# Patient Record
Sex: FEMALE | Race: AMERICAN INDIAN OR ALASKA NATIVE | Employment: UNEMPLOYED | URBAN - METROPOLITAN AREA
[De-identification: names, ages, dates, MRNs, and addresses within clinical notes are randomized per-mention and may not be internally consistent; named-entity substitution may affect disease eponyms.]

---

## 2017-01-02 ENCOUNTER — APPOINTMENT (OUTPATIENT)
Dept: PHYSICAL THERAPY | Facility: CLINIC | Age: 42
DRG: 441 | End: 2017-01-02
Payer: MEDICAID

## 2017-01-04 ENCOUNTER — APPOINTMENT (OUTPATIENT)
Dept: GENERAL RADIOLOGY | Facility: CLINIC | Age: 42
DRG: 441 | End: 2017-01-04
Attending: INTERNAL MEDICINE
Payer: MEDICAID

## 2017-01-04 PROCEDURE — 74000 XR ABDOMEN PORT F1 VW: CPT

## 2017-01-05 ENCOUNTER — APPOINTMENT (OUTPATIENT)
Dept: PHYSICAL THERAPY | Facility: CLINIC | Age: 42
DRG: 441 | End: 2017-01-05
Payer: MEDICAID

## 2017-01-06 ENCOUNTER — APPOINTMENT (OUTPATIENT)
Dept: OCCUPATIONAL THERAPY | Facility: CLINIC | Age: 42
DRG: 441 | End: 2017-01-06
Payer: MEDICAID

## 2017-01-07 ENCOUNTER — APPOINTMENT (OUTPATIENT)
Dept: OCCUPATIONAL THERAPY | Facility: CLINIC | Age: 42
DRG: 441 | End: 2017-01-07
Payer: MEDICAID

## 2017-01-08 ENCOUNTER — APPOINTMENT (OUTPATIENT)
Dept: OCCUPATIONAL THERAPY | Facility: CLINIC | Age: 42
DRG: 441 | End: 2017-01-08
Payer: MEDICAID

## 2017-01-09 ENCOUNTER — APPOINTMENT (OUTPATIENT)
Dept: OCCUPATIONAL THERAPY | Facility: CLINIC | Age: 42
DRG: 441 | End: 2017-01-09
Payer: MEDICAID

## 2017-01-09 ENCOUNTER — APPOINTMENT (OUTPATIENT)
Dept: PHYSICAL THERAPY | Facility: CLINIC | Age: 42
DRG: 441 | End: 2017-01-09
Payer: MEDICAID

## 2017-01-10 ENCOUNTER — APPOINTMENT (OUTPATIENT)
Dept: OCCUPATIONAL THERAPY | Facility: CLINIC | Age: 42
DRG: 441 | End: 2017-01-10
Payer: MEDICAID

## 2017-01-11 ENCOUNTER — APPOINTMENT (OUTPATIENT)
Dept: OCCUPATIONAL THERAPY | Facility: CLINIC | Age: 42
DRG: 441 | End: 2017-01-11
Payer: MEDICAID

## 2017-01-12 ENCOUNTER — APPOINTMENT (OUTPATIENT)
Dept: OCCUPATIONAL THERAPY | Facility: CLINIC | Age: 42
DRG: 441 | End: 2017-01-12
Payer: MEDICAID

## 2017-01-13 ENCOUNTER — APPOINTMENT (OUTPATIENT)
Dept: OCCUPATIONAL THERAPY | Facility: CLINIC | Age: 42
DRG: 441 | End: 2017-01-13
Payer: MEDICAID

## 2017-01-14 ENCOUNTER — APPOINTMENT (OUTPATIENT)
Dept: OCCUPATIONAL THERAPY | Facility: CLINIC | Age: 42
DRG: 441 | End: 2017-01-14
Payer: MEDICAID

## 2017-01-17 ENCOUNTER — APPOINTMENT (OUTPATIENT)
Dept: OCCUPATIONAL THERAPY | Facility: CLINIC | Age: 42
DRG: 441 | End: 2017-01-17
Payer: MEDICAID

## 2017-01-20 ENCOUNTER — APPOINTMENT (OUTPATIENT)
Dept: GENERAL RADIOLOGY | Facility: CLINIC | Age: 42
DRG: 441 | End: 2017-01-20
Attending: PEDIATRICS
Payer: MEDICAID

## 2017-01-20 PROCEDURE — 74020 XR ABDOMEN 2 VW: CPT

## 2017-01-20 PROCEDURE — 71020 XR CHEST 2 VW: CPT

## 2017-01-21 ENCOUNTER — APPOINTMENT (OUTPATIENT)
Dept: ULTRASOUND IMAGING | Facility: CLINIC | Age: 42
DRG: 441 | End: 2017-01-21
Attending: PEDIATRICS
Payer: MEDICAID

## 2017-01-21 PROCEDURE — 93970 EXTREMITY STUDY: CPT

## 2017-02-03 ENCOUNTER — TELEPHONE (OUTPATIENT)
Dept: FAMILY MEDICINE | Facility: CLINIC | Age: 42
End: 2017-02-03

## 2017-02-03 ENCOUNTER — CARE COORDINATION (OUTPATIENT)
Dept: CARDIOLOGY | Facility: CLINIC | Age: 42
End: 2017-02-03

## 2017-02-03 NOTE — PROGRESS NOTES
"[facility]  \"Hello, my name is Kassidy Pedroza , and I am calling from the [facility].  I want to check in and see how you are doing, after leaving the hospital.  You may also receive a call from your Care Coordinator (care team), but I want to make sure you don t have any urgent needs.  I have a couple questions to review with you:     Post-Discharge Outreach                                                    Mildred Rascon is a 41 year old female         Care Team:    Patient Care Team       Relationship Specialty Notifications Start End    Matthieu Kunz MD PCP - General Internal Medicine  9/25/12     Phone: [phone] Fax: [phone]         [facility] [address] [facility] [id number]    Heber Lou, RN Clinic Care Coordinator Nurse Admissions 11/15/16     Comment:  phone [phone]            Transition of Care Review                                                      Patient was called three times and no answer so post 24 hr DC follow up calls will be closed out               Plan                                                      Thanks for your time.  Your Care Coordinator may follow-up within the next couple days.  In the meantime if you have questions, concerns or problems call your care team.        Kassidy Pedroza      "

## 2017-02-03 NOTE — TELEPHONE ENCOUNTER
ED / Discharge Outreach Protocol    Patient Contact    Attempt # 1    Was call answered?  No.  Left message on voicemail with information to call me back.    Discharge Summary is incomplete.

## 2017-04-23 ENCOUNTER — APPOINTMENT (OUTPATIENT)
Dept: CT IMAGING | Facility: CLINIC | Age: 42
DRG: 442 | End: 2017-04-23
Attending: FAMILY MEDICINE
Payer: MEDICAID

## 2017-04-23 ENCOUNTER — HOSPITAL ENCOUNTER (INPATIENT)
Facility: CLINIC | Age: 42
LOS: 3 days | Discharge: HOME | DRG: 442 | End: 2017-04-26
Attending: FAMILY MEDICINE | Admitting: PEDIATRICS
Payer: MEDICAID

## 2017-04-23 DIAGNOSIS — R44.0 AUDITORY HALLUCINATIONS: ICD-10-CM

## 2017-04-23 DIAGNOSIS — E87.6 HYPOKALEMIA: ICD-10-CM

## 2017-04-23 DIAGNOSIS — R44.1 VISUAL HALLUCINATIONS: ICD-10-CM

## 2017-04-23 DIAGNOSIS — K70.30 ALCOHOLIC CIRRHOSIS OF LIVER WITHOUT ASCITES (H): ICD-10-CM

## 2017-04-23 DIAGNOSIS — K76.82 HEPATIC ENCEPHALOPATHY (H): ICD-10-CM

## 2017-04-23 LAB
ALBUMIN SERPL-MCNC: 3.1 G/DL (ref 3.4–5)
ALBUMIN UR-MCNC: NEGATIVE MG/DL
ALP SERPL-CCNC: 169 U/L (ref 40–150)
ALT SERPL W P-5'-P-CCNC: 22 U/L (ref 0–50)
AMMONIA PLAS-SCNC: 115 UMOL/L (ref 10–50)
AMPHETAMINES UR QL SCN: NORMAL
ANION GAP SERPL CALCULATED.3IONS-SCNC: 12 MMOL/L (ref 3–14)
APAP SERPL-MCNC: NORMAL MG/L (ref 10–20)
APPEARANCE UR: CLEAR
AST SERPL W P-5'-P-CCNC: 35 U/L (ref 0–45)
BARBITURATES UR QL: NORMAL
BASOPHILS # BLD AUTO: 0.1 10E9/L (ref 0–0.2)
BASOPHILS NFR BLD AUTO: 0.9 %
BENZODIAZ UR QL: NORMAL
BILIRUB SERPL-MCNC: 2 MG/DL (ref 0.2–1.3)
BILIRUB UR QL STRIP: NEGATIVE
BUN SERPL-MCNC: 5 MG/DL (ref 7–30)
CALCIUM SERPL-MCNC: 8.9 MG/DL (ref 8.5–10.1)
CANNABINOIDS UR QL SCN: NORMAL
CHLORIDE SERPL-SCNC: 113 MMOL/L (ref 94–109)
CO2 SERPL-SCNC: 20 MMOL/L (ref 20–32)
COCAINE UR QL: NORMAL
COLOR UR AUTO: YELLOW
CREAT SERPL-MCNC: 0.56 MG/DL (ref 0.52–1.04)
DIFFERENTIAL METHOD BLD: ABNORMAL
EOSINOPHIL # BLD AUTO: 0 10E9/L (ref 0–0.7)
EOSINOPHIL NFR BLD AUTO: 0.7 %
ERYTHROCYTE [DISTWIDTH] IN BLOOD BY AUTOMATED COUNT: 14.3 % (ref 10–15)
ETHANOL SERPL-MCNC: <0.01 G/DL
ETHANOL UR QL SCN: NORMAL
GFR SERPL CREATININE-BSD FRML MDRD: ABNORMAL ML/MIN/1.7M2
GLUCOSE SERPL-MCNC: 108 MG/DL (ref 70–99)
GLUCOSE UR STRIP-MCNC: NEGATIVE MG/DL
HCT VFR BLD AUTO: 38.5 % (ref 35–47)
HGB BLD-MCNC: 13.3 G/DL (ref 11.7–15.7)
HGB UR QL STRIP: NEGATIVE
IMM GRANULOCYTES # BLD: 0 10E9/L (ref 0–0.4)
IMM GRANULOCYTES NFR BLD: 0.2 %
INR PPP: 1.44 (ref 0.86–1.14)
KETONES UR STRIP-MCNC: NEGATIVE MG/DL
LEUKOCYTE ESTERASE UR QL STRIP: NEGATIVE
LYMPHOCYTES # BLD AUTO: 1.1 10E9/L (ref 0.8–5.3)
LYMPHOCYTES NFR BLD AUTO: 19.9 %
MAGNESIUM SERPL-MCNC: 1.7 MG/DL (ref 1.6–2.3)
MCH RBC QN AUTO: 31.7 PG (ref 26.5–33)
MCHC RBC AUTO-ENTMCNC: 34.5 G/DL (ref 31.5–36.5)
MCV RBC AUTO: 92 FL (ref 78–100)
MONOCYTES # BLD AUTO: 0.4 10E9/L (ref 0–1.3)
MONOCYTES NFR BLD AUTO: 7.2 %
NEUTROPHILS # BLD AUTO: 4.1 10E9/L (ref 1.6–8.3)
NEUTROPHILS NFR BLD AUTO: 71.1 %
NITRATE UR QL: NEGATIVE
NRBC # BLD AUTO: 0 10*3/UL
NRBC BLD AUTO-RTO: 0 /100
OPIATES UR QL SCN: NORMAL
PH UR STRIP: 7 PH (ref 5–7)
PLATELET # BLD AUTO: 68 10E9/L (ref 150–450)
POTASSIUM SERPL-SCNC: 3.2 MMOL/L (ref 3.4–5.3)
PROT SERPL-MCNC: 7.7 G/DL (ref 6.8–8.8)
RBC # BLD AUTO: 4.2 10E12/L (ref 3.8–5.2)
RBC #/AREA URNS AUTO: 1 /HPF (ref 0–2)
SALICYLATES SERPL-MCNC: NORMAL MG/DL
SODIUM SERPL-SCNC: 145 MMOL/L (ref 133–144)
SP GR UR STRIP: 1.01 (ref 1–1.03)
SQUAMOUS #/AREA URNS AUTO: 1 /HPF (ref 0–1)
URN SPEC COLLECT METH UR: ABNORMAL
UROBILINOGEN UR STRIP-MCNC: 8 MG/DL (ref 0–2)
WBC # BLD AUTO: 5.7 10E9/L (ref 4–11)
WBC #/AREA URNS AUTO: 4 /HPF (ref 0–2)

## 2017-04-23 PROCEDURE — 85025 COMPLETE CBC W/AUTO DIFF WBC: CPT | Performed by: FAMILY MEDICINE

## 2017-04-23 PROCEDURE — 25000132 ZZH RX MED GY IP 250 OP 250 PS 637: Performed by: PHYSICIAN ASSISTANT

## 2017-04-23 PROCEDURE — 80320 DRUG SCREEN QUANTALCOHOLS: CPT | Performed by: PHYSICIAN ASSISTANT

## 2017-04-23 PROCEDURE — 80329 ANALGESICS NON-OPIOID 1 OR 2: CPT | Performed by: FAMILY MEDICINE

## 2017-04-23 PROCEDURE — 70450 CT HEAD/BRAIN W/O DYE: CPT

## 2017-04-23 PROCEDURE — S5010 5% DEXTROSE AND 0.45% SALINE: HCPCS | Performed by: FAMILY MEDICINE

## 2017-04-23 PROCEDURE — 99285 EMERGENCY DEPT VISIT HI MDM: CPT | Mod: Z6 | Performed by: FAMILY MEDICINE

## 2017-04-23 PROCEDURE — 99223 1ST HOSP IP/OBS HIGH 75: CPT | Mod: AI | Performed by: PEDIATRICS

## 2017-04-23 PROCEDURE — 80053 COMPREHEN METABOLIC PANEL: CPT | Performed by: FAMILY MEDICINE

## 2017-04-23 PROCEDURE — 87040 BLOOD CULTURE FOR BACTERIA: CPT | Performed by: FAMILY MEDICINE

## 2017-04-23 PROCEDURE — 99207 ZZC APP CREDIT; MD BILLING SHARED VISIT: CPT | Performed by: PHYSICIAN ASSISTANT

## 2017-04-23 PROCEDURE — 80307 DRUG TEST PRSMV CHEM ANLYZR: CPT | Performed by: PHYSICIAN ASSISTANT

## 2017-04-23 PROCEDURE — 93005 ELECTROCARDIOGRAM TRACING: CPT

## 2017-04-23 PROCEDURE — 83735 ASSAY OF MAGNESIUM: CPT | Performed by: FAMILY MEDICINE

## 2017-04-23 PROCEDURE — 82140 ASSAY OF AMMONIA: CPT | Performed by: FAMILY MEDICINE

## 2017-04-23 PROCEDURE — 25800025 ZZH RX 258: Performed by: FAMILY MEDICINE

## 2017-04-23 PROCEDURE — 80320 DRUG SCREEN QUANTALCOHOLS: CPT | Performed by: FAMILY MEDICINE

## 2017-04-23 PROCEDURE — 81001 URINALYSIS AUTO W/SCOPE: CPT | Performed by: PHYSICIAN ASSISTANT

## 2017-04-23 PROCEDURE — 25000125 ZZHC RX 250: Performed by: FAMILY MEDICINE

## 2017-04-23 PROCEDURE — 96374 THER/PROPH/DIAG INJ IV PUSH: CPT

## 2017-04-23 PROCEDURE — 85610 PROTHROMBIN TIME: CPT | Performed by: FAMILY MEDICINE

## 2017-04-23 PROCEDURE — 12000001 ZZH R&B MED SURG/OB UMMC

## 2017-04-23 PROCEDURE — 99285 EMERGENCY DEPT VISIT HI MDM: CPT | Mod: 25

## 2017-04-23 RX ORDER — ZINC SULFATE 50(220)MG
220 CAPSULE ORAL 2 TIMES DAILY
COMMUNITY
End: 2017-08-09

## 2017-04-23 RX ORDER — FOLIC ACID 1 MG/1
1 TABLET ORAL DAILY
Status: DISCONTINUED | OUTPATIENT
Start: 2017-04-24 | End: 2017-04-26 | Stop reason: HOSPADM

## 2017-04-23 RX ORDER — POTASSIUM CHLORIDE 750 MG/1
20-40 TABLET, EXTENDED RELEASE ORAL
Status: DISCONTINUED | OUTPATIENT
Start: 2017-04-23 | End: 2017-04-26 | Stop reason: HOSPADM

## 2017-04-23 RX ORDER — POTASSIUM CHLORIDE 29.8 MG/ML
20 INJECTION INTRAVENOUS
Status: DISCONTINUED | OUTPATIENT
Start: 2017-04-23 | End: 2017-04-26 | Stop reason: HOSPADM

## 2017-04-23 RX ORDER — RISPERIDONE 2 MG/1
2 TABLET ORAL AT BEDTIME
Status: DISCONTINUED | OUTPATIENT
Start: 2017-04-23 | End: 2017-04-26 | Stop reason: HOSPADM

## 2017-04-23 RX ORDER — MULTIPLE VITAMINS W/ MINERALS TAB 9MG-400MCG
1 TAB ORAL DAILY
Status: DISCONTINUED | OUTPATIENT
Start: 2017-04-24 | End: 2017-04-26 | Stop reason: HOSPADM

## 2017-04-23 RX ORDER — POTASSIUM CHLORIDE 1.5 G/1.58G
20-40 POWDER, FOR SOLUTION ORAL
Status: DISCONTINUED | OUTPATIENT
Start: 2017-04-23 | End: 2017-04-26 | Stop reason: HOSPADM

## 2017-04-23 RX ORDER — SODIUM CHLORIDE 9 MG/ML
INJECTION, SOLUTION INTRAVENOUS
Status: DISCONTINUED
Start: 2017-04-23 | End: 2017-04-23 | Stop reason: HOSPADM

## 2017-04-23 RX ORDER — RISPERIDONE 1 MG/1
1 TABLET ORAL 2 TIMES DAILY PRN
Status: DISCONTINUED | OUTPATIENT
Start: 2017-04-23 | End: 2017-04-26 | Stop reason: HOSPADM

## 2017-04-23 RX ORDER — ONDANSETRON 2 MG/ML
4 INJECTION INTRAMUSCULAR; INTRAVENOUS EVERY 6 HOURS PRN
Status: DISCONTINUED | OUTPATIENT
Start: 2017-04-23 | End: 2017-04-26 | Stop reason: HOSPADM

## 2017-04-23 RX ORDER — ALBUTEROL SULFATE 90 UG/1
1 AEROSOL, METERED RESPIRATORY (INHALATION) EVERY 4 HOURS PRN
Status: DISCONTINUED | OUTPATIENT
Start: 2017-04-23 | End: 2017-04-26 | Stop reason: HOSPADM

## 2017-04-23 RX ORDER — MULTIPLE VITAMINS W/ MINERALS TAB 9MG-400MCG
1 TAB ORAL DAILY
Status: ON HOLD | COMMUNITY
End: 2018-01-02

## 2017-04-23 RX ORDER — LACTULOSE 10 G/15ML
100 SOLUTION ORAL
Status: DISCONTINUED | OUTPATIENT
Start: 2017-04-23 | End: 2017-04-26 | Stop reason: HOSPADM

## 2017-04-23 RX ORDER — FOLIC ACID 1 MG/1
1 TABLET ORAL DAILY
Status: DISCONTINUED | OUTPATIENT
Start: 2017-04-23 | End: 2017-04-23

## 2017-04-23 RX ORDER — LANOLIN ALCOHOL/MO/W.PET/CERES
100 CREAM (GRAM) TOPICAL DAILY
Status: DISCONTINUED | OUTPATIENT
Start: 2017-04-24 | End: 2017-04-26 | Stop reason: HOSPADM

## 2017-04-23 RX ORDER — ZINC SULFATE 50(220)MG
220 CAPSULE ORAL 2 TIMES DAILY
Status: DISCONTINUED | OUTPATIENT
Start: 2017-04-23 | End: 2017-04-26 | Stop reason: HOSPADM

## 2017-04-23 RX ORDER — POTASSIUM CHLORIDE 7.45 MG/ML
10 INJECTION INTRAVENOUS
Status: DISCONTINUED | OUTPATIENT
Start: 2017-04-23 | End: 2017-04-26 | Stop reason: HOSPADM

## 2017-04-23 RX ORDER — LACTULOSE 10 G/15ML
20 SOLUTION ORAL
Status: DISCONTINUED | OUTPATIENT
Start: 2017-04-23 | End: 2017-04-26 | Stop reason: HOSPADM

## 2017-04-23 RX ORDER — NALOXONE HYDROCHLORIDE 0.4 MG/ML
.1-.4 INJECTION, SOLUTION INTRAMUSCULAR; INTRAVENOUS; SUBCUTANEOUS
Status: DISCONTINUED | OUTPATIENT
Start: 2017-04-23 | End: 2017-04-26 | Stop reason: HOSPADM

## 2017-04-23 RX ORDER — MULTIPLE VITAMINS W/ MINERALS TAB 9MG-400MCG
1 TAB ORAL DAILY
Status: DISCONTINUED | OUTPATIENT
Start: 2017-04-23 | End: 2017-04-23

## 2017-04-23 RX ORDER — POTASSIUM CL/LIDO/0.9 % NACL 10MEQ/0.1L
10 INTRAVENOUS SOLUTION, PIGGYBACK (ML) INTRAVENOUS
Status: DISCONTINUED | OUTPATIENT
Start: 2017-04-23 | End: 2017-04-26 | Stop reason: HOSPADM

## 2017-04-23 RX ORDER — ONDANSETRON 4 MG/1
4 TABLET, ORALLY DISINTEGRATING ORAL EVERY 6 HOURS PRN
Status: DISCONTINUED | OUTPATIENT
Start: 2017-04-23 | End: 2017-04-26 | Stop reason: HOSPADM

## 2017-04-23 RX ORDER — MAGNESIUM SULFATE HEPTAHYDRATE 40 MG/ML
4 INJECTION, SOLUTION INTRAVENOUS EVERY 4 HOURS PRN
Status: DISCONTINUED | OUTPATIENT
Start: 2017-04-23 | End: 2017-04-26 | Stop reason: HOSPADM

## 2017-04-23 RX ADMIN — PANTOPRAZOLE SODIUM 40 MG: 40 TABLET, DELAYED RELEASE ORAL at 20:07

## 2017-04-23 RX ADMIN — ZINC SULFATE CAP 220 MG (50 MG ELEMENTAL ZN) 220 MG: 220 (50 ZN) CAP at 20:08

## 2017-04-23 RX ADMIN — POTASSIUM CHLORIDE 40 MEQ: 1.5 POWDER, FOR SOLUTION ORAL at 20:07

## 2017-04-23 RX ADMIN — RIFAXIMIN 550 MG: 550 TABLET ORAL at 20:08

## 2017-04-23 RX ADMIN — RISPERIDONE 2 MG: 2 TABLET ORAL at 22:09

## 2017-04-23 RX ADMIN — LACTULOSE 20 G: 20 SOLUTION ORAL at 20:07

## 2017-04-23 RX ADMIN — LACTULOSE 20 G: 20 SOLUTION ORAL at 22:09

## 2017-04-23 RX ADMIN — FOLIC ACID: 5 INJECTION, SOLUTION INTRAMUSCULAR; INTRAVENOUS; SUBCUTANEOUS at 13:38

## 2017-04-23 RX ADMIN — POTASSIUM CHLORIDE 20 MEQ: 750 TABLET, EXTENDED RELEASE ORAL at 22:09

## 2017-04-23 ASSESSMENT — ACTIVITIES OF DAILY LIVING (ADL)
TRANSFERRING: 0-->INDEPENDENT
COGNITION: 2 - DIFFICULTY WITH ORGANIZING THOUGHTS
TOILETING: 0-->INDEPENDENT
DRESS: 0-->INDEPENDENT
WHICH_OF_THE_ABOVE_FUNCTIONAL_RISKS_HAD_A_RECENT_ONSET_OR_CHANGE?: AMBULATION;TRANSFERRING;TOILETING;BATHING;DRESSING;COGNITION
FALL_HISTORY_WITHIN_LAST_SIX_MONTHS: NO
RETIRED_COMMUNICATION: 0-->UNDERSTANDS/COMMUNICATES WITHOUT DIFFICULTY
BATHING: 0-->INDEPENDENT
SWALLOWING: 0-->SWALLOWS FOODS/LIQUIDS WITHOUT DIFFICULTY
AMBULATION: 0-->INDEPENDENT
RETIRED_EATING: 0-->INDEPENDENT

## 2017-04-23 ASSESSMENT — ENCOUNTER SYMPTOMS
DIFFICULTY URINATING: 0
FEVER: 0
SHORTNESS OF BREATH: 0
CONFUSION: 1
HALLUCINATIONS: 1
ABDOMINAL PAIN: 0
DYSURIA: 0

## 2017-04-23 ASSESSMENT — PAIN DESCRIPTION - DESCRIPTORS: DESCRIPTORS: ACHING

## 2017-04-23 NOTE — PROGRESS NOTES
Pt arrived to floor from [place] ED. Pt able to stand ambulate to bed, standing wt taken. Pt continuing to have visual and audible hallucinations- pt hallucinations involve seeing Franklin her  standing behind me but he is wearing a mask and invisible suit, this explains why I cannot see Franklin. Pt talking to Franklin frequently and visually getting angry because he refuses to take of mask and suit so I can see him.  Attendant will be bedside. Admission started but unable to obtain accurate information at times due to pt's hallucinations. CAll light within reach, attendant present- Admitting Md arrived to assess pt.

## 2017-04-23 NOTE — ED PROVIDER NOTES
History     Chief Complaint   Patient presents with     Hallucinations     police brought pt in cooeratively after she was seen on street corner waving arms and talking to self. pt believes she is getting  today      HPI  Mildred Rascon is a 42 year old female with a history of alcoholic cirrhosis c/b previous hepatic encephalopathy, cholelithiasis, asthma, MDD, narcotic abuse and seizures who presents via EMS for evaluation of hallucinations. Of note, the patient was seen at [facility] ER 2 days ago for evaluation of confusion. She was found hyperammonemic (to 148  mol/L) in the setting of missed lactulose doses. She was discharged with a prescription for this. The patient is brought in today by EMS when she was found waving her arms and talking to herself, with hallucinations. The patient reports that she she saw someone she knew so was calling out and waving to them, but no one was around according to the police. She also reports that she got  today but is in the process of finalizing it.  Then the patient stated she was  yesterday and there was going to be another ceremony today.  She could not tell me the day of the week, but knew the date.  She thought she was in the psychiatric unit in the hospital.  She is reporting that her boyfriend/ is sitting next to me in the room and he was walking around in the hallway outside, yet there was no one in the room with me or in the hallway.  She was seeing glittering shirts on people and thinking invisible people were also in the room.  The patient reports that she did not get a chance to take her lactulose today and reports she had a dose last evening.  She denies she is taking any illicit substances and had no alcohol the last 2 days. She states she's urinating okay and having loose stools from the lactulose.    I have reviewed the Medications, Allergies, Past Medical and Surgical History, and Social History in the Epic  system.    Current Facility-Administered Medications   Medication     naloxone (NARCAN) injection 0.1-0.4 mg     ondansetron (ZOFRAN-ODT) ODT tab 4 mg    Or     ondansetron (ZOFRAN) injection 4 mg     albuterol (PROAIR HFA/PROVENTIL HFA/VENTOLIN HFA) Inhaler 1 puff     pantoprazole (PROTONIX) suspension 40 mg     rifaximin (XIFAXAN) tablet 550 mg     risperiDONE (risperDAL) tablet 1 mg     risperiDONE (risperDAL) tablet 2 mg     zinc sulfate (ZINCATE) capsule 220 mg     Daily 2 GRAM acetaminophen limit, unless fulminent liver failure or transaminases greater than or equal to 300 - 400, then none     lactulose (CHRONULAC) solution 20 g    Or     lactulose (CHRONULAC) solution for enema prep 100 g     potassium chloride SA (K-DUR/KLOR-CON M) CR tablet 20-40 mEq     potassium chloride (KLOR-CON) Packet 20-40 mEq     potassium chloride 10 mEq in 100 mL intermittent infusion     potassium chloride 10 mEq in 100 mL intermittent infusion with 10 mg lidocaine     potassium chloride 20 mEq in 50 mL intermittent infusion     magnesium sulfate 4 g in 100 mL sterile water (premade)     folic acid (FOLVITE) tablet 1 mg     multivitamin, therapeutic with minerals (THERA-VIT-M) tablet 1 tablet     thiamine tablet 100 mg     Past Medical History:   Diagnosis Date     Anxiety      Arthritis      Depression      Liver disease      Substance abuse        Past Surgical History:   Procedure Laterality Date     APPENDECTOMY       C  DELIVERY ONLY  ,      CHOLECYSTECTOMY       TUBAL LIGATION         Family History   Problem Relation Age of Onset     Liver Cancer Mother      Alcoholism Mother      Lung Cancer Father      Myocardial Infarction Father      Myocardial Infarction Brother      Myocardial Infarction Brother        Social History   Substance Use Topics     Smoking status: Current Every Day Smoker     Years: 7.00     Types: Cigarettes     Smokeless tobacco: Never Used      Comment: cutting back     Alcohol use  Yes      Comment: last drink was a couple months a go        Allergies   Allergen Reactions     Acetaminophen      Ambien [Zolpidem Tartrate] Other (See Comments)     Sleep walks and eats     Ciprofloxacin Other (See Comments)     Seizure.     Citalopram Nausea and Vomiting       Review of Systems   Constitutional: Negative for fever.   HENT: Negative for congestion.    Eyes: Negative for redness.   Respiratory: Negative for shortness of breath.    Cardiovascular: Negative for chest pain.   Gastrointestinal: Negative for abdominal pain.   Genitourinary: Negative for difficulty urinating and dysuria.   Musculoskeletal: Negative for arthralgias and neck stiffness.   Skin: Negative for color change.   Neurological: Negative for headaches.   Psychiatric/Behavioral: Positive for confusion and hallucinations.   All other systems reviewed and are negative.      Physical Exam      Physical Exam   Constitutional: No distress.   HENT:   Head: Normocephalic and atraumatic.   Right Ear: External ear normal.   Left Ear: External ear normal.   Mouth/Throat: Oropharynx is clear and moist. No oropharyngeal exudate.   Eyes: Conjunctivae and EOM are normal. Pupils are equal, round, and reactive to light. No scleral icterus.   Neck: Normal range of motion. Neck supple. No thyromegaly present.   Cardiovascular: Normal rate, regular rhythm, normal heart sounds and intact distal pulses.    Pulmonary/Chest: Effort normal and breath sounds normal. No respiratory distress.   Abdominal: Soft. Bowel sounds are normal. There is hepatomegaly. There is no tenderness.   No ascites   Musculoskeletal: She exhibits no edema or tenderness.   Lymphadenopathy:     She has no cervical adenopathy.   Neurological: She is alert. She has normal strength and normal reflexes. She is disoriented (place and time). No cranial nerve deficit or sensory deficit. She displays a negative Romberg sign.   Skin: Skin is warm. No rash noted. She is not diaphoretic.    Psychiatric: Her speech is normal. Her affect is inappropriate. She is slowed and actively hallucinating. Thought content is delusional. Thought content is not paranoid. Cognition and memory are impaired. She expresses inappropriate judgment. She does not express impulsivity. She expresses no homicidal and no suicidal ideation. She expresses no suicidal plans and no homicidal plans. She exhibits abnormal recent memory.   Patient was actively hallucinating.  She was seeing her  in the room when no one else was there and talking to people not even present in the room or hallway.  She was seeing glittering lights on people's shirts and clothes.  She was delusional thinking she was going to be  today, then said she was  yesterday, then she changed her story saying she was going to get  tomorrow.       ED Course     ED Course     Procedures             EKG Interpretation:      Interpreted by Ant Andino  Time reviewed: admission  Symptoms at time of EKG: AMS   Rhythm: normal sinus   Rate: normal  Axis: normal  Ectopy: none  Conduction: normal  ST Segments/ T Waves: No ST-T wave changes  Q Waves: none  Comparison to prior: No old EKG available    Clinical Impression: normal EKG          Critical Care time:  none               Labs Ordered and Resulted from Time of ED Arrival Up to the Time of Departure from the ED   CBC WITH PLATELETS DIFFERENTIAL - Abnormal; Notable for the following:        Result Value    Platelet Count 68 (*)     All other components within normal limits   COMPREHENSIVE METABOLIC PANEL - Abnormal; Notable for the following:     Sodium 145 (*)     Potassium 3.2 (*)     Chloride 113 (*)     Glucose 108 (*)     Urea Nitrogen 5 (*)     Bilirubin Total 2.0 (*)     Albumin 3.1 (*)     Alkaline Phosphatase 169 (*)     All other components within normal limits   AMMONIA - Abnormal; Notable for the following:     Ammonia 115 (*)     All other components within normal limits    INR - Abnormal; Notable for the following:     INR 1.44 (*)     All other components within normal limits   ALCOHOL ETHYL   ACETAMINOPHEN LEVEL   SALICYLATE LEVEL   MAGNESIUM   ACETAMINOPHEN       Assessments & Plan (with Medical Decision Making)  1.  Hepatic Encephalopathy   2.  Visual and Auditory Hallucinations   3.  Neurocognitive Disorder Secondary to Chronic Alcohol Use   4.  Alcoholic Cirrhosis   5.  Chronic Pain    This patient is a 41-year-old female with a history of alcoholic cirrhosis, hepatic encephalopathy, cholelithiasis, asthma, MDD, narcotic abuse and seizures who presents with increased auditory visual hallucinations.  The patient was picked up by police as she was waving her arms on a street corner and talking to people (not present in the area) that she was going to get .  While here in the ER the patient continually thought that her boyfriend was in the room with me or walking in the hallways when no one else was present.  She was also continuing to have auditory hallucinations as well frequently talking with people not present in the room.  The patient s labs show that her ammonia level is 115.  Looking through chart review, we did find that she was recently at [facility] with an ammonia level of 145 2 days ago and was having hallucinations then, but discharged from their facility.  The patient was very noncompliant with her medications in the past and has continued to drink alcohol in the past as well.  Her alcohol level was less than 0.1.  Chemistry showed that she was slightly hypokalemic at 3.2, sodium was 145.  Patient was given a banana bag with thiamine.  I am worried about whether or not this patient could have some component of Wernicke s encephalopathy also on top of all of this.  The patient s CBC showed a normal white count, a stable hemoglobin at 13.3. The patient s platelets were noted to be suppressed as before.  Her acetaminophen and salicylate levels were  negative.  A U-tox is still pending.  INR was slightly elevated at 1.44 but not significantly off of her baseline.  Head scan was performed because of her altered mental status and this does not show any acute intracranial abnormality.  I suspect that her hepatic encephalopathy in addition to drinking and also her neurocognitive delay has triggered some of these hallucinations.  The patient will most likely need to get her ammonia level down to normal to see if these symptoms improve.  Uncertain if a psychiatric component is part of the problem as well.  I spoke to the attending Medicine physician regarding the patient s clinical exam findings and lab and x-ray results and the need for admission to the hospital.  The patient is not completely oriented to time but she is to person and somewhat to place.  The patient was agreeable to coming in, although initially was declining admission.  She was placed on a 72 hour hold as I felt she was unsafe to be discharged on her own given her delusional AMS.       I have reviewed the nursing notes.    I have reviewed the findings, diagnosis, plan and need for follow up with the patient.  This part of the document was transcribed by Andrei Mesa for Ant Andino MD.    Current Discharge Medication List          Final diagnoses:   Hepatic encephalopathy (H)   Hypokalemia   Alcoholic cirrhosis of liver without ascites (H)   Visual hallucinations   Auditory hallucinations     IAndrei, am serving as a trained medical scribe to document services personally performed by Ant Andino MD, based on the provider's statements to me.      Ant KAISER MD, was physically present and have reviewed and verified the accuracy of this note documented by Andrei Mesa.    4/23/2017   [facility], EMERGENCY DEPARTMENT     Ant Andino MD  04/24/17 0958

## 2017-04-23 NOTE — PHARMACY-ADMISSION MEDICATION HISTORY
Admission Medication History status for the 4/23/2017 admission is complete.  See EPIC admission navigator for Prior to Admission medications.    Medication history sources:  [facility] [place] [place] [facility]    Medication history source reliability: Poor; patient doesn't know her medications and was very confused. Per CareEverywhere, she was last hospitalized at [facility] [place] [place] 2/6- 2/11 and has home care through [facility] [place] [place]. I updated her medication list based on her active medications at [facility] [place] [place], although it is unclear when she last took her medications.     Medication adherence:  Unknown; patient is unable to tell me anything about her medications or when she last took them. She states her fiance, Franklin, manages her medications and knows when she last took them, but her nurse reports [facility] does not exist.    Changes made to PTA medication list (reason)  Added:   - Risperidone 2 mg po qHS per [facility] [place] [place] records. Note the patient was discharged from our hospital on 2/2 on olanzapine and haloperidol, but the psychiatrist at [facility] [place] [place] changed her to risperidone to reduce anticholinergic adverse effects. He had prescribed haloperidol PRN, but patient was not discharged with it.  - Risperidone 1 mg po BID PRN agitation per [facility] [place] [place] records  - Zinc sulfate 220 mg po BID per [facility] [place] [place] records  - Folic acid 1 mg po daily per [facility] [place] [place] records  Deleted:   - Acetaminophen, docusate, ondansetron; were not listed as active medications in [facility] [place] [place] records  - Haloperidol, olanzapine; these medications were discontinued/not given while patient was admitted at [facility] [place] [place] and were not ordered at discharge  - Nystatin; not listed on [facility] [place] [place] records  - Phytonadione; not listed on [facility] [place] [place] records  Changed:   - Multivitamin 1 tablet po daily per [facility] [place] [place] records (updated formulation)    Additional medication history information (including reliability of information, actions taken by pharmacist):   - Patient was not reliable historian and there was no one with patient to get information  regarding her medications. It appears she was getting most of her care at [facility], so I updated her medication list to reflect her active medications from [facility]. It is unclear when the patient last took her medications.   - Per Care Everywhere, the patient was seen at [facility] 2 days ago and prescribed lactulose 20 g po QID. The patient was unable to tell me when she last took lactulose, but did state she thought she needed a dose now.  - Per Care Everywhere, her last hospital admission was 2/6 - 2/11 at [facility]. She has been followed by an [facility] home nurse, but it appears several of her recent visits have not occurred.     Time spent in this activity: 45 minutes    Medication history completed by: Fern Little PharmD    Prior to Admission medications    Medication Sig Last Dose Taking? Auth Provider   multivitamin, therapeutic with minerals (MULTI-VITAMIN) TABS tablet Take 1 tablet by mouth daily Unknown  Unknown, Entered By History   zinc sulfate (ZINCATE) 220 (50 ZN) MG capsule Take 220 mg by mouth 2 times daily Unknown  Unknown, Entered By History   RisperiDONE (RISPERDAL PO) Take 1 mg by mouth 2 times daily as needed (agitation) Unknown  Unknown, Entered By History   RISPERIDONE PO Take 2 mg by mouth At Bedtime Unknown  Unknown, Entered By History   FOLIC ACID PO Take 1 mg by mouth daily Unknown  Unknown, Entered By History   melatonin 3 MG tablet Take 1 tablet (3 mg) by mouth nightly as needed Unknown  Navneet Verma MD   lactulose (CHRONULAC) 10 GM/15ML solution Take 30 mLs (20 g) by mouth 4 times daily Unknown  Navneet Verma MD   pantoprazole (PROTONIX) 2 mg/mL Take 20 mLs (40 mg) by mouth daily Unknown  Navneet Verma MD   thiamine 100 MG tablet Take 1 tablet (100 mg) by mouth daily Unknown  Navneet Verma MD   MEDICATION INSTRUCTION Daily 2 gram acetaminophen limit from all sources.   Navneet Verma MD   albuterol (PROAIR HFA, PROVENTIL HFA, VENTOLIN  HFA) 108 (90 BASE) MCG/ACT inhaler Use 1-2 puffs every 4 to 6 hours as needed Unknown  Navneet Verma MD   rifaximin (XIFAXAN) 550 MG TABS Take 1 tablet (550 mg) by mouth 2 times daily Unknown  Navneet Verma MD   FERROUS GLUCONATE PO Take 324 mg by mouth 2 times daily (with meals) Unknown  Reported, Patient

## 2017-04-23 NOTE — PLAN OF CARE
Problem: Goal Outcome Summary  Goal: Goal Outcome Summary  Outcome: No Change  Pt admitted 1730 to floor from [place] ED. VSS, 72 Hr hold in place. pt experiencing hallucinations both Visual and Audible. Pt able to answer questions for admission but not all- Pt getting frustrated with Franklin- whom is the focal point of the hallucinations. Pt able to deescalate with less questions being asked and providing time with little stimulation.  Skin assessment was not able to be completed due to pt refusing. PIV in Right forearm- NS running at TKO.  STanding wt completed. Attendant at bedside. Urine needed for labs- staff aware. Calls light within reach, will continue to follow POC/monitor.

## 2017-04-23 NOTE — ED NOTES
ED to Floor Handoff      S:  Mildred Rascon is a 42 year old female who speaks English and lives unknown,  home status is unknown  They arrived in the ED by police car with a complaint of Hallucinations (police brought pt in cooeratively after she was seen on street corner waving arms and talking to self. pt believes she is getting  today )    Initial vitals were:   BP: 142/82  Pulse: 112  Temp: 97.9  F (36.6  C)  Resp: 16  Weight: 92.5 kg (204 lb)  SpO2: 97 %  Allergies:   Allergies   Allergen Reactions     Acetaminophen      Ambien [Zolpidem Tartrate] Other (See Comments)     Sleep walks and eats     Ciprofloxacin Other (See Comments)     Seizure.     Citalopram Nausea and Vomiting   .  The meds given in the ED and their home medications are:   Current Facility-Administered Medications   Medication     NaCl 0.9 % infusion     Current Outpatient Prescriptions   Medication     multivitamin, therapeutic with minerals (MULTI-VITAMIN) TABS tablet     zinc sulfate (ZINCATE) 220 (50 ZN) MG capsule     RisperiDONE (RISPERDAL PO)     RISPERIDONE PO     FOLIC ACID PO     melatonin 3 MG tablet     lactulose (CHRONULAC) 10 GM/15ML solution     pantoprazole (PROTONIX) 2 mg/mL     thiamine 100 MG tablet     MEDICATION INSTRUCTION     albuterol (PROAIR HFA, PROVENTIL HFA, VENTOLIN HFA) 108 (90 BASE) MCG/ACT inhaler     rifaximin (XIFAXAN) 550 MG TABS     FERROUS GLUCONATE PO     Social demographics are   Social History     Social History     Marital status:      Spouse name: N/A     Number of children: N/A     Years of education: N/A     Social History Main Topics     Smoking status: Current Every Day Smoker     Years: 7.00     Types: Cigarettes     Smokeless tobacco: Never Used      Comment: cutting back     Alcohol use Yes      Comment: last drink was a couple months a go     Drug use: Yes     Special: Marijuana     Sexual activity: Yes     Partners: Male     Birth control/ protection: Surgical     Other  Topics Concern     Parent/Sibling W/ Cabg, Mi Or Angioplasty Before 65f 55m? No     Social History Narrative       B:   The patient has been ill for 2 day(s).  In the ED was diagnosed with   Final diagnoses:   Hepatic encephalopathy (H)    Infection/sepsis suspected:No Isolation type; No active isolations   A:   In the ED these meds were given:   Medications   NaCl 0.9 % infusion (not administered)   dextrose 5% and 0.45% NaCl 1,000 mL with multivitamin-ADULT (INFUVITE) 10 mL, thiamine 100 mg, folic acid 1 mg infusion ( Intravenous New Bag 4/23/17 1338)     Drips running?  Yes  Labs results   Labs Ordered and Resulted from Time of ED Arrival Up to the Time of Departure from the ED   CBC WITH PLATELETS DIFFERENTIAL - Abnormal; Notable for the following:        Result Value    Platelet Count 68 (*)     All other components within normal limits   COMPREHENSIVE METABOLIC PANEL - Abnormal; Notable for the following:     Sodium 145 (*)     Potassium 3.2 (*)     Chloride 113 (*)     Glucose 108 (*)     Urea Nitrogen 5 (*)     Bilirubin Total 2.0 (*)     Albumin 3.1 (*)     Alkaline Phosphatase 169 (*)     All other components within normal limits   AMMONIA - Abnormal; Notable for the following:     Ammonia 115 (*)     All other components within normal limits   INR - Abnormal; Notable for the following:     INR 1.44 (*)     All other components within normal limits   ALCOHOL ETHYL   ACETAMINOPHEN LEVEL   SALICYLATE LEVEL   MAGNESIUM   ACETAMINOPHEN   DRUG ABUSE SCREEN 6 CHEM DEP URINE ([facility])   ROUTINE UA WITH MICROSCOPIC REFLEX TO CULTURE   BLOOD CULTURE     Imaging Studies:   Recent Results (from the past 24 hour(s))   Head CT w/o contrast    Narrative    CT HEAD WITHOUT CONTRAST  4/23/2017 2:01 PM    HISTORY: Altered mental status without injury.    TECHNIQUE: Thin section axial images without contrast. Radiation dose  for this scan was reduced using automated exposure control, adjustment  of the mA and/or kV according  to patient size, or iterative  reconstruction technique.    COMPARISON: 12/11/2016.    FINDINGS: The ventricles are normal in size, shape and configuration.  The brain parenchyma and subarachnoid spaces are normal with exception  of a minimal focus of subcortical encephalomalacia malacia in the  anterior right frontal lobe which is stable. There is no evidence for  intracranial hemorrhage or mass effects. There is no noncontrast CT  evidence of an acute infarct. No calvarial abnormalities. Visualized  paranasal sinuses are clear.      Impression    IMPRESSION: No acute intracranial abnormality.     DOUGLAS CAREY MD     Recent vital signs /82  Pulse 112  Temp 97.9  F (36.6  C) (Oral)  Resp 16  Wt 92.5 kg (204 lb)  SpO2 97%  BMI 32.93 kg/m2  Cardiac Rhythm: ,      Abnormal labs/tests/findings requiring intervention:---  Pain control: pt had none  Nausea control: pt had none  R:   Transfer assistance needed: Independent  Family present during ED course? No   Family currently present? No  Pt needs tele? No  Code Status: Full Code  Tasks needing to be completed:---    Mansi Brannon  [facility]--   [phone] [place] ED  [phone] [facility] ED

## 2017-04-23 NOTE — IP AVS SNAPSHOT
MRN:[id number]                      After Visit Summary   4/23/2017    Mildred Rascon    MRN: [id number]           Thank you!     Thank you for choosing [facility] for your care. Our goal is always to provide you with excellent care. Hearing back from our patients is one way we can continue to improve our services. Please take a few minutes to complete the written survey that you may receive in the mail after you visit with us. Thank you!        Patient Information     Date Of Birth          1975        Designated Caregiver       Most Recent Value    Caregiver    Will someone help with your care after discharge? no [JOSE]      About your hospital stay     You were admitted on:  April 23, 2017 You last received care in the:  [address] [facility]    You were discharged on:  April 26, 2017        Reason for your hospital stay       You were admitted because you were confused likely because you were not taking your medication , ie lactulose                  Who to Call     For medical emergencies, please call 911.  For non-urgent questions about your medical care, please call your primary care provider or clinic, [phone]          Attending Provider     Provider Specialty    Ant Andino MD Emergency Medicine    Jeremy Dowd MD Internal Medicine       Primary Care Provider Office Phone # Fax #    Ant Cavazos [phone]       [facility] [address] [facility] [address]        After Care Instructions     Activity       As tolerated            Diet       As tolerated            Monitor and record       - If family/friends note that you appear slightly confused they should make sure that you take your lactulose (or rifaximin) right away. This will help clear the confusion.    - Friends/family can have you put your arms up like you are stopping traffic. If your hands are shaking or bending at the wrist this is a sign the toxins are starting to  build up and also a reason to make sure you take a dose of lactulose (or rifamaxin) right away.    - Record every day in a chart when you have a bowel movement and take your lactulose (or rifamaxin). Your family/friends should look at your chart and make sure you haven't missed any doses and that you are having enough bowel movements.  1. Each day there should be 3 boxes for bowel movements.  2. Each day there should be 2 boxes for rifaximin.   3. Each day there should be 4 boxes for lactulose.  If you are behind on any doses or bowel movements family/friends should have you take a dose of medication and call you every 2 hours until you are no longer confused.                  Follow-up Appointments     Follow Up and recommended labs and tests       PCP in one week for post hospital dc follow up                  Additional Services     Medication Therapy Management Referral       Reason for referral:  adherence to medication regimen and takes rifaxamin or lactulose     This service is designed to help you get the most from your medications.  A specially trained pharmacist will work closely with you and your doctors  to solve any problems related to your medications and to help you get the   best results from taking them.      The Medication Therapy Management staff will call you to schedule an appointment.                  Further instructions from your care team       We have scheduled an appt for you on Tuesday 5/2 at 12:00 pm with your Primary Care Provider, Dr. Ant Cavazos. Please bring your insurance card, ID, and these discharge papers with you to this appointment.  [facility]  [address] [place] [address]  Phone #: [phone]      Pending Results     Date and Time Order Name Status Description    4/26/2017 0315 EKG 12-lead, tracing only Preliminary     4/23/2017 1230 Blood culture ONE site Preliminary             Statement of Approval     Ordered          04/25/17 3742   "I have reviewed and agree with all the recommendations and orders detailed in this document.  EFFECTIVE NOW     Approved and electronically signed by:  Noelle Felix MD             Admission Information     Date & Time Provider Department Dept. Phone    2017 Jeremy Dowd MD [address] [facility] [phone]      Your Vitals Were     Blood Pressure Pulse Temperature Respirations Height Weight    107/58 (BP Location: Left arm) 98 97.4  F (36.3  C) (Oral) 16 1.676 m (5' 6\") 96.3 kg (212 lb 3.2 oz)    Pulse Oximetry BMI (Body Mass Index)                98% 34.25 kg/m2          MyChart Information     [employer] lets you send messages to your doctor, view your test results, renew your prescriptions, schedule appointments and more. To sign up, go to [email or web address] . Click on \"Log in\" on the left side of the screen, which will take you to the Welcome page. Then click on \"Sign up Now\" on the right side of the page.     You will be asked to enter the access code listed below, as well as some personal information. Please follow the directions to create your username and password.     Your access code is: [email or web address]  Expires: 2017  3:23 PM     Your access code will  in 90 days. If you need help or a new code, please call your [facility] clinic or [phone].        Care EveryWhere ID     This is your Care EveryWhere ID. This could be used by other organizations to access your [facility] medical records  [id number]           Review of your medicines      CONTINUE these medicines which may have CHANGED, or have new prescriptions. If we are uncertain of the size of tablets/capsules you have at home, strength may be listed as something that might have changed.        Dose / Directions    RISPERDAL PO   This may have changed:  Another medication with the same name was removed. Continue taking this medication, and follow the directions you see here.        Dose:  1 mg   Take 1 mg by mouth 2 times " daily as needed (agitation)   Refills:  0         CONTINUE these medicines which have NOT CHANGED        Dose / Directions    albuterol 108 (90 BASE) MCG/ACT Inhaler   Commonly known as:  PROAIR HFA/PROVENTIL HFA/VENTOLIN HFA   Used for:  Mild intermittent asthma without complication        Use 1-2 puffs every 4 to 6 hours as needed   Quantity:  1 Inhaler   Refills:  0       FERROUS GLUCONATE PO   Indication:  Anemia From Inadequate Iron in the Body        Dose:  324 mg   Take 324 mg by mouth 2 times daily (with meals)   Refills:  0       FOLIC ACID PO        Dose:  1 mg   Take 1 mg by mouth daily   Refills:  0       lactulose 10 GM/15ML solution   Commonly known as:  CHRONULAC   Used for:  Hepatic encephalopathy (H)        Dose:  20 g   Take 30 mLs (20 g) by mouth 4 times daily   Quantity:  3600 mL   Refills:  3       MEDICATION INSTRUCTION   Used for:  Alcoholic liver disease (H)        Daily 2 gram acetaminophen limit from all sources.   Refills:  0       Multi-vitamin Tabs tablet        Dose:  1 tablet   Take 1 tablet by mouth daily   Refills:  0       pantoprazole Susp suspension   Commonly known as:  PROTONIX   Used for:  Alcoholic liver disease (H)        Dose:  40 mg   Take 20 mLs (40 mg) by mouth daily   Refills:  0       rifaximin 550 MG Tabs tablet   Commonly known as:  XIFAXAN   Indication:  Intestinal bacteria reduction in the treament of Hepatic Encephalopathy.   Used for:  Alcoholic cirrhosis of liver without ascites (H)        Dose:  550 mg   Take 1 tablet (550 mg) by mouth 2 times daily   Quantity:  60 tablet   Refills:  3       thiamine 100 MG tablet   Used for:  Alcoholic liver disease (H)        Dose:  100 mg   Take 1 tablet (100 mg) by mouth daily   Refills:  0       zinc sulfate 220 (50 ZN) MG capsule   Commonly known as:  ZINCATE        Dose:  220 mg   Take 220 mg by mouth 2 times daily   Refills:  0         STOP taking     melatonin 3 MG tablet                    Protect others around you:  Learn how to safely use, store and throw away your medicines at www.disposemymeds.org.             Medication List: This is a list of all your medications and when to take them. Check marks below indicate your daily home schedule. Keep this list as a reference.      Medications           Morning Afternoon Evening Bedtime As Needed    albuterol 108 (90 BASE) MCG/ACT Inhaler   Commonly known as:  PROAIR HFA/PROVENTIL HFA/VENTOLIN HFA   Use 1-2 puffs every 4 to 6 hours as needed                                FERROUS GLUCONATE PO   Take 324 mg by mouth 2 times daily (with meals)                                FOLIC ACID PO   Take 1 mg by mouth daily   Last time this was given:  1 mg on 4/26/2017  8:35 AM                                lactulose 10 GM/15ML solution   Commonly known as:  CHRONULAC   Take 30 mLs (20 g) by mouth 4 times daily   Last time this was given:  20 g on 4/26/2017  8:34 AM                                MEDICATION INSTRUCTION   Daily 2 gram acetaminophen limit from all sources.                                Multi-vitamin Tabs tablet   Take 1 tablet by mouth daily   Last time this was given:  1 tablet on 4/26/2017  8:34 AM                                pantoprazole Susp suspension   Commonly known as:  PROTONIX   Take 20 mLs (40 mg) by mouth daily   Last time this was given:  40 mg on 4/26/2017  8:34 AM                                rifaximin 550 MG Tabs tablet   Commonly known as:  XIFAXAN   Take 1 tablet (550 mg) by mouth 2 times daily   Last time this was given:  550 mg on 4/26/2017  8:35 AM                                RISPERDAL PO   Take 1 mg by mouth 2 times daily as needed (agitation)   Last time this was given:  2 mg on 4/24/2017  9:27 PM                                thiamine 100 MG tablet   Take 1 tablet (100 mg) by mouth daily   Last time this was given:  100 mg on 4/26/2017  8:35 AM                                zinc sulfate 220 (50 ZN) MG capsule   Commonly known as:  ZINCATE    Take 220 mg by mouth 2 times daily   Last time this was given:  220 mg on 4/26/2017  8:35 AM

## 2017-04-23 NOTE — LETTER
Transition Communication Hand-off for Care Transitions to Next Level of Care Provider    Name: Mildred Rascon  MRN #: [id number]  Primary Care Provider: Ant Cavazos     Primary Clinic: [facility] [address] [facility] [address]     Reason for Hospitalization:  Hepatic encephalopathy (H) [K72.90]  Admit Date/Time: 4/23/2017 12:12 PM  Discharge Date: 4/26/17  Payor Source: Payor[place] / [place] / Product Type: Medicaid /       Reason for Communication Hand-off Referral: Fragility  Non-adherence to plan of care related to:  Multiple chemical dependency admissions    Discharge Plan: home       Concern for non-adherence with plan of care:   Y/N yes  Discharge Needs Assessment: patient continuously readmitted to multiple OSH for encephalopathy.  This admission, patient was brought into ED by police.      Already enrolled in Tele-monitoring program and name of program:  no  Follow-up specialty is recommended: No    Follow-up plan:  No future appointments.    Any outstanding tests or procedures:        Referrals     Future Labs/Procedures    Medication Therapy Management Referral     Comments:    Reason for referral:  adherence to medication regimen and takes rifaxamin or lactulose     This service is designed to help you get the most from your medications.  A specially trained pharmacist will work closely with you and your doctors  to solve any problems related to your medications and to help you get the   best results from taking them.      The Medication Therapy Management staff will call you to schedule an appointment.            Key Recommendations:  See AVS    Tracie Davalos RN, BSN  Care Coordinator [facility]  Pager: [phone]  Phone: [phone]  AVS/Discharge Summary is the source of truth; this is a helpful guide for improved communication of patient story

## 2017-04-23 NOTE — IP AVS SNAPSHOT
[address] [address]    Phone:  [phone]                                       After Visit Summary   4/23/2017    Mildred Rascon    MRN: [id number]           After Visit Summary Signature Page     I have received my discharge instructions, and my questions have been answered. I have discussed any challenges I see with this plan with the nurse or doctor.    ..........................................................................................................................................  Patient/Patient Representative Signature      ..........................................................................................................................................  Patient Representative Print Name and Relationship to Patient    ..................................................               ................................................  Date                                            Time    ..........................................................................................................................................  Reviewed by Signature/Title    ...................................................              ..............................................  Date                                                            Time

## 2017-04-23 NOTE — ED NOTES
Pt using BR. PT c/o dropping specimen cup and unable to give UA. Pt notified to give one when able.

## 2017-04-23 NOTE — H&P
Internal Medicine History and Physical    Patient Name: Mildred Rascon MRN# [id number]   Age: 42 year old YOB: 1975     Date of Admission:4/23/2017    Primary care provider: Ant Cavazos  Date of Service: 4/23/2017  Admitting Team: Gold Night    Physician Attestation   I, Jeremy TERRY Dowd, saw and evaluated Mildred Rascon as part of a shared visit.  I have reviewed and discussed with the advanced practice provider their history, physical and plan.    I personally reviewed the vital signs, medications, labs and imaging.    My key history or physical exam findings: Pt oriented to date, month, year, president but tangential in thinking about her  and being newly , seeing glitter etc.  No asterixis or evidence of ascites on exam.  Lungs- CTA bilaterally  CV- RRR no M  No swelling in ext    Key management decisions made by me: Agree with lactulose protocol. Consider Psychiatry evaluation of thinking does not clear.  Pt on hold given hx of past elopement.     Jeremy Dowd  Date of Service (when I saw the patient): 04/23/17         Assessment and Plan:   Mildred Rascon is a 42 year old female with a history of alcoholic hepatic cirrhosis c/b HE, asthma, polysubstance abuse, depression, and anxiety who presented to the ED via police due to AMS likely 2/2 hyperammonemia.    Acute on Chronic Encephalopathy likely 2/2 Hyperammonemia, Hx of Alcoholic Cirrhosis.  Brought to ED today via police due to active hallucinations. Labs revealed elevated Ammonia (115), normal ALT/AST, T bili 2.0, Alk phos 169,  ethanol and APAP negative. CT scan with no acute intracranial abnormality. Presented to [facility] ED 4/21 with elevated ammonia (148) in setting of lactulose non-compliance. She was found to be A&O and was sent home with a new lactulose prescription. Suspect current encephalopathy 2/2 medication non-compliance, although consider possible component from underlying neurocognitive  disorder. Low suspicion for infectious source at this time as WBC WNL, patient afebrile, no localized symptoms. VS stable, currently A&O x 3. No asterixis on exam.  - Lactulose protocol  - q4h Neuro checks  - Follow BCx  - UA ordered, pending  - Drug screen pending  - Continue rifaximin 550 mg BID  - Thiamine, folate, MVI QD  - Protonix 40 mg QD    Visual Hallucinations. Presented to ED with active visual hallucinations. CT negative per above. Noted persistent visual hallucinations and some impulsiveness/aggression during recent hospitalization 11/2016. Psych evaluated several times, felt patient did not have decisional capacity to care for self. Patient eloped 2/2/17 prior to obtaining appropriate placement. Uncertain which psychiatric medications are most current. Appears patient was discharged 2/2017 on zyprexa 5 mg QAM, 15 mg QHS; haldol 1 mg QHS, although current prescription lists only risperidone nightly which patient reports taking.   - 1:1 sitter  - 72 hour hold in place  - Continue risperidone 2 mg QHS  - Risperidone 1 mg BID PRN  - Consider psych consult once cognition improved    Suspect Underlying Neurocognitive Disorder. Noted previous hospitalizations, thought 2/2 chronic alcohol use vs previous head trauma. Psychiatry has documented patient as not decisional in previous notes, most recently 2/1/17. During last hospital stay, attempted to place patient in a group home; however, she eloped before placement was obtained. SW subsequently completed a vulnerable adult report as patient felt to be at significant risk for harm in the community.  - 72 hour hold in place  - Psych consult per above    Thrombocytopenia. Platelets 68 on admission, BL ~80's. Thrombocytopenia noted as far back as 2010, more consistently since 2016. Unclear etiology, although likely 2/2 underlying liver disease.   - Trend CBC    Hyperkalemia. K 3.2 in ED, Mg WNL. Suspect 2/2 poor oral intake as patient with encephalopathy, unclear  "ability to care for self.  - Trend CMP  - Potassium replacement protocol    Hypernatremia. Na 145 in ED. Unclear etiology, although possibly 2/2 free water deficit as patient likely with poor oral intake given encephalopathy.  - Trend CMP  - Encourage PO fluids    Asthma. No acute concerns. Continue albuterol PRN.     CODE: Full Code  Diet/IVF: Regular diet  DVT ppx: Mechanical, SCD's  Disposition/Admission Status: Inpatient pending improvement in cognition    Maggy Varela PA-C  Hospitalist Service  Pager: [phone]           Chief Complaint:   Hyperammonemia         HPI:   Mildred Rascon is a 42 year old female with a history of alcohol abuse, alcoholic hepatic cirrhosis c/b HE, depression, and anxiety who presented to the ED with AMS likely 2/2 hyperammonemia    Patient has had multiple hospitalizations for recurrent hepatic encephalopathy, most recently hospitalized at [facility] 11/15/2016-2/2/2017. Her course was complicated by persistent hallucinations and impulsive/aggressive behavior, felt to be inconsistent with her HE. She also presented to [facility] ED 4/21 and found to have an elevated ammonia of 148. Patient was reported as alert and oriented, and felt lab findings likely due to medication non-compliance (self-reported). She was sent home with a new lactulose prescription.    Currently patient states she is here because her \"ammonia is high\". Reports having a discussion with her neighbor earlier today, when police came by and told her she was delusional. Patient is adamant that she was talking with a real person, and the police simply did not look hard enough. They then escorted her to the ED for further evaluation. She reports not having received her afternoon dosing of lactulose, but otherwise believes she has been compliant. Notes she did run out of all of her prescriptions several days ago. Patient believes her  Franklin is currently in the room with her, although no one else is present. " Otherwise denies fevers, chills, HA, SOB, chest pain, cough, abdominal pain, nausea, vomiting, dysuria, urinary frequency, constipation, peripheral edema, or SI/HI.         Past Medical History:     Past Medical History:   Diagnosis Date     Anxiety      Arthritis      Depression      Liver disease      Substance abuse      Reviewed & updated in [employer].         Past Surgical History:     Past Surgical History:   Procedure Laterality Date     APPENDECTOMY       C  DELIVERY ONLY  ,      CHOLECYSTECTOMY       TUBAL LIGATION       Reviewed & updated in Epic.         Social History:   Tobacco use: Former Smoker  Alcohol use: Denies  Illicit substances: Denies         Family History:     Family History   Problem Relation Age of Onset     Liver Cancer Mother      Alcoholism Mother      Lung Cancer Father      Myocardial Infarction Father      Myocardial Infarction Brother      Myocardial Infarction Brother      Reviewed & updated in Epic.         Allergies:      Allergies   Allergen Reactions     Acetaminophen      Ambien [Zolpidem Tartrate] Other (See Comments)     Sleep walks and eats     Ciprofloxacin Other (See Comments)     Seizure.     Citalopram Nausea and Vomiting            Medications:     Prior to Admission Medications   Prescriptions Last Dose Informant Patient Reported? Taking?   FERROUS GLUCONATE PO Unknown Other Yes No   Sig: Take 324 mg by mouth 2 times daily (with meals)   FOLIC ACID PO Unknown Other Yes No   Sig: Take 1 mg by mouth daily   MEDICATION INSTRUCTION  Other No No   Sig: Daily 2 gram acetaminophen limit from all sources.   RISPERIDONE PO Unknown Other Yes No   Sig: Take 2 mg by mouth At Bedtime   RisperiDONE (RISPERDAL PO) Unknown Other Yes No   Sig: Take 1 mg by mouth 2 times daily as needed (agitation)   albuterol (PROAIR HFA, PROVENTIL HFA, VENTOLIN HFA) 108 (90 BASE) MCG/ACT inhaler Unknown Other No No   Sig: Use 1-2 puffs every 4 to 6 hours as needed   lactulose  "(CHRONULAC) 10 GM/15ML solution Unknown Other No No   Sig: Take 30 mLs (20 g) by mouth 4 times daily   melatonin 3 MG tablet Unknown Other No No   Sig: Take 1 tablet (3 mg) by mouth nightly as needed   multivitamin, therapeutic with minerals (MULTI-VITAMIN) TABS tablet Unknown Other Yes No   Sig: Take 1 tablet by mouth daily   pantoprazole (PROTONIX) 2 mg/mL Unknown Other No No   Sig: Take 20 mLs (40 mg) by mouth daily   rifaximin (XIFAXAN) 550 MG TABS Unknown Other No No   Sig: Take 1 tablet (550 mg) by mouth 2 times daily   thiamine 100 MG tablet Unknown Other No No   Sig: Take 1 tablet (100 mg) by mouth daily   zinc sulfate (ZINCATE) 220 (50 ZN) MG capsule Unknown Other Yes No   Sig: Take 220 mg by mouth 2 times daily      Facility-Administered Medications: None             Review of Systems:   A complete ROS was performed and is negative other than what is stated in the HPI.          Physical Exam:   Blood pressure 107/57, pulse 104, temperature 97.5  F (36.4  C), temperature source Oral, resp. rate 16, height 1.676 m (5' 6\"), weight 95.3 kg (210 lb), SpO2 100 %, not currently breastfeeding.  General: Well-appearing female sitting upright in bed, NAD.  HEENT: NC/AT. Anicteric sclera. EOMI. Mucous membranes moist.  Neck: Supple  CV: RRR, S1/S2. No appreciable murmurs.  PULMONARY: Normal effort on room air. Lungs CTAB without wheezes, rales or rhonchi.  GI: Soft, obese. Non-tender, no apparent ascites. Bowel sounds normoactive.  Extremities/MSK: No peripheral edema. Warm & well perfused. No joint tenderness or swelling.  Skin: No jaundice, rashes or lesions evident on exposed skin.  Neuro: A&O X 3. Moves all extremities symmetrically. No focal deficits. No asterixis.  Psych: Active visual hallucinations, believes her  is in the room with her and intermittently speaks to him. Denies SI/HI.         Data:   I reviewed all pertinent data including CBC, CMP, Ammonia, Ethanol, BCx              "

## 2017-04-24 LAB
ALBUMIN SERPL-MCNC: 2.5 G/DL (ref 3.4–5)
ALP SERPL-CCNC: 145 U/L (ref 40–150)
ALT SERPL W P-5'-P-CCNC: 16 U/L (ref 0–50)
ANION GAP SERPL CALCULATED.3IONS-SCNC: 8 MMOL/L (ref 3–14)
AST SERPL W P-5'-P-CCNC: 24 U/L (ref 0–45)
BILIRUB SERPL-MCNC: 1.7 MG/DL (ref 0.2–1.3)
BUN SERPL-MCNC: 6 MG/DL (ref 7–30)
CALCIUM SERPL-MCNC: 8.3 MG/DL (ref 8.5–10.1)
CHLORIDE SERPL-SCNC: 114 MMOL/L (ref 94–109)
CO2 SERPL-SCNC: 20 MMOL/L (ref 20–32)
CREAT SERPL-MCNC: 0.51 MG/DL (ref 0.52–1.04)
ERYTHROCYTE [DISTWIDTH] IN BLOOD BY AUTOMATED COUNT: 15.1 % (ref 10–15)
GFR SERPL CREATININE-BSD FRML MDRD: ABNORMAL ML/MIN/1.7M2
GLUCOSE SERPL-MCNC: 95 MG/DL (ref 70–99)
HCT VFR BLD AUTO: 31.2 % (ref 35–47)
HGB BLD-MCNC: 10.9 G/DL (ref 11.7–15.7)
MCH RBC QN AUTO: 32.4 PG (ref 26.5–33)
MCHC RBC AUTO-ENTMCNC: 34.9 G/DL (ref 31.5–36.5)
MCV RBC AUTO: 93 FL (ref 78–100)
PLATELET # BLD AUTO: 52 10E9/L (ref 150–450)
POTASSIUM SERPL-SCNC: 3.6 MMOL/L (ref 3.4–5.3)
POTASSIUM SERPL-SCNC: 3.7 MMOL/L (ref 3.4–5.3)
PROT SERPL-MCNC: 5.9 G/DL (ref 6.8–8.8)
RBC # BLD AUTO: 3.36 10E12/L (ref 3.8–5.2)
SODIUM SERPL-SCNC: 143 MMOL/L (ref 133–144)
WBC # BLD AUTO: 3.6 10E9/L (ref 4–11)

## 2017-04-24 PROCEDURE — 99233 SBSQ HOSP IP/OBS HIGH 50: CPT | Performed by: INTERNAL MEDICINE

## 2017-04-24 PROCEDURE — 25000132 ZZH RX MED GY IP 250 OP 250 PS 637: Performed by: PHYSICIAN ASSISTANT

## 2017-04-24 PROCEDURE — 36415 COLL VENOUS BLD VENIPUNCTURE: CPT | Performed by: PHYSICIAN ASSISTANT

## 2017-04-24 PROCEDURE — 85027 COMPLETE CBC AUTOMATED: CPT | Performed by: PHYSICIAN ASSISTANT

## 2017-04-24 PROCEDURE — 80053 COMPREHEN METABOLIC PANEL: CPT | Performed by: PHYSICIAN ASSISTANT

## 2017-04-24 PROCEDURE — 25000132 ZZH RX MED GY IP 250 OP 250 PS 637: Performed by: INTERNAL MEDICINE

## 2017-04-24 PROCEDURE — 84132 ASSAY OF SERUM POTASSIUM: CPT | Performed by: PEDIATRICS

## 2017-04-24 PROCEDURE — 99207 ZZC CDG-MDM COMPONENT: MEETS MODERATE - UP CODED: CPT | Performed by: INTERNAL MEDICINE

## 2017-04-24 PROCEDURE — 12000001 ZZH R&B MED SURG/OB UMMC

## 2017-04-24 RX ADMIN — LACTULOSE 20 G: 20 SOLUTION ORAL at 10:09

## 2017-04-24 RX ADMIN — FOLIC ACID 1 MG: 1 TABLET ORAL at 10:09

## 2017-04-24 RX ADMIN — ZINC SULFATE CAP 220 MG (50 MG ELEMENTAL ZN) 220 MG: 220 (50 ZN) CAP at 10:09

## 2017-04-24 RX ADMIN — TRAMADOL HYDROCHLORIDE 25 MG: 50 TABLET, FILM COATED ORAL at 12:44

## 2017-04-24 RX ADMIN — RIFAXIMIN 550 MG: 550 TABLET ORAL at 21:27

## 2017-04-24 RX ADMIN — MULTIPLE VITAMINS W/ MINERALS TAB 1 TABLET: TAB at 10:09

## 2017-04-24 RX ADMIN — RISPERIDONE 2 MG: 2 TABLET ORAL at 21:27

## 2017-04-24 RX ADMIN — ZINC SULFATE CAP 220 MG (50 MG ELEMENTAL ZN) 220 MG: 220 (50 ZN) CAP at 21:27

## 2017-04-24 RX ADMIN — Medication 100 MG: at 10:09

## 2017-04-24 RX ADMIN — PANTOPRAZOLE SODIUM 40 MG: 40 TABLET, DELAYED RELEASE ORAL at 10:09

## 2017-04-24 RX ADMIN — RIFAXIMIN 550 MG: 550 TABLET ORAL at 10:10

## 2017-04-24 RX ADMIN — LACTULOSE 20 G: 20 SOLUTION ORAL at 21:39

## 2017-04-24 ASSESSMENT — ENCOUNTER SYMPTOMS
ARTHRALGIAS: 0
COLOR CHANGE: 0
HEADACHES: 0
EYE REDNESS: 0
NECK STIFFNESS: 0

## 2017-04-24 NOTE — PLAN OF CARE
"Problem: Goal Outcome Summary  Goal: Goal Outcome Summary  Outcome: No Change  Blood pressure 112/62, pulse 92, temperature 97.2  F (36.2  C), temperature source Oral, resp. rate 16, height 1.676 m (5' 6\"), weight 95.3 kg (210 lb), SpO2 100 %, not currently breastfeeding.      Pt here for altered mental status, now improving. Up with SBA to BSC. VSS on room air. Pt has 72 hour hold in place. 1:1 attendant in place. Pt able to answer questions appropriately. Can state where she is and the date. Pt is having hallucinations. Talks to people and seeing people that are not present. Not agitated. Very pleasant and joking with staff. Stated that she is here because she was not taking Lactulose. Pt given Lactulose per PRN orders. Taking it willingly. Takes meds PO without difficulty. Taking food and fluids, good appetite. R PIV is saline locked. Voiding spontaneously and had a BM. Urine sent. Utox neg. Patient is resting at present. Continue to assess per POC.  "

## 2017-04-24 NOTE — PROGRESS NOTES
Care Coordinator- Assessment Note     Admission Date/Time:  4/23/2017  Attending MD:  Jeremy Dowd MD     Data  Chart reviewed, discussed with interdisciplinary team.   Patient was admitted for:   1. Hepatic encephalopathy (H)    2. Hypokalemia    3. Alcoholic cirrhosis of liver without ascites (H)    4. Visual hallucinations    5. Auditory hallucinations         History: alcoholic hepatic cirrhosis c/b HE, asthma, polysubstance abuse, depression, and anxiety     Patient currently admitted with: AMS likely related to hyperammonemia     RNCC Assessment  Concerns with insurance coverage for discharge needs: None.  Current Living Situation: Patient lives in a shelter.  Support System: Supportive  Services Involved: none  Transportation: Family or Friend will provide  Barriers to Discharge: none    Plan  Anticipated Discharge Date: 1-2  Anticipated Discharge Plan:  home    CTS Handoff completed: morgan Degroot RN, BSN, PHN, RNCCPH: [phone]  Pager: [phone]  Covering for : Tracie Davalos, Medicine RNCC

## 2017-04-24 NOTE — PROGRESS NOTES
[facility], [place]    Internal Medicine Progress Note - Gold Service      Assessment & Plan   Mildred Rascon is a 42 year old female with a history of alcoholic hepatic cirrhosis c/b HE, asthma, polysubstance abuse, depression, and anxiety who presented to the ED via police due to AMS likely 2/2 hepatic encephalopathy    Acute on Chronic Encephalopathy likely 2/2 Hepatic encephalopathy: Suspect current encephalopathy 2/2 medication non-compliance. Brought to ED today via police due to active hallucinations. Labs revealed elevated Ammonia (115). Ethanol, Urine toxicology screen and APAP negative. CT scan with no acute intracranial abnormality. Presented to [facility] ED 4/21 with elevated ammonia (148) in setting of lactulose non-compliance. She was found to be A&O and was sent home with a new lactulose prescription. Low suspicion for infectious source at this time as WBC WNL. Patient denies any respiratory, Genitourinary, Gastrointestinal symptoms.   She was started on Lactulose protocol. She was continued on Rifaximin, Thiamine, Folic acid, and MVI. Neuro checks were done.   Today morning, patient still slightly somnolent, but denies any hallucinations. Mildly shaky.   - Continue Lactulose protocol  - q4h Neuro checks  - Continue rifaximin 550 mg BID  - Thiamine, folate, MVI QD  - Protonix 40 mg QD     Visual Hallucinations: Most likely due to Hepatic encephalopathy. Psych evaluated several times, felt patient did not have decisional capacity to care for self. Patient eloped 2/2/17 prior to obtaining appropriate placement. Uncertain which psychiatric medications are most current. Appears patient was discharged 2/2017 on zyprexa 5 mg QAM, 15 mg QHS; haldol 1 mg QHS, although current prescription lists only risperidone nightly which patient reports taking.   Patient denies any hallucinations right now, and agreeable with the plan of care. She was not suicidal. So will discontinue bedside  attendant, and discontinue 72 hours hold  - Consider Psych consult if hallucination returns, mental status worsens despite treatment with Lactulose       Suspect Underlying Neurocognitive Disorder. Noted previous hospitalizations, thought 2/2 chronic alcohol use vs previous head trauma. Psychiatry has documented patient as not decisional in previous notes, most recently 2/1/17. During last hospital stay, attempted to place patient in a group home; however, she eloped before placement was obtained. SW subsequently completed a vulnerable adult report as patient felt to be at significant risk for harm in the community. Discussed with , she can be discharged when medically stable.      Thrombocytopenia. Platelets 68 on admission, BL ~80's. Thrombocytopenia noted as far back as 2010, more consistently since 2016. Unclear etiology, although likely 2/2 underlying liver disease.   No signs of bleeding.   - Trend CBC    Anemia: Baseline around 9-10. Hg 13.3 on admission most likely represents hemoconcentration  - Anemia workup can be done as outpatient     Hypokalemia. K 3.2 in ED, Mg WNL. Suspect 2/2 poor oral intake as patient with encephalopathy, unclear ability to care for self.  K 3.7 in AM  - Potassium replacement protocol     Hypernatremia. Na 145 in ED. Unclear etiology, although possibly 2/2 free water deficit as patient likely with poor oral intake given encephalopathy.  - Trend CMP  - Encourage PO fluids     Asthma. No acute concerns. Continue albuterol PRN.      CODE: Full Code  Diet/IVF: Regular diet  DVT ppx: Mechanical, SCD's  Disposition/Admission Status: Inpatient pending improvement in cognition   Disposition Plan   Expected discharge: 1 - 2 days; recommended to prior living arrangement once .     Entered: Freedom Draper 04/24/2017, 1:14 PM   Information in the above section will display in the discharge planner report.          Freedom Draper  Internal Medicine Staff Hospitalist  Service  [facility]  Pager: [phone]  Please see sticky note for cross cover information    Interval History     Hx reviewed with the patient. Feels better compared to yesterday. Denies any auditory or visual hallucinations. No abdominal fever, chills, cough, chest pain, shortness of breath no burning urination      Data reviewed today: I reviewed all medications, new labs and imaging results over the last 24 hours. I personally reviewed the head CT image(s) showing no acute abnormality.    Physical Exam   Vital Signs: Temp: 97.2  F (36.2  C) Temp src: Axillary BP: 103/54 Pulse: 82   Resp: 16 SpO2: 97 % O2 Device: None (Room air)    Weight: 212 lbs 3.2 oz  General Appearance: In no acute distress. Looked little shaky  Respiratory: B/L CTA  Cardiovascular: S1, S2 normal.   GI: Soft, NT, BS+   Neuro: Sleepy initially, awake later. Oriented to time place person        Data   Medications     - MEDICATION INSTRUCTIONS -         pantoprazole  40 mg Oral Daily     rifaximin  550 mg Oral BID     risperiDONE (risperDAL) tablet 2 mg  2 mg Oral At Bedtime     zinc sulfate  220 mg Oral BID     folic acid (FOLVITE) tablet 1 mg  1 mg Oral Daily     multivitamin, therapeutic with minerals  1 tablet Oral Daily     vitamin  B-1  100 mg Oral Daily     Data     Recent Labs  Lab 04/24/17  0804 04/24/17  0158 04/23/17  1310   WBC 3.6*  --  5.7   HGB 10.9*  --  13.3   MCV 93  --  92   PLT 52*  --  68*   INR  --   --  1.44*     --  145*   POTASSIUM 3.7 3.6 3.2*   CHLORIDE 114*  --  113*   CO2 20  --  20   BUN 6*  --  5*   CR 0.51*  --  0.56   ANIONGAP 8  --  12   FABIAN 8.3*  --  8.9   GLC 95  --  108*   ALBUMIN 2.5*  --  3.1*   PROTTOTAL 5.9*  --  7.7   BILITOTAL 1.7*  --  2.0*   ALKPHOS 145  --  169*   ALT 16  --  22   AST 24  --  35     Recent Results (from the past 24 hour(s))   Head CT w/o contrast    Narrative    CT HEAD WITHOUT CONTRAST  4/23/2017 2:01 PM    HISTORY: Altered mental status without  injury.    TECHNIQUE: Thin section axial images without contrast. Radiation dose  for this scan was reduced using automated exposure control, adjustment  of the mA and/or kV according to patient size, or iterative  reconstruction technique.    COMPARISON: 12/11/2016.    FINDINGS: The ventricles are normal in size, shape and configuration.  The brain parenchyma and subarachnoid spaces are normal with exception  of a minimal focus of subcortical encephalomalacia malacia in the  anterior right frontal lobe which is stable. There is no evidence for  intracranial hemorrhage or mass effects. There is no noncontrast CT  evidence of an acute infarct. No calvarial abnormalities. Visualized  paranasal sinuses are clear.      Impression    IMPRESSION: No acute intracranial abnormality.     DOUGLAS CAREY MD

## 2017-04-24 NOTE — PLAN OF CARE
Problem: Goal Outcome Summary  Goal: Goal Outcome Summary  Outcome: No Change  Pt alert, confused/lethargic in the morning. Lactulose given and confusion improved. AOx4 the rest of the day, no hallucinations reported. PRN lactulose ordered and will continue to monitor for increased confusion and HE. Bedside attendant and 72 hr hold DC'd. R PIV saline locked. Regular diet, good appetite. Voiding WDL, one BM today. Will continue to monitor and follow POC.

## 2017-04-25 VITALS
RESPIRATION RATE: 16 BRPM | DIASTOLIC BLOOD PRESSURE: 58 MMHG | HEART RATE: 98 BPM | BODY MASS INDEX: 34.1 KG/M2 | HEIGHT: 66 IN | OXYGEN SATURATION: 98 % | WEIGHT: 212.2 LBS | TEMPERATURE: 97.4 F | SYSTOLIC BLOOD PRESSURE: 107 MMHG

## 2017-04-25 LAB
ALBUMIN SERPL-MCNC: 2.4 G/DL (ref 3.4–5)
ALP SERPL-CCNC: 164 U/L (ref 40–150)
ALT SERPL W P-5'-P-CCNC: 17 U/L (ref 0–50)
ANION GAP SERPL CALCULATED.3IONS-SCNC: 11 MMOL/L (ref 3–14)
APAP SERPL-MCNC: ABNORMAL UG/ML
AST SERPL W P-5'-P-CCNC: 23 U/L (ref 0–45)
BASOPHILS # BLD AUTO: 0 10E9/L (ref 0–0.2)
BASOPHILS NFR BLD AUTO: 0.8 %
BILIRUB SERPL-MCNC: 1.2 MG/DL (ref 0.2–1.3)
BUN SERPL-MCNC: 6 MG/DL (ref 7–30)
CALCIUM SERPL-MCNC: 8.6 MG/DL (ref 8.5–10.1)
CHLORIDE SERPL-SCNC: 111 MMOL/L (ref 94–109)
CO2 SERPL-SCNC: 22 MMOL/L (ref 20–32)
CREAT SERPL-MCNC: 0.55 MG/DL (ref 0.52–1.04)
DIFFERENTIAL METHOD BLD: ABNORMAL
EOSINOPHIL # BLD AUTO: 0.1 10E9/L (ref 0–0.7)
EOSINOPHIL NFR BLD AUTO: 3.2 %
ERYTHROCYTE [DISTWIDTH] IN BLOOD BY AUTOMATED COUNT: 15.1 % (ref 10–15)
GFR SERPL CREATININE-BSD FRML MDRD: ABNORMAL ML/MIN/1.7M2
GLUCOSE SERPL-MCNC: 135 MG/DL (ref 70–99)
HCT VFR BLD AUTO: 31.8 % (ref 35–47)
HGB BLD-MCNC: 10.7 G/DL (ref 11.7–15.7)
IMM GRANULOCYTES # BLD: 0 10E9/L (ref 0–0.4)
IMM GRANULOCYTES NFR BLD: 0 %
INR PPP: 1.45 (ref 0.86–1.14)
LYMPHOCYTES # BLD AUTO: 1.4 10E9/L (ref 0.8–5.3)
LYMPHOCYTES NFR BLD AUTO: 37.8 %
MCH RBC QN AUTO: 31.7 PG (ref 26.5–33)
MCHC RBC AUTO-ENTMCNC: 33.6 G/DL (ref 31.5–36.5)
MCV RBC AUTO: 94 FL (ref 78–100)
MONOCYTES # BLD AUTO: 0.3 10E9/L (ref 0–1.3)
MONOCYTES NFR BLD AUTO: 7.4 %
NEUTROPHILS # BLD AUTO: 1.9 10E9/L (ref 1.6–8.3)
NEUTROPHILS NFR BLD AUTO: 50.8 %
NRBC # BLD AUTO: 0 10*3/UL
NRBC BLD AUTO-RTO: 0 /100
PLATELET # BLD AUTO: 57 10E9/L (ref 150–450)
POTASSIUM SERPL-SCNC: 3.7 MMOL/L (ref 3.4–5.3)
PROT SERPL-MCNC: 6.2 G/DL (ref 6.8–8.8)
RBC # BLD AUTO: 3.38 10E12/L (ref 3.8–5.2)
SODIUM SERPL-SCNC: 144 MMOL/L (ref 133–144)
WBC # BLD AUTO: 3.8 10E9/L (ref 4–11)

## 2017-04-25 PROCEDURE — 12000001 ZZH R&B MED SURG/OB UMMC

## 2017-04-25 PROCEDURE — 36415 COLL VENOUS BLD VENIPUNCTURE: CPT | Performed by: INTERNAL MEDICINE

## 2017-04-25 PROCEDURE — 85025 COMPLETE CBC W/AUTO DIFF WBC: CPT | Performed by: INTERNAL MEDICINE

## 2017-04-25 PROCEDURE — 99207 ZZC CDG-MDM COMPONENT: MEETS MODERATE - UP CODED: CPT | Performed by: INTERNAL MEDICINE

## 2017-04-25 PROCEDURE — 80053 COMPREHEN METABOLIC PANEL: CPT | Performed by: INTERNAL MEDICINE

## 2017-04-25 PROCEDURE — 85610 PROTHROMBIN TIME: CPT | Performed by: INTERNAL MEDICINE

## 2017-04-25 PROCEDURE — 25000132 ZZH RX MED GY IP 250 OP 250 PS 637: Performed by: PHYSICIAN ASSISTANT

## 2017-04-25 PROCEDURE — 99232 SBSQ HOSP IP/OBS MODERATE 35: CPT | Performed by: INTERNAL MEDICINE

## 2017-04-25 RX ADMIN — Medication 100 MG: at 08:21

## 2017-04-25 RX ADMIN — MULTIPLE VITAMINS W/ MINERALS TAB 1 TABLET: TAB at 08:20

## 2017-04-25 RX ADMIN — ZINC SULFATE CAP 220 MG (50 MG ELEMENTAL ZN) 220 MG: 220 (50 ZN) CAP at 08:20

## 2017-04-25 RX ADMIN — RIFAXIMIN 550 MG: 550 TABLET ORAL at 08:20

## 2017-04-25 RX ADMIN — ZINC SULFATE CAP 220 MG (50 MG ELEMENTAL ZN) 220 MG: 220 (50 ZN) CAP at 20:20

## 2017-04-25 RX ADMIN — LACTULOSE 20 G: 20 SOLUTION ORAL at 00:21

## 2017-04-25 RX ADMIN — LACTULOSE 20 G: 20 SOLUTION ORAL at 21:56

## 2017-04-25 RX ADMIN — FOLIC ACID 1 MG: 1 TABLET ORAL at 08:20

## 2017-04-25 RX ADMIN — RIFAXIMIN 550 MG: 550 TABLET ORAL at 20:20

## 2017-04-25 RX ADMIN — PANTOPRAZOLE SODIUM 40 MG: 40 TABLET, DELAYED RELEASE ORAL at 08:20

## 2017-04-25 NOTE — DISCHARGE SUMMARY
Gold Service - Internal Medicine Discharge Summary       Mildred Rascon MRN# [id number]   YOB: 1975 Age: 42 year old     Date of Admission:  4/23/2017  Date of Discharge:  4/26  Admitting Physician:  Jeremy Dowd MD  Discharge Physician:  Elvira  Discharging Service:  Internal Medicine [facility]     Primary Provider: Ant Cavazos         Reason for Admission:   Confusion           Discharge Diagnosis:   Hepatic encephalopathy  due to medication non compliance  Pancytopenia , etiology under, likely due to liver cirrhosis and alcohol abuse  Hypokalemia  Hypernatremia         Procedures & Significant Findings:     Ct head ; no acute pathology 4/23           Consultations:   none         Hospital Course by Problem:      Acute on Chronic Encephalopathy likely 2/2 Hepatic encephalopathy: Suspect current encephalopathy 2/2 medication non-compliance. Brought to ED t via police due to active hallucinations. Labs revealed elevated Ammonia (115). Ethanol, Urine toxicology screen and APAP negative. CT scan with no acute intracranial abnormality. Presented to [facility] ED 4/21 with elevated ammonia (148) in setting of lactulose non-compliance. She was found to be A&O and was sent home with a new lactulose prescription.   Patient denies any respiratory, Genitourinary, Gastrointestinal symptoms.   She was started on Lactulose protocol. She was continued on Rifaximin, Thiamine, Folic acid, and MVI. Neuro checks were done.   Now alert and oriented and wants to dc to e         Suspect Underlying Neurocognitive Disorder. Noted previous hospitalizations, thought 2/2 chronic alcohol use vs previous head trauma. Psychiatry has documented patient as not decisional in previous notes, most recently 2/1/17. During last hospital stay, attempted to place patient in a group home; however, she eloped before placement was obtained. SW subsequently completed a vulnerable adult report as patient felt to be at significant risk  "for harm in the community. Discussed with  4/25 and 4/26, she can be discharged when medically stable.   She has a house and lives with , tried to contact him but could not reach on number listed . SW gave a cab voucher to patient       Thrombocytopenia. . Unclear etiology, although likely 2/2 underlying liver disease.   No signs of bleeding.        Anemia: Baseline around 9-10      Hypokalemia. Corrected       Hypernatremia.   - Encourage PO fluids  Corrected       Vital signs:  Temp: 96.9  F (36.1  C) Temp src: Oral BP: 93/61 Pulse: 88   Resp: 18 SpO2: 100 % O2 Device: None (Room air)   Height: 167.6 cm (5' 6\") Weight: 96.3 kg (212 lb 3.2 oz) (bed scale)  Estimated body mass index is 34.25 kg/(m^2) as calculated from the following:    Height as of this encounter: 1.676 m (5' 6\").    Weight as of this encounter: 96.3 kg (212 lb 3.2 oz).  Neck ; supple , no JVD  Chest ; equal BS bilaterally , no rales or rhonchi   CVS; RRR, no murmur /rubs or gallops  GI ; soft abdomen, non tender, BS positive  Ext  , no cynosis  Neuro ; CN 2 to 12 grossly intact , No Facial asymmetry noticed  .. No asterixis. Able to tell time/date.cathleen/reason for admission who brought her in . Plan to take lactulose and follow up   Psych ; appropriate mood and effect  Skin ; no rash or purpura on exposed skin            Pending Results:   bcx         Discharge Medications:     Current Discharge Medication List      CONTINUE these medications which have NOT CHANGED    Details   multivitamin, therapeutic with minerals (MULTI-VITAMIN) TABS tablet Take 1 tablet by mouth daily      zinc sulfate (ZINCATE) 220 (50 ZN) MG capsule Take 220 mg by mouth 2 times daily      RisperiDONE (RISPERDAL PO) Take 1 mg by mouth 2 times daily as needed (agitation)      FOLIC ACID PO Take 1 mg by mouth daily      lactulose (CHRONULAC) 10 GM/15ML solution Take 30 mLs (20 g) by mouth 4 times daily  Qty: 3600 mL, Refills: 3    Comments: Goal 4-5 BM daily. "  Hold next dose if goal met.  Associated Diagnoses: Hepatic encephalopathy (H)      pantoprazole (PROTONIX) 2 mg/mL Take 20 mLs (40 mg) by mouth daily    Associated Diagnoses: Alcoholic liver disease (H)      thiamine 100 MG tablet Take 1 tablet (100 mg) by mouth daily    Associated Diagnoses: Alcoholic liver disease (H)      MEDICATION INSTRUCTION Daily 2 gram acetaminophen limit from all sources.  Refills: 0    Associated Diagnoses: Alcoholic liver disease (H)      albuterol (PROAIR HFA, PROVENTIL HFA, VENTOLIN HFA) 108 (90 BASE) MCG/ACT inhaler Use 1-2 puffs every 4 to 6 hours as needed  Qty: 1 Inhaler, Refills: 0    Associated Diagnoses: Mild intermittent asthma without complication      rifaximin (XIFAXAN) 550 MG TABS Take 1 tablet (550 mg) by mouth 2 times daily  Qty: 60 tablet, Refills: 3    Associated Diagnoses: Alcoholic cirrhosis of liver without ascites (H)      FERROUS GLUCONATE PO Take 324 mg by mouth 2 times daily (with meals)         STOP taking these medications       melatonin 3 MG tablet Comments:   Reason for Stopping:                    Discharge Instructions and Follow-Up:     Discharge Procedure Orders  Medication Therapy Management Referral   Referral Type: Med Therapy Management     Reason for your hospital stay   Order Comments: You were admitted because you were confused likely because you were not taking your medication , ie lactulose     Follow Up and recommended labs and tests   Order Comments: PCP in one week for post hospital dc follow up     Activity   Order Comments: As tolerated   Order Specific Question Answer Comments   Is discharge order? Yes      Monitor and record   Order Comments: - If family/friends note that you appear slightly confused they should make sure that you take your lactulose (or rifaximin) right away. This will help clear the confusion.    - Friends/family can have you put your arms up like you are stopping traffic. If your hands are shaking or bending at the  wrist this is a sign the toxins are starting to build up and also a reason to make sure you take a dose of lactulose (or rifamaxin) right away.    - Record every day in a chart when you have a bowel movement and take your lactulose (or rifamaxin). Your family/friends should look at your chart and make sure you haven't missed any doses and that you are having enough bowel movements.  1. Each day there should be 3 boxes for bowel movements.  2. Each day there should be 2 boxes for rifaximin.   3. Each day there should be 4 boxes for lactulose.  If you are behind on any doses or bowel movements family/friends should have you take a dose of medication and call you every 2 hours until you are no longer confused.     Diet   Order Comments: As tolerated   Order Specific Question Answer Comments   Is discharge order? Yes                  Discharge Disposition:   home         Condition on Discharge:   Stable  Time spent 33 minutes   Date of service 4/26  Spoke with RN and SW about dc plans     Noelle Felix  Internal Medicine Hospitalist & Staff Physician  [facility]

## 2017-04-25 NOTE — PLAN OF CARE
Problem: Goal Outcome Summary  Goal: Goal Outcome Summary  Outcome: No Change  1900-0730: Pt A&Ox4 with some forgetfulness and intermittent confusion. VSS on room air. PRN lactulose administered x2. SBA to bathroom, calls appropriately. 2BM overnight. Continue with POC.

## 2017-04-26 ENCOUNTER — CARE COORDINATION (OUTPATIENT)
Dept: CARE COORDINATION | Facility: CLINIC | Age: 42
End: 2017-04-26

## 2017-04-26 LAB
ALBUMIN SERPL-MCNC: 2.9 G/DL (ref 3.4–5)
ALP SERPL-CCNC: 172 U/L (ref 40–150)
ALT SERPL W P-5'-P-CCNC: 19 U/L (ref 0–50)
AMMONIA PLAS-SCNC: 43 UMOL/L (ref 10–50)
ANION GAP SERPL CALCULATED.3IONS-SCNC: 9 MMOL/L (ref 3–14)
AST SERPL W P-5'-P-CCNC: 27 U/L (ref 0–45)
BILIRUB SERPL-MCNC: 1.8 MG/DL (ref 0.2–1.3)
BUN SERPL-MCNC: 8 MG/DL (ref 7–30)
CALCIUM SERPL-MCNC: 8.9 MG/DL (ref 8.5–10.1)
CHLORIDE SERPL-SCNC: 111 MMOL/L (ref 94–109)
CO2 SERPL-SCNC: 21 MMOL/L (ref 20–32)
CREAT SERPL-MCNC: 0.49 MG/DL (ref 0.52–1.04)
ERYTHROCYTE [DISTWIDTH] IN BLOOD BY AUTOMATED COUNT: 14.8 % (ref 10–15)
GFR SERPL CREATININE-BSD FRML MDRD: ABNORMAL ML/MIN/1.7M2
GLUCOSE SERPL-MCNC: 104 MG/DL (ref 70–99)
HCT VFR BLD AUTO: 34 % (ref 35–47)
HGB BLD-MCNC: 11.8 G/DL (ref 11.7–15.7)
INR PPP: 1.42 (ref 0.86–1.14)
MCH RBC QN AUTO: 32.5 PG (ref 26.5–33)
MCHC RBC AUTO-ENTMCNC: 34.7 G/DL (ref 31.5–36.5)
MCV RBC AUTO: 94 FL (ref 78–100)
PLATELET # BLD AUTO: 59 10E9/L (ref 150–450)
POTASSIUM SERPL-SCNC: 3.6 MMOL/L (ref 3.4–5.3)
PROT SERPL-MCNC: 6.7 G/DL (ref 6.8–8.8)
RBC # BLD AUTO: 3.63 10E12/L (ref 3.8–5.2)
SODIUM SERPL-SCNC: 141 MMOL/L (ref 133–144)
WBC # BLD AUTO: 4.6 10E9/L (ref 4–11)

## 2017-04-26 PROCEDURE — 93010 ELECTROCARDIOGRAM REPORT: CPT | Performed by: INTERNAL MEDICINE

## 2017-04-26 PROCEDURE — 99239 HOSP IP/OBS DSCHRG MGMT >30: CPT | Performed by: INTERNAL MEDICINE

## 2017-04-26 PROCEDURE — 82140 ASSAY OF AMMONIA: CPT | Performed by: INTERNAL MEDICINE

## 2017-04-26 PROCEDURE — 36415 COLL VENOUS BLD VENIPUNCTURE: CPT | Performed by: INTERNAL MEDICINE

## 2017-04-26 PROCEDURE — 25000128 H RX IP 250 OP 636: Performed by: INTERNAL MEDICINE

## 2017-04-26 PROCEDURE — 85027 COMPLETE CBC AUTOMATED: CPT | Performed by: INTERNAL MEDICINE

## 2017-04-26 PROCEDURE — 85610 PROTHROMBIN TIME: CPT | Performed by: INTERNAL MEDICINE

## 2017-04-26 PROCEDURE — 25000132 ZZH RX MED GY IP 250 OP 250 PS 637: Performed by: PHYSICIAN ASSISTANT

## 2017-04-26 PROCEDURE — 93005 ELECTROCARDIOGRAM TRACING: CPT

## 2017-04-26 PROCEDURE — 80053 COMPREHEN METABOLIC PANEL: CPT | Performed by: INTERNAL MEDICINE

## 2017-04-26 RX ORDER — HALOPERIDOL 5 MG/ML
INJECTION INTRAMUSCULAR
Status: DISCONTINUED
Start: 2017-04-26 | End: 2017-04-26 | Stop reason: HOSPADM

## 2017-04-26 RX ORDER — HALOPERIDOL 5 MG/ML
2 INJECTION INTRAMUSCULAR ONCE
Status: COMPLETED | OUTPATIENT
Start: 2017-04-26 | End: 2017-04-26

## 2017-04-26 RX ORDER — HALOPERIDOL 5 MG/ML
2 INJECTION INTRAMUSCULAR ONCE
Status: DISCONTINUED | OUTPATIENT
Start: 2017-04-26 | End: 2017-04-26

## 2017-04-26 RX ADMIN — PANTOPRAZOLE SODIUM 40 MG: 40 TABLET, DELAYED RELEASE ORAL at 08:34

## 2017-04-26 RX ADMIN — HALOPERIDOL LACTATE 2 MG: 5 INJECTION, SOLUTION INTRAMUSCULAR at 03:05

## 2017-04-26 RX ADMIN — LACTULOSE 20 G: 20 SOLUTION ORAL at 08:34

## 2017-04-26 RX ADMIN — MULTIPLE VITAMINS W/ MINERALS TAB 1 TABLET: TAB at 08:34

## 2017-04-26 RX ADMIN — FOLIC ACID 1 MG: 1 TABLET ORAL at 08:35

## 2017-04-26 RX ADMIN — RIFAXIMIN 550 MG: 550 TABLET ORAL at 08:35

## 2017-04-26 RX ADMIN — LACTULOSE 20 G: 20 SOLUTION ORAL at 00:24

## 2017-04-26 RX ADMIN — LACTULOSE 20 G: 20 SOLUTION ORAL at 02:55

## 2017-04-26 RX ADMIN — ZINC SULFATE CAP 220 MG (50 MG ELEMENTAL ZN) 220 MG: 220 (50 ZN) CAP at 08:35

## 2017-04-26 RX ADMIN — Medication 100 MG: at 08:35

## 2017-04-26 NOTE — PLAN OF CARE
Problem: Goal Outcome Summary  Goal: Goal Outcome Summary  Outcome: No Change  Temp: 97.4  F (36.3  C) Temp src: Oral BP: 107/58 Pulse: 98 Resp: 16 SpO2: 98 % O2 Device: None (Room air)      Contact isolation. Alert, but disoriented to situation; per [place] score, 3 doses of PRN lactulose given. Patient hallucinating (seeing babies) and aggressive; provider aware and a one time dose of IM haldol ordered; patient cooperative and agreeable to receiving haldol. Restraints ordered by provider, but restraints not used. Patient moved near nursing station for closer supervision. Up with SBA. Regular diet. No complaints of nausea, no emesis. No PIV in place; provider aware. Voiding wnl's. No stools from 2300 to 0330. No complaints of pain.

## 2017-04-26 NOTE — PLAN OF CARE
Problem: Goal Outcome Summary  Goal: Goal Outcome Summary  Outcome: No Change  1256-0643: VSS, A/O, making illogical statements/having hallucinations at times. Lactulose protocol initiated at 2200, increased confusion noted. Pt had planned to d/c today, needs safer plan before discharge. PIV removed in anticipation for d/c, MDs okay with pt not having PIV at this time. Refused risperidone at bedtime d/t stomach pain with previous dose, attempted to provide education but pt becoming increasingly agitated, MDs aware. Will order zyprexa if necessary, pt redirectable at this time. Wandering into other pt's rooms this evening, door alarm placed, attempted to place bed alarm but pt refusing. Continue to monitor and follow POC.

## 2017-04-26 NOTE — PROGRESS NOTES
[facility], [place]    Internal Medicine Progress Note - Gold Service      Assessment & Plan   Mildred Rascon is a 42 year old female admitted on 4/23/2017. For confusion and high ammonia    Acute encephalopathy ;   Due to medication non complainace  Infection work up negative   Ct lactulose per [facility] protocol   Rifaximin   Protonix    Underlying neurocognitive disorder : etoh abuse vs head trauma   Improved   Ct pm resperidone      Diet: Fluids:none  DVT Prophylaxis: ambulatory  Code Status: Full Code    Disposition Plan   Expected discharge ; home today    Noelle Felix  Internal Medicine Staff Hospitalist Service  [facility]    Please see sticky note for cross cover information    Interval History   Slept well   Not agitated   No cp or sob   No hallucinations reported       Data reviewed today: I reviewed all medications, new labs and imaging results over the last 24 hours  Physical Exam   Vital Signs: Temp: 97.4  F (36.3  C) Temp src: Oral BP: 107/58 Pulse: 98   Resp: 16 SpO2: 98 % O2 Device: None (Room air)    Weight: 212 lbs 3.2 oz  General Appearance: Alert and inetarctive  Respiratory: clear b/l no rales or rhocnhi  Cardiovascular: RRR  GI: soft , nontender , bs positive  Skin: no erythema  Other: knows where she is , wants to dc , at times agitated           Data   D/w RN , SW and called s/o

## 2017-04-26 NOTE — PROVIDER NOTIFICATION
"Pt noted to having increasing hallucinations-\"Someone is in my closet!\" and wandering into other pt rooms. When asking pt about this, she states \"they told me to do it, I didn't want to\", crying and very agitated. Bed and door alarms placed. MD notified. PRN lactulose protocol initiated.   "

## 2017-04-26 NOTE — PROGRESS NOTES
[facility], [place]    Internal Medicine Progress Note - Gold Service      Assessment & Plan   Mildred Rascon is a 42 year old female admitted on 4/23/2017.     Acute encephalopathy ; due to medication noncompliance  Continue lactulose  Ct Rifaximin   Thiamin, folate, mvi    Hallucinations and agitation ; improved now .  required IM haldol last night   Seems due to underlying psych related issues  SW had filed vulnerable adult report .    Improved now . Per SW patient has been to numerous hospitals and non compliant with medications .     Diet: regular  DVT Prophylaxis:   Code Status: Full Code    Disposition Plan   Expected discharge: TBD     Entered: Noelle Felix 04/26/2017, 9:20 AM   Information in the above section will display in the discharge planner report.      The patient's care was discussed with the Bedside Nurse.and CC and DELFINO. Asked SW to look into living situation. denia says she lives at  home with yamilet Reid , I was not able to reach him     Noelle Felix  Internal Medicine Staff Hospitalist Service  [facility]    Please see sticky note for cross cover information    Interval History   Agitation last night   Hallucinations last night   4 BM  No chest pain or sob         Data reviewed today: I reviewed all medications, new labs and imaging results over the last 24 hours. I personally reviewed the EKG tracing showing QTC of 468.    Physical Exam   Vital Signs: Temp: 97.4  F (36.3  C) Temp src: Oral BP: 107/58 Pulse: 98   Resp: 16 SpO2: 98 % O2 Device: None (Room air)    Weight: 212 lbs 3.2 oz  General Appearance:   Respiratory: clear bilaterally   Cardiovascular: RRR . No r/g/  GI: soft , bs positive  Skin: no rash   Neuro ; able to tell me date.day year , hospital . Why she brought to ED by police  No hallucinations currently            Data     Recent Labs  Lab 04/26/17  0830 04/25/17  0743 04/24/17  0804  04/23/17  1310   WBC 4.6 3.8*  3.6*  --  5.7   HGB 11.8 10.7* 10.9*  --  13.3   MCV 94 94 93  --  92   PLT 59* 57* 52*  --  68*   INR 1.42* 1.45*  --   --  1.44*    144 143  --  145*   POTASSIUM 3.6 3.7 3.7  < > 3.2*   CHLORIDE 111* 111* 114*  --  113*   CO2 21 22 20  --  20   BUN 8 6* 6*  --  5*   CR 0.49* 0.55 0.51*  --  0.56   ANIONGAP 9 11 8  --  12   FABIAN 8.9 8.6 8.3*  --  8.9   * 135* 95  --  108*   ALBUMIN 2.9* 2.4* 2.5*  --  3.1*   PROTTOTAL 6.7* 6.2* 5.9*  --  7.7   BILITOTAL 1.8* 1.2 1.7*  --  2.0*   ALKPHOS 172* 164* 145  --  169*   ALT 19 17 16  --  22   AST 27 23 24  --  35   < > = values in this interval not displayed.  Ammonia 43 9 115 )

## 2017-04-26 NOTE — PLAN OF CARE
Problem: Goal Outcome Summary  Goal: Goal Outcome Summary  Pt AOx4, VSS on RA. Transfers SBA/independently in room. No c/o pain or nausea. Regular diet, good appetite. Voiding WDL, 2 BM's today. Plan was to DC home. AVS reviewed and signed. PIV removed. Pt claimed  or cab would pick her up. However, when RN later asked the patient about her plan, she became more confused about whether or not she had a ride available and who was going to pick her up. MD paged and decided that pt should stay an add'l night, receiving lactulose PRN until there is a safer plan for her to hopefully DC tomorrow. Will continue to monitor and follow POC.

## 2017-04-26 NOTE — PROGRESS NOTES
Social Work Services Progress Note    Hospital Day: 4  Date of Initial Social Work Evaluation:  RNCC evaluated pt on 4/25/17  Collaborated with:  Patient, RN Carri, primary team Gold 2    Data:  Pt is a 42-year-old female admitted with acute encephalopathy.     Intervention:  Pt to be discharged today and states that she does not have a ride home. Multiple members of the team tried calling pt's significant other, Dontae, but he has been unreachable. SW met with pt. Pt confirms that she lives at [address] [place], [place] [address] [place] in [place]. Pt states that she has been unable to get ahold of any family members to provide a ride for her, and she left her cell phone at home. There is concern for pt's ability to safely take a bus home. SW obtained a cab voucher for the pt. Notified pt's RN of cab voucher and gave cab voucher to charge RN.     Assessment:  Pt to dc home today with cab ride    Plan:    Anticipated Disposition:  Home, no needs identified    Barriers to d/c plan:  None    Follow Up:  SW to follow for any needs    JULIENNE Edwards, LGSW  5[address] Unit   Pager [phone]  Phone [phone]

## 2017-04-26 NOTE — PROGRESS NOTES
"[facility]  \"Hello, my name is Kassidy Pedroza , and I am calling from the [facility].  I want to check in and see how you are doing, after leaving the hospital.  You may also receive a call from your Care Coordinator (care team), but I want to make sure you don t have any urgent needs.  I have a couple questions to review with you:     Post-Discharge Outreach                                                    Mildred Rascon is a 42 year old female     Follow-up Appointments           Follow Up and recommended labs and tests       PCP in one week for post hospital dc follow up                 Care Team:    Patient Care Team       Relationship Specialty Notifications Start End    nAt Cavazos PCP - General Internal Medicine  4/23/17     Phone: [phone] Fax: [phone]         [facility] [address] [facility] [address]            Transition of Care Review                                                      Patient was called three times and no answer so post 24 hr DC follow up calls will be closed out                       Plan                                                      Thanks for your time.  Your Care Coordinator may follow-up within the next couple days.  In the meantime if you have questions, concerns or problems call your care team.        Kassidy Pedroza    "

## 2017-04-26 NOTE — PLAN OF CARE
Problem: Goal Outcome Summary  Goal: Goal Outcome Summary  Outcome: No Change  2590-4337  Pt alert. Pt is disoriented to situation, able to answer orientation questions properly. Pt very agitated and anxious at beginning of shift. Pt continues to have visual hallucinations, yelling in her room and crying. Pt uncooperative with staff intermittently, at times will be compliant. No PIV. Restraints were discontinued by MD as they were not used. No pain. No BMs this shift, voiding adequately. Good appetite. Pt finally fell asleep at 0530.    Vital signs were not taken this morning at pt is sleeping.

## 2017-04-27 ENCOUNTER — TELEPHONE (OUTPATIENT)
Dept: PHARMACY | Facility: OTHER | Age: 42
End: 2017-04-27

## 2017-04-27 LAB — INTERPRETATION ECG - MUSE: NORMAL

## 2017-04-27 NOTE — TELEPHONE ENCOUNTER
MTM referral from: Transitions of Care (recent hospital discharge or ED visit)    MTM referral outreach attempt #1 on April 27, 2017 at 3:57 PM      Outcome: Patient is not interested at this time because they are a [facility] patient. Emailed MTM referral info for follow up, will route to MTM Pharmacist/Provider as an FYI. Thank you for the referral.     Kendra Frias MTM Coordinator

## 2017-04-29 LAB
BACTERIA SPEC CULT: NO GROWTH
Lab: NORMAL
MICRO REPORT STATUS: NORMAL
SPECIMEN SOURCE: NORMAL

## 2017-08-09 ENCOUNTER — HOSPITAL ENCOUNTER (INPATIENT)
Facility: CLINIC | Age: 42
LOS: 145 days | Discharge: NURSING, REHAB OR LONG-TERM CARE | End: 2018-01-02
Attending: FAMILY MEDICINE | Admitting: PSYCHIATRY & NEUROLOGY
Payer: MEDICAID

## 2017-08-09 DIAGNOSIS — F15.10 METHAMPHETAMINE ABUSE, EPISODIC (H): ICD-10-CM

## 2017-08-09 DIAGNOSIS — R41.82 ALTERED MENTAL STATUS, UNSPECIFIED ALTERED MENTAL STATUS TYPE: ICD-10-CM

## 2017-08-09 DIAGNOSIS — F41.8 MIXED ANXIETY DEPRESSIVE DISORDER: ICD-10-CM

## 2017-08-09 DIAGNOSIS — K70.9 LIVER DISEASE, CHRONIC, DUE TO ALCOHOL (H): ICD-10-CM

## 2017-08-09 DIAGNOSIS — R41.82 ALTERED MENTAL STATUS: ICD-10-CM

## 2017-08-09 DIAGNOSIS — K21.9 GASTROESOPHAGEAL REFLUX DISEASE WITHOUT ESOPHAGITIS: ICD-10-CM

## 2017-08-09 DIAGNOSIS — N28.9 RENAL INSUFFICIENCY: ICD-10-CM

## 2017-08-09 DIAGNOSIS — K76.9 HEPATIC LESION: Primary | ICD-10-CM

## 2017-08-09 DIAGNOSIS — K70.9 ALCOHOLIC LIVER DISEASE (H): ICD-10-CM

## 2017-08-09 LAB
ALBUMIN SERPL-MCNC: 3.2 G/DL (ref 3.4–5)
ALCOHOL BREATH TEST: 0 (ref 0–0.01)
ALP SERPL-CCNC: 196 U/L (ref 40–150)
ALT SERPL W P-5'-P-CCNC: 129 U/L (ref 0–50)
AMMONIA PLAS-SCNC: 47 UMOL/L (ref 10–50)
AMMONIA PLAS-SCNC: 52 UMOL/L (ref 10–50)
AMPHETAMINES UR QL SCN: ABNORMAL
ANION GAP SERPL CALCULATED.3IONS-SCNC: 11 MMOL/L (ref 3–14)
ANION GAP SERPL CALCULATED.3IONS-SCNC: 13 MMOL/L (ref 3–14)
AST SERPL W P-5'-P-CCNC: 145 U/L (ref 0–45)
BARBITURATES UR QL: ABNORMAL
BASOPHILS # BLD AUTO: 0 10E9/L (ref 0–0.2)
BASOPHILS NFR BLD AUTO: 0.7 %
BENZODIAZ UR QL: ABNORMAL
BILIRUB SERPL-MCNC: 1.1 MG/DL (ref 0.2–1.3)
BUN SERPL-MCNC: 6 MG/DL (ref 7–30)
BUN SERPL-MCNC: 6 MG/DL (ref 7–30)
CALCIUM SERPL-MCNC: 8.6 MG/DL (ref 8.5–10.1)
CALCIUM SERPL-MCNC: 8.7 MG/DL (ref 8.5–10.1)
CANNABINOIDS UR QL SCN: ABNORMAL
CHLORIDE SERPL-SCNC: 114 MMOL/L (ref 94–109)
CHLORIDE SERPL-SCNC: 115 MMOL/L (ref 94–109)
CO2 SERPL-SCNC: 16 MMOL/L (ref 20–32)
CO2 SERPL-SCNC: 17 MMOL/L (ref 20–32)
COCAINE UR QL: ABNORMAL
CREAT SERPL-MCNC: 0.48 MG/DL (ref 0.52–1.04)
CREAT SERPL-MCNC: 0.48 MG/DL (ref 0.52–1.04)
DIFFERENTIAL METHOD BLD: ABNORMAL
EOSINOPHIL # BLD AUTO: 0.1 10E9/L (ref 0–0.7)
EOSINOPHIL NFR BLD AUTO: 1.4 %
ERYTHROCYTE [DISTWIDTH] IN BLOOD BY AUTOMATED COUNT: 14.4 % (ref 10–15)
ETHANOL UR QL SCN: ABNORMAL
GFR SERPL CREATININE-BSD FRML MDRD: ABNORMAL ML/MIN/1.7M2
GFR SERPL CREATININE-BSD FRML MDRD: ABNORMAL ML/MIN/1.7M2
GLUCOSE SERPL-MCNC: 122 MG/DL (ref 70–99)
GLUCOSE SERPL-MCNC: 134 MG/DL (ref 70–99)
HCT VFR BLD AUTO: 39.8 % (ref 35–47)
HGB BLD-MCNC: 14.1 G/DL (ref 11.7–15.7)
IMM GRANULOCYTES # BLD: 0 10E9/L (ref 0–0.4)
IMM GRANULOCYTES NFR BLD: 0 %
LYMPHOCYTES # BLD AUTO: 0.9 10E9/L (ref 0.8–5.3)
LYMPHOCYTES NFR BLD AUTO: 20.1 %
MAGNESIUM SERPL-MCNC: 1.9 MG/DL (ref 1.6–2.3)
MCH RBC QN AUTO: 31.8 PG (ref 26.5–33)
MCHC RBC AUTO-ENTMCNC: 35.4 G/DL (ref 31.5–36.5)
MCV RBC AUTO: 90 FL (ref 78–100)
MONOCYTES # BLD AUTO: 0.3 10E9/L (ref 0–1.3)
MONOCYTES NFR BLD AUTO: 6.8 %
NEUTROPHILS # BLD AUTO: 3 10E9/L (ref 1.6–8.3)
NEUTROPHILS NFR BLD AUTO: 71 %
NRBC # BLD AUTO: 0 10*3/UL
NRBC BLD AUTO-RTO: 0 /100
OPIATES UR QL SCN: ABNORMAL
PLATELET # BLD AUTO: 61 10E9/L (ref 150–450)
POTASSIUM SERPL-SCNC: 3.4 MMOL/L (ref 3.4–5.3)
POTASSIUM SERPL-SCNC: 3.4 MMOL/L (ref 3.4–5.3)
PROT SERPL-MCNC: 7.8 G/DL (ref 6.8–8.8)
RBC # BLD AUTO: 4.44 10E12/L (ref 3.8–5.2)
SODIUM SERPL-SCNC: 143 MMOL/L (ref 133–144)
SODIUM SERPL-SCNC: 143 MMOL/L (ref 133–144)
TSH SERPL DL<=0.005 MIU/L-ACNC: 2.33 MU/L (ref 0.4–4)
WBC # BLD AUTO: 4.3 10E9/L (ref 4–11)

## 2017-08-09 PROCEDURE — 99285 EMERGENCY DEPT VISIT HI MDM: CPT | Mod: 25 | Performed by: FAMILY MEDICINE

## 2017-08-09 PROCEDURE — 25000128 H RX IP 250 OP 636: Performed by: FAMILY MEDICINE

## 2017-08-09 PROCEDURE — 25800025 ZZH RX 258: Performed by: FAMILY MEDICINE

## 2017-08-09 PROCEDURE — 85025 COMPLETE CBC W/AUTO DIFF WBC: CPT | Performed by: FAMILY MEDICINE

## 2017-08-09 PROCEDURE — 84443 ASSAY THYROID STIM HORMONE: CPT | Performed by: FAMILY MEDICINE

## 2017-08-09 PROCEDURE — 99283 EMERGENCY DEPT VISIT LOW MDM: CPT | Mod: Z6 | Performed by: FAMILY MEDICINE

## 2017-08-09 PROCEDURE — 80320 DRUG SCREEN QUANTALCOHOLS: CPT | Performed by: FAMILY MEDICINE

## 2017-08-09 PROCEDURE — 80307 DRUG TEST PRSMV CHEM ANLYZR: CPT | Performed by: FAMILY MEDICINE

## 2017-08-09 PROCEDURE — 83735 ASSAY OF MAGNESIUM: CPT | Performed by: FAMILY MEDICINE

## 2017-08-09 PROCEDURE — 80053 COMPREHEN METABOLIC PANEL: CPT | Performed by: FAMILY MEDICINE

## 2017-08-09 PROCEDURE — 96361 HYDRATE IV INFUSION ADD-ON: CPT | Performed by: FAMILY MEDICINE

## 2017-08-09 PROCEDURE — 25000132 ZZH RX MED GY IP 250 OP 250 PS 637: Performed by: FAMILY MEDICINE

## 2017-08-09 PROCEDURE — 82075 ASSAY OF BREATH ETHANOL: CPT | Performed by: FAMILY MEDICINE

## 2017-08-09 PROCEDURE — 25000132 ZZH RX MED GY IP 250 OP 250 PS 637: Performed by: EMERGENCY MEDICINE

## 2017-08-09 PROCEDURE — 99207 ZZC CONSULT E&M CHANGED TO SUBSEQUENT LEVEL: CPT | Performed by: INTERNAL MEDICINE

## 2017-08-09 PROCEDURE — 25000125 ZZHC RX 250: Performed by: FAMILY MEDICINE

## 2017-08-09 PROCEDURE — 96365 THER/PROPH/DIAG IV INF INIT: CPT | Performed by: FAMILY MEDICINE

## 2017-08-09 PROCEDURE — 82140 ASSAY OF AMMONIA: CPT | Performed by: FAMILY MEDICINE

## 2017-08-09 PROCEDURE — S5010 5% DEXTROSE AND 0.45% SALINE: HCPCS | Performed by: FAMILY MEDICINE

## 2017-08-09 PROCEDURE — 90791 PSYCH DIAGNOSTIC EVALUATION: CPT

## 2017-08-09 PROCEDURE — 80048 BASIC METABOLIC PNL TOTAL CA: CPT | Performed by: FAMILY MEDICINE

## 2017-08-09 PROCEDURE — 99233 SBSQ HOSP IP/OBS HIGH 50: CPT | Performed by: INTERNAL MEDICINE

## 2017-08-09 RX ORDER — PANTOPRAZOLE SODIUM 40 MG/1
40 TABLET, DELAYED RELEASE ORAL DAILY
Status: ON HOLD | COMMUNITY
End: 2018-01-02

## 2017-08-09 RX ORDER — RISPERIDONE 1 MG/1
1 TABLET, ORALLY DISINTEGRATING ORAL ONCE
Status: COMPLETED | OUTPATIENT
Start: 2017-08-09 | End: 2017-08-09

## 2017-08-09 RX ORDER — FUROSEMIDE 20 MG
TABLET ORAL
COMMUNITY
Start: 2017-07-12 | End: 2017-08-09

## 2017-08-09 RX ORDER — FOLIC ACID 1 MG/1
1 TABLET ORAL DAILY
Status: ON HOLD | COMMUNITY
End: 2018-01-02

## 2017-08-09 RX ORDER — HALOPERIDOL 5 MG/ML
2 INJECTION INTRAMUSCULAR ONCE
Status: DISCONTINUED | OUTPATIENT
Start: 2017-08-09 | End: 2017-08-09

## 2017-08-09 RX ORDER — SPIRONOLACTONE 100 MG/1
100 TABLET, FILM COATED ORAL
COMMUNITY
Start: 2017-06-25 | End: 2017-08-09

## 2017-08-09 RX ORDER — RISPERIDONE 1 MG/1
1 TABLET ORAL 2 TIMES DAILY PRN
Status: ON HOLD | COMMUNITY
End: 2018-01-02

## 2017-08-09 RX ORDER — GABAPENTIN 300 MG/1
300 CAPSULE ORAL 3 TIMES DAILY
Status: ON HOLD | COMMUNITY
End: 2018-01-02

## 2017-08-09 RX ORDER — FERROUS GLUCONATE 324(38)MG
324 TABLET ORAL
Status: ON HOLD | COMMUNITY
End: 2018-01-02

## 2017-08-09 RX ORDER — SODIUM CHLORIDE 9 MG/ML
1000 INJECTION, SOLUTION INTRAVENOUS CONTINUOUS
Status: DISCONTINUED | OUTPATIENT
Start: 2017-08-09 | End: 2017-08-30

## 2017-08-09 RX ORDER — RISPERIDONE 2 MG/1
2 TABLET ORAL AT BEDTIME
Status: ON HOLD | COMMUNITY
End: 2018-01-02

## 2017-08-09 RX ADMIN — SODIUM CHLORIDE 1000 ML: 9 INJECTION, SOLUTION INTRAVENOUS at 16:27

## 2017-08-09 RX ADMIN — FOLIC ACID: 5 INJECTION, SOLUTION INTRAMUSCULAR; INTRAVENOUS; SUBCUTANEOUS at 15:04

## 2017-08-09 RX ADMIN — SODIUM CHLORIDE 1000 ML: 9 INJECTION, SOLUTION INTRAVENOUS at 09:47

## 2017-08-09 RX ADMIN — LACTULOSE 20 G: 20 POWDER, FOR SOLUTION ORAL at 10:56

## 2017-08-09 RX ADMIN — RISPERIDONE 1 MG: 1 TABLET, ORALLY DISINTEGRATING ORAL at 12:19

## 2017-08-09 RX ADMIN — SODIUM CHLORIDE 1000 ML: 9 INJECTION, SOLUTION INTRAVENOUS at 11:13

## 2017-08-09 RX ADMIN — RISPERIDONE 1 MG: 1 TABLET, ORALLY DISINTEGRATING ORAL at 10:34

## 2017-08-09 RX ADMIN — LACTULOSE 20 G: 20 POWDER, FOR SOLUTION ORAL at 20:33

## 2017-08-09 NOTE — ED NOTES
"Awake,  Staring, reaching for objects not present  States \"i need to finish my puzzle\".  Tearful at times  "

## 2017-08-09 NOTE — ED NOTES
Pt appears to be responding to internal stimuli; reaching for things that are not physically present and conversating at times as though another person is in the room with her.

## 2017-08-09 NOTE — ED NOTES
Bed: ED11  Expected date: 8/9/17  Expected time: 7:50 AM  Means of arrival:   Comments:  [facility] [address] 43 yo F anxiety

## 2017-08-09 NOTE — ED NOTES
"In room screaming and crying,  Pulled own IV out, staring at the blood yelling \"someone is trying to kill me, You were suppose to keep me safe\"   Assisted to clean up, scrubs changed.   Remains paranoid with auditory and visual hallucinations.  MD at bedside  "

## 2017-08-09 NOTE — ED NOTES
"Pt in her room on her hands and knees starring at the ground with her pants down to her knees. Pt told to pull up her pants and asked if she needed assistance or a smaller size pants, but pt said \"no I am fine\" as she crawled across the floor with her pants down.   "

## 2017-08-09 NOTE — IP AVS SNAPSHOT
[address]    [address]    [place] [address]    Phone:  [phone]                                       After Visit Summary   8/9/2017    Mildred Rascon    MRN: [id number]           After Visit Summary Signature Page     I have received my discharge instructions, and my questions have been answered. I have discussed any challenges I see with this plan with the nurse or doctor.    ..........................................................................................................................................  Patient/Patient Representative Signature      ..........................................................................................................................................  Patient Representative Print Name and Relationship to Patient    ..................................................               ................................................  Date                                            Time    ..........................................................................................................................................  Reviewed by Signature/Title    ...................................................              ..............................................  Date                                                            Time

## 2017-08-09 NOTE — PHARMACY-ADMISSION MEDICATION HISTORY
Admission Medication History     Admission medication history interview status for the August 9, 2017 admission is complete. See Epic admission navigator for allergy information, pharmacy, prior to admission medications and immunization status.     Medication history interview sources:  [facility] ([phone]), [facility] ([phone]), [facility] ([facility], [phone]), [facility] ([phone]), [facility] ([phone])     Medication compliance: Poor    Changes made to PTA medication list (reason)  Added:   - risperidone 2 mg at bedtime  Deleted:   - lactulose 10 g packet, take 3 packets TID x10 days --> last fill in Feb 2017, per [facility] this completed  - furosemide 20 mg tab (no dose) --> no record of this medication with any pharmacy  - rifaximin 550 mg BID --> no record of this medication with any pharmacy  - spironolactone 100 mg daily --> no record of this medication with any pharmacy  - thiamine 100 mg daily --> no record of this medication with any pharmacy  - zinc sulfate 220 mg (50 Zn) capsule --> no record of this medication with any pharmacy  Changed:   - pantoprazole liquid --> pantoprazole 40 mg tab daily (formulation/dose correction)  - ferrous gluconate 324 mg BID --> 324 mg daily (dose correction)    Additional medication history information (including reliability of information, actions taken by pharmacist):  - Unable to interview patient given current presentation. Attempted to contact spouse (Dontae) at [phone] but unable to get through. Called the patient's listed home phone number ([phone]), however this number belongs to someone unrelated to Mildred.   - Contacted home health care ([facility]; [phone]) to obtain medication history, however they noted the patient was discharged from home health care on 6/13/17 and has not received care from them since.   - VERY difficult to determine the patient's medications and whether  she has been taking them. Contacted 5 pharmacies ([facility], [facility], [facility], [facility], and [facility]) and found the following refill history:   - [facility]: most recent fill was April 2017 (gabapentin), preceeded by Feb 2017 (ferrous gluconate, folic acid, albuterol)    - [facility]: most recent fill was March 2017 (gabapentin)   - [facility]: most recent fill was Feb 2017 (risperidone, lactulose, pantoprazole, multivitamin)   - [facility]: most recent fill April 2017 (lactulose)    Prior to Admission medications    Medication Sig Last Dose Taking? Auth Provider   ferrous gluconate (FERGON) 324 (38 FE) MG tablet Take 324 mg by mouth daily (with breakfast) Unknown  Unknown, Entered By History   folic acid (FOLVITE) 1 MG tablet Take 1 mg by mouth daily Unknown  Unknown, Entered By History   pantoprazole (PROTONIX) 40 MG EC tablet Take 40 mg by mouth daily Unknown  Unknown, Entered By History   risperiDONE (RISPERDAL) 1 MG tablet Take 1 mg by mouth 2 times daily as needed (agitation) Unknown  Unknown, Entered By History   risperiDONE (RISPERDAL) 2 MG tablet Take 2 mg by mouth At Bedtime Unknown  Unknown, Entered By History   gabapentin (NEURONTIN) 300 MG capsule Take 300 mg by mouth 3 times daily Unknown  Unknown, Entered By History   multivitamin, therapeutic with minerals (MULTI-VITAMIN) TABS tablet Take 1 tablet by mouth daily Unknown     albuterol (PROAIR HFA, PROVENTIL HFA, VENTOLIN HFA) 108 (90 BASE) MCG/ACT inhaler Use 1-2 puffs every 4 to 6 hours as needed Unknown  Navneet Verma MD     Time spent: 2 hours    Medication history completed by:   Ludy Anaya, AshleyD

## 2017-08-09 NOTE — ED NOTES
Patient talked with  on the phone.    updated on condition by writer, with patients consent to give info

## 2017-08-09 NOTE — ED NOTES
"Pt tearful, asking staff to unlock the bed so she can \"move it into the hallway\" and \"unhook my IV so I can leave.\"   "
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
## 2017-08-09 NOTE — ED NOTES
"Pt sitting on the bathroom floor, states she is \"finishing a puzzle for her friend,\" pt taking large steps looking at the floor and attempting to  something that was not there.  Pt crying stating \"please don't shoot me, they're gonna kill me, please don't let them kill me.\"  "
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
## 2017-08-09 NOTE — ED NOTES
"Pt standing behind bed, asking PA \"unlock this bed so I can move it for my friends, I'm taking this home.\"  Told PA \"move right now you little bitch, I am moving this bed.\"  PA re-directed pt to bed, pt continues to reach for things not physically present and handed PA a \"hat\" (which was not physically present) and said \"put this fucking ugly hat on and don't let the door hit you on the way out bitch.\"  "

## 2017-08-09 NOTE — ED NOTES
Patient seen by and talked with MD.  Reports feels comfortable being discharge.  Given discharge instructions along with information for treatment centers and homeless shelters.  Discharged home

## 2017-08-09 NOTE — IP AVS SNAPSHOT
MRN:[id number]                      After Visit Summary   8/9/2017    Mildred Rascon    MRN: [id number]           Thank you!     Thank you for choosing [facility] for your care. Our goal is always to provide you with excellent care.        Patient Information     Date Of Birth          1975        About your hospital stay     You were admitted on:  August 10, 2017 You last received care in the:  UR 30NR    You were discharged on:  January 2, 2018       Who to Call     For medical emergencies, please call 911.  For non-urgent questions about your medical care, please call your primary care provider or clinic, [phone]          Attending Provider     Provider Specialty    Manolo Renee MD [facility], Quoc Avalos MD Psychiatry    July Foss MD Psychiatry    Kerry, Asif DE LA CRUZ MD Psychiatry    Brandi, Jeffrey Graham MD Psychiatry    Ashish, Andrés Tee MD Psychiatry       Primary Care Provider Office Phone # Fax #    Ant Cavazos [phone]      After Care Instructions     Activity       Activity Level: up as tolerated. Patient uses a walker, so her ambulation is slow.            Diet       Diet order: Regular  Fluid restriction? NA  Texture: regular  Thicken liquids? No            Fall precautions           General info for SNF       Admitting Provider(s): Andrés Hinojosa MD    Admit to Facility: [facility]  Length of Stay Estimate: Undetermined   Condition: Improving  Level of care:skilled   Rehabilitation Potential: Good  Admission H&P completed: Yes   Recent Chemotherapy: No  Use Nursing Home Standing Orders: Yes    For questions about these admission orders, please contact:  [address] [address]  Phone: [phone]                  Follow-up Appointments     Follow Up ([facility])       Follow up MRI to assess liver lesion. See you PCP 1 week after MRI to  discuss results    Appointments on [place] and/or [facility] (with [facility] or [facility] provider or service). Call [phone] if you haven't heard regarding these appointments within 7 days of discharge.                  Future tests that were ordered for you     MR Abdomen w Contrast       Administration of IV contrast (contrast agent, dose, and amount) will be tailored to this examination per the appropriate written protocol listed in the Protocol E-Book, or by the supervising imaging provider.                  Further instructions from your care team        Behavioral Discharge Planning and Instructions      Summary:  You were admitted on 8/9/2017 after eloping from the [facility] you were placed in from [facility].  You were then found at a party where you were smoking methamphetamine and crying uncontrollably and your family called 911. You were treated by Dr. Asif Payne MD and then Andrés Hinojosa MD. You were responding to internal stimuli. You spent most of the time in your room. You were not able to participate in the program. Your symptoms did not resolve but did improve.  You were discharged on 1/2/2018 from [facility] to a Skilled Nursing  Home at [facility] on a provisional discharge. You were [facility] by [facility]. You will be on a secure behavioral health unit.      Principal Diagnosis:   Cognitive Disorder NOS.   Encephalopathy probably multifactorial such as chronic hepatic encephalopathy, organic brain damage due to encephalopathy, alcohol drinking, possibly underlying psychotic illness; possibly head injury contributes to patient's symptoms.   Alcohol use disorder.   Amphetamine use disorder.  Major depressive disorder, moderate severity.       Health Care Follow-up Appointments:     You were recommitted through [facility] [place] on November 29, 2017.   Continue to coordinate the terms of your commitment with the adult mental health case  manager.  [facility]  is  Shira Pate Ph: [phone]  [facility] is Janki Benítez Ph: [phone]  The Health unit Coordinator has faxed these discharge instructions to fax: [phone]      You are being discharged on a provisional discharge. This is filed with [facility].      Tracie is the liaison to your admission to this [facility].  Ph: [phone]  Fax:[phone]    [facility]  [place] at [address] [address]  Ph: [phone]  Fax: [phone]      The OBRA Level 2 screening was completed 12/29/17 by [facility].  Macarena Mays  Ph: [phone]  Fax: [phone]      Your [facility] CADI Worker is:  Yoon Braun  Cell: [phone]  The [facility] Unit Coordinator has faxed these discharge instructions to Fax: [phone]      You sister would like to be your guardian.  Yareli  Ph: [phone]    Attend all scheduled appointments with your outpatient providers. Call at least 24 hours in advance if you need to reschedule an appointment to ensure continued access to your outpatient providers.   Major Treatments, Procedures and Findings:  You were provided with: a psychiatric assessment and medication evaluation and/or management    You were evaluated by internal medicine multiple times for your physical health conditions.    You were seen by a pain management specialist on 11/14/17.    Symptoms to Report: feeling more aggressive, increased confusion, losing more sleep, mood getting worse or thoughts of suicide    Early warning signs can include: increased depression or anxiety sleep disturbances increased thoughts or behaviors of suicide or self-harm  increased unusual thinking, such as paranoia or hearing voices    Safety and Wellness:  Take all medicines as directed.  Make no changes unless your doctor suggests them.      Follow treatment recommendations.  Refrain from alcohol and non-prescribed drugs.  If  "there is a concern for safety, call 911.    Resources:   [facility] Crisis (COPE) Response - Adult [phone]        The treatment team has appreciated the opportunity to work with you.     If you have any questions or concerns our unit number is [phone]          Pending Results     No orders found from 2017 to 8/10/2017.            Statement of Approval     Ordered          18 0942  I have reviewed and agree with all the recommendations and orders detailed in this document.     Approved and electronically signed by:  Andrés Hinojosa MD             Admission Information     Date & Time Provider Department Dept. Phone    2017 Andrés Hinojosa MD UR 30NR [phone]      Your Vitals Were     Blood Pressure Pulse Temperature Respirations Height Weight    109/69 75 96  F (35.6  C) 16 1.676 m (5' 5.98\") 102.5 kg (226 lb)    Pulse Oximetry BMI (Body Mass Index)                94% 36.5 kg/m2          [employer] Information     "[employer]" lets you send messages to your doctor, view your test results, renew your prescriptions, schedule appointments and more. To sign up, go to [email or web address]" . Click on \"Log in\" on the left side of the screen, which will take you to the Welcome page. Then click on \"Sign up Now\" on the right side of the page.     You will be asked to enter the access code listed below, as well as some personal information. Please follow the directions to create your username and password.     Your access code is: [email or web address]  Expires: 2018 12:12 PM     Your access code will  in 90 days. If you need help or a new code, please call your [place] clinic or [phone].        Care EveryWhere ID     This is your Care EveryWhere ID. This could be used by other organizations to access your [place] medical records  [id number]        Equal Access to Services     ALFREDO HARRIS AH: Cachorro Knedrick, annie buenrostro, sushila garduno " sushma lukedanish sarikamonico la'aan ah. So [facility] [phone].    ATENCIÓN: Si habla español, tiene a alvarado disposición servicios gratuitos de asistencia lingüística. Dilip al [phone].    We comply with applicable federal civil rights laws and [place] laws. We do not discriminate on the basis of race, color, national origin, age, disability, sex, sexual orientation, or gender identity.               Review of your medicines      START taking        Dose / Directions    furosemide 20 MG tablet   Commonly known as:  LASIX   Used for:  Renal insufficiency        Dose:  20 mg   Take 1 tablet (20 mg) by mouth daily   Quantity:  30 tablet   Refills:  1       * haloperidol 2 MG tablet   Commonly known as:  HALDOL   Used for:  Altered mental status, unspecified altered mental status type, Mixed anxiety depressive disorder        Dose:  2 mg   Take 1 tablet (2 mg) by mouth 2 times daily   Quantity:  60 tablet   Refills:  1       * haloperidol 0.5 MG tablet   Commonly known as:  HALDOL   Used for:  Altered mental status, unspecified altered mental status type, Mixed anxiety depressive disorder        Dose:  0.5 mg   Take 1 tablet (0.5 mg) by mouth 2 times daily   Quantity:  60 tablet   Refills:  1       lactulose 10 GM Packet   Commonly known as:  CEPHULAC   Used for:  Altered mental status, unspecified altered mental status type, Liver disease, chronic, due to alcohol (H)        Dose:  30 g   Take 3 packets (30 g) by mouth 3 times daily   Quantity:  90 each   Refills:  1       levOCARNitine 330 MG tablet   Commonly known as:  CARNITOR   Used for:  Liver disease, chronic, due to alcohol (H), Alcoholic liver disease (H)        Dose:  990 mg   Take 3 tablets (990 mg) by mouth 2 times daily   Quantity:  180 tablet   Refills:  1       rifaximin 550 MG Tabs tablet   Commonly known as:  XIFAXAN   Indication:  hepatic encephalopathy   Used for:  Liver disease, chronic, due to alcohol (H), Altered mental status, unspecified altered mental  status type        Dose:  550 mg   Take 1 tablet (550 mg) by mouth 2 times daily   Quantity:  60 tablet   Refills:  1       spironolactone 50 MG tablet   Commonly known as:  ALDACTONE   Used for:  Liver disease, chronic, due to alcohol (H), Renal insufficiency        Dose:  50 mg   Take 1 tablet (50 mg) by mouth daily   Quantity:  30 tablet   Refills:  1       * Notice:  This list has 2 medication(s) that are the same as other medications prescribed for you. Read the directions carefully, and ask your doctor or other care provider to review them with you.      CONTINUE these medicines which may have CHANGED, or have new prescriptions. If we are uncertain of the size of tablets/capsules you have at home, strength may be listed as something that might have changed.        Dose / Directions    gabapentin 100 MG capsule   Commonly known as:  NEURONTIN   This may have changed:    - medication strength  - how much to take   Used for:  Mixed anxiety depressive disorder        Dose:  200 mg   Take 2 capsules (200 mg) by mouth 3 times daily   Quantity:  180 capsule   Refills:  1       * risperiDONE 2 MG tablet   Commonly known as:  risperDAL   This may have changed:  Another medication with the same name was changed. Make sure you understand how and when to take each.   Used for:  Altered mental status, unspecified altered mental status type, Mixed anxiety depressive disorder        Dose:  2 mg   Take 1 tablet (2 mg) by mouth At Bedtime   Quantity:  30 tablet   Refills:  1       * risperiDONE 1 MG tablet   Commonly known as:  risperDAL   This may have changed:    - when to take this  - reasons to take this   Used for:  Altered mental status, unspecified altered mental status type, Mixed anxiety depressive disorder        Dose:  1 mg   Take 1 tablet (1 mg) by mouth every morning   Quantity:  30 tablet   Refills:  1       * Notice:  This list has 2 medication(s) that are the same as other medications prescribed for you. Read  the directions carefully, and ask your doctor or other care provider to review them with you.      CONTINUE these medicines which have NOT CHANGED        Dose / Directions    albuterol 108 (90 BASE) MCG/ACT Inhaler   Commonly known as:  PROAIR HFA/PROVENTIL HFA/VENTOLIN HFA   Used for:  Mild intermittent asthma without complication        Use 1-2 puffs every 4 to 6 hours as needed   Quantity:  1 Inhaler   Refills:  0       ferrous gluconate 324 (38 FE) MG tablet   Commonly known as:  FERGON   Used for:  Liver disease, chronic, due to alcohol (H), Alcoholic liver disease (H)        Dose:  324 mg   Take 1 tablet (324 mg) by mouth daily (with breakfast)   Quantity:  30 tablet   Refills:  1       folic acid 1 MG tablet   Commonly known as:  FOLVITE   Used for:  Liver disease, chronic, due to alcohol (H), Alcoholic liver disease (H)        Dose:  1 mg   Take 1 tablet (1 mg) by mouth daily   Quantity:  30 tablet   Refills:  1       multivitamin, therapeutic with minerals Tabs tablet   Used for:  Liver disease, chronic, due to alcohol (H)        Dose:  1 tablet   Take 1 tablet by mouth daily   Quantity:  30 each   Refills:  1       pantoprazole 40 MG EC tablet   Commonly known as:  PROTONIX   Used for:  Gastroesophageal reflux disease without esophagitis        Dose:  40 mg   Take 1 tablet (40 mg) by mouth daily   Quantity:  30 tablet   Refills:  1            Where to get your medicines      Some of these will need a paper prescription and others can be bought over the counter. Ask your nurse if you have questions.     Bring a paper prescription for each of these medications     ferrous gluconate 324 (38 FE) MG tablet    folic acid 1 MG tablet    furosemide 20 MG tablet    gabapentin 100 MG capsule    haloperidol 0.5 MG tablet    haloperidol 2 MG tablet    lactulose 10 GM Packet    levOCARNitine 330 MG tablet    multivitamin, therapeutic with minerals Tabs tablet    pantoprazole 40 MG EC tablet    rifaximin 550 MG Tabs  tablet    risperiDONE 1 MG tablet    risperiDONE 2 MG tablet    spironolactone 50 MG tablet               ANTIBIOTIC INSTRUCTION     You've Been Prescribed an Antibiotic - Now What?  Your healthcare team thinks that you or your loved one might have an infection. Some infections can be treated with antibiotics, which are powerful, life-saving drugs. Like all medications, antibiotics have side effects and should only be used when necessary. There are some important things you should know about your antibiotic treatment.      Your healthcare team may run tests before you start taking an antibiotic.    Your team may take samples (e.g., from your blood, urine or other areas) to run tests to look for bacteria. These test can be important to determine if you need an antibiotic at all and, if you do, which antibiotic will work best.      Within a few days, your healthcare team might change or even stop your antibiotic.    Your team may start you on an antibiotic while they are working to find out what is making you sick.    Your team might change your antibiotic because test results show that a different antibiotic would be better to treat your infection.    In some cases, once your team has more information, they learn that you do not need an antibiotic at all. They may find out that you don't have an infection, or that the antibiotic you're taking won't work against your infection. For example, an infection caused by a virus can't be treated with antibiotics. Staying on an antibiotic when you don't need it is more likely to be harmful than helpful.      You may experience side effects from your antibiotic.    Like all medications, antibiotics have side effects. Some of these can be serious.    Let you healthcare team know if you have any known allergies when you are admitted to the hospital.    One significant side effect of nearly all antibiotics is the risk of severe and sometimes deadly diarrhea caused by Clostridium  difficile (C. Difficile). This occurs when a person takes antibiotics because some good germs are destroyed. Antibiotic use allows C. diificile to take over, putting patients at high risk for this serious infection.    As a patient or caregiver, it is important to understand your or your loved one's antibiotic treatment. It is especially important for caregivers to speak up when patients can't speak for themselves. Here are some important questions to ask your healthcare team.    What infection is this antibiotic treating and how do you know I have that infection?    What side effects might occur from this antibiotic?    How long will I need to take this antibiotic?    Is it safe to take this antibiotic with other medications or supplements (e.g., vitamins) that I am taking?     Are there any special directions I need to know about taking this antibiotic? For example, should I take it with food?    How will I be monitored to know whether my infection is responding to the antibiotic?    What tests may help to make sure the right antibiotic is prescribed for me?      Information provided by:  www.cdc.gov/getsmart  U.S. Department of Health and Human Services  Centers for disease Control and Prevention  National Center for Emerging and Zoonotic Infectious Diseases  Division of Healthcare Quality Promotion         Protect others around you: Learn how to safely use, store and throw away your medicines at www.disposemymeds.org.             Medication List: This is a list of all your medications and when to take them. Check marks below indicate your daily home schedule. Keep this list as a reference.      Medications           Morning Afternoon Evening Bedtime As Needed    albuterol 108 (90 BASE) MCG/ACT Inhaler   Commonly known as:  PROAIR HFA/PROVENTIL HFA/VENTOLIN HFA   Use 1-2 puffs every 4 to 6 hours as needed                                ferrous gluconate 324 (38 FE) MG tablet   Commonly known as:  FERGON   Take 1  tablet (324 mg) by mouth daily (with breakfast)   Last time this was given:  324 mg on 1/2/2018 10:22 AM                                folic acid 1 MG tablet   Commonly known as:  FOLVITE   Take 1 tablet (1 mg) by mouth daily   Last time this was given:  1 mg on 1/2/2018 10:22 AM                                furosemide 20 MG tablet   Commonly known as:  LASIX   Take 1 tablet (20 mg) by mouth daily   Last time this was given:  20 mg on 1/2/2018 10:20 AM                                gabapentin 100 MG capsule   Commonly known as:  NEURONTIN   Take 2 capsules (200 mg) by mouth 3 times daily   Last time this was given:  200 mg on 1/2/2018 10:20 AM                                * haloperidol 2 MG tablet   Commonly known as:  HALDOL   Take 1 tablet (2 mg) by mouth 2 times daily   Last time this was given:  0.5 mg on 1/2/2018 10:23 AM                                * haloperidol 0.5 MG tablet   Commonly known as:  HALDOL   Take 1 tablet (0.5 mg) by mouth 2 times daily   Last time this was given:  0.5 mg on 1/2/2018 10:23 AM                                lactulose 10 GM Packet   Commonly known as:  CEPHULAC   Take 3 packets (30 g) by mouth 3 times daily   Last time this was given:  30 g on 1/2/2018 10:20 AM                                levOCARNitine 330 MG tablet   Commonly known as:  CARNITOR   Take 3 tablets (990 mg) by mouth 2 times daily   Last time this was given:  990 mg on 1/2/2018 10:22 AM                                multivitamin, therapeutic with minerals Tabs tablet   Take 1 tablet by mouth daily   Last time this was given:  1 tablet on 1/2/2018 10:22 AM                                pantoprazole 40 MG EC tablet   Commonly known as:  PROTONIX   Take 1 tablet (40 mg) by mouth daily   Last time this was given:  40 mg on 1/2/2018 10:22 AM                                rifaximin 550 MG Tabs tablet   Commonly known as:  XIFAXAN   Take 1 tablet (550 mg) by mouth 2 times daily   Last time this was given:   550 mg on 1/2/2018 10:21 AM                                * risperiDONE 2 MG tablet   Commonly known as:  risperDAL   Take 1 tablet (2 mg) by mouth At Bedtime   Last time this was given:  1 mg on 1/2/2018 10:22 AM                                * risperiDONE 1 MG tablet   Commonly known as:  risperDAL   Take 1 tablet (1 mg) by mouth every morning   Last time this was given:  1 mg on 1/2/2018 10:22 AM                                spironolactone 50 MG tablet   Commonly known as:  ALDACTONE   Take 1 tablet (50 mg) by mouth daily   Last time this was given:  50 mg on 1/2/2018 10:24 AM                                * Notice:  This list has 4 medication(s) that are the same as other medications prescribed for you. Read the directions carefully, and ask your doctor or other care provider to review them with you.

## 2017-08-09 NOTE — ED NOTES
Continues to talk to people who are not present.  Reaches in to the air trying to grab things that are not present.

## 2017-08-09 NOTE — ED NOTES
Sleeping, slept through IV start and blood draw. Placed on pulse oximetry.  Haldol held at this time,  IV fluids started.
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
## 2017-08-10 PROBLEM — F29 PSYCHOSIS (H): Status: ACTIVE | Noted: 2017-08-10

## 2017-08-10 PROCEDURE — 25000132 ZZH RX MED GY IP 250 OP 250 PS 637: Performed by: EMERGENCY MEDICINE

## 2017-08-10 PROCEDURE — 12400001 ZZH R&B MH UMMC

## 2017-08-10 PROCEDURE — 25000132 ZZH RX MED GY IP 250 OP 250 PS 637: Performed by: PSYCHIATRY & NEUROLOGY

## 2017-08-10 PROCEDURE — HZ2ZZZZ DETOXIFICATION SERVICES FOR SUBSTANCE ABUSE TREATMENT: ICD-10-PCS | Performed by: PSYCHIATRY & NEUROLOGY

## 2017-08-10 PROCEDURE — 25000132 ZZH RX MED GY IP 250 OP 250 PS 637: Performed by: NURSE PRACTITIONER

## 2017-08-10 RX ORDER — FOLIC ACID 1 MG/1
1 TABLET ORAL DAILY
Status: DISCONTINUED | OUTPATIENT
Start: 2017-08-10 | End: 2018-01-02 | Stop reason: HOSPADM

## 2017-08-10 RX ORDER — FUROSEMIDE 20 MG/1
20 TABLET ORAL DAILY
Status: DISCONTINUED | OUTPATIENT
Start: 2017-08-10 | End: 2018-01-02 | Stop reason: HOSPADM

## 2017-08-10 RX ORDER — RISPERIDONE 2 MG/1
2 TABLET ORAL AT BEDTIME
Status: DISCONTINUED | OUTPATIENT
Start: 2017-08-10 | End: 2018-01-02 | Stop reason: HOSPADM

## 2017-08-10 RX ORDER — LORAZEPAM 1 MG/1
1 TABLET ORAL EVERY 4 HOURS PRN
Status: DISCONTINUED | OUTPATIENT
Start: 2017-08-10 | End: 2018-01-02 | Stop reason: HOSPADM

## 2017-08-10 RX ORDER — RISPERIDONE 1 MG/1
1 TABLET ORAL 2 TIMES DAILY PRN
Status: DISCONTINUED | OUTPATIENT
Start: 2017-08-10 | End: 2018-01-02 | Stop reason: HOSPADM

## 2017-08-10 RX ORDER — HYDROXYZINE HYDROCHLORIDE 25 MG/1
25-50 TABLET, FILM COATED ORAL EVERY 4 HOURS PRN
Status: DISCONTINUED | OUTPATIENT
Start: 2017-08-10 | End: 2018-01-02 | Stop reason: HOSPADM

## 2017-08-10 RX ORDER — MULTIPLE VITAMINS W/ MINERALS TAB 9MG-400MCG
1 TAB ORAL DAILY
Status: DISCONTINUED | OUTPATIENT
Start: 2017-08-10 | End: 2018-01-02 | Stop reason: HOSPADM

## 2017-08-10 RX ORDER — ALBUTEROL SULFATE 90 UG/1
2 AEROSOL, METERED RESPIRATORY (INHALATION) EVERY 4 HOURS PRN
Status: DISCONTINUED | OUTPATIENT
Start: 2017-08-10 | End: 2018-01-02 | Stop reason: HOSPADM

## 2017-08-10 RX ORDER — PANTOPRAZOLE SODIUM 40 MG/1
40 TABLET, DELAYED RELEASE ORAL DAILY
Status: DISCONTINUED | OUTPATIENT
Start: 2017-08-10 | End: 2018-01-02 | Stop reason: HOSPADM

## 2017-08-10 RX ORDER — SPIRONOLACTONE 50 MG/1
50 TABLET, FILM COATED ORAL DAILY
Status: DISCONTINUED | OUTPATIENT
Start: 2017-08-10 | End: 2018-01-02 | Stop reason: HOSPADM

## 2017-08-10 RX ADMIN — PANTOPRAZOLE SODIUM 40 MG: 40 TABLET, DELAYED RELEASE ORAL at 09:03

## 2017-08-10 RX ADMIN — SPIRONOLACTONE 50 MG: 50 TABLET ORAL at 11:02

## 2017-08-10 RX ADMIN — LACTULOSE 20 G: 20 POWDER, FOR SOLUTION ORAL at 09:03

## 2017-08-10 RX ADMIN — RIFAXIMIN 550 MG: 550 TABLET ORAL at 09:03

## 2017-08-10 RX ADMIN — RISPERIDONE 2 MG: 2 TABLET ORAL at 09:10

## 2017-08-10 RX ADMIN — LACTULOSE 20 G: 20 POWDER, FOR SOLUTION ORAL at 14:48

## 2017-08-10 RX ADMIN — RIFAXIMIN 550 MG: 550 TABLET ORAL at 20:48

## 2017-08-10 RX ADMIN — FOLIC ACID 1 MG: 1 TABLET ORAL at 09:03

## 2017-08-10 RX ADMIN — Medication 20 MG: at 11:02

## 2017-08-10 RX ADMIN — MULTIPLE VITAMINS W/ MINERALS TAB 1 TABLET: TAB at 09:03

## 2017-08-10 RX ADMIN — LACTULOSE 20 G: 20 POWDER, FOR SOLUTION ORAL at 20:48

## 2017-08-10 ASSESSMENT — ACTIVITIES OF DAILY LIVING (ADL)
ORAL_HYGIENE: INDEPENDENT
GROOMING: INDEPENDENT;PROMPTS
LAUNDRY: WITH SUPERVISION
GROOMING: INDEPENDENT
DRESS: INDEPENDENT
ORAL_HYGIENE: INDEPENDENT
DRESS: SCRUBS (BEHAVIORAL HEALTH)

## 2017-08-10 NOTE — PROGRESS NOTES
08/10/17 0135   Patient Belongings   Did you bring any home meds/supplements to the hospital?  No   Patient Belongings body jewelry;clothing;plastic bag;purse;shoes;wallet   Disposition of Belongings locker, security    Belongings Search Yes   Clothing Search Yes   Second Staff Lisha FRANK   General Info Comment Black shoes, blue tights, blue shirt, black and red purse, identication card, direct express card :[id number], green socks, make up in purse, neck lace with a feather and cross, brown wallet     Brought in by her  on 10/2/2017 at 4:30pm (YJ)  In locker   10 pairs of socks: pink, blue, cream, tan, pink/black, grey, white x2, brown, pink/purple  Black nike bag, black small note pad, black sweat pants with strings, tan bra, white shirt, navy blue pants, blue jegging, black navy shirt, black shirt with colored designs, navy blue shirt, grey sweater pants x3 underwears navy blue, black and white  Yellow sweat shirt, grey shirt, yellow shirt, tan shorts (fariba), white shirt with black colored designs, white pair of socks, white shirt with American flag designs, black shirt, black long sleeve shirt, black jeans, pink shirt, grey shirt, long sleeve blue sweater shirt, navy blue shorts with strings, white starkey top, grey starkey top, black and white sport bra, orange shirt, grey shirt, black starkey top.    ADMISSION:  I am responsible for any personal items that are not sent to the safe or pharmacy. [facility] is not responsible for loss, theft or damage of any property in my possession.    Patient Signature _____________________ Date/Time _____________________    Staff Signature _______________________ Date/Time _____________________    2nd Staff person, if patient is unable/unwilling to sign  ___________________________________ Date/Time _____________________  DISCHARGE:  All personal items have been returned to me.    Patient Signature _____________________ Date/Time _____________________    Staff Signature  _______________________ Date/Time _____________________

## 2017-08-10 NOTE — ED NOTES
Station [facility] called, unable to take patient at this time, they will call when they are able to admit patient.

## 2017-08-10 NOTE — PROGRESS NOTES
Pt signed JENNIFER for  Franklin and mother-in-law Divya. Both numbers pt gave were either in active or wrong number.

## 2017-08-10 NOTE — ED NOTES
Continues to have hallucinations.  grabbing into air at things not present,  Currently in bed with hand placed over ears. Remains paranoid. Crying at intermittent intervals

## 2017-08-10 NOTE — PLAN OF CARE
Problem: Psychotic Symptoms  Goal: Psychotic Symptoms  Signs and symptoms of listed problems will be absent or manageable.  42 year old female arrived on station 20 at 0112 this night shift from the ER at [facility].  Arrived via wheelchair and appeared to be somewhat unsteady on her feet.  She was immediately brought to [facility] where she will sleep in a medical bed with seizure pads on the floor next to her bed.  MSSA = 6 (due to hallucinations).  She is on a 1:1 for safety and appears to be sleeping comfortably at this time.  VS and search done upon admission to unit.  Will have labs in the morning.  Internal med consult also.     Admitted for hallucinations.  She apparently has been at [facility] in the past on medical units.  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  History of alcohol use, and more recently, meth use. Smokes cigarettes.     Unable to complete admission with patient due to psychosis (auditory and visual hallucinations) and inability to remain awake at this point for the profile interview.  Will complete admission process when patient more willing and able.  Please see ER notes for further history.

## 2017-08-10 NOTE — PROGRESS NOTES
Initial Psychosocial Assessment    I have reviewed the chart, met with the patient, and developed Care Plan. Information for assessment was obtained from: Pt, medical record                                                              Voluntary    Presenting Problem: Patient was too fatigued to complete assessment.  According to the DEC assessment, pt presented to the hospital after smoking and snorting meth. She appears to be confused when she became angry with her family after they jokingly told her she has cancer and was going to die. She be upset, crying uncontrollably and could not calm down. Police were called and pt was brought to the hospital.    Pt has a history of over disease and alcoholism.        History of Mental Health and Chemical Dependency:      History of liver disease from alcoholism.  Pt denies alcohol use this admission but did report Meth use.      Significant Life Events   (Illness, Abuse, Trauma, Death): declined to respond according to records.    Family:    Living Situation:      Educational Background:        Financial Status:     Legal Issues:      Ethnic/Cultural Issues:     Spiritual Orientation:       Service History:    Social Functioning (organization, interests):    Current Treatment Providers are:    Dr. Klein    PC Dr. Ant Cavazos    Social Service Assessment/Plan:   Provide safe environment  Observe for detox symptoms  Manage meds per psychiatry  CTC to encourage CD assessment

## 2017-08-11 PROCEDURE — 25000132 ZZH RX MED GY IP 250 OP 250 PS 637: Performed by: EMERGENCY MEDICINE

## 2017-08-11 PROCEDURE — 25000132 ZZH RX MED GY IP 250 OP 250 PS 637: Performed by: NURSE PRACTITIONER

## 2017-08-11 PROCEDURE — 12400007 ZZH R&B MH INTERMEDIATE UMMC

## 2017-08-11 PROCEDURE — 99233 SBSQ HOSP IP/OBS HIGH 50: CPT | Performed by: PSYCHIATRY & NEUROLOGY

## 2017-08-11 RX ADMIN — LACTULOSE 20 G: 20 POWDER, FOR SOLUTION ORAL at 09:17

## 2017-08-11 RX ADMIN — SPIRONOLACTONE 50 MG: 50 TABLET ORAL at 09:16

## 2017-08-11 RX ADMIN — PANTOPRAZOLE SODIUM 40 MG: 40 TABLET, DELAYED RELEASE ORAL at 09:16

## 2017-08-11 RX ADMIN — LACTULOSE 20 G: 20 POWDER, FOR SOLUTION ORAL at 13:25

## 2017-08-11 RX ADMIN — MULTIPLE VITAMINS W/ MINERALS TAB 1 TABLET: TAB at 09:16

## 2017-08-11 RX ADMIN — LACTULOSE 20 G: 20 POWDER, FOR SOLUTION ORAL at 20:21

## 2017-08-11 RX ADMIN — RIFAXIMIN 550 MG: 550 TABLET ORAL at 09:16

## 2017-08-11 RX ADMIN — Medication 20 MG: at 09:16

## 2017-08-11 RX ADMIN — RIFAXIMIN 550 MG: 550 TABLET ORAL at 20:21

## 2017-08-11 RX ADMIN — FOLIC ACID 1 MG: 1 TABLET ORAL at 09:16

## 2017-08-11 RX ADMIN — RISPERIDONE 2 MG: 2 TABLET ORAL at 21:51

## 2017-08-11 ASSESSMENT — ACTIVITIES OF DAILY LIVING (ADL)
DRESS: SCRUBS (BEHAVIORAL HEALTH)
LAUNDRY: WITH SUPERVISION
DRESS: SCRUBS (BEHAVIORAL HEALTH)
HYGIENE/GROOMING: INDEPENDENT
ORAL_HYGIENE: INDEPENDENT
ORAL_HYGIENE: INDEPENDENT
GROOMING: INDEPENDENT;PROMPTS

## 2017-08-11 NOTE — PROGRESS NOTES
08/11/17 1341   Behavioral Health   Hallucinations denies / not responding to hallucinations   Thinking distractable   Orientation time: oriented;date: oriented;place: oriented;person: oriented   Memory baseline memory   Insight poor   Judgement impaired   Eye Contact (Covers with a blanket over her head)   Affect irritable;blunted, flat;angry   Mood irritable;depressed   Physical Appearance/Attire disheveled   Hygiene neglected grooming - unclean body, hair, teeth   Suicidality other (see comments)  (Unable to assess due her not speaking.)   Self Injury other (see comment)  (Unable to assess)   Speech clear;coherent   Medication Sensitivity no stated side effects   Psychomotor / Gait balanced;steady   Psycho Education   Type of Intervention 1:1 intervention   Response participates, initiates socially appropriate   Hours 0.5   Activities of Daily Living   Hygiene/Grooming independent;prompts   Oral Hygiene independent   Dress scrubs (behavioral health)   Laundry with supervision   Room Organization independent   Behavioral Health Interventions   Psychotic Symptoms maintain safety precautions;maintain safe secure environment;assist patient in developing safety plan;assist patient in following safety plan;encourage nutrition and hydration   Social and Therapeutic Interventions (Psychotic Symptoms) encourage socialization with peers;encourage participation in therapeutic groups and milieu activities   Pt spent the entire time in her room sleeping or lying bed. She did not eat breakfast but the writer took her lunch to room to eat. She cover her head with a blanket and she appears to responding to internal stimuli. She did not come out for any requests. She does not talk back to writer when talking to her

## 2017-08-11 NOTE — PROGRESS NOTES
NON ATTENDANCE:      08/11/17 1300   General Information   Has Not Attended OT as of: 08/11/17   Special Considerations Encourage attendance and Participation.

## 2017-08-11 NOTE — PROGRESS NOTES
"[facility] [place]   Psychiatric Progress Note        Interim History:   The patient's care was discussed with the treatment team during the daily team meeting and/or staff's chart notes were reviewed.  Staff report patient spent most of the time in her bed. When out of bed was rude and demanding with treatment team members. She was seen in her bed, she opened her eyes and immediately started screaming. When asked why she was doing that, said: \"because I am angry that I am here\". She added that she was homeless, would not answer to where she was living before. When asked if she knew where she was at, sarcastically responded:\"at the same place where I was yesterday\". Blamed her  for putting her here. She would not respond where she was experiencing Auditory hallucinations or Visual hallucinations. She has been eating little and with encouragement taking her meds.   Per [facility]'s note:   Writer spoke with patient's  Franklin Fatemeh ([phone]), JENNIFER signed. Franklin reports that patient left the nursing home she was living at [facility]. She lives there for medical reasons. Writer did notify Franklin that someone from [facility] will be contacting him for additional information.     Write spoke with [facility] pre-petition screening. Patient is already committed as MI. She was released from the [facility] in [place] to the nursing [facility] two weeks ago. Pt eloped one week ago and the CM Shira Villagran revoked her PD and pt is now on a full commitment. Shira will fax over the paperwork for our records. Shira will call [facility] or [facility] to put pt on a list for [facility].       Medications:       risperiDONE  2 mg Oral At Bedtime     pantoprazole  40 mg Oral Daily     multivitamin, therapeutic with minerals  1 tablet Oral Daily     folic acid  1 mg Oral Daily     lactulose  20 g Oral TID     rifaximin  550 mg Oral BID     spironolactone  50 mg " Oral Daily     furosemide  20 mg Oral Daily          Allergies:     Allergies   Allergen Reactions     Acetaminophen      Ambien [Zolpidem Tartrate] Other (See Comments)     Sleep walks and eats     Ciprofloxacin Other (See Comments)     Seizure.     Citalopram Nausea and Vomiting          Labs:   No results found for this or any previous visit (from the past 24 hour(s)).       Psychiatric Examination:     /79 (BP Location: Left arm)  Pulse 71  Temp 99  F (37.2  C) (Oral)  Resp 16  Wt 92.1 kg (203 lb)  SpO2 99%  BMI 32.77 kg/m2  Weight is 203 lbs 0 oz  Body mass index is 32.77 kg/(m^2).  Orthostatic Vitals       Most Recent      Lying Orthostatic /53 08/11 1057    Lying Orthostatic Pulse (bpm) 65 08/11 1057    Sitting Orthostatic /62 08/11 0924    Sitting Orthostatic Pulse (bpm) 75 08/11 0924    Standing Orthostatic BP --  Comment: Ref. 08/11 0924    Standing Orthostatic Pulse (bpm) --  Comment: Ref. 08/11 0924            Appearance: dressed in hospital scrubs  Attitude:  guarded and uncooperative  Eye Contact:  poor   Mood:  angry  Affect:  intensity is exaggerated  Speech:  rambling  Psychomotor Behavior:  no evidence of tardive dyskinesia, dystonia, or tics and physical agitation  Throught Process:  disorganized  Associations:  loosening of associations present  Thought Content:  no evidence of suicidal ideation or homicidal ideation and can't rule out presence of psychotic symptoms.  Insight:  limited  Judgement:  poor  Oriented to:  self and place  Attention Span and Concentration:  limited  Recent and Remote Memory:  poor         Precautions:     Behavioral Orders   Procedures     Code 1 - Restrict to Unit     Elopement precautions     Routine Programming     As clinically indicated     Seizure precautions     Status 15     Every 15 minutes.     Withdrawal precautions          DIagnoses:     Encephalopathy probably multifactorial, drug induced (amphetamines); possibly underlying  psychotic illness; possibly head injury contributes to patient's symptoms.             Plan:   Patient is fully MI committed. Will need CD component added. She has CM and will need placement, likely, to one of highly structured locked places.
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
## 2017-08-11 NOTE — PLAN OF CARE
Problem: General Plan of Care (Inpatient Behavioral)  Goal: Team Discussion  Team Plan:   BEHAVIORAL TEAM DISCUSSION     Participants: Dr. Jasso, MARYBEL Elmore, RN Umu Santoyo  Progress: none  Continued Stay Criteria/Rationale: pt's PD was revoked  Medical/Physical:possible withdrawal from meth  Precautions:   Behavioral Orders   Procedures     Code 1 - Restrict to Unit     Elopement precautions     Routine Programming       As clinically indicated     Seizure precautions     Status 15       Every 15 minutes.     Withdrawal precautions     Plan: CM is looking into placement for pt.  Rationale for change in precautions or plan: no change

## 2017-08-11 NOTE — PROGRESS NOTES
"BP 95/61  Pulse 110  Temp 99.2  F (37.3  C) (Oral)  Resp 16  Wt 92.1 kg (203 lb)  SpO2 99%  BMI 32.77 kg/m2      Pt isolated to her room for the majority of the shift. She avoided eye contact, was staring into space and responded with mostly one word answers. Pt appeared responding to internal stimuli, pulling invisible objects off of her toothbrush, as well as looking out the window and crying. She required multiple prompts by writer for patient to verbally respond. When asked if she was seeing or hearing things that were not there, pt denied. In addition, pt was brushing her teeth hard enough to cause there to be blood on her toothbrush. Writer redirected this behavior.    Pt made multiple attempts to elope from the unit, but was redirectable by staff. Pt stated to writer, \"I want to leave! Why won't they let me leave? You guys win. You're the winners. I want to leave. I just live right over there!\" Writer told pt that provider wants to ensure that she is safe to go home.     Pt became increasingly agitated, and attempted to exit the unit. Provider paged, and order for prn Lorazepam placed, and to be taken in place of her scheduled Risperdal. Writer offered pt Lorazepam, but she yelled at writer and said, \"I'm not going to take that! I can see the doctor right now and he says I don't need to take that.\" Writer explained that this medication would help her calm down. Pt began screaming, and said \"Get the fuck out of my room with that!\" Writer did not continue to press pt into taking this medication.    Pt remained calm and isolative to her room for the remainder of the evening.    Pt denies SI/SIB.    No additional concerns at this time. Will continue to monitor and assess.  "

## 2017-08-11 NOTE — CONSULTS
Brief Medicine Note,     Please refer to recent medicine H&P by Dr. Ari Shah in charting dated 8/9/17, which was reviewed by this writer and is up to date. Labs and vitals reviewed.  Please call the on-call [facility] for any f/u medical concerns if they arise.     In short, Mildred Rascon is a 42 year old female with a past medical history of hepatic alcoholic cirrhosis, substance abuse, depression, and anxiety who is admitted to station 20 for psychosis, delusions, and paranoia, possibly 2/2 recent methamphetamine use.    Pt seen briefly today.  She appears to be responding to internal stimuli and very slow to respond to my questions.  She averts eye contact.  She denies previous history of SBP and paracentesis.  She reports that she still takes Aldactone and Lasix, which I have restarted.  Otherwise, she denies chest pain, dyspnea, abdominal pain, dizziness, and urinary symptoms.          Nely Oswald, CNP  Internal Medicine   [phone]

## 2017-08-11 NOTE — PROGRESS NOTES
Writer spoke with patient's  Franklin Lefthand ([phone]), JENNIFER signed. Franklin reports that patient left the nursing home she was living at [facility] in [place]. She lives there for medical reasons. Writer did notify Franklin that someone from [facility] will be contacting him for additional information.    Write spoke with [facility] pre-petition screening. Patient is already committed as MI. She was released from the [facility] in [place] to the nursing [facility] two weeks ago. Pt eloped one week ago and the CM Shira Villagarn revoked her PD and pt is now on a full commitment. Shira will fax over the paperwork for our records. Shira will call [facility] or [facility] to put pt on a list for [facility].

## 2017-08-12 LAB
ALBUMIN SERPL-MCNC: 3 G/DL (ref 3.4–5)
ALP SERPL-CCNC: 180 U/L (ref 40–150)
ALT SERPL W P-5'-P-CCNC: 88 U/L (ref 0–50)
ANION GAP SERPL CALCULATED.3IONS-SCNC: 12 MMOL/L (ref 3–14)
AST SERPL W P-5'-P-CCNC: 83 U/L (ref 0–45)
BASOPHILS # BLD AUTO: 0 10E9/L (ref 0–0.2)
BASOPHILS NFR BLD AUTO: 0.4 %
BILIRUB SERPL-MCNC: 1.4 MG/DL (ref 0.2–1.3)
BUN SERPL-MCNC: 8 MG/DL (ref 7–30)
CALCIUM SERPL-MCNC: 8.9 MG/DL (ref 8.5–10.1)
CHLORIDE SERPL-SCNC: 111 MMOL/L (ref 94–109)
CO2 SERPL-SCNC: 20 MMOL/L (ref 20–32)
CREAT SERPL-MCNC: 0.5 MG/DL (ref 0.52–1.04)
DIFFERENTIAL METHOD BLD: ABNORMAL
EOSINOPHIL # BLD AUTO: 0.2 10E9/L (ref 0–0.7)
EOSINOPHIL NFR BLD AUTO: 3.7 %
ERYTHROCYTE [DISTWIDTH] IN BLOOD BY AUTOMATED COUNT: 14.4 % (ref 10–15)
GFR SERPL CREATININE-BSD FRML MDRD: ABNORMAL ML/MIN/1.7M2
GLUCOSE SERPL-MCNC: 110 MG/DL (ref 70–99)
HCT VFR BLD AUTO: 36.5 % (ref 35–47)
HGB BLD-MCNC: 13 G/DL (ref 11.7–15.7)
IMM GRANULOCYTES # BLD: 0 10E9/L (ref 0–0.4)
IMM GRANULOCYTES NFR BLD: 0.2 %
LYMPHOCYTES # BLD AUTO: 1.3 10E9/L (ref 0.8–5.3)
LYMPHOCYTES NFR BLD AUTO: 26.4 %
MCH RBC QN AUTO: 31.8 PG (ref 26.5–33)
MCHC RBC AUTO-ENTMCNC: 35.6 G/DL (ref 31.5–36.5)
MCV RBC AUTO: 89 FL (ref 78–100)
MONOCYTES # BLD AUTO: 0.3 10E9/L (ref 0–1.3)
MONOCYTES NFR BLD AUTO: 5.4 %
NEUTROPHILS # BLD AUTO: 3.1 10E9/L (ref 1.6–8.3)
NEUTROPHILS NFR BLD AUTO: 63.9 %
NRBC # BLD AUTO: 0 10*3/UL
NRBC BLD AUTO-RTO: 0 /100
PLATELET # BLD AUTO: 62 10E9/L (ref 150–450)
POTASSIUM SERPL-SCNC: 3.1 MMOL/L (ref 3.4–5.3)
PROT SERPL-MCNC: 7 G/DL (ref 6.8–8.8)
RBC # BLD AUTO: 4.09 10E12/L (ref 3.8–5.2)
SODIUM SERPL-SCNC: 143 MMOL/L (ref 133–144)
WBC # BLD AUTO: 4.8 10E9/L (ref 4–11)

## 2017-08-12 PROCEDURE — 12400007 ZZH R&B MH INTERMEDIATE UMMC

## 2017-08-12 PROCEDURE — 25000132 ZZH RX MED GY IP 250 OP 250 PS 637: Performed by: NURSE PRACTITIONER

## 2017-08-12 PROCEDURE — 36415 COLL VENOUS BLD VENIPUNCTURE: CPT | Performed by: EMERGENCY MEDICINE

## 2017-08-12 PROCEDURE — 80053 COMPREHEN METABOLIC PANEL: CPT | Performed by: EMERGENCY MEDICINE

## 2017-08-12 PROCEDURE — 85025 COMPLETE CBC W/AUTO DIFF WBC: CPT | Performed by: EMERGENCY MEDICINE

## 2017-08-12 PROCEDURE — 25000132 ZZH RX MED GY IP 250 OP 250 PS 637: Performed by: EMERGENCY MEDICINE

## 2017-08-12 RX ORDER — POTASSIUM CHLORIDE 1500 MG/1
20 TABLET, EXTENDED RELEASE ORAL ONCE
Status: COMPLETED | OUTPATIENT
Start: 2017-08-12 | End: 2017-08-12

## 2017-08-12 RX ORDER — OLANZAPINE 5 MG/1
5-10 TABLET ORAL EVERY 6 HOURS PRN
Status: DISCONTINUED | OUTPATIENT
Start: 2017-08-12 | End: 2018-01-02 | Stop reason: HOSPADM

## 2017-08-12 RX ADMIN — LACTULOSE 20 G: 20 POWDER, FOR SOLUTION ORAL at 14:55

## 2017-08-12 RX ADMIN — RIFAXIMIN 550 MG: 550 TABLET ORAL at 09:20

## 2017-08-12 RX ADMIN — POTASSIUM CHLORIDE 20 MEQ: 1500 TABLET, EXTENDED RELEASE ORAL at 18:39

## 2017-08-12 RX ADMIN — FOLIC ACID 1 MG: 1 TABLET ORAL at 09:20

## 2017-08-12 RX ADMIN — PANTOPRAZOLE SODIUM 40 MG: 40 TABLET, DELAYED RELEASE ORAL at 09:20

## 2017-08-12 RX ADMIN — LACTULOSE 20 G: 20 POWDER, FOR SOLUTION ORAL at 21:20

## 2017-08-12 RX ADMIN — MULTIPLE VITAMINS W/ MINERALS TAB 1 TABLET: TAB at 09:20

## 2017-08-12 RX ADMIN — Medication 20 MG: at 09:20

## 2017-08-12 RX ADMIN — RIFAXIMIN 550 MG: 550 TABLET ORAL at 21:20

## 2017-08-12 RX ADMIN — SPIRONOLACTONE 50 MG: 50 TABLET ORAL at 09:20

## 2017-08-12 RX ADMIN — RISPERIDONE 2 MG: 2 TABLET ORAL at 21:20

## 2017-08-12 RX ADMIN — LACTULOSE 20 G: 20 POWDER, FOR SOLUTION ORAL at 09:46

## 2017-08-12 ASSESSMENT — ACTIVITIES OF DAILY LIVING (ADL)
DRESS: SCRUBS (BEHAVIORAL HEALTH);INDEPENDENT
COGNITION: 2 - DIFFICULTY WITH ORGANIZING THOUGHTS
GROOMING: INDEPENDENT
DRESS: 0-->INDEPENDENT
AMBULATION: 0-->INDEPENDENT
SWALLOWING: 0-->SWALLOWS FOODS/LIQUIDS WITHOUT DIFFICULTY
TOILETING: 0-->INDEPENDENT
TRANSFERRING: 0-->INDEPENDENT
ORAL_HYGIENE: INDEPENDENT
RETIRED_EATING: 0-->INDEPENDENT
BATHING: 0-->INDEPENDENT
DRESS: STREET CLOTHES;INDEPENDENT
ORAL_HYGIENE: PROMPTS
RETIRED_COMMUNICATION: 0-->UNDERSTANDS/COMMUNICATES WITHOUT DIFFICULTY
HYGIENE/GROOMING: PROMPTS

## 2017-08-12 NOTE — PROGRESS NOTES
Brief Medicine Note, 8/12/17:    Internal Medicine following for history of alcoholic cirrhosis with hx of hepatic encephalopathy.  Mildred Rascon is a 42 year old female with a past medical history of hepatic alcoholic cirrhosis, substance abuse, depression, and anxiety who is admitted to station 20 for psychosis, delusions, and paranoia, possibly 2/2 recent methamphetamine use.     ALT, AST, AP all continuing to trend down.  Tbili slightly elevated to 1.4 in setting of known cirrhosis.  Potassium low at 3.1 despite daily Aldactone.  Will replace K w/ 20mEq x 1 dose and recheck in am.               Nely Oswald CNP  Hospitalist Service   Pager: [phone]

## 2017-08-12 NOTE — SIGNIFICANT EVENT
"While taking the pt's vitals, the pt appeared to reach out to hit this writer, but instead reached for object(s) that this writer could not see.  Several times in this encounter, the pt would stare at things in space and visibly respond to things that were not there.  This became more evident when the pt grabbed her garbage bag and started talking to and hugging it.  Thereafter, this writer witnessed multiple events when the pt was obviously responding to unseen stimuli.  When asked by this writer if I could help, the pt angrily lashed out and told me to \"get the hell out, I'm busy in here.\"  "

## 2017-08-12 NOTE — PLAN OF CARE
Problem: Psychotic Symptoms  Goal: Psychotic Symptoms  Signs and symptoms of listed problems will be absent or manageable.   Outcome: Improving  48 Hour Nursing Assessment:  Day 3 of hospitalization.  Mildred has spent her day in bed.  She denies SI and hallucinations.   She did not wish to talk today but was pleasant in her response.  She said she just wanted to sleep.  She repeated that she didn't want to be here, and why don't we just let her go, a number of times.  She is on a full commitment.

## 2017-08-12 NOTE — PROGRESS NOTES
Withdrawn, isolated this evening. Didn't say much and appeared irritable. Didn't leave her room the whole shift. Was up most of the shift moving from bed to bed in her room.

## 2017-08-12 NOTE — PROGRESS NOTES
"Pt was isolative to her room for the shift, coming out to use the phone on one occurrence. She was verbally agitated with several staff members, but remained calm without aggression for the remainder of the shift. Pt was visibly responding to internal stimuli, pulling objects out of her water glass. In addition, she had her head covered with a blanket, and was audibly crying. However, she stated she was \"OK\".     Pt was sleeping on the floor on her seizure mat, but stated she was comfortable and not in distress. She took all medications without issue.    No additional issues at this time. Will continue to monitor.  "

## 2017-08-13 LAB
ALBUMIN SERPL-MCNC: 2.8 G/DL (ref 3.4–5)
ALP SERPL-CCNC: 190 U/L (ref 40–150)
ALT SERPL W P-5'-P-CCNC: 89 U/L (ref 0–50)
ANION GAP SERPL CALCULATED.3IONS-SCNC: 10 MMOL/L (ref 3–14)
AST SERPL W P-5'-P-CCNC: 86 U/L (ref 0–45)
BILIRUB SERPL-MCNC: 1 MG/DL (ref 0.2–1.3)
BUN SERPL-MCNC: 8 MG/DL (ref 7–30)
CALCIUM SERPL-MCNC: 8.4 MG/DL (ref 8.5–10.1)
CHLORIDE SERPL-SCNC: 112 MMOL/L (ref 94–109)
CO2 SERPL-SCNC: 20 MMOL/L (ref 20–32)
CREAT SERPL-MCNC: 0.58 MG/DL (ref 0.52–1.04)
GFR SERPL CREATININE-BSD FRML MDRD: ABNORMAL ML/MIN/1.7M2
GLUCOSE SERPL-MCNC: 118 MG/DL (ref 70–99)
POTASSIUM SERPL-SCNC: 3.4 MMOL/L (ref 3.4–5.3)
PROT SERPL-MCNC: 7.1 G/DL (ref 6.8–8.8)
SODIUM SERPL-SCNC: 142 MMOL/L (ref 133–144)

## 2017-08-13 PROCEDURE — 25000132 ZZH RX MED GY IP 250 OP 250 PS 637: Performed by: NURSE PRACTITIONER

## 2017-08-13 PROCEDURE — 25000132 ZZH RX MED GY IP 250 OP 250 PS 637: Performed by: EMERGENCY MEDICINE

## 2017-08-13 PROCEDURE — 80053 COMPREHEN METABOLIC PANEL: CPT | Performed by: NURSE PRACTITIONER

## 2017-08-13 PROCEDURE — 12400007 ZZH R&B MH INTERMEDIATE UMMC

## 2017-08-13 PROCEDURE — 36415 COLL VENOUS BLD VENIPUNCTURE: CPT | Performed by: NURSE PRACTITIONER

## 2017-08-13 RX ADMIN — LACTULOSE 20 G: 20 POWDER, FOR SOLUTION ORAL at 08:57

## 2017-08-13 RX ADMIN — RISPERIDONE 2 MG: 2 TABLET ORAL at 21:02

## 2017-08-13 RX ADMIN — LACTULOSE 20 G: 20 POWDER, FOR SOLUTION ORAL at 14:24

## 2017-08-13 RX ADMIN — FOLIC ACID 1 MG: 1 TABLET ORAL at 08:57

## 2017-08-13 RX ADMIN — LACTULOSE 20 G: 20 POWDER, FOR SOLUTION ORAL at 21:02

## 2017-08-13 RX ADMIN — SPIRONOLACTONE 50 MG: 50 TABLET ORAL at 08:57

## 2017-08-13 RX ADMIN — Medication 20 MG: at 08:57

## 2017-08-13 RX ADMIN — RIFAXIMIN 550 MG: 550 TABLET ORAL at 21:02

## 2017-08-13 RX ADMIN — PANTOPRAZOLE SODIUM 40 MG: 40 TABLET, DELAYED RELEASE ORAL at 08:57

## 2017-08-13 RX ADMIN — RIFAXIMIN 550 MG: 550 TABLET ORAL at 08:58

## 2017-08-13 RX ADMIN — MULTIPLE VITAMINS W/ MINERALS TAB 1 TABLET: TAB at 08:57

## 2017-08-13 ASSESSMENT — ACTIVITIES OF DAILY LIVING (ADL)
GROOMING: PROMPTS
ORAL_HYGIENE: PROMPTS
DRESS: SCRUBS (BEHAVIORAL HEALTH)
GROOMING: PROMPTS
DRESS: SCRUBS (BEHAVIORAL HEALTH)
ORAL_HYGIENE: PROMPTS

## 2017-08-13 NOTE — PROGRESS NOTES
Mildred wanted her clothes back and to take a shower.  Explained to her that as we were concerned about her running she needed to stay in scrubs.  She then wanted her purse and was told purses aren't allowed.  She pursued getting her clothes back briefly and then took her shower.  She is in clean scrubs.

## 2017-08-13 NOTE — PROGRESS NOTES
08/13/17 1448   Behavioral Health   Hallucinations other (see comment)  (Minimal communicative)   Thinking poor concentration;distractable  (Delayed response)   Orientation situation, disoriented;place, disoriented   Memory (not able to deterimne)   Insight poor   Judgement impaired   Eye Contact into space   Affect blunted, flat   Mood mood is calm  (slow response and sedated)   Physical Appearance/Attire untidy   Hygiene neglected grooming - unclean body, hair, teeth   Suicidality (Denies after delayed response)   Self Injury other (see comment)  (Denies after delayed response)   Elopement (sleeping whole shift - nothing to report)   Activity isolative;withdrawn   Speech pressured   Medication Sensitivity sedation   Psychomotor / Gait slow;balanced   Psycho Education   Type of Intervention 1:1 intervention   Response participates with cues/redirection   Hours 0.5   Treatment Detail (Minimal response)   Activities of Daily Living   Hygiene/Grooming prompts   Oral Hygiene prompts   Dress scrubs (behavioral health)   Room Organization prompts   Patient was in her room most the shift sleeping.  The patient did come out for lunch, but returned right to her room.  Patient was very slow with a response when asked a question.  Patient was only minimally communicative.  Patient was able to directly deny having SI or SiB after a delayed response.

## 2017-08-13 NOTE — PLAN OF CARE
"Problem: Psychotic Symptoms  Goal: Psychotic Symptoms  Signs and symptoms of listed problems will be absent or manageable.   Outcome: No Change     48 Hour Nurse Note:     Met with pt for 1:1.  Pt states she feels \"great\".  Pt presents as irritable and with poor concentration.  She is isolative to her room.  Pt was guarded of questions that were asked of her.  Pt denied depression, anxiety, SI, SIB, AH, VH, and paranoia.  Writer asked pt where she lived she stated \"[place] the block, [place] the corner, [place]\" writer asked pt to elaborate and pt stated she lived [address] \"[address]".  Pt stated her doctor said she could leave soon.  Writer asked what day she could leave and pt became irritated.  Pt became more irritable as the 1:1 continued. Pt dismissed writer after 5 minutes of the interview stating \"I am done with this\".    During the 5 minute interview, the pt appeared to be responding to IS.  She was moving her head back and forth as if responding to a voice and was grabbing for invisible objects in the air.  Staff report seeing pt (during checks) talking to her self (\"Thanks for helping me\" - no one in the room), grabbing at invisible items in the room, stating that staff are people in her life (\"Oh, you are my son's girlfriend\").  Pt denies AH and VH to staff when they ask her if she has any hallucinations.    Pt continues on elopement, seizure, withdrawal, and contact precautions.    Will continue to monitor and assess.  "

## 2017-08-13 NOTE — PROGRESS NOTES
Pt spent most of time in room, isolative and withdrawn but came out for snacks both times. Admitted to better appetite today and seen eating more of dinner tray and lots of snacks. Prompted to shower but pt wanted her personal clothes first to which she did not end up showering. Denies SI/SIB/AH/VH. However, seen pt still grabbing at air and laughing to self in room. Pleasant when approached but pt did get irritable and angry when redirected from kitchen door which she was trying to follow staffs out. Pt threw cup of liquid on ground and room floor as a result of redirection. However, pt able to calmly remain in room rest of shift.       08/12/17 4092   Behavioral Health   Hallucinations appears responding   Thinking confused;distractable;poor concentration   Orientation place: oriented   Memory baseline memory   Insight poor   Judgement impaired   Eye Contact at examiner   Affect blunted, flat;irritable   Mood mood is calm;depressed;irritable;labile   Physical Appearance/Attire appears stated age;attire appropriate to age and situation;neat   Hygiene well groomed   Suicidality other (see comments)  (denies)   Self Injury other (see comment)  (denies)   Elopement Loitering near exit doors;Trying handles of doors;Statements about wanting to leave   Activity withdrawn;isolative   Speech coherent   Medication Sensitivity no stated side effects;no observed side effects   Psychomotor / Gait steady;balanced   Activities of Daily Living   Hygiene/Grooming prompts   Oral Hygiene prompts   Dress scrubs (behavioral health);independent   Room Organization prompts

## 2017-08-14 PROCEDURE — 99232 SBSQ HOSP IP/OBS MODERATE 35: CPT | Performed by: PSYCHIATRY & NEUROLOGY

## 2017-08-14 PROCEDURE — 25000132 ZZH RX MED GY IP 250 OP 250 PS 637: Performed by: EMERGENCY MEDICINE

## 2017-08-14 PROCEDURE — 25000132 ZZH RX MED GY IP 250 OP 250 PS 637: Performed by: NURSE PRACTITIONER

## 2017-08-14 PROCEDURE — 12400007 ZZH R&B MH INTERMEDIATE UMMC

## 2017-08-14 RX ADMIN — RIFAXIMIN 550 MG: 550 TABLET ORAL at 20:40

## 2017-08-14 RX ADMIN — RIFAXIMIN 550 MG: 550 TABLET ORAL at 08:55

## 2017-08-14 RX ADMIN — SPIRONOLACTONE 50 MG: 50 TABLET ORAL at 08:55

## 2017-08-14 RX ADMIN — RISPERIDONE 2 MG: 2 TABLET ORAL at 21:49

## 2017-08-14 RX ADMIN — LACTULOSE 20 G: 20 POWDER, FOR SOLUTION ORAL at 20:40

## 2017-08-14 RX ADMIN — Medication 20 MG: at 08:55

## 2017-08-14 RX ADMIN — MULTIPLE VITAMINS W/ MINERALS TAB 1 TABLET: TAB at 08:55

## 2017-08-14 RX ADMIN — LACTULOSE 20 G: 20 POWDER, FOR SOLUTION ORAL at 08:55

## 2017-08-14 RX ADMIN — PANTOPRAZOLE SODIUM 40 MG: 40 TABLET, DELAYED RELEASE ORAL at 08:55

## 2017-08-14 RX ADMIN — FOLIC ACID 1 MG: 1 TABLET ORAL at 08:55

## 2017-08-14 RX ADMIN — LACTULOSE 20 G: 20 POWDER, FOR SOLUTION ORAL at 14:09

## 2017-08-14 ASSESSMENT — ACTIVITIES OF DAILY LIVING (ADL)
LAUNDRY: WITH SUPERVISION
DRESS: PROMPTS
GROOMING: PROMPTS
DRESS: INDEPENDENT
GROOMING: INDEPENDENT
ORAL_HYGIENE: PROMPTS
ORAL_HYGIENE: INDEPENDENT

## 2017-08-14 NOTE — PROGRESS NOTES
"[facility], [place]   Psychiatric Progress Note        Interim History:   The patient's care was discussed with the treatment team during the daily team meeting and/or staff's chart notes were reviewed.  Staff report patient spent most of the time in her bed. She went out to get her tray, but preferred to eat in her bed. She continued to be irritable and tended to answer with short answers. \"I am tired, why do you talk to me\" while answering to my questions. She refused to answer about where she was at and today's date. She was seen by staff members talking to self and other people while being alone, stated that staff members were her relatives.   Per medicine note: Following up on chronic transaminitis and hypokalemia. ALT, AST, and ALP are stable. Tbili normalized. K normalized. See prior notes for details on current and prior medical history.      Medicine will sign off. Please contact with future questions or concerns.\"       Medications:       risperiDONE  2 mg Oral At Bedtime     pantoprazole  40 mg Oral Daily     multivitamin, therapeutic with minerals  1 tablet Oral Daily     folic acid  1 mg Oral Daily     lactulose  20 g Oral TID     rifaximin  550 mg Oral BID     spironolactone  50 mg Oral Daily     furosemide  20 mg Oral Daily          Allergies:     Allergies   Allergen Reactions     Acetaminophen      Ambien [Zolpidem Tartrate] Other (See Comments)     Sleep walks and eats     Ciprofloxacin Other (See Comments)     Seizure.     Citalopram Nausea and Vomiting          Labs:   No results found for this or any previous visit (from the past 24 hour(s)).       Psychiatric Examination:     /55 (BP Location: Right arm)  Pulse 64  Temp 98.1  F (36.7  C) (Oral)  Resp 16  Wt 92.1 kg (203 lb)  SpO2 99%  BMI 32.77 kg/m2  Weight is 203 lbs 0 oz  Body mass index is 32.77 kg/(m^2).  Orthostatic Vitals       Most Recent      Lying Orthostatic /53 08/11 1057    Lying " Orthostatic Pulse (bpm) 65 08/11 1057    Sitting Orthostatic /62 08/11 0924    Sitting Orthostatic Pulse (bpm) 75 08/11 0924    Standing Orthostatic BP --  Comment: Ref. 08/11 0924    Standing Orthostatic Pulse (bpm) --  Comment: Ref. 08/11 0924            Appearance: dressed in hospital scrubs  Attitude:  guarded and uncooperative  Eye Contact:  poor   Mood:  angry  Affect:  intensity is exaggerated  Speech:  rambling, limited speech production  Psychomotor Behavior:  no evidence of tardive dyskinesia, dystonia, or tics and physical agitation  Throught Process:  disorganized  Associations:  loosening of associations present  Thought Content:  no evidence of suicidal ideation or homicidal ideation and can't rule out presence of psychotic symptoms.  Insight:  limited  Judgement:  poor  Oriented to:  self and place  Attention Span and Concentration:  limited  Recent and Remote Memory:  poor         Precautions:     Behavioral Orders   Procedures     Code 1 - Restrict to Unit     Elopement precautions     Routine Programming     As clinically indicated     Seizure precautions     Status 15     Every 15 minutes.     Withdrawal precautions          DIagnoses:     Encephalopathy probably multifactorial, possibly underlying psychotic illness; possibly head injury contributes to patient's symptoms.             Plan:   Patient is fully MI committed. Will need CD component added. She has CM and will need placement, likely, to one of highly structured locked places. She refused to sign neuroleptic consent. For more details, please, see Internal Medicine notes. Appreciate medical team's input.

## 2017-08-14 NOTE — PROGRESS NOTES
Brief Medicine Note:    Following up on chronic transaminitis and hypokalemia. ALT, AST, and ALP are stable. Tbili normalized. K normalized. See prior notes for details on current and prior medical history.     Medicine will sign off. Please contact with future questions or concerns.    Bridgette Champion PA-C  Internal Medicine [facility]  [phone]

## 2017-08-14 NOTE — PROGRESS NOTES
08/14/17 1411   Behavioral Health   Hallucinations denies / not responding to hallucinations  (was angry with the question)   Thinking poor concentration;distractable   Orientation person: oriented;situation, disoriented   Memory (not able to determine)   Insight poor   Judgement impaired   Eye Contact out of corner of eyes  (or eyes closed)   Affect irritable;blunted, flat   Mood depressed;labile   Physical Appearance/Attire disheveled   Hygiene neglected grooming - unclean body, hair, teeth   Suicidality other (see comments)  (Denies)   Self Injury other (see comment)  (Denies)   Activity isolative;withdrawn   Speech (mummble)   Medication Sensitivity sedation   Psychomotor / Gait (In bed all shift, only sat up twice to eat)   Psycho Education   Type of Intervention 1:1 intervention   Response participates, initiates socially appropriate   Hours 0.5   Treatment Detail (Check In & Observation)   Activities of Daily Living   Hygiene/Grooming prompts   Oral Hygiene prompts   Dress prompts   Room Organization prompts   Patient was in her room sleeping all shift.  The patient was not willing to get up for her two meals this shift, but was brought her trays and she eat in bed.  Patient reports being very tired and did not have the energy to come out of her room for meals.  Patient was irritable when checking in and gave short blunt answers.  For some questions, such as about hallucinations, she was angry with being asked, but denied.  Patient shortly after starting check in rolled over and sad she was going to go to sleep.  Patient did deny SI and SiB.

## 2017-08-15 PROCEDURE — 12400007 ZZH R&B MH INTERMEDIATE UMMC

## 2017-08-15 PROCEDURE — 25000132 ZZH RX MED GY IP 250 OP 250 PS 637: Performed by: NURSE PRACTITIONER

## 2017-08-15 PROCEDURE — 25000132 ZZH RX MED GY IP 250 OP 250 PS 637: Performed by: EMERGENCY MEDICINE

## 2017-08-15 RX ADMIN — MULTIPLE VITAMINS W/ MINERALS TAB 1 TABLET: TAB at 08:49

## 2017-08-15 RX ADMIN — LACTULOSE 20 G: 20 POWDER, FOR SOLUTION ORAL at 08:49

## 2017-08-15 RX ADMIN — FOLIC ACID 1 MG: 1 TABLET ORAL at 08:49

## 2017-08-15 RX ADMIN — SPIRONOLACTONE 50 MG: 50 TABLET ORAL at 08:49

## 2017-08-15 RX ADMIN — RIFAXIMIN 550 MG: 550 TABLET ORAL at 08:49

## 2017-08-15 RX ADMIN — LACTULOSE 20 G: 20 POWDER, FOR SOLUTION ORAL at 15:52

## 2017-08-15 RX ADMIN — RIFAXIMIN 550 MG: 550 TABLET ORAL at 20:21

## 2017-08-15 RX ADMIN — PANTOPRAZOLE SODIUM 40 MG: 40 TABLET, DELAYED RELEASE ORAL at 08:49

## 2017-08-15 RX ADMIN — LACTULOSE 20 G: 20 POWDER, FOR SOLUTION ORAL at 20:21

## 2017-08-15 RX ADMIN — RISPERIDONE 2 MG: 2 TABLET ORAL at 21:24

## 2017-08-15 RX ADMIN — Medication 20 MG: at 08:49

## 2017-08-15 ASSESSMENT — ACTIVITIES OF DAILY LIVING (ADL)
GROOMING: PROMPTS
GROOMING: PROMPTS
ORAL_HYGIENE: PROMPTS
DRESS: SCRUBS (BEHAVIORAL HEALTH);PROMPTS
ORAL_HYGIENE: PROMPTS
LAUNDRY: WITH SUPERVISION
LAUNDRY: WITH SUPERVISION
DRESS: PROMPTS

## 2017-08-15 NOTE — PROGRESS NOTES
/55 (BP Location: Right arm)  Pulse 64  Temp 98.1  F (36.7  C) (Oral)  Resp 16  Wt 92.1 kg (203 lb)  SpO2 99%  BMI 32.77 kg/m2    Pt isolated to her room for the shift. She took her dinner tray in her room, and ate about 2/3 of the food on her tray. She still appears responding to internal stimuli, and picks objects out of her drinking glass, as well as off of her tongue.     Pt often responds to questions in an agitated fashion, but did not become aggressive or escalate in any way. Pt took her medications without issue.    States that she has been able to pass BM and urine with no problems. Denies pain.    Will continue to monitor.

## 2017-08-15 NOTE — PROGRESS NOTES
"   08/14/17 2200   Behavioral Health   Hallucinations denies / not responding to hallucinations   Thinking confused;distractable;poor concentration   Orientation person: oriented;place: oriented   Memory baseline memory   Insight poor   Judgement impaired   Eye Contact at examiner   Affect blunted, flat;irritable   Mood mood is calm   Physical Appearance/Attire disheveled   Hygiene neglected grooming - unclean body, hair, teeth   Suicidality other (see comments)  (denies )   Self Injury other (see comment)  (denies )   Elopement Statements about wanting to leave   Activity isolative   Activities of Daily Living   Hygiene/Grooming independent   Oral Hygiene independent   Dress independent   Laundry with supervision   Room Organization independent     Patient denied SI \"no I don't.\"  Patient denied SIB.  Patient seems very disorganized, irritated, isolative, but calm.  Patient did not attend group and spend most of the shift in her room.  Patient daily goal \" get out of here.\"  Patient goal after discharge \" not talk.\"  Patient reported \" do you have the keys for the door.\"  Patient reported she is eating well and sleeping well.   Patient reported no visitors.    "

## 2017-08-15 NOTE — PROGRESS NOTES
Up much of night. Going from bed to bed. Picking in air. Talking to self. Offers no complaints or requests. Sleeping after 0400.

## 2017-08-16 PROCEDURE — 25000132 ZZH RX MED GY IP 250 OP 250 PS 637: Performed by: NURSE PRACTITIONER

## 2017-08-16 PROCEDURE — 12400007 ZZH R&B MH INTERMEDIATE UMMC

## 2017-08-16 PROCEDURE — 99233 SBSQ HOSP IP/OBS HIGH 50: CPT | Performed by: PSYCHIATRY & NEUROLOGY

## 2017-08-16 PROCEDURE — 25000132 ZZH RX MED GY IP 250 OP 250 PS 637: Performed by: EMERGENCY MEDICINE

## 2017-08-16 RX ORDER — RISPERIDONE 1 MG/1
1 TABLET ORAL EVERY MORNING
Status: DISCONTINUED | OUTPATIENT
Start: 2017-08-17 | End: 2018-01-02 | Stop reason: HOSPADM

## 2017-08-16 RX ADMIN — MULTIPLE VITAMINS W/ MINERALS TAB 1 TABLET: TAB at 09:01

## 2017-08-16 RX ADMIN — FOLIC ACID 1 MG: 1 TABLET ORAL at 09:01

## 2017-08-16 RX ADMIN — LACTULOSE 20 G: 20 POWDER, FOR SOLUTION ORAL at 09:01

## 2017-08-16 RX ADMIN — LACTULOSE 20 G: 20 POWDER, FOR SOLUTION ORAL at 13:58

## 2017-08-16 RX ADMIN — SPIRONOLACTONE 50 MG: 50 TABLET ORAL at 09:01

## 2017-08-16 RX ADMIN — RISPERIDONE 2 MG: 2 TABLET ORAL at 21:14

## 2017-08-16 RX ADMIN — Medication 20 MG: at 09:01

## 2017-08-16 RX ADMIN — PANTOPRAZOLE SODIUM 40 MG: 40 TABLET, DELAYED RELEASE ORAL at 09:01

## 2017-08-16 RX ADMIN — RIFAXIMIN 550 MG: 550 TABLET ORAL at 09:01

## 2017-08-16 RX ADMIN — LACTULOSE 20 G: 20 POWDER, FOR SOLUTION ORAL at 21:14

## 2017-08-16 RX ADMIN — RIFAXIMIN 550 MG: 550 TABLET ORAL at 21:14

## 2017-08-16 NOTE — PLAN OF CARE
Problem: Psychotic Symptoms  Goal: Psychotic Symptoms  Signs and symptoms of listed problems will be absent or manageable.   Outcome: No Change  48 Hour Assessment     /60  Pulse 70  Temp 97.9  F (36.6  C) (Oral)  Resp 16  Wt 92.1 kg (203 lb)  SpO2 99%  BMI 32.77 kg/m2    Pt isolated to her room for the shift, coming out to the lounge to request soda. She ate her meal in her room, and completed her food with no issue. She was prompted to shower, but declined.  Pt denies being in pain or discomfort. She states she has had a BM and no issues with urinating. Pt took all medications with no issue.     Pt was calm for the evening and made no attempts to elope from the unit.         SI/SIB: Pt denies     Auditory/Visual Hallucinations: Pt denies. However, she appears to be responding to internal stimuli by picking at objects in the air that are not there. When asked, pt becomes agitated and verbally irritated with writer.     No additional concerns at this time. Will continue to assess.

## 2017-08-16 NOTE — SIGNIFICANT EVENT
The pt was visibly responding to stimuli again this shift.  This writer went in to take her evening blood pressure and she was batting at things above her head.  When asked if she was seeing anything, the pt became agitated and started raising her voice in denial of hallucinations.  Right after however, the pt returned to batting at things above her head.

## 2017-08-16 NOTE — PROGRESS NOTES
"[facility], [place]   Psychiatric Progress Note        Interim History:   The patient's care was discussed with the treatment team during the daily team meeting and/or staff's chart notes were reviewed.  Staff report patient spent most of the time in her bed. She refused breakfast, needed encouragement to get out of bed. Appeared to be withdrawn. She was seen by one of Psych associates batting things in the air. Got angry when I asked about Auditory hallucinations, Visual hallucinations, Suicidal ideation, Homicidal thoughts. \"I am fine, I don't want to talk\".   Per CTC: \"Received a call from Shira Villagran.  [facility] is on [facility]  [facility] pt needs to wait for a bed there. She is 20th on the wait list.  Shira will be out of the office on Friday.  She will plan on visiting pt on Tuesday, Aug 22nd at 2pm\".         Medications:       risperiDONE  2 mg Oral At Bedtime     pantoprazole  40 mg Oral Daily     multivitamin, therapeutic with minerals  1 tablet Oral Daily     folic acid  1 mg Oral Daily     lactulose  20 g Oral TID     rifaximin  550 mg Oral BID     spironolactone  50 mg Oral Daily     furosemide  20 mg Oral Daily          Allergies:     Allergies   Allergen Reactions     Acetaminophen      Ambien [Zolpidem Tartrate] Other (See Comments)     Sleep walks and eats     Ciprofloxacin Other (See Comments)     Seizure.     Citalopram Nausea and Vomiting          Labs:   No results found for this or any previous visit (from the past 24 hour(s)).       Psychiatric Examination:     /60  Pulse 70  Temp 97.5  F (36.4  C) (Oral)  Resp 16  Wt 92.1 kg (203 lb)  SpO2 99%  BMI 32.77 kg/m2  Weight is 203 lbs 0 oz  Body mass index is 32.77 kg/(m^2).  Orthostatic Vitals       Most Recent      Lying Orthostatic /53 08/11 1057    Lying Orthostatic Pulse (bpm) 65 08/11 1057    Sitting Orthostatic /62 08/11 0924    Sitting Orthostatic Pulse (bpm) 75 " 08/11 0924    Standing Orthostatic BP --  Comment: Ref. 08/11 0924    Standing Orthostatic Pulse (bpm) --  Comment: Ref. 08/11 0924            Appearance: dressed in hospital scrubs  Attitude:  guarded and uncooperative  Eye Contact:  poor   Mood:  angry  Affect:  intensity is exaggerated  Speech:  rambling, limited speech production  Psychomotor Behavior:  no evidence of tardive dyskinesia, dystonia, or tics and physical agitation  Throught Process:  disorganized  Associations:  loosening of associations present  Thought Content:  no evidence of suicidal ideation or homicidal ideation, Visual hallucinations are likely.   Insight:  limited  Judgement:  poor  Oriented to:  self and place  Attention Span and Concentration:  limited  Recent and Remote Memory:  poor         Precautions:     Behavioral Orders   Procedures     Code 1 - Restrict to Unit     Elopement precautions     Routine Programming     As clinically indicated     Seizure precautions     Status 15     Every 15 minutes.     Withdrawal precautions          DIagnoses:     Encephalopathy probably multifactorial, possibly underlying psychotic illness; possibly head injury contributes to patient's symptoms.             Plan:   Patient is fully MI committed. Per CTC, she is waiting for placement to [facility], see above. The etiology of Visual hallucinations is unclear. Can't be explained by intoxication or withdrawal. Liver enzymes are not very high. Will ask for neuro consult.  Will increase Risperdal dose to address Visual hallucinations. Will continue to provide support and structure.

## 2017-08-16 NOTE — PROGRESS NOTES
Received a call from Shira Villagran.  Pt is on the wait list for [facility].  Per [facility], pt needs to wait for a bed there. She is 20th on the wait list.  Shira will be out of the office on Friday.  She will plan on visiting pt on Tuesday, Aug 22nd at 2pm.

## 2017-08-16 NOTE — PROGRESS NOTES
08/16/17 1349   Behavioral Health   Hallucinations denies / not responding to hallucinations   Thinking poor concentration   Orientation person: oriented;place: oriented;date: oriented;time: oriented   Memory baseline memory   Insight poor   Judgement impaired   Eye Contact into space   Affect blunted, flat;tense;irritable   Mood labile   Physical Appearance/Attire untidy   Hygiene neglected grooming - unclean body, hair, teeth   Suicidality (none stated)   Self Injury (none stated)   Elopement (none staetd)   Activity isolative;withdrawn;refusal   Speech clear;coherent   Medication Sensitivity no stated side effects   Psychomotor / Gait slow   Psycho Education   Type of Intervention 1:1 intervention   Response refuses   Hours 0.5   pt isolative in room. Pt refused check in with staff. Pt refused to come out for breakfast and needed motivation to come out for lunch as she stated her knees were hurting  but pt did end up walking out. Pt showered. Pt irritable at times.

## 2017-08-17 LAB
ALBUMIN SERPL-MCNC: 2.7 G/DL (ref 3.4–5)
ALP SERPL-CCNC: 168 U/L (ref 40–150)
ALT SERPL W P-5'-P-CCNC: 57 U/L (ref 0–50)
AMMONIA PLAS-SCNC: 73 UMOL/L (ref 10–50)
AST SERPL W P-5'-P-CCNC: 48 U/L (ref 0–45)
BILIRUB DIRECT SERPL-MCNC: 0.3 MG/DL (ref 0–0.2)
BILIRUB SERPL-MCNC: 1 MG/DL (ref 0.2–1.3)
PROT SERPL-MCNC: 6.7 G/DL (ref 6.8–8.8)

## 2017-08-17 PROCEDURE — 12400007 ZZH R&B MH INTERMEDIATE UMMC

## 2017-08-17 PROCEDURE — 36415 COLL VENOUS BLD VENIPUNCTURE: CPT | Performed by: PSYCHIATRY & NEUROLOGY

## 2017-08-17 PROCEDURE — 82140 ASSAY OF AMMONIA: CPT | Performed by: PSYCHIATRY & NEUROLOGY

## 2017-08-17 PROCEDURE — 99233 SBSQ HOSP IP/OBS HIGH 50: CPT | Performed by: PSYCHIATRY & NEUROLOGY

## 2017-08-17 PROCEDURE — 25000132 ZZH RX MED GY IP 250 OP 250 PS 637: Performed by: NURSE PRACTITIONER

## 2017-08-17 PROCEDURE — 25000132 ZZH RX MED GY IP 250 OP 250 PS 637: Performed by: EMERGENCY MEDICINE

## 2017-08-17 PROCEDURE — 80076 HEPATIC FUNCTION PANEL: CPT | Performed by: PSYCHIATRY & NEUROLOGY

## 2017-08-17 PROCEDURE — 25000132 ZZH RX MED GY IP 250 OP 250 PS 637: Performed by: PSYCHIATRY & NEUROLOGY

## 2017-08-17 RX ADMIN — PANTOPRAZOLE SODIUM 40 MG: 40 TABLET, DELAYED RELEASE ORAL at 09:07

## 2017-08-17 RX ADMIN — RISPERIDONE 2 MG: 2 TABLET ORAL at 21:00

## 2017-08-17 RX ADMIN — LACTULOSE 20 G: 20 POWDER, FOR SOLUTION ORAL at 09:06

## 2017-08-17 RX ADMIN — MULTIPLE VITAMINS W/ MINERALS TAB 1 TABLET: TAB at 09:07

## 2017-08-17 RX ADMIN — LACTULOSE 20 G: 20 POWDER, FOR SOLUTION ORAL at 20:59

## 2017-08-17 RX ADMIN — Medication 20 MG: at 09:07

## 2017-08-17 RX ADMIN — FOLIC ACID 1 MG: 1 TABLET ORAL at 09:07

## 2017-08-17 RX ADMIN — RIFAXIMIN 550 MG: 550 TABLET ORAL at 20:59

## 2017-08-17 RX ADMIN — LACTULOSE 20 G: 20 POWDER, FOR SOLUTION ORAL at 13:57

## 2017-08-17 RX ADMIN — RISPERIDONE 1 MG: 1 TABLET ORAL at 09:07

## 2017-08-17 RX ADMIN — RIFAXIMIN 550 MG: 550 TABLET ORAL at 09:07

## 2017-08-17 RX ADMIN — SPIRONOLACTONE 50 MG: 50 TABLET ORAL at 09:07

## 2017-08-17 ASSESSMENT — ACTIVITIES OF DAILY LIVING (ADL)
GROOMING: PROMPTS
DRESS: SCRUBS (BEHAVIORAL HEALTH);INDEPENDENT
ORAL_HYGIENE: PROMPTS

## 2017-08-17 NOTE — PROGRESS NOTES
"[facility], [place]   Psychiatric Progress Note        Interim History:   The patient's care was discussed with the treatment team during the daily team meeting and/or staff's chart notes were reviewed.  Staff report patient spent most of the time in her bed. She refuses to get out but eats if tray with food is brought to her. She seems to be eat better. Remains irritable, gets angry when asked about Auditory hallucinations and Visual hallucinations and denies having them. She was seen by number of staff members trying to reach things in the air. When I asked her about this today, she responded: \"I was just stretching my arms\". She appeared to be less confused, told me that she was at [facility], said that now was the end of August. Quickly grew irritated and refused to answer further questions. I discussed patient's case with neurology who reviewed her chart and ordered MRI and ammonia level. Per neurology patient's symptoms could be due to brain injury due to encephalopathy. Neurology will see the patient after she had her MRI done.          Medications:       risperiDONE  1 mg Oral QAM     risperiDONE  2 mg Oral At Bedtime     pantoprazole  40 mg Oral Daily     multivitamin, therapeutic with minerals  1 tablet Oral Daily     folic acid  1 mg Oral Daily     lactulose  20 g Oral TID     rifaximin  550 mg Oral BID     spironolactone  50 mg Oral Daily     furosemide  20 mg Oral Daily          Allergies:     Allergies   Allergen Reactions     Acetaminophen      Ambien [Zolpidem Tartrate] Other (See Comments)     Sleep walks and eats     Ciprofloxacin Other (See Comments)     Seizure.     Citalopram Nausea and Vomiting          Labs:     Recent Results (from the past 24 hour(s))   Hepatic panel    Collection Time: 08/17/17  7:53 AM   Result Value Ref Range    Bilirubin Direct 0.3 (H) 0.0 - 0.2 mg/dL    Bilirubin Total 1.0 0.2 - 1.3 mg/dL    Albumin 2.7 (L) 3.4 - 5.0 g/dL    Protein Total 6.7 (L) " 6.8 - 8.8 g/dL    Alkaline Phosphatase 168 (H) 40 - 150 U/L    ALT 57 (H) 0 - 50 U/L    AST 48 (H) 0 - 45 U/L   Ammonia    Collection Time: 08/17/17 11:34 AM   Result Value Ref Range    Ammonia 73 (H) 10 - 50 umol/L          Psychiatric Examination:     /60  Pulse 70  Temp 97.5  F (36.4  C) (Oral)  Resp 16  Wt 92.1 kg (203 lb)  SpO2 99%  BMI 32.77 kg/m2  Weight is 203 lbs 0 oz  Body mass index is 32.77 kg/(m^2).  Orthostatic Vitals       Most Recent      Lying Orthostatic /53 08/11 1057    Lying Orthostatic Pulse (bpm) 65 08/11 1057    Sitting Orthostatic /62 08/11 0924    Sitting Orthostatic Pulse (bpm) 75 08/11 0924    Standing Orthostatic BP --  Comment: Ref. 08/11 0924    Standing Orthostatic Pulse (bpm) --  Comment: Ref. 08/11 0924            Appearance: dressed in hospital scrubs  Attitude:  guarded and uncooperative  Eye Contact:  poor   Mood:  angry  Affect:  intensity is exaggerated  Speech:  rambling, limited speech production  Psychomotor Behavior:  no evidence of tardive dyskinesia, dystonia, or tics and physical agitation  Throught Process:  Slightly more organized.  Associations:  loosening of associations present  Thought Content:  no evidence of suicidal ideation or homicidal ideation, Visual hallucinations are likely.   Insight:  limited  Judgement:  poor  Oriented to:  self and place  Attention Span and Concentration:  limited  Recent and Remote Memory:  poor         Precautions:     Behavioral Orders   Procedures     Code 2     Elopement precautions     Routine Programming     As clinically indicated     Seizure precautions     Status 15     Every 15 minutes.     Withdrawal precautions          DIagnoses:     Encephalopathy probably multifactorial, possibly underlying psychotic illness; possibly head injury contributes to patient's symptoms.             Plan:   Patient is fully MI committed. Per CTC, she is waiting for placement to [facility], see above. The etiology of Visual  hallucinations is unclear. Can't be explained by intoxication or withdrawal. Liver enzymes are not very high and lower than before, but ammonia level is higher, still, not very high. Will have brain MRI. Will continue to provide support and structure.

## 2017-08-17 NOTE — PROGRESS NOTES
"Pt has an MRI scheduled for 8/17.  Writer was told that pt had hearing aids.  Writer asked pt if she would remove her hearing aids and the pt stated \"I can't, they are implanted\".  Pt is psychotic and a poor historian.  Pt can not state when the implants were placed.  Pt has no hx in her chart of cochlear implants.   Writer attempted to call family, but the number provided was disconnected.  Writer called MRI, who stated that some cochlear implants can not have MRIs completed.  Writer paged Attending provider, who asked for neurology to be paged.  Neurology physician stated that the pt had an MRI completed 6 months ago and pt is a poor historian and that MRI should check for an implant.  Writer called MRI, who checked and did not see an implant; however, the tech was going to check with the radiologist to determine the next step of care.  Writer awaiting phone call from MRI.    ADDENDUM:  Writer spoke to MRI.  They stated they need definitive proof from pt family that she does not have a cochlear implant.    Number provided for family contact does not work.  Will ask pt if she has a number that works.    ADDENDUM #2:  Writer was able to contact pt's , Franklin Weldon.   states pt does not have any hearing aids.   states she has difficulty hearing, but no implants.     ADDENDUM #3:  Writer called MRI to notify them that pt does not have an implant.  MRI wanted to ensure pt would agree to be safe during the procedure. Writer asked pt if she could be safe to do an MRI.  Pt stated \"I have done lots of them, but I am not doing one tonight\".  Writer asked why, pt stated \"Because I am not!\".  \"I will do it tomorrow\".  Writer informed MRI, and they have scheduled pt for tomorrow during evening shift.    Will continue to monitor and assess.  "
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
## 2017-08-17 NOTE — PLAN OF CARE
Problem: Psychotic Symptoms  Goal: Psychotic Symptoms  Signs and symptoms of listed problems will be absent or manageable.   Outcome: No Change  48 Hour Nursing Assessment:  Day 8 of hospitalization.  Mildred  Is refusing to come out for meals, refusing to talk with staff, and refusing to go to groups.  She denies being suicidal.  She denies hallucinating however staff wonder if this is accurate.  She will eat if her tray is taken to her and her appetite has seemed to improve.  When staff offer to spend time with her she doesn't want this.
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
## 2017-08-18 PROCEDURE — 25000132 ZZH RX MED GY IP 250 OP 250 PS 637: Performed by: EMERGENCY MEDICINE

## 2017-08-18 PROCEDURE — 25000132 ZZH RX MED GY IP 250 OP 250 PS 637: Performed by: PSYCHIATRY & NEUROLOGY

## 2017-08-18 PROCEDURE — 12400007 ZZH R&B MH INTERMEDIATE UMMC

## 2017-08-18 PROCEDURE — 99232 SBSQ HOSP IP/OBS MODERATE 35: CPT | Performed by: PSYCHIATRY & NEUROLOGY

## 2017-08-18 PROCEDURE — 25000132 ZZH RX MED GY IP 250 OP 250 PS 637: Performed by: NURSE PRACTITIONER

## 2017-08-18 RX ADMIN — RIFAXIMIN 550 MG: 550 TABLET ORAL at 20:18

## 2017-08-18 RX ADMIN — LACTULOSE 20 G: 20 POWDER, FOR SOLUTION ORAL at 20:18

## 2017-08-18 RX ADMIN — RISPERIDONE 2 MG: 2 TABLET ORAL at 21:36

## 2017-08-18 RX ADMIN — SPIRONOLACTONE 50 MG: 50 TABLET ORAL at 09:15

## 2017-08-18 RX ADMIN — LACTULOSE 20 G: 20 POWDER, FOR SOLUTION ORAL at 14:14

## 2017-08-18 RX ADMIN — LACTULOSE 20 G: 20 POWDER, FOR SOLUTION ORAL at 09:14

## 2017-08-18 RX ADMIN — RISPERIDONE 1 MG: 1 TABLET ORAL at 09:14

## 2017-08-18 RX ADMIN — PANTOPRAZOLE SODIUM 40 MG: 40 TABLET, DELAYED RELEASE ORAL at 09:14

## 2017-08-18 RX ADMIN — MULTIPLE VITAMINS W/ MINERALS TAB 1 TABLET: TAB at 09:14

## 2017-08-18 RX ADMIN — Medication 20 MG: at 09:15

## 2017-08-18 RX ADMIN — FOLIC ACID 1 MG: 1 TABLET ORAL at 09:14

## 2017-08-18 RX ADMIN — RIFAXIMIN 550 MG: 550 TABLET ORAL at 09:15

## 2017-08-18 ASSESSMENT — ACTIVITIES OF DAILY LIVING (ADL)
DRESS: INDEPENDENT
DRESS: INDEPENDENT
ORAL_HYGIENE: PROMPTS
GROOMING: PROMPTS
HYGIENE/GROOMING: PROMPTS
LAUNDRY: UNABLE TO COMPLETE
ORAL_HYGIENE: PROMPTS

## 2017-08-18 NOTE — PROGRESS NOTES
"   08/18/17 6913   Behavioral Health   Hallucinations denies / not responding to hallucinations   Thinking distractable;poor concentration   Orientation person: oriented;place: oriented   Memory baseline memory   Insight poor   Judgement impaired   Eye Contact (covered up with a blanket)   Affect blunted, flat;irritable   Mood irritable   Physical Appearance/Attire untidy   Hygiene neglected grooming - unclean body, hair, teeth   Suicidality other (see comments)  (denied)   Self Injury other (see comment)  (denied)   Elopement (none observed, in room all shift)   Activity isolative;withdrawn   Speech pressured   Medication Sensitivity no stated side effects;no observed side effects   Psychomotor / Gait steady   Coping/Psychosocial   Verbalized Emotional State frustration  (said she feels \"okay\")   Plan Of Care Reviewed With patient   Patient Agreement with Plan of Care agrees   Psycho Education   Type of Intervention other (see comment)   Response refuses   Activities of Daily Living   Hygiene/Grooming prompts   Oral Hygiene prompts   Dress independent   Laundry unable to complete   Room Organization unable   Groups   Details did not attend any   Behavioral Health Interventions   Psychotic Symptoms maintain safety precautions;maintain safe secure environment   Social and Therapeutic Interventions (Psychotic Symptoms) encourage participation in therapeutic groups and milieu activities     Pt was withdrawn and isolative for the whole shift, resting and sleeping in her room. She ate lunch in her room - when asked if she wanted to come out to eat she got irritable. When doing 1:1 check in, pt stated she does not have any thoughts of SI or self-harm, and denied any hallucinations. Pt is reserved and gave short answers, and didn't elaborate much on anything.  "

## 2017-08-18 NOTE — PROGRESS NOTES
Neurology follow up    Discussed case with Dr Payne. Patient does not allow physical examination and refuses brain MRI.  My impression remains that her visual hallucinations may not be related to delirium, drug effects or psychosis. 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 Previous brain MRI done in 11/2016 provides supportive evidence for the above  statement.  My recommendation is for patient to have an Opthalmologic examination to rule out other causes of visual hallucinations. I doubt she will allow it, but nevertheless this step is medically appropriate. I am afraid I don't have any treatment to propose that is likely to reverse those hallucinations. I will sign off at this point. Please call with any questions.    Zack Grimes MD   of Neurology

## 2017-08-18 NOTE — PLAN OF CARE
Problem: General Plan of Care (Inpatient Behavioral)  Goal: Team Discussion  Team Plan:   BEHAVIORAL TEAM DISCUSSION     Participants: Dr. Payne, MARYBEL Elmore, Rn Elvie Cornejo  Progress: minimal  Continued Stay Criteria/Rationale: PD revoked  Medical/Physical: liver disease  Precautions:   Behavioral Orders   Procedures     Code 2     Elopement precautions     Routine Programming       As clinically indicated     Seizure precautions     Status 15       Every 15 minutes.     Withdrawal precautions     Plan: pt is on the list for [facility],   Rationale for change in precautions or plan: no change.

## 2017-08-18 NOTE — PROGRESS NOTES
"[facility], [place], [place]   Psychiatric Progress Note        Interim History:   The patient's care was discussed with the treatment team during the daily team meeting and/or staff's chart notes were reviewed.  Staff report patient spent most of the time in her bed. She refuses to get out but eats if tray with food is brought to her. She seems to be eat better. Remains irritable, gets angry when asked about Auditory hallucinations and Visual hallucinations and denies having them. She was seen by number of staff members trying to reach things in the air. Neurology has ordered brain MRI, but the patient refused to do it: \"I had a million of MRIs I would not do one again\". She is irritable, focused on being discharged. Yesterday was trying to leave this unit, got angry when staff didn't open for her door.          Medications:       risperiDONE  1 mg Oral QAM     risperiDONE  2 mg Oral At Bedtime     pantoprazole  40 mg Oral Daily     multivitamin, therapeutic with minerals  1 tablet Oral Daily     folic acid  1 mg Oral Daily     lactulose  20 g Oral TID     rifaximin  550 mg Oral BID     spironolactone  50 mg Oral Daily     furosemide  20 mg Oral Daily          Allergies:     Allergies   Allergen Reactions     Acetaminophen      Ambien [Zolpidem Tartrate] Other (See Comments)     Sleep walks and eats     Ciprofloxacin Other (See Comments)     Seizure.     Citalopram Nausea and Vomiting          Labs:     No results found for this or any previous visit (from the past 24 hour(s)).       Psychiatric Examination:     BP (!) 84/44  Pulse 71  Temp 98  F (36.7  C) (Oral)  Resp 16  Wt 92.1 kg (203 lb)  SpO2 99%  BMI 32.77 kg/m2  Weight is 203 lbs 0 oz  Body mass index is 32.77 kg/(m^2).           Appearance: dressed in hospital scrubs  Attitude:  guarded and uncooperative  Eye Contact:  poor   Mood:  angry  Affect:  intensity is exaggerated  Speech: limited speech production  Psychomotor Behavior:  " no evidence of tardive dyskinesia, dystonia, or tics and physical agitation  Throught Process:  Slightly more organized.  Associations:  loosening of associations present  Thought Content:  no evidence of suicidal ideation or homicidal ideation, Visual hallucinations are likely.   Insight:  limited  Judgement:  poor  Oriented to:  self and place  Attention Span and Concentration:  limited  Recent and Remote Memory:  poor         Precautions:     Behavioral Orders   Procedures     Code 2     Elopement precautions     Routine Programming     As clinically indicated     Seizure precautions     Status 15     Every 15 minutes.     Withdrawal precautions          DIagnoses:     Encephalopathy probably multifactorial, possibly underlying psychotic illness; possibly head injury contributes to patient's symptoms. Will recheck BP because of today's hypotension.            Plan:   Patient is fully MI committed. Per CTC, she is waiting for placement to [facility], see above. The etiology of Visual hallucinations is unclear. Can't be explained by intoxication or withdrawal. Liver enzymes are not very high and lower than before, but ammonia level is higher, still, not very high. She refused MRI. Will wait for recommendations from neurology consult. Will continue to provide support and structure.

## 2017-08-19 PROCEDURE — 25000132 ZZH RX MED GY IP 250 OP 250 PS 637: Performed by: NURSE PRACTITIONER

## 2017-08-19 PROCEDURE — 12400007 ZZH R&B MH INTERMEDIATE UMMC

## 2017-08-19 PROCEDURE — 25000132 ZZH RX MED GY IP 250 OP 250 PS 637: Performed by: PSYCHIATRY & NEUROLOGY

## 2017-08-19 PROCEDURE — 25000132 ZZH RX MED GY IP 250 OP 250 PS 637: Performed by: EMERGENCY MEDICINE

## 2017-08-19 RX ADMIN — LACTULOSE 20 G: 20 POWDER, FOR SOLUTION ORAL at 20:11

## 2017-08-19 RX ADMIN — PANTOPRAZOLE SODIUM 40 MG: 40 TABLET, DELAYED RELEASE ORAL at 08:52

## 2017-08-19 RX ADMIN — MULTIPLE VITAMINS W/ MINERALS TAB 1 TABLET: TAB at 08:52

## 2017-08-19 RX ADMIN — SPIRONOLACTONE 50 MG: 50 TABLET ORAL at 08:52

## 2017-08-19 RX ADMIN — RISPERIDONE 1 MG: 1 TABLET ORAL at 08:52

## 2017-08-19 RX ADMIN — RISPERIDONE 2 MG: 2 TABLET ORAL at 21:59

## 2017-08-19 RX ADMIN — LACTULOSE 20 G: 20 POWDER, FOR SOLUTION ORAL at 14:36

## 2017-08-19 RX ADMIN — Medication 20 MG: at 08:52

## 2017-08-19 RX ADMIN — LACTULOSE 20 G: 20 POWDER, FOR SOLUTION ORAL at 08:52

## 2017-08-19 RX ADMIN — RIFAXIMIN 550 MG: 550 TABLET ORAL at 08:52

## 2017-08-19 RX ADMIN — RIFAXIMIN 550 MG: 550 TABLET ORAL at 20:11

## 2017-08-19 RX ADMIN — FOLIC ACID 1 MG: 1 TABLET ORAL at 08:52

## 2017-08-19 ASSESSMENT — ACTIVITIES OF DAILY LIVING (ADL)
DRESS: INDEPENDENT
DRESS: INDEPENDENT;SCRUBS (BEHAVIORAL HEALTH)
ORAL_HYGIENE: PROMPTS
GROOMING: PROMPTS
GROOMING: PROMPTS
ORAL_HYGIENE: PROMPTS
LAUNDRY: WITH SUPERVISION

## 2017-08-19 NOTE — PROGRESS NOTES
08/19/17 1522   Behavioral Health   Hallucinations denies / not responding to hallucinations   Thinking distractable   Orientation person: oriented;place: oriented   Memory baseline memory   Insight poor   Judgement impaired   Eye Contact at examiner   Affect blunted, flat;irritable   Mood anxious   Physical Appearance/Attire untidy;disheveled   Hygiene neglected grooming - unclean body, hair, teeth;body odor   Suicidality other (see comments)  (denied )   Self Injury other (see comment)  (denied )   Elopement (not shown)   Activity isolative   Speech pressured;clear   Medication Sensitivity no stated side effects   Psychomotor / Gait balanced   Psycho Education   Type of Intervention 1:1 intervention   Response refuses   Hours 0.5   Treatment Detail (check-in)   Activities of Daily Living   Hygiene/Grooming prompts   Oral Hygiene prompts   Dress independent   Room Organization independent   Behavioral Health Interventions   Psychotic Symptoms maintain safety precautions;encourage nutrition and hydration;encourage participation / independence with adls;provide emotional support;establish therapeutic relationship;assist with developing & utilizing healthy coping strategies;build upon strengths   Social and Therapeutic Interventions (Psychotic Symptoms) encourage socialization with peers;encourage participation in therapeutic groups and milieu activities     Pt spent the entire shift in her room napping. Requested her meals be brought to her room. Pt was hyperactive/hyperagressive, demanding staff to leave her room. Pt reported general body aches, denied all psychotic symptoms.

## 2017-08-19 NOTE — PROGRESS NOTES
08/18/17 2135   Behavioral Health   Hallucinations denies / not responding to hallucinations   Thinking distractable   Orientation person: oriented;place: oriented;date: oriented;time: oriented   Memory baseline memory   Insight poor   Judgement impaired   Eye Contact at examiner   Affect blunted, flat   Mood anxious   Physical Appearance/Attire disheveled   Hygiene neglected grooming - unclean body, hair, teeth   Suicidality other (see comments)  (pt denied)   Self Injury other (see comment)  (pt denied)   Elopement (none observed)   Activity isolative   Speech pressured   Medication Sensitivity no stated side effects   Psychomotor / Gait steady   Activities of Daily Living   Hygiene/Grooming prompts   Oral Hygiene prompts   Dress independent   Room Organization unable   Behavioral Health Interventions   Psychotic Symptoms decrease environmental stimulation;simple, clear language;maintain safety precautions;monitor need to revise level of observation;reality orientation;maintain safe secure environment;assist patient in developing safety plan;encourage nutrition and hydration;encourage participation / independence with adls;provide emotional support;build upon strengths;assist with developing & utilizing healthy coping strategies;establish therapeutic relationship;assist patient in following safety plan   Social and Therapeutic Interventions (Psychotic Symptoms) encourage socialization with peers;encourage effective boundaries with peers;encourage participation in therapeutic groups and milieu activities     Pt denied thoughts of SI/SIB. Her favorite things she did today was eat chicken tenders. Pt had multiple snacks on this shift. Staff changed the pt bed linins. Mildred needs prompting on ADL's. No visitors on this shift. Pt took several naps. No elopement activity observed. Pt waited patiently while grabbing snacks and made no gestures about running through the door. She was polite to staff.

## 2017-08-20 PROCEDURE — 25000132 ZZH RX MED GY IP 250 OP 250 PS 637: Performed by: NURSE PRACTITIONER

## 2017-08-20 PROCEDURE — 25000132 ZZH RX MED GY IP 250 OP 250 PS 637: Performed by: EMERGENCY MEDICINE

## 2017-08-20 PROCEDURE — 25000132 ZZH RX MED GY IP 250 OP 250 PS 637: Performed by: PSYCHIATRY & NEUROLOGY

## 2017-08-20 PROCEDURE — 12400007 ZZH R&B MH INTERMEDIATE UMMC

## 2017-08-20 RX ADMIN — LACTULOSE 20 G: 20 POWDER, FOR SOLUTION ORAL at 20:28

## 2017-08-20 RX ADMIN — SPIRONOLACTONE 50 MG: 50 TABLET ORAL at 08:12

## 2017-08-20 RX ADMIN — RISPERIDONE 2 MG: 2 TABLET ORAL at 21:07

## 2017-08-20 RX ADMIN — MULTIPLE VITAMINS W/ MINERALS TAB 1 TABLET: TAB at 08:13

## 2017-08-20 RX ADMIN — Medication 20 MG: at 08:12

## 2017-08-20 RX ADMIN — RIFAXIMIN 550 MG: 550 TABLET ORAL at 08:12

## 2017-08-20 RX ADMIN — LACTULOSE 20 G: 20 POWDER, FOR SOLUTION ORAL at 14:07

## 2017-08-20 RX ADMIN — PANTOPRAZOLE SODIUM 40 MG: 40 TABLET, DELAYED RELEASE ORAL at 08:12

## 2017-08-20 RX ADMIN — LACTULOSE 20 G: 20 POWDER, FOR SOLUTION ORAL at 08:12

## 2017-08-20 RX ADMIN — RISPERIDONE 1 MG: 1 TABLET ORAL at 08:12

## 2017-08-20 RX ADMIN — FOLIC ACID 1 MG: 1 TABLET ORAL at 08:13

## 2017-08-20 RX ADMIN — RIFAXIMIN 550 MG: 550 TABLET ORAL at 20:28

## 2017-08-20 ASSESSMENT — ACTIVITIES OF DAILY LIVING (ADL)
LAUNDRY: WITH SUPERVISION
ORAL_HYGIENE: INDEPENDENT;PROMPTS
GROOMING: INDEPENDENT;PROMPTS
DRESS: SCRUBS (BEHAVIORAL HEALTH);INDEPENDENT

## 2017-08-20 NOTE — PLAN OF CARE
Problem: Psychotic Symptoms  Intervention: Social and Therapeutic Interv (Psychotic Symptoms)     Occupational Therapy:  Pt attended and actively participated in an OT facilitated Topic group which focused on the topic of Self-Confidence. Pt appeared comfortable answering question cards related to problem solving, identifying self-positives, and feelings expression.

## 2017-08-20 NOTE — PROGRESS NOTES
"Pt isolated to her room for the shift, coming out to grab snacks on occasion. She denies being in any pain, and states feeling \"OK\". She is pleasant when asked questions by writer, but will generally answer in one word answers. Pt does not appear to be responding to internal stimuli. However, pt avoids eye contact with writer and is evasive when asked if she is having auditory or visual hallucinations.     She states that she is able to pass a BM, as well as urine. Pt took all medications without issue, and denies any medication side effects.Offered pt toiletries to take a shower, and she said she would take a shower. However, pt did not follow through with this.    Writer encouraged pt to join other patients in the milieu, but she declined.    No additional concerns at this time. Will continue to monitor and assess.    "

## 2017-08-20 NOTE — PLAN OF CARE
"Problem: Psychotic Symptoms  Goal: Psychotic Symptoms  Signs and symptoms of listed problems will be absent or manageable.   Outcome: Improving  Pt in kaba yelling at staff was redirected back to her room, I offered her a prn ativan and she refused, stated \"I'm tired\". Pt appetite good did get up this am and get her own breakfast tray and ate her food in her room. Pt is labile with periods of confusion and anger and then is polite and lucid. Pt does present tired , isolates in her room, she has refused her vital signs x 2, will continue to encourage her to let us get her vital signs.    "

## 2017-08-21 PROCEDURE — 99233 SBSQ HOSP IP/OBS HIGH 50: CPT | Performed by: PSYCHIATRY & NEUROLOGY

## 2017-08-21 PROCEDURE — 25000132 ZZH RX MED GY IP 250 OP 250 PS 637: Performed by: PSYCHIATRY & NEUROLOGY

## 2017-08-21 PROCEDURE — 99207 ZZC CDG-MDM COMPONENT: MEETS MODERATE - UP CODED: CPT | Performed by: PSYCHIATRY & NEUROLOGY

## 2017-08-21 PROCEDURE — 25000132 ZZH RX MED GY IP 250 OP 250 PS 637: Performed by: EMERGENCY MEDICINE

## 2017-08-21 PROCEDURE — 12400007 ZZH R&B MH INTERMEDIATE UMMC

## 2017-08-21 PROCEDURE — 25000132 ZZH RX MED GY IP 250 OP 250 PS 637: Performed by: NURSE PRACTITIONER

## 2017-08-21 RX ADMIN — MULTIPLE VITAMINS W/ MINERALS TAB 1 TABLET: TAB at 09:20

## 2017-08-21 RX ADMIN — RIFAXIMIN 550 MG: 550 TABLET ORAL at 09:19

## 2017-08-21 RX ADMIN — RISPERIDONE 2 MG: 2 TABLET ORAL at 21:00

## 2017-08-21 RX ADMIN — SPIRONOLACTONE 50 MG: 50 TABLET ORAL at 09:20

## 2017-08-21 RX ADMIN — RIFAXIMIN 550 MG: 550 TABLET ORAL at 21:00

## 2017-08-21 RX ADMIN — PANTOPRAZOLE SODIUM 40 MG: 40 TABLET, DELAYED RELEASE ORAL at 09:20

## 2017-08-21 RX ADMIN — LACTULOSE 20 G: 20 POWDER, FOR SOLUTION ORAL at 21:00

## 2017-08-21 RX ADMIN — FOLIC ACID 1 MG: 1 TABLET ORAL at 09:20

## 2017-08-21 RX ADMIN — LACTULOSE 20 G: 20 POWDER, FOR SOLUTION ORAL at 09:19

## 2017-08-21 RX ADMIN — Medication 20 MG: at 09:19

## 2017-08-21 RX ADMIN — LACTULOSE 20 G: 20 POWDER, FOR SOLUTION ORAL at 14:12

## 2017-08-21 RX ADMIN — RISPERIDONE 1 MG: 1 TABLET ORAL at 09:19

## 2017-08-21 ASSESSMENT — ACTIVITIES OF DAILY LIVING (ADL)
DRESS: INDEPENDENT
ORAL_HYGIENE: INDEPENDENT
HYGIENE/GROOMING: PROMPTS
DRESS: INDEPENDENT
GROOMING: INDEPENDENT
LAUNDRY: WITH SUPERVISION
ORAL_HYGIENE: INDEPENDENT

## 2017-08-21 NOTE — PROGRESS NOTES
Writer called [facility] to discuss sending pt to a [facility]. The person I spoke with stated that since the patient is [facility], she will be placed on the [facility] and not the [place] [facility]. It is possible to request the Chief Medical Officer for the [facility] to review the case for [facility] placement.

## 2017-08-21 NOTE — PLAN OF CARE
"Problem: Psychotic Symptoms  Goal: Psychotic Symptoms  Signs and symptoms of listed problems will be absent or manageable.   Outcome: No Change  Pt isolated to her room for the shift. She states that she is \"feeling ok\". Pt denies SI/SIB, as well as AH and VH. Pt was calm when meeting with writer, but displayed a blunted affect and responded with one word answers.      Pt came out of her room and attempted to exit the unit through the emergency door. She yelled at staff saying, \"I wasn't talking to you! I see my  out there and I want to leave!\" Pt was redirected back to her room, and she was able to calm down. Writer went to check on pt, and she stated she was \"OK\". When asked if she was in pain, pt stated that she was in pain, but \"Would be fine\". Writer inquired further, and pt stated that she would need narcotics for her pain. Writer stated that this would not be possible at the moment, and pt responded \"Yeah, that's why I said I would be fine.\"    Pt showered on this shift, and writer cleaned pt room. Pt  called to speak with writer, and to check on pt status (JENNIFER was signed for ). Patient's  stated that pt recently lost her glasses, and has very poor vision. States that he will look into getting glasses for pt.     No additional concerns at this time. Will continue to monitor and assess.    "

## 2017-08-21 NOTE — PROGRESS NOTES
08/21/17 1413   Behavioral Health   Hallucinations denies / not responding to hallucinations   Thinking confused;poor concentration   Orientation person: oriented   Memory baseline memory   Insight poor   Judgement impaired   Eye Contact at examiner   Affect blunted, flat   Mood labile   Physical Appearance/Attire untidy   Hygiene neglected grooming - unclean body, hair, teeth   Suicidality (none stated)   Self Injury (none stated)   Elopement (none shown, in room all shift)   Activity isolative;withdrawn;refusal   Speech coherent   Medication Sensitivity no stated side effects   Psychomotor / Gait balanced   Psycho Education   Type of Intervention 1:1 intervention   Response refuses   Hours 0.5   Activities of Daily Living   Hygiene/Grooming independent   Oral Hygiene independent   Dress independent   Laundry with supervision   Room Organization independent   pt isolative and withdrawn in room all shift. Pt had meals be brought to her and she ate well. Pt labile and sleeping with blanket over her head.

## 2017-08-22 PROCEDURE — 25000132 ZZH RX MED GY IP 250 OP 250 PS 637: Performed by: EMERGENCY MEDICINE

## 2017-08-22 PROCEDURE — 25000132 ZZH RX MED GY IP 250 OP 250 PS 637: Performed by: NURSE PRACTITIONER

## 2017-08-22 PROCEDURE — 12400007 ZZH R&B MH INTERMEDIATE UMMC

## 2017-08-22 PROCEDURE — 25000132 ZZH RX MED GY IP 250 OP 250 PS 637: Performed by: PSYCHIATRY & NEUROLOGY

## 2017-08-22 RX ADMIN — FOLIC ACID 1 MG: 1 TABLET ORAL at 08:39

## 2017-08-22 RX ADMIN — PANTOPRAZOLE SODIUM 40 MG: 40 TABLET, DELAYED RELEASE ORAL at 08:39

## 2017-08-22 RX ADMIN — LACTULOSE 20 G: 20 POWDER, FOR SOLUTION ORAL at 20:42

## 2017-08-22 RX ADMIN — RIFAXIMIN 550 MG: 550 TABLET ORAL at 08:38

## 2017-08-22 RX ADMIN — SPIRONOLACTONE 50 MG: 50 TABLET ORAL at 08:39

## 2017-08-22 RX ADMIN — RISPERIDONE 2 MG: 2 TABLET ORAL at 20:42

## 2017-08-22 RX ADMIN — MULTIPLE VITAMINS W/ MINERALS TAB 1 TABLET: TAB at 08:38

## 2017-08-22 RX ADMIN — LACTULOSE 20 G: 20 POWDER, FOR SOLUTION ORAL at 13:54

## 2017-08-22 RX ADMIN — Medication 20 MG: at 08:38

## 2017-08-22 RX ADMIN — RISPERIDONE 1 MG: 1 TABLET ORAL at 08:38

## 2017-08-22 RX ADMIN — RIFAXIMIN 550 MG: 550 TABLET ORAL at 20:42

## 2017-08-22 RX ADMIN — LACTULOSE 20 G: 20 POWDER, FOR SOLUTION ORAL at 08:38

## 2017-08-22 ASSESSMENT — ACTIVITIES OF DAILY LIVING (ADL)
GROOMING: PROMPTS
GROOMING: PROMPTS
DRESS: SCRUBS (BEHAVIORAL HEALTH)
ORAL_HYGIENE: PROMPTS
LAUNDRY: WITH SUPERVISION
DRESS: INDEPENDENT
ORAL_HYGIENE: PROMPTS

## 2017-08-22 NOTE — PLAN OF CARE
"Problem: Psychotic Symptoms  Goal: Psychotic Symptoms  Signs and symptoms of listed problems will be absent or manageable.   Outcome: No Change  Pt isolated to her room for the majority of this shift. She came out to the lounge to request, and eat, a snack. She appears responding to internal stimuli by pulling objects out of her mouth and the air. However, pt denies all AH, VH and SI/SIB.      Pt was tearful, and reports she is having knee pain and a headache. However, she is unable to take ibuprofen or aspirin. Pt states \"I can't have any pain medication, so I will be fine.\" Encouraged pt to join other patients in milieu, but she declined.    Pt did not make any attempts to elope from unit.      "

## 2017-08-22 NOTE — PROGRESS NOTES
"   08/22/17 1306   Behavioral Health   Hallucinations denies / not responding to hallucinations   Thinking confused;poor concentration   Orientation person: oriented;place: oriented   Memory baseline memory   Insight poor   Judgement impaired   Eye Contact at examiner   Affect tense;irritable   Mood labile;irritable   Physical Appearance/Attire disheveled   Hygiene neglected grooming - unclean body, hair, teeth   Suicidality (none stated)   Self Injury (none stated)   Elopement Distress about legal situation (holds for mental health or criminal);Statements about wanting to leave   Activity isolative;withdrawn;refusal   Speech clear;coherent   Medication Sensitivity no stated side effects   Psychomotor / Gait balanced   Psycho Education   Type of Intervention 1:1 intervention   Response refuses   Hours 0.5   Activities of Daily Living   Hygiene/Grooming prompts   Oral Hygiene prompts   Dress independent   Laundry with supervision   Room Organization independent   pt isolative and withdrawn in room. Pt did walk out to get meal trays but brought them back to her room to eat them. Pt irritable upon approach. Pt wanting to leave as she is making statements about leaving. Pt refused full check in with staff and told staff \" come back when you actually have news for me\". Pt did not shower and has body odor also has very messy room.   "

## 2017-08-22 NOTE — PROGRESS NOTES
Patient came out briefly to eat a snack but otherwise isolated in her room and refused to leave or interact with staff beyond minimal responses. Patient appeared tense and irritable and complained of headaches at times.     08/21/17 2100   Behavioral Health   Hallucinations denies / not responding to hallucinations   Thinking confused;distractable;poor concentration   Orientation person: oriented   Memory baseline memory   Insight poor   Judgement impaired   Eye Contact at examiner   Affect blunted, flat;tense   Mood labile;irritable   Physical Appearance/Attire disheveled   Hygiene neglected grooming - unclean body, hair, teeth   Suicidality other (see comments)  (None stated or observed.)   Self Injury other (see comment)  (None stated or observed.)   Activity isolative;withdrawn;refusal   Speech clear;coherent   Medication Sensitivity no stated side effects;no observed side effects   Psychomotor / Gait balanced   Activities of Daily Living   Hygiene/Grooming prompts   Oral Hygiene independent   Dress independent   Room Organization independent

## 2017-08-22 NOTE — PROGRESS NOTES
Pt's CM Shira Villagran came to visit to see if pt would sign a JENNIFER for [facility] in [place]. Pt became irritated and demanded that we leave her room. Writer will call [facility] to see if they need a JENNIFER seeing as pt is committed.

## 2017-08-22 NOTE — PROGRESS NOTES
[facility], [place]   Psychiatric Progress Note        Interim History:   The patient's care was discussed with the treatment team during the daily team meeting and/or staff's chart notes were reviewed.  Staff report patient spent most of the time in her bed. She refuses to get out but eats if tray with food is brought to her. She seems to be eat better. Remains irritable, tends to answer in short fraises. She refused MRI and refused neuro exam. As per neuro:     Neurology follow up     Discussed case with Dr Payne. Patient does not allow physical examination and refuses brain MRI.  My impression remains that her visual hallucinations may not be related to delirium, drug effects or psychosis. 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 Previous brain MRI done in 11/2016 provides supportive evidence for the above  statement.  My recommendation is for patient to have an Opthalmologic examination to rule out other causes of visual hallucinations. I doubt she will allow it, but nevertheless this step is medically appropriate. I am afraid I don't have any treatment to propose that is likely to reverse those hallucinations. I will sign off at this point. Please call with any questions.     Zack Grimes MD   of Neurology       Medications:       risperiDONE  1 mg Oral QAM     risperiDONE  2 mg Oral At Bedtime     pantoprazole  40 mg Oral Daily     multivitamin, therapeutic with minerals  1 tablet Oral Daily     folic acid  1 mg Oral Daily     lactulose  20 g Oral TID     rifaximin  550 mg Oral BID     spironolactone  50 mg Oral Daily     furosemide  20 mg Oral Daily          Allergies:     Allergies   Allergen Reactions     Acetaminophen      Ambien [Zolpidem  Tartrate] Other (See Comments)     Sleep walks and eats     Ciprofloxacin Other (See Comments)     Seizure.     Citalopram Nausea and Vomiting          Labs:     No results found for this or any previous visit (from the past 24 hour(s)).       Psychiatric Examination:     /76  Pulse 82  Temp 97.5  F (36.4  C) (Oral)  Resp 16  Wt 93.4 kg (206 lb)  SpO2 98%  BMI 33.25 kg/m2  Weight is 206 lbs 0 oz  Body mass index is 33.25 kg/(m^2).           Appearance: dressed in hospital scrubs  Attitude:  guarded and uncooperative  Eye Contact:  poor   Mood:  angry  Affect:  intensity is exaggerated  Speech: limited speech production  Psychomotor Behavior:  no evidence of tardive dyskinesia, dystonia, or tics and physical agitation  Throught Process:  Slightly more organized.  Associations:  loosening of associations present at times.  Thought Content:  no evidence of suicidal ideation or homicidal ideation, Visual hallucinations are likely.   Insight:  limited  Judgement:  poor  Oriented to:  self and place  Attention Span and Concentration:  limited  Recent and Remote Memory:  poor         Precautions:     Behavioral Orders   Procedures     Code 2     Elopement precautions     Routine Programming     As clinically indicated     Seizure precautions     Status 15     Every 15 minutes.     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             Plan:   Patient is fully MI committed. Per CTC, she is waiting for placement to [facility], see above. The etiology of Visual hallucinations per neuro, see note above. No medication changes today. Will recheck Ammonia level in am. BP is normal.

## 2017-08-23 LAB — AMMONIA PLAS-SCNC: 82 UMOL/L (ref 10–50)

## 2017-08-23 PROCEDURE — 36415 COLL VENOUS BLD VENIPUNCTURE: CPT | Performed by: PSYCHIATRY & NEUROLOGY

## 2017-08-23 PROCEDURE — 25000132 ZZH RX MED GY IP 250 OP 250 PS 637: Performed by: EMERGENCY MEDICINE

## 2017-08-23 PROCEDURE — 25000132 ZZH RX MED GY IP 250 OP 250 PS 637: Performed by: NURSE PRACTITIONER

## 2017-08-23 PROCEDURE — 82140 ASSAY OF AMMONIA: CPT | Performed by: PSYCHIATRY & NEUROLOGY

## 2017-08-23 PROCEDURE — 12400007 ZZH R&B MH INTERMEDIATE UMMC

## 2017-08-23 PROCEDURE — 25000132 ZZH RX MED GY IP 250 OP 250 PS 637: Performed by: PSYCHIATRY & NEUROLOGY

## 2017-08-23 PROCEDURE — 99233 SBSQ HOSP IP/OBS HIGH 50: CPT | Performed by: PSYCHIATRY & NEUROLOGY

## 2017-08-23 RX ADMIN — FLUOXETINE 20 MG: 20 CAPSULE ORAL at 20:00

## 2017-08-23 RX ADMIN — RISPERIDONE 2 MG: 2 TABLET ORAL at 22:02

## 2017-08-23 RX ADMIN — FOLIC ACID 1 MG: 1 TABLET ORAL at 09:05

## 2017-08-23 RX ADMIN — PANTOPRAZOLE SODIUM 40 MG: 40 TABLET, DELAYED RELEASE ORAL at 09:05

## 2017-08-23 RX ADMIN — RIFAXIMIN 550 MG: 550 TABLET ORAL at 09:05

## 2017-08-23 RX ADMIN — Medication 20 MG: at 09:05

## 2017-08-23 RX ADMIN — LACTULOSE 20 G: 20 POWDER, FOR SOLUTION ORAL at 14:02

## 2017-08-23 RX ADMIN — LACTULOSE 20 G: 20 POWDER, FOR SOLUTION ORAL at 09:05

## 2017-08-23 RX ADMIN — MULTIPLE VITAMINS W/ MINERALS TAB 1 TABLET: TAB at 09:05

## 2017-08-23 RX ADMIN — LACTULOSE 20 G: 20 POWDER, FOR SOLUTION ORAL at 20:00

## 2017-08-23 RX ADMIN — RIFAXIMIN 550 MG: 550 TABLET ORAL at 20:00

## 2017-08-23 RX ADMIN — SPIRONOLACTONE 50 MG: 50 TABLET ORAL at 09:05

## 2017-08-23 RX ADMIN — RISPERIDONE 1 MG: 1 TABLET ORAL at 09:05

## 2017-08-23 ASSESSMENT — ACTIVITIES OF DAILY LIVING (ADL)
HYGIENE/GROOMING: PROMPTS
DRESS: SCRUBS (BEHAVIORAL HEALTH)
ORAL_HYGIENE: PROMPTS

## 2017-08-23 NOTE — PROGRESS NOTES
The pt was only visible when she got her dinner tray and snacks.  She sat in her room, in and out of sleep, responding to stimuli.  Any attempt to have any meaningful conversations ended in the pt's irritability and refusal to continue.     08/22/17 2112   Behavioral Health   Hallucinations auditory;visual;appears responding   Thinking confused;distractable;poor concentration   Orientation person: oriented;place: oriented   Insight poor   Judgement impaired   Eye Contact at examiner   Affect blunted, flat;irritable   Mood labile;irritable   Physical Appearance/Attire disheveled   Hygiene neglected grooming - unclean body, hair, teeth   Suicidality other (see comments)  (none stated)   Self Injury other (see comment)  (none observed)   Elopement (no statements about leaving were made this shift)   Activity isolative;withdrawn   Speech clear   Medication Sensitivity no stated side effects;no observed side effects   Psychomotor / Gait balanced   Substance Withdrawal Interventions   Social and Therapeutic Interventions (Substance Withdrawal) encourage socialization with peers;encourage effective boundaries with peers;encourage participation in therapeutic groups and milieu activities   Safety   Suicidality status 15   Coping/Psychosocial   Supportive Measures verbalization of feelings encouraged   Psycho Education   Type of Intervention 1:1 intervention   Response refuses   Group Therapy Session   Group Attendance refused to attend group session   Activities of Daily Living   Hygiene/Grooming prompts   Oral Hygiene prompts   Dress scrubs (behavioral health)   Room Organization prompts   Activity   Activity Level of Assistance independent   Behavioral Health Interventions   Psychotic Symptoms maintain safety precautions;monitor need to revise level of observation;maintain safe secure environment;reality orientation;simple, clear language;decrease environmental stimulation;redirection of intrusive behaviors;redirection of  aggressive behaviors;encourage nutrition and hydration;encourage participation / independence with adls;provide emotional support;establish therapeutic relationship;assist with developing & utilizing healthy coping strategies;build upon strengths   Social and Therapeutic Interventions (Psychotic Symptoms) encourage effective boundaries with peers;encourage socialization with peers;encourage participation in therapeutic groups and milieu activities

## 2017-08-23 NOTE — PROGRESS NOTES
"[facility], [place]   Psychiatric Progress Note        Interim History:   The patient's care was discussed with the treatment team during the daily team meeting and/or staff's chart notes were reviewed.  Staff report patient spent most of the time in her bed. She got out to get her tray, then, returned to her room and ate there. Doesn't sleep at night, sleeps more during the day. Irritable, becomes angry when asked about Auditory hallucinations and Visual hallucinations. Appears to be more depressed. Per staff report, she still at times attends to internal stimuli. Passively takes her meds. Was visited by CM, see CTC's note below:   \"Pt's CM Shira Tyshawn came to visit to see if pt would sign a JENNIFER for [employer] [facility] in [place]. Pt became irritated and demanded that we leave her room. Writer will call [employer] [facility] to see if they need a JENNIFER seeing as pt is committed.\"       Medications:       risperiDONE  1 mg Oral QAM     risperiDONE  2 mg Oral At Bedtime     pantoprazole  40 mg Oral Daily     multivitamin, therapeutic with minerals  1 tablet Oral Daily     folic acid  1 mg Oral Daily     lactulose  20 g Oral TID     rifaximin  550 mg Oral BID     spironolactone  50 mg Oral Daily     furosemide  20 mg Oral Daily          Allergies:     Allergies   Allergen Reactions     Acetaminophen      Ambien [Zolpidem Tartrate] Other (See Comments)     Sleep walks and eats     Ciprofloxacin Other (See Comments)     Seizure.     Citalopram Nausea and Vomiting          Labs:     Recent Results (from the past 24 hour(s))   Ammonia    Collection Time: 08/23/17  8:04 AM   Result Value Ref Range    Ammonia 82 (H) 10 - 50 umol/L          Psychiatric Examination:     /76  Pulse 82  Temp 97.8  F (36.6  C) (Oral)  Resp 16  Wt 93.4 kg (206 lb)  SpO2 98%  BMI 33.25 kg/m2  Weight is 206 lbs 0 oz  Body mass index is 33.25 kg/(m^2).           Appearance: dressed in hospital scrubs  Attitude:  " guarded and uncooperative  Eye Contact:  poor   Mood:  angry and sad  Affect:  intensity is exaggerated  Speech: limited speech production  Psychomotor Behavior:  no evidence of tardive dyskinesia, dystonia, or tics and physical agitation  Throught Process:  Slightly more organized.  Associations:  loosening of associations present at times.  Thought Content:  no evidence of suicidal ideation or homicidal ideation, Visual hallucinations are likely.   Insight:  limited  Judgement:  poor  Oriented to:  self and place  Attention Span and Concentration:  limited  Recent and Remote Memory:  poor         Precautions:     Behavioral Orders   Procedures     Code 2     Elopement precautions     Routine Programming     As clinically indicated     Seizure precautions     Status 15     Every 15 minutes.     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             Plan:   Patient is fully MI committed. Per CTC, she is waiting for placement to [facility], see above. The etiology of Visual hallucinations per neuro, see note above. Will ask for IM consult due to slowly rising ammonia level. Will start on Prozac.

## 2017-08-24 LAB — AMMONIA PLAS-SCNC: 76 UMOL/L (ref 10–50)

## 2017-08-24 PROCEDURE — 12400007 ZZH R&B MH INTERMEDIATE UMMC

## 2017-08-24 PROCEDURE — 82140 ASSAY OF AMMONIA: CPT | Performed by: PHYSICIAN ASSISTANT

## 2017-08-24 PROCEDURE — 36415 COLL VENOUS BLD VENIPUNCTURE: CPT | Performed by: PHYSICIAN ASSISTANT

## 2017-08-24 PROCEDURE — 99232 SBSQ HOSP IP/OBS MODERATE 35: CPT | Performed by: PHYSICIAN ASSISTANT

## 2017-08-24 PROCEDURE — 99207 ZZC CONSULT E&M CHANGED TO SUBSEQUENT LEVEL: CPT | Performed by: PHYSICIAN ASSISTANT

## 2017-08-24 PROCEDURE — 25000132 ZZH RX MED GY IP 250 OP 250 PS 637: Performed by: PSYCHIATRY & NEUROLOGY

## 2017-08-24 PROCEDURE — 25000132 ZZH RX MED GY IP 250 OP 250 PS 637: Performed by: EMERGENCY MEDICINE

## 2017-08-24 PROCEDURE — 99233 SBSQ HOSP IP/OBS HIGH 50: CPT | Performed by: PSYCHIATRY & NEUROLOGY

## 2017-08-24 PROCEDURE — 25000132 ZZH RX MED GY IP 250 OP 250 PS 637: Performed by: NURSE PRACTITIONER

## 2017-08-24 RX ORDER — LACTULOSE 10 G/15ML
20 SOLUTION ORAL
Status: DISCONTINUED | OUTPATIENT
Start: 2017-08-24 | End: 2017-10-26

## 2017-08-24 RX ADMIN — LACTULOSE 20 G: 20 POWDER, FOR SOLUTION ORAL at 20:07

## 2017-08-24 RX ADMIN — LACTULOSE 20 G: 20 POWDER, FOR SOLUTION ORAL at 14:31

## 2017-08-24 RX ADMIN — MULTIPLE VITAMINS W/ MINERALS TAB 1 TABLET: TAB at 08:44

## 2017-08-24 RX ADMIN — FOLIC ACID 1 MG: 1 TABLET ORAL at 08:44

## 2017-08-24 RX ADMIN — RISPERIDONE 2 MG: 2 TABLET ORAL at 21:57

## 2017-08-24 RX ADMIN — PANTOPRAZOLE SODIUM 40 MG: 40 TABLET, DELAYED RELEASE ORAL at 08:44

## 2017-08-24 RX ADMIN — RIFAXIMIN 550 MG: 550 TABLET ORAL at 08:45

## 2017-08-24 RX ADMIN — Medication 20 MG: at 08:44

## 2017-08-24 RX ADMIN — RIFAXIMIN 550 MG: 550 TABLET ORAL at 20:07

## 2017-08-24 RX ADMIN — SPIRONOLACTONE 50 MG: 50 TABLET ORAL at 08:44

## 2017-08-24 RX ADMIN — RISPERIDONE 1 MG: 1 TABLET ORAL at 08:44

## 2017-08-24 RX ADMIN — LACTULOSE 20 G: 20 POWDER, FOR SOLUTION ORAL at 08:44

## 2017-08-24 ASSESSMENT — ACTIVITIES OF DAILY LIVING (ADL)
ORAL_HYGIENE: INDEPENDENT
GROOMING: INDEPENDENT
DRESS: STREET CLOTHES
LAUNDRY: WITH SUPERVISION

## 2017-08-24 NOTE — PROGRESS NOTES
08/24/17 1435   Behavioral Health   Hallucinations denies / not responding to hallucinations   Thinking distractable   Orientation time: oriented;date: oriented;place: oriented;person: oriented   Memory baseline memory   Insight poor   Judgement impaired   Eye Contact (Covered her head with a blanket.)   Affect blunted, flat   Mood mood is calm;depressed   Physical Appearance/Attire disheveled   Hygiene body odor   Suicidality other (see comments)  (Denies)   Self Injury other (see comment)  (Denies)   Activity isolative;withdrawn   Speech coherent;clear   Medication Sensitivity no stated side effects   Psychomotor / Gait balanced;steady   Psycho Education   Type of Intervention 1:1 intervention   Response participates, initiates socially appropriate   Hours 0.5   Activities of Daily Living   Hygiene/Grooming independent   Oral Hygiene independent   Dress street clothes   Laundry with supervision   Behavioral Health Interventions   Psychotic Symptoms maintain safety precautions;assist patient in developing safety plan;assist patient in following safety plan;encourage participation / independence with adls;provide emotional support   Social and Therapeutic Interventions (Psychotic Symptoms) encourage socialization with peers;encourage participation in therapeutic groups and milieu activities   Pt spent most of her time in the room and she did not attend any groups. She had her meals in the room and denies suicidal ideation or having any side effects of medications.Pt has been sleeping most of the time.

## 2017-08-24 NOTE — CONSULTS
"           BRIEF INTERNAL MEDICINE CONSULT NOTE    Mildred Rascon  : 1975  MRN # [id number]  2017    ASSESSMENT & PLAN: Mildred Rascon is a 42 year old female with a history of hepatic cirrhosis 2/2 alcohol use c/b HE, asthma, polysubstance abuse, depression, and anxiety admitted to [facility] for acute psychosis. Please see initial consult note dated 2017 by Dr. Ari Shah. Medicine was re-consulted to evaluate elevated ammonia levels.    1. Elevated Ammonia. Ammonia gradually up-trending since admission, most recently 82 (73). Liver enzymes down-trending with AST/ALT 48/57 (previously 86/89). History of hepatic cirrhosis c/b multiple episodes of hepatic encephalopathy (most recently 2017). Maintained on lactulose and rifaximin PTA, although documented poor compliance. Abdominal US 2016 with cirrhotic liver, small volume ascites, retrograde flow in portal venous system. Neurology consulted on  due to persistent visual hallucinations; felt likely patient suffers from permanent sequelae of multiple attacks of HE and/or Korsakoff syndrome. Unclear how many BM's patient is currently having per day, may need increased lactulose dosing. Will also check UA for possible infection. No obvious ascites on exam.  - Lactulose protocol  - I&O's  - UA/UCx ordered  - Repeat ammonia today  - Trend ammonia, CMP    INTERVAL HISTORY: Patient appears irritable today. Provides very brief one-word responses to most questions. When asked how many BM's she has in one day, patient states \"enough\". Denies fevers or chills. No abdominal pain, nausea, vomiting. Does not feel as though her mentation is clouded. Discussed monitoring her ammonia levels, and informed patient of her most recent level (82) to which she replied \"that's not high at all\".    PHYSICAL EXAM:  Blood pressure 109/76, pulse 82, temperature 97.8  F (36.6  C), temperature source Oral, resp. rate 16, weight 93.4 kg (206 lb), SpO2 98 %, " not currently breastfeeding.  GENERAL: Well-appearing female sitting on bed, eating breakfast. NAD.  HEENT: NC/AT. Anicteric sclera. Mucous membranes moist.  NECK: Supple   CV: RRR, S1/S2 present without murmur, rub, or gallop.   RESPIRATORY: Respirations regular, even, and unlabored on RA. Lungs CTAB without wheezes, rales or rhonchi.   GI: Soft, non-tender and non distended with normoactive bowel sounds.   EXTREMITIES: No peripheral edema. Warm & well perfused.  NEUROLOGIC: Alert and orientated x 3. No asterixis. Irritable but cooperative. Provides brief, yes or no responses to questions. Moves all extremities. No focal deficits.   SKIN: No apparent jaundice, rashes, or lesions.    LABS:  Reviewed    Maggy Varela PA-C  [facility]  Pager: [phone]

## 2017-08-24 NOTE — PROGRESS NOTES
Has been withdrawn and isolative this evening. Has been loitering around exits. It seemed at one-point she was whispering to someone while she was looking at the keypad to one of the fire exit doors. Hallucinations appear auditory. Irritable with staff this evening with vitals and check-ins for medications.

## 2017-08-24 NOTE — PLAN OF CARE
"Pt refused her scheduled Prozac today stating \"why would he even prescribe prozac.  I told the doctor I would not take it\".    "

## 2017-08-24 NOTE — PROGRESS NOTES
"[facility], [place]   Psychiatric Progress Note        Interim History:   The patient's care was discussed with the treatment team during the daily team meeting and/or staff's chart notes were reviewed.  Staff report patient spent most of the time in her bed. She got out to get her tray, then, returned to her room and ate there. She refused to take Prozac today am, became angry when I told her that she was repeatedly complaining of feeling tired and Prozac could help with tiredness. She has minimal interactions with peers, staff members, typically talks in short fraises, still easily gets irritable. She, overall, appears to be less confused, impossible to verify if she experiences Auditory hallucinations or Visual hallucinations, although, staff reports that Mildred at times talks to self. There were no recent reports about patient trying to reach things in the air. She still has poor insight into reasons for her hospitalization, was seen trying to unlock unit doors.   Seen by IM: \"1. Elevated Ammonia. Ammonia gradually up-trending since admission, most recently 82 (73). Liver enzymes down-trending with AST/ALT 48/57 (previously 86/89). History of hepatic cirrhosis c/b multiple episodes of hepatic encephalopathy (most recently 4/2017). Maintained on lactulose and rifaximin PTA, although documented poor compliance. Abdominal US 9/2016 with cirrhotic liver, small volume ascites, retrograde flow in portal venous system. Neurology consulted on 8/17 due to persistent visual hallucinations; felt likely patient suffers from permanent sequelae of multiple attacks of HE and/or Korsakoff syndrome. Unclear how many BM's patient is currently having per day, may need increased lactulose dosing. Will also check UA for possible infection. No obvious ascites on exam.  - Lactulose protocol  - I&O's  - UA/UCx ordered  - Repeat ammonia today  - Trend ammonia, CMP       Medications:       FLUoxetine  20 mg " Oral Daily     risperiDONE  1 mg Oral QAM     risperiDONE  2 mg Oral At Bedtime     pantoprazole  40 mg Oral Daily     multivitamin, therapeutic with minerals  1 tablet Oral Daily     folic acid  1 mg Oral Daily     lactulose  20 g Oral TID     rifaximin  550 mg Oral BID     spironolactone  50 mg Oral Daily     furosemide  20 mg Oral Daily          Allergies:     Allergies   Allergen Reactions     Acetaminophen      Ambien [Zolpidem Tartrate] Other (See Comments)     Sleep walks and eats     Ciprofloxacin Other (See Comments)     Seizure.     Citalopram Nausea and Vomiting          Labs:     Recent Results (from the past 24 hour(s))   Ammonia    Collection Time: 08/24/17  1:18 PM   Result Value Ref Range    Ammonia 76 (H) 10 - 50 umol/L          Psychiatric Examination:     /76  Pulse 82  Temp 97.8  F (36.6  C) (Oral)  Resp 16  Wt 93.4 kg (206 lb)  SpO2 98%  BMI 33.25 kg/m2  Weight is 206 lbs 0 oz  Body mass index is 33.25 kg/(m^2).           Appearance: dressed in hospital scrubs, laying in bed covered with blanket.  Attitude:  guarded and uncooperative  Eye Contact:  poor   Mood:  angry and sad  Affect:  intensity is exaggerated  Speech: limited speech production  Psychomotor Behavior:  no evidence of tardive dyskinesia, dystonia, or tics and physical agitation  Throught Process:  Slightly more organized.  Associations:  loosening of associations present at times.  Thought Content:  no evidence of suicidal ideation or homicidal ideation, Visual hallucinations are likely.   Insight:  limited  Judgement:  poor  Oriented to:  self and place  Attention Span and Concentration:  limited  Recent and Remote Memory:  poor         Precautions:     Behavioral Orders   Procedures     Code 2     Elopement precautions     Routine Programming     As clinically indicated     Seizure precautions     Status 15     Every 15 minutes.     Withdrawal precautions          DIagnoses:     Encephalopathy probably multifactorial  such as chronic hepatic encephalopathy, organic brain damage due to encephalopathy, alcohol drinking, possibly underlying psychotic illness; possibly head injury contributes to patient's symptoms.             Plan:   Patient is fully MI committed. Per CTC, she is waiting for placement to [facility], see above. The etiology of Visual hallucinations per neuro, see note above. Seen by IM for uptrending in ammonia level. See IM recommendations above. For more details, please, see Internal Medicine notes. Appreciate medical team's input.

## 2017-08-25 LAB
ALBUMIN SERPL-MCNC: 2.7 G/DL (ref 3.4–5)
ALP SERPL-CCNC: 149 U/L (ref 40–150)
ALT SERPL W P-5'-P-CCNC: 39 U/L (ref 0–50)
AMMONIA PLAS-SCNC: 102 UMOL/L (ref 10–50)
AMMONIA PLAS-SCNC: 68 UMOL/L (ref 10–50)
ANION GAP SERPL CALCULATED.3IONS-SCNC: 11 MMOL/L (ref 3–14)
AST SERPL W P-5'-P-CCNC: 38 U/L (ref 0–45)
BILIRUB SERPL-MCNC: 0.9 MG/DL (ref 0.2–1.3)
BUN SERPL-MCNC: 11 MG/DL (ref 7–30)
CALCIUM SERPL-MCNC: 8.5 MG/DL (ref 8.5–10.1)
CHLORIDE SERPL-SCNC: 112 MMOL/L (ref 94–109)
CO2 SERPL-SCNC: 22 MMOL/L (ref 20–32)
CREAT SERPL-MCNC: 0.55 MG/DL (ref 0.52–1.04)
GFR SERPL CREATININE-BSD FRML MDRD: >90 ML/MIN/1.7M2
GLUCOSE SERPL-MCNC: 100 MG/DL (ref 70–99)
POTASSIUM SERPL-SCNC: 3.9 MMOL/L (ref 3.4–5.3)
PROT SERPL-MCNC: 6.9 G/DL (ref 6.8–8.8)
SODIUM SERPL-SCNC: 145 MMOL/L (ref 133–144)

## 2017-08-25 PROCEDURE — 25000132 ZZH RX MED GY IP 250 OP 250 PS 637: Performed by: NURSE PRACTITIONER

## 2017-08-25 PROCEDURE — 25000132 ZZH RX MED GY IP 250 OP 250 PS 637: Performed by: EMERGENCY MEDICINE

## 2017-08-25 PROCEDURE — 12400007 ZZH R&B MH INTERMEDIATE UMMC

## 2017-08-25 PROCEDURE — 99232 SBSQ HOSP IP/OBS MODERATE 35: CPT | Performed by: PSYCHIATRY & NEUROLOGY

## 2017-08-25 PROCEDURE — 82140 ASSAY OF AMMONIA: CPT | Performed by: PHYSICIAN ASSISTANT

## 2017-08-25 PROCEDURE — 25000132 ZZH RX MED GY IP 250 OP 250 PS 637: Performed by: PSYCHIATRY & NEUROLOGY

## 2017-08-25 PROCEDURE — 25000132 ZZH RX MED GY IP 250 OP 250 PS 637: Performed by: PHYSICIAN ASSISTANT

## 2017-08-25 PROCEDURE — 80053 COMPREHEN METABOLIC PANEL: CPT | Performed by: PHYSICIAN ASSISTANT

## 2017-08-25 PROCEDURE — 36415 COLL VENOUS BLD VENIPUNCTURE: CPT | Performed by: PHYSICIAN ASSISTANT

## 2017-08-25 RX ADMIN — MULTIPLE VITAMINS W/ MINERALS TAB 1 TABLET: TAB at 08:45

## 2017-08-25 RX ADMIN — RISPERIDONE 1 MG: 1 TABLET ORAL at 08:46

## 2017-08-25 RX ADMIN — RIFAXIMIN 550 MG: 550 TABLET ORAL at 20:17

## 2017-08-25 RX ADMIN — RIFAXIMIN 550 MG: 550 TABLET ORAL at 08:45

## 2017-08-25 RX ADMIN — LACTULOSE 20 G: 10 SOLUTION ORAL at 11:48

## 2017-08-25 RX ADMIN — LACTULOSE 20 G: 20 POWDER, FOR SOLUTION ORAL at 09:27

## 2017-08-25 RX ADMIN — PANTOPRAZOLE SODIUM 40 MG: 40 TABLET, DELAYED RELEASE ORAL at 08:45

## 2017-08-25 RX ADMIN — LACTULOSE 20 G: 20 POWDER, FOR SOLUTION ORAL at 08:46

## 2017-08-25 RX ADMIN — LACTULOSE 20 G: 20 POWDER, FOR SOLUTION ORAL at 20:17

## 2017-08-25 RX ADMIN — SPIRONOLACTONE 50 MG: 50 TABLET ORAL at 08:45

## 2017-08-25 RX ADMIN — LACTULOSE 20 G: 10 SOLUTION ORAL at 13:26

## 2017-08-25 RX ADMIN — Medication 20 MG: at 08:45

## 2017-08-25 RX ADMIN — RISPERIDONE 2 MG: 2 TABLET ORAL at 20:17

## 2017-08-25 RX ADMIN — FOLIC ACID 1 MG: 1 TABLET ORAL at 08:45

## 2017-08-25 ASSESSMENT — ACTIVITIES OF DAILY LIVING (ADL)
DRESS: INDEPENDENT
GROOMING: INDEPENDENT
ORAL_HYGIENE: INDEPENDENT
GROOMING: INDEPENDENT
ORAL_HYGIENE: INDEPENDENT
LAUNDRY: WITH SUPERVISION
DRESS: INDEPENDENT
LAUNDRY: WITH SUPERVISION

## 2017-08-25 NOTE — PROGRESS NOTES
[facility], [place]   Psychiatric Progress Note        Interim History:   The patient's care was discussed with the treatment team during the daily team meeting and/or staff's chart notes were reviewed.  Staff report patient spent most of the time in her bed. She got out to get her tray, then, returned to her room and ate there. She was started on Lactulose protocol. She appears to be much less confused, there were no recent reports about Auditory hallucinations or Visual hallucinations. She is still irritable when awakened, states that this what she does: sleeps during the day and awake at night. Refuses antidepressants and Prozac was discontinued.        Medications:       risperiDONE  1 mg Oral QAM     risperiDONE  2 mg Oral At Bedtime     pantoprazole  40 mg Oral Daily     multivitamin, therapeutic with minerals  1 tablet Oral Daily     folic acid  1 mg Oral Daily     lactulose  20 g Oral TID     rifaximin  550 mg Oral BID     spironolactone  50 mg Oral Daily     furosemide  20 mg Oral Daily          Allergies:     Allergies   Allergen Reactions     Acetaminophen      Ambien [Zolpidem Tartrate] Other (See Comments)     Sleep walks and eats     Ciprofloxacin Other (See Comments)     Seizure.     Citalopram Nausea and Vomiting          Labs:     Recent Results (from the past 24 hour(s))   Ammonia    Collection Time: 08/24/17  1:18 PM   Result Value Ref Range    Ammonia 76 (H) 10 - 50 umol/L   Comprehensive metabolic panel    Collection Time: 08/25/17  8:37 AM   Result Value Ref Range    Sodium 145 (H) 133 - 144 mmol/L    Potassium 3.9 3.4 - 5.3 mmol/L    Chloride 112 (H) 94 - 109 mmol/L    Carbon Dioxide 22 20 - 32 mmol/L    Anion Gap 11 3 - 14 mmol/L    Glucose 100 (H) 70 - 99 mg/dL    Urea Nitrogen 11 7 - 30 mg/dL    Creatinine 0.55 0.52 - 1.04 mg/dL    GFR Estimate >90 >60 mL/min/1.7m2    GFR Estimate If Black >90 >60 mL/min/1.7m2    Calcium 8.5 8.5 - 10.1 mg/dL    Bilirubin Total  0.9 0.2 - 1.3 mg/dL    Albumin 2.7 (L) 3.4 - 5.0 g/dL    Protein Total 6.9 6.8 - 8.8 g/dL    Alkaline Phosphatase 149 40 - 150 U/L    ALT 39 0 - 50 U/L    AST 38 0 - 45 U/L   Ammonia    Collection Time: 08/25/17  8:37 AM   Result Value Ref Range    Ammonia 102 (HH) 10 - 50 umol/L          Psychiatric Examination:     /76  Pulse 82  Temp 97.8  F (36.6  C) (Oral)  Resp 16  Wt 93.4 kg (206 lb)  SpO2 98%  BMI 33.25 kg/m2  Weight is 206 lbs 0 oz  Body mass index is 33.25 kg/(m^2).           Appearance: dressed in hospital scrubs, laying in bed covered with blanket.  Attitude:  guarded and uncooperative  Eye Contact:  poor   Mood:  angry and sad  Affect:  intensity is exaggerated at times, at times flat.  Speech: limited speech production  Psychomotor Behavior:  no evidence of tardive dyskinesia, dystonia, or tics and physical agitation  Throught Process:  Slightly more organized.  Associations:  loosening of associations present at times.  Thought Content:  no evidence of suicidal ideation or homicidal ideation, Visual hallucinations are likely.   Insight:  limited  Judgement:  poor  Oriented to:  self and place  Attention Span and Concentration:  limited  Recent and Remote Memory:  poor         Precautions:     Behavioral Orders   Procedures     Code 2     Elopement precautions     Routine Programming     As clinically indicated     Seizure precautions     Status 15     Every 15 minutes.     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             Plan:   Patient is fully MI committed. Per CTC, she is waiting for placement to [facility], see above. Despite mild increase in ammonia level patient appears to be less confused, more aware of her meds.

## 2017-08-25 NOTE — PROGRESS NOTES
"This writer was notified of elevated ammonia levels (102) by lab.  Pt assessed for confusion, pt is easily aroused, does not appear confused and denies symptoms of confusion. Pt expresses frustration about being awaken from sleep. Pt reports one bowel movement this morning.  Internal medicine provider consulted. Lactulose protocol initiated-First dose of lactulose 20mg given at 0927.    Update:  Lactulose protocol ended at 13:26. Pt's ammonia level has decreased to 68 (WNL). No confusion noted, pt remains irritable and asks that \"you all need to stop bothering me I want to sleep.\"      Regarding intake/output: Pt reported 3 unwitnessed bowel movements and Pt reports urinating 3 times. Pt informs this writer, \"I keep missing the hat when I pee.\"  "

## 2017-08-25 NOTE — PROGRESS NOTES
08/25/17 1346   Behavioral Health   Hallucinations denies / not responding to hallucinations   Thinking poor concentration   Orientation person: oriented;place: oriented   Memory baseline memory   Insight poor   Judgement impaired   Eye Contact at examiner   Affect blunted, flat;irritable   Mood labile   Physical Appearance/Attire attire appropriate to age and situation   Hygiene neglected grooming - unclean body, hair, teeth   Suicidality (none stated)   Self Injury (none stated)   Elopement (none shown)   Activity isolative;withdrawn;refusal   Speech clear;coherent   Medication Sensitivity no stated side effects   Psychomotor / Gait slow   Psycho Education   Type of Intervention 1:1 intervention   Response refuses   Hours 0.5   Activities of Daily Living   Hygiene/Grooming independent   Oral Hygiene independent   Dress independent   Laundry with supervision   Room Organization independent   pt in room most of shift only out to get meals and bring them back to her room to eat them. Pt irritable and labile with mood. Pt refusal during check in.

## 2017-08-25 NOTE — PROGRESS NOTES
Pt ate 100% of her meal, and drank 236 mL of milk, and 240 mL of water.    She had one solid BM and one urine output (into toilet). Provided with hat, so more accurate urine reading can be obtained. Pt also needs UA collected, but was unable to provide a sample for this tonight.    Took all medications without issue. Pt did not show signs of confusion, so Lactulose prn was not administered.     No additional concerns at this time. Will continue to assess.

## 2017-08-25 NOTE — PROGRESS NOTES
[facility], [place]   Psychiatric Progress Note        Interim History:   The patient's care was discussed with the treatment team during the daily team meeting and/or staff's chart notes were reviewed.  Staff report patient spent most of the time in her bed. She refuses to get out but eats if tray with food is brought to her. She seems to be eat better. Remains irritable, gets angry when asked about Auditory hallucinations and Visual hallucinations and denies having them. She was seen by number of staff members trying to reach things in the air. She is also focused on being discharged from this unit, doesn't understand/accept concept of commitment. Passively takes her meds.          Medications:       risperiDONE  1 mg Oral QAM     risperiDONE  2 mg Oral At Bedtime     pantoprazole  40 mg Oral Daily     multivitamin, therapeutic with minerals  1 tablet Oral Daily     folic acid  1 mg Oral Daily     lactulose  20 g Oral TID     rifaximin  550 mg Oral BID     spironolactone  50 mg Oral Daily     furosemide  20 mg Oral Daily          Allergies:     Allergies   Allergen Reactions     Acetaminophen      Ambien [Zolpidem Tartrate] Other (See Comments)     Sleep walks and eats     Ciprofloxacin Other (See Comments)     Seizure.     Citalopram Nausea and Vomiting          Labs:     Recent Results (from the past 24 hour(s))   Ammonia    Collection Time: 08/24/17  1:18 PM   Result Value Ref Range    Ammonia 76 (H) 10 - 50 umol/L   Comprehensive metabolic panel    Collection Time: 08/25/17  8:37 AM   Result Value Ref Range    Sodium 145 (H) 133 - 144 mmol/L    Potassium 3.9 3.4 - 5.3 mmol/L    Chloride 112 (H) 94 - 109 mmol/L    Carbon Dioxide 22 20 - 32 mmol/L    Anion Gap 11 3 - 14 mmol/L    Glucose 100 (H) 70 - 99 mg/dL    Urea Nitrogen 11 7 - 30 mg/dL    Creatinine 0.55 0.52 - 1.04 mg/dL    GFR Estimate >90 >60 mL/min/1.7m2    GFR Estimate If Black >90 >60 mL/min/1.7m2    Calcium 8.5 8.5 -  10.1 mg/dL    Bilirubin Total 0.9 0.2 - 1.3 mg/dL    Albumin 2.7 (L) 3.4 - 5.0 g/dL    Protein Total 6.9 6.8 - 8.8 g/dL    Alkaline Phosphatase 149 40 - 150 U/L    ALT 39 0 - 50 U/L    AST 38 0 - 45 U/L   Ammonia    Collection Time: 08/25/17  8:37 AM   Result Value Ref Range    Ammonia 102 (HH) 10 - 50 umol/L          Psychiatric Examination:     /76  Pulse 82  Temp 97.8  F (36.6  C) (Oral)  Resp 16  Wt 93.4 kg (206 lb)  SpO2 98%  BMI 33.25 kg/m2  Weight is 206 lbs 0 oz  Body mass index is 33.25 kg/(m^2).           Appearance: dressed in hospital scrubs  Attitude:  guarded and uncooperative  Eye Contact:  poor   Mood:  irritable  Affect:  intensity is exaggerated, often flat  Speech: limited speech production  Psychomotor Behavior:  no evidence of tardive dyskinesia, dystonia, or tics and physical agitation  Throught Process:  Slightly more organized.  Associations:  loosening of associations present  Thought Content:  no evidence of suicidal ideation or homicidal ideation, Visual hallucinations are likely.   Insight:  limited  Judgement:  poor  Oriented to:  self and place  Attention Span and Concentration:  limited  Recent and Remote Memory:  poor         Precautions:     Behavioral Orders   Procedures     Code 2     Elopement precautions     Routine Programming     As clinically indicated     Seizure precautions     Status 15     Every 15 minutes.     Withdrawal precautions          DIagnoses:     Encephalopathy probably multifactorial, possibly underlying psychotic illness; possibly head injury contributes to patient's symptoms. Will recheck BP because of today's hypotension.            Plan:   Patient is fully MI committed. Per CTC, she is waiting for placement to [facility], see above. Will continue to provide support and structure.

## 2017-08-25 NOTE — PROGRESS NOTES
Brief Medicine Note    Medicine following for elevated ammonia levels.    Started patient on lactulose protocol yesterday. Repeat ammonia check this AM elevated at 102. Per RN, patient alert and oriented this AM; noted to be less confused. Possible lab error, as patient's clinical presentation does not appear to be concerning for worsening encephalopathy (although very possible patient's baseline ammonia level is moderately elevated and she has compensated as such). Ordered 1x dose of lactulose 20 g to be given this AM as patient only had one documented BM yesterday. Repeat ammonia level of 68 at noon.     Recommend continuing with lactulose protocol; given ammonia is improving and patient without s/s of encephalopathy, medicine will sign off. Please alert medicine team if patient develops signs of encephalopathy including acute confusion, delirium, etc.    Maggy Varela PA-C  Hospitalist Service  [phone]

## 2017-08-26 PROCEDURE — 12400007 ZZH R&B MH INTERMEDIATE UMMC

## 2017-08-26 PROCEDURE — 25000132 ZZH RX MED GY IP 250 OP 250 PS 637: Performed by: NURSE PRACTITIONER

## 2017-08-26 PROCEDURE — 25000132 ZZH RX MED GY IP 250 OP 250 PS 637: Performed by: PSYCHIATRY & NEUROLOGY

## 2017-08-26 PROCEDURE — 25000132 ZZH RX MED GY IP 250 OP 250 PS 637: Performed by: EMERGENCY MEDICINE

## 2017-08-26 RX ADMIN — PANTOPRAZOLE SODIUM 40 MG: 40 TABLET, DELAYED RELEASE ORAL at 09:27

## 2017-08-26 RX ADMIN — LACTULOSE 20 G: 20 POWDER, FOR SOLUTION ORAL at 09:27

## 2017-08-26 RX ADMIN — RISPERIDONE 2 MG: 2 TABLET ORAL at 22:27

## 2017-08-26 RX ADMIN — LACTULOSE 20 G: 20 POWDER, FOR SOLUTION ORAL at 13:48

## 2017-08-26 RX ADMIN — RISPERIDONE 1 MG: 1 TABLET ORAL at 09:27

## 2017-08-26 RX ADMIN — LACTULOSE 20 G: 20 POWDER, FOR SOLUTION ORAL at 20:32

## 2017-08-26 RX ADMIN — RIFAXIMIN 550 MG: 550 TABLET ORAL at 09:27

## 2017-08-26 RX ADMIN — MULTIPLE VITAMINS W/ MINERALS TAB 1 TABLET: TAB at 09:27

## 2017-08-26 RX ADMIN — FOLIC ACID 1 MG: 1 TABLET ORAL at 09:27

## 2017-08-26 RX ADMIN — RIFAXIMIN 550 MG: 550 TABLET ORAL at 20:32

## 2017-08-26 RX ADMIN — SPIRONOLACTONE 50 MG: 50 TABLET ORAL at 09:27

## 2017-08-26 RX ADMIN — Medication 20 MG: at 09:27

## 2017-08-26 ASSESSMENT — ACTIVITIES OF DAILY LIVING (ADL)
GROOMING: PROMPTS
DRESS: SCRUBS (BEHAVIORAL HEALTH)
LAUNDRY: UNABLE TO COMPLETE
HYGIENE/GROOMING: INDEPENDENT;PROMPTS
DRESS: INDEPENDENT
LAUNDRY: UNABLE TO COMPLETE
ORAL_HYGIENE: PROMPTS;INDEPENDENT
ORAL_HYGIENE: INDEPENDENT;PROMPTS

## 2017-08-26 NOTE — PROGRESS NOTES
Mildred was able to do most of a MMSE accurately.  She is not confused.  She is taking all of her medication.  She reported one BM.

## 2017-08-26 NOTE — PROGRESS NOTES
08/26/17 1441   Behavioral Health   Hallucinations denies / not responding to hallucinations   Thinking other (see comment)  (JOSE)   Orientation other (see comment)  (JOSE)   Memory other (see comment)  (JOSE)   Insight poor   Judgement impaired   Eye Contact (No eye contact)   Affect irritable;tense;blunted, flat   Mood irritable   Physical Appearance/Attire disheveled   Hygiene neglected grooming - unclean body, hair, teeth   Suicidality other (see comments)  (JOSE-none stated, none observed)   Self Injury other (see comment)  (JOSE-none stated, none observed)   Elopement (Pt didn't exhibit any of these behaviors this shift.)   Activity isolative;withdrawn   Speech coherent;clear;pressured   Psychomotor / Gait (JOSE-Pt stayed in bed all shift.)   Activities of Daily Living   Hygiene/Grooming independent;prompts   Oral Hygiene independent;prompts   Dress independent   Laundry unable to complete   Room Organization prompts   Significant Event   Significant Event Other (see comments)  (Shift Summary)   Behavioral Health Interventions   Psychotic Symptoms maintain safety precautions;maintain safe secure environment;reality orientation;simple, clear language;decrease environmental stimulation;redirection of intrusive behaviors;encourage nutrition and hydration;encourage participation / independence with adls;provide emotional support;establish therapeutic relationship;assist with developing & utilizing healthy coping strategies;build upon strengths   Social and Therapeutic Interventions (Psychotic Symptoms) encourage socialization with peers;encourage effective boundaries with peers;encourage participation in therapeutic groups and milieu activities   Pt became upset and irritable upon check-in, so a complete shift check-in wasn't possible. Pt stayed in bed and appeared to be sleeping for most of the shift and stayed in her room the whole shift. Pt reported that her stomach hurts.

## 2017-08-26 NOTE — PLAN OF CARE
"Problem: Psychotic Symptoms  Goal: Psychotic Symptoms  Signs and symptoms of listed problems will be absent or manageable.   Outcome: Improving     48 Hour Nurse Note:     Met with pt for 1:1.  Pt states she is doing \"ok\".  Pt alert and Ox4.  Pt was able to answer all of writers questions.  Pt presents with a flat and blunted affect.  Pt less irritable during this shift, was able to talk to writer without agitation.  Pt reports depression and anxiety, but would not elaborate.  Pt denies AH and VH.  Pt denies paranoia. Pt was observed by staff grabbing at invisible things in her room - appears to be responding to IS.       Medical Data:  Neuro check - Pt PERRLA.  Pt able to follow all verbal commands of facial expressions (frown/smile/raise eyebrows/etc.)  Pt able to stick out tongue - no drifting observed.  Pt displayed no weakness in any extremity and had full range of motion.  Pt walks on the unit.  Pt declined to allow writer to test sensory function \"I can feel everything\".       /65 (BP Location: Right arm)  Pulse 84  Temp 97.9  F (36.6  C) (Oral)  Resp 16  Wt 93.4 kg (206 lb)  SpO2 98%  BMI 33.25 kg/m2      Pt on lactuolse nurse initated protocool for HE.  Pt did not displays symptoms of Stage 1 or greater.  Pt took scheduled lactulose.  Pt compliant with all other medications.     Pt continues on VRE precautions.     Will continue to monitor and assess.                 "

## 2017-08-26 NOTE — PROGRESS NOTES
Neuro assessment at 0500. Awakened easily. VSS. Thought date was September 2nd otherwise oriented. Said she wanted to sleep and asked nurse to leave the room.

## 2017-08-27 PROCEDURE — 25000132 ZZH RX MED GY IP 250 OP 250 PS 637: Performed by: PSYCHIATRY & NEUROLOGY

## 2017-08-27 PROCEDURE — 25000132 ZZH RX MED GY IP 250 OP 250 PS 637: Performed by: NURSE PRACTITIONER

## 2017-08-27 PROCEDURE — 12400007 ZZH R&B MH INTERMEDIATE UMMC

## 2017-08-27 PROCEDURE — 25000132 ZZH RX MED GY IP 250 OP 250 PS 637: Performed by: EMERGENCY MEDICINE

## 2017-08-27 RX ADMIN — RIFAXIMIN 550 MG: 550 TABLET ORAL at 20:55

## 2017-08-27 RX ADMIN — SPIRONOLACTONE 50 MG: 50 TABLET ORAL at 09:28

## 2017-08-27 RX ADMIN — PANTOPRAZOLE SODIUM 40 MG: 40 TABLET, DELAYED RELEASE ORAL at 09:28

## 2017-08-27 RX ADMIN — LACTULOSE 20 G: 20 POWDER, FOR SOLUTION ORAL at 20:54

## 2017-08-27 RX ADMIN — RISPERIDONE 1 MG: 1 TABLET ORAL at 09:28

## 2017-08-27 RX ADMIN — MULTIPLE VITAMINS W/ MINERALS TAB 1 TABLET: TAB at 09:28

## 2017-08-27 RX ADMIN — RISPERIDONE 2 MG: 2 TABLET ORAL at 20:56

## 2017-08-27 RX ADMIN — RIFAXIMIN 550 MG: 550 TABLET ORAL at 09:28

## 2017-08-27 RX ADMIN — Medication 20 MG: at 09:28

## 2017-08-27 RX ADMIN — LACTULOSE 20 G: 20 POWDER, FOR SOLUTION ORAL at 13:53

## 2017-08-27 RX ADMIN — FOLIC ACID 1 MG: 1 TABLET ORAL at 09:28

## 2017-08-27 RX ADMIN — LACTULOSE 20 G: 20 POWDER, FOR SOLUTION ORAL at 09:28

## 2017-08-27 ASSESSMENT — ACTIVITIES OF DAILY LIVING (ADL)
ORAL_HYGIENE: INDEPENDENT
GROOMING: SHOWER;INDEPENDENT
HYGIENE/GROOMING: INDEPENDENT
DRESS: SCRUBS (BEHAVIORAL HEALTH);INDEPENDENT
DRESS: SCRUBS (BEHAVIORAL HEALTH);INDEPENDENT
ORAL_HYGIENE: INDEPENDENT

## 2017-08-27 NOTE — PROGRESS NOTES
The pt was isolative and withdrawn.  The only conversation that this writer had or heard was when staff asked her questions.  She appeared to be responding to stimuli, but when asked if she had hallucinations, she irritably denied it.  This writer would encourage other shifts to not allow the pt to eat in her room, as the food mess that I and her nurse had to clean up was quite large.

## 2017-08-27 NOTE — PROGRESS NOTES
Pt refused vitals    Intake: 90% of her meal; 240 mL of milk  - Encouraged pt to drink water    Output: 1 urine occurrence in toilet bowl; no BM

## 2017-08-27 NOTE — PLAN OF CARE
Problem: Psychotic Symptoms  Goal: Psychotic Symptoms  Signs and symptoms of listed problems will be absent or manageable.   Outcome: No Change  48 Hour Nursing Assessment:  Day 18 of hospitalization.  Mildred has continued to refuse to use the hat which has been provided for urine and/or BM's  She will report how many times she has used the toilet to void or have a BM but we don't have any volumes for output.  She is eating well.  She is oriented except for the day of the week.  Last ammonia was drawn on Friday.  She denies SI and hallucinations but she seems to be having visual hallucinations.  She does not want 1:1's.

## 2017-08-28 PROCEDURE — 25000132 ZZH RX MED GY IP 250 OP 250 PS 637: Performed by: PSYCHIATRY & NEUROLOGY

## 2017-08-28 PROCEDURE — 25000132 ZZH RX MED GY IP 250 OP 250 PS 637: Performed by: EMERGENCY MEDICINE

## 2017-08-28 PROCEDURE — 99232 SBSQ HOSP IP/OBS MODERATE 35: CPT | Performed by: PSYCHIATRY & NEUROLOGY

## 2017-08-28 PROCEDURE — 25000132 ZZH RX MED GY IP 250 OP 250 PS 637: Performed by: NURSE PRACTITIONER

## 2017-08-28 PROCEDURE — 12400007 ZZH R&B MH INTERMEDIATE UMMC

## 2017-08-28 RX ADMIN — RISPERIDONE 1 MG: 1 TABLET ORAL at 08:37

## 2017-08-28 RX ADMIN — RIFAXIMIN 550 MG: 550 TABLET ORAL at 08:37

## 2017-08-28 RX ADMIN — PANTOPRAZOLE SODIUM 40 MG: 40 TABLET, DELAYED RELEASE ORAL at 08:37

## 2017-08-28 RX ADMIN — MULTIPLE VITAMINS W/ MINERALS TAB 1 TABLET: TAB at 08:37

## 2017-08-28 RX ADMIN — LACTULOSE 20 G: 20 POWDER, FOR SOLUTION ORAL at 14:08

## 2017-08-28 RX ADMIN — FOLIC ACID 1 MG: 1 TABLET ORAL at 08:37

## 2017-08-28 RX ADMIN — RIFAXIMIN 550 MG: 550 TABLET ORAL at 20:13

## 2017-08-28 RX ADMIN — Medication 20 MG: at 08:37

## 2017-08-28 RX ADMIN — SPIRONOLACTONE 50 MG: 50 TABLET ORAL at 08:37

## 2017-08-28 RX ADMIN — LACTULOSE 20 G: 20 POWDER, FOR SOLUTION ORAL at 20:13

## 2017-08-28 RX ADMIN — LACTULOSE 20 G: 20 POWDER, FOR SOLUTION ORAL at 08:37

## 2017-08-28 RX ADMIN — RISPERIDONE 2 MG: 2 TABLET ORAL at 22:38

## 2017-08-28 ASSESSMENT — ACTIVITIES OF DAILY LIVING (ADL)
GROOMING: INDEPENDENT
ORAL_HYGIENE: INDEPENDENT;PROMPTS
DRESS: SCRUBS (BEHAVIORAL HEALTH);INDEPENDENT;PROMPTS
LAUNDRY: WITH SUPERVISION

## 2017-08-28 NOTE — PROGRESS NOTES
Pt still much the same. Mostly kept to room, coming out only for meals but even then grabs her tray right away into room even with prompts to eat in dining room. Irritable when staffs check-in. Covering head with blanket or looking away in bed when conversing in room. Still appears to respond to internal stimuli even when saying she doesn't have AH/VH. Otherwise, no attempts to leave this shift and no behavioral issues.        08/27/17 3270   Behavioral Health   Hallucinations appears responding   Thinking distractable   Orientation place: oriented   Memory baseline memory   Insight poor   Judgement impaired   Eye Contact at examiner;into space   Affect blunted, flat;irritable   Mood irritable   Physical Appearance/Attire disheveled;appears stated age;attire appropriate to age and situation   Hygiene other (see comment)  (adequate)   Suicidality other (see comments)  (none stated/observed)   Self Injury other (see comment)  (none stated/observed)   Activity withdrawn;isolative   Speech clear;coherent   Medication Sensitivity no observed side effects   Psychomotor / Gait steady;balanced   Activities of Daily Living   Hygiene/Grooming independent   Oral Hygiene independent   Dress scrubs (behavioral health);independent   Room Organization prompts

## 2017-08-28 NOTE — PROGRESS NOTES
08/28/17 1523   Behavioral Health   Hallucinations denies / not responding to hallucinations   Thinking poor concentration   Orientation person: oriented;place: oriented   Memory baseline memory   Insight poor   Judgement impaired   Eye Contact at examiner   Affect full range affect;irritable   Mood labile   Physical Appearance/Attire disheveled   Hygiene (did not shower, adequate)   Suicidality (none stated)   Self Injury (none stated)   Elopement (none shown)   Activity isolative;withdrawn;refusal   Speech clear;coherent   Medication Sensitivity no stated side effects   Psychomotor / Gait slow   Psycho Education   Type of Intervention 1:1 intervention   Response refuses   Hours 0.5   pt labile mood. Pt only out to get meals and then ate in her room. Pt eating 100%.

## 2017-08-28 NOTE — PROGRESS NOTES
Continues to refuse to use the hat in her toilet.  She reported 2 voids and one BM.  Has spent the day in bed but eats her meals.  Does not know day of week but know the year.

## 2017-08-28 NOTE — PROGRESS NOTES
"[facility], [place]   Psychiatric Progress Note        Interim History:   The patient's care was discussed with the treatment team during the daily team meeting and/or staff's chart notes were reviewed.  Staff report patient spent most of the time in her bed. She got out to get her tray, then, returned to her room and ate there. She remains irritable and refuses 1:1. Gives short answers: \"I am fine, I am OK\". Refused to go to groups: \"I don't want to\". Denied she had complaints or discomfort. Was seen by staff trying to reach things in the air as if responding to the internal stimuli, but becomes angry and denies having them. Oriented to self and place, said that today was Sept 6, 17, not Aug 28.       Medications:       risperiDONE  1 mg Oral QAM     risperiDONE  2 mg Oral At Bedtime     pantoprazole  40 mg Oral Daily     multivitamin, therapeutic with minerals  1 tablet Oral Daily     folic acid  1 mg Oral Daily     lactulose  20 g Oral TID     rifaximin  550 mg Oral BID     spironolactone  50 mg Oral Daily     furosemide  20 mg Oral Daily          Allergies:     Allergies   Allergen Reactions     Acetaminophen      Ambien [Zolpidem Tartrate] Other (See Comments)     Sleep walks and eats     Ciprofloxacin Other (See Comments)     Seizure.     Citalopram Nausea and Vomiting          Labs:     No results found for this or any previous visit (from the past 24 hour(s)).       Psychiatric Examination:     /56  Pulse 72  Temp 98.1  F (36.7  C) (Oral)  Resp 16  Wt 93.4 kg (206 lb)  SpO2 95%  BMI 33.25 kg/m2  Weight is 206 lbs 0 oz  Body mass index is 33.25 kg/(m^2).           Appearance: dressed in hospital scrubs, laying in bed covered with blanket.  Attitude:  guarded and uncooperative  Eye Contact:  poor   Mood: sad  Affect:  intensity is exaggerated at times, at times flat.  Speech: limited speech production  Psychomotor Behavior:  no evidence of tardive dyskinesia, dystonia, " or tics and physical agitation  Throught Process:  Slightly more organized.  Associations:  loosening of associations present at times.  Thought Content:  no evidence of suicidal ideation or homicidal ideation, Visual hallucinations are likely.   Insight:  limited  Judgement:  poor  Oriented to:  self and place  Attention Span and Concentration:  limited  Recent and Remote Memory:  poor         Precautions:     Behavioral Orders   Procedures     Code 2     Elopement precautions     Routine Programming     As clinically indicated     Seizure precautions     Status 15     Every 15 minutes.     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             Plan:   Patient is fully MI committed. Per CTC, she is waiting for placement to [facility], see above. Despite mild increase in ammonia level patient appears to be less confused, more aware of her meds. Will recheck Ammonia level again on Wednesday.

## 2017-08-29 PROCEDURE — 12400007 ZZH R&B MH INTERMEDIATE UMMC

## 2017-08-29 PROCEDURE — 25000132 ZZH RX MED GY IP 250 OP 250 PS 637: Performed by: PSYCHIATRY & NEUROLOGY

## 2017-08-29 PROCEDURE — 25000132 ZZH RX MED GY IP 250 OP 250 PS 637: Performed by: NURSE PRACTITIONER

## 2017-08-29 PROCEDURE — 25000132 ZZH RX MED GY IP 250 OP 250 PS 637: Performed by: EMERGENCY MEDICINE

## 2017-08-29 RX ADMIN — LACTULOSE 20 G: 20 POWDER, FOR SOLUTION ORAL at 09:27

## 2017-08-29 RX ADMIN — RIFAXIMIN 550 MG: 550 TABLET ORAL at 20:50

## 2017-08-29 RX ADMIN — LACTULOSE 20 G: 20 POWDER, FOR SOLUTION ORAL at 14:08

## 2017-08-29 RX ADMIN — Medication 20 MG: at 09:26

## 2017-08-29 RX ADMIN — RIFAXIMIN 550 MG: 550 TABLET ORAL at 09:26

## 2017-08-29 RX ADMIN — RISPERIDONE 1 MG: 1 TABLET ORAL at 09:27

## 2017-08-29 RX ADMIN — LACTULOSE 20 G: 20 POWDER, FOR SOLUTION ORAL at 20:50

## 2017-08-29 RX ADMIN — MULTIPLE VITAMINS W/ MINERALS TAB 1 TABLET: TAB at 09:26

## 2017-08-29 RX ADMIN — FOLIC ACID 1 MG: 1 TABLET ORAL at 09:25

## 2017-08-29 RX ADMIN — RISPERIDONE 2 MG: 2 TABLET ORAL at 20:50

## 2017-08-29 RX ADMIN — SPIRONOLACTONE 50 MG: 50 TABLET ORAL at 09:27

## 2017-08-29 RX ADMIN — PANTOPRAZOLE SODIUM 40 MG: 40 TABLET, DELAYED RELEASE ORAL at 09:26

## 2017-08-29 ASSESSMENT — ACTIVITIES OF DAILY LIVING (ADL)
GROOMING: PROMPTS;INDEPENDENT
DRESS: SCRUBS (BEHAVIORAL HEALTH)
LAUNDRY: WITH SUPERVISION
ORAL_HYGIENE: INDEPENDENT;PROMPTS
DRESS: SCRUBS (BEHAVIORAL HEALTH)
GROOMING: INDEPENDENT
ORAL_HYGIENE: INDEPENDENT;PROMPTS

## 2017-08-29 NOTE — PLAN OF CARE
Problem: Psychotic Symptoms  Goal: Psychotic Symptoms  Signs and symptoms of listed problems will be absent or manageable.   Outcome: No Change  48 Hour Assessment  Intake: 75% of meal; no reported fluid intake; observed 8 oz with medications.  -Encouraged fluid intake    Output: no urine output; no BM; no urine or BM noted in toilet hat.    Pt had juice spilled on her bed sheet, and she declined to move so writer could change the sheet. Writer asked staff to encourage pt to have sheet changed, but she declined from this staff, as well.     Pt denies SI/SIB, AH and VH. Pt also reports that she does know where she is, and she appears oriented x 4. However, she does appear confused, and visibly responding to internal stimuli.  Pt isolated to her room for the shift, even with staff encouragement to attend Community meetings and dinner. She answered questions with one word answers, and was often irritable with staff.     Pt took all medications without issue.    No attempts to elope from unit.      No additional concerns at this time. Will continue to assess.

## 2017-08-29 NOTE — PROGRESS NOTES
Intake: 75% of meal; no reported fluid intake; observed 8 oz with medications.  -Encouraged fluid intake    Output: no urine output; no BM; no urine or BM noted in toilet hat.    Pt had juice spilled on her bed sheet, and she declined to move so writer could change the sheet. Writer asked staff to encourage pt to have sheet changed, but she declined from this staff, as well.

## 2017-08-30 LAB
AMMONIA PLAS-SCNC: 121 UMOL/L (ref 10–50)
AMMONIA PLAS-SCNC: 94 UMOL/L (ref 10–50)

## 2017-08-30 PROCEDURE — 25000132 ZZH RX MED GY IP 250 OP 250 PS 637: Performed by: EMERGENCY MEDICINE

## 2017-08-30 PROCEDURE — 25000132 ZZH RX MED GY IP 250 OP 250 PS 637: Performed by: NURSE PRACTITIONER

## 2017-08-30 PROCEDURE — 36415 COLL VENOUS BLD VENIPUNCTURE: CPT | Performed by: PSYCHIATRY & NEUROLOGY

## 2017-08-30 PROCEDURE — 82140 ASSAY OF AMMONIA: CPT | Performed by: PSYCHIATRY & NEUROLOGY

## 2017-08-30 PROCEDURE — 36415 COLL VENOUS BLD VENIPUNCTURE: CPT | Performed by: PHYSICIAN ASSISTANT

## 2017-08-30 PROCEDURE — 99232 SBSQ HOSP IP/OBS MODERATE 35: CPT | Performed by: PHYSICIAN ASSISTANT

## 2017-08-30 PROCEDURE — 12400007 ZZH R&B MH INTERMEDIATE UMMC

## 2017-08-30 PROCEDURE — 99232 SBSQ HOSP IP/OBS MODERATE 35: CPT | Performed by: PSYCHIATRY & NEUROLOGY

## 2017-08-30 PROCEDURE — 25000132 ZZH RX MED GY IP 250 OP 250 PS 637: Performed by: PSYCHIATRY & NEUROLOGY

## 2017-08-30 PROCEDURE — 99207 ZZC CDG-MDM COMPONENT: MEETS LOW - DOWN CODED: CPT | Performed by: PHYSICIAN ASSISTANT

## 2017-08-30 PROCEDURE — 82140 ASSAY OF AMMONIA: CPT | Performed by: PHYSICIAN ASSISTANT

## 2017-08-30 PROCEDURE — 25000132 ZZH RX MED GY IP 250 OP 250 PS 637: Performed by: PHYSICIAN ASSISTANT

## 2017-08-30 RX ORDER — LACTULOSE 10 G/15ML
20 SOLUTION ORAL
Status: DISCONTINUED | OUTPATIENT
Start: 2017-08-30 | End: 2017-10-26

## 2017-08-30 RX ADMIN — RIFAXIMIN 550 MG: 550 TABLET ORAL at 08:23

## 2017-08-30 RX ADMIN — LACTULOSE 20 G: 20 POWDER, FOR SOLUTION ORAL at 22:04

## 2017-08-30 RX ADMIN — Medication 20 MG: at 08:23

## 2017-08-30 RX ADMIN — PANTOPRAZOLE SODIUM 40 MG: 40 TABLET, DELAYED RELEASE ORAL at 08:23

## 2017-08-30 RX ADMIN — RISPERIDONE 1 MG: 1 TABLET ORAL at 08:23

## 2017-08-30 RX ADMIN — LACTULOSE 20 G: 20 POWDER, FOR SOLUTION ORAL at 14:35

## 2017-08-30 RX ADMIN — SPIRONOLACTONE 50 MG: 50 TABLET ORAL at 08:23

## 2017-08-30 RX ADMIN — LACTULOSE 20 G: 20 POWDER, FOR SOLUTION ORAL at 20:27

## 2017-08-30 RX ADMIN — FOLIC ACID 1 MG: 1 TABLET ORAL at 08:23

## 2017-08-30 RX ADMIN — RISPERIDONE 2 MG: 2 TABLET ORAL at 20:27

## 2017-08-30 RX ADMIN — RIFAXIMIN 550 MG: 550 TABLET ORAL at 20:27

## 2017-08-30 RX ADMIN — MULTIPLE VITAMINS W/ MINERALS TAB 1 TABLET: TAB at 08:22

## 2017-08-30 RX ADMIN — LACTULOSE 20 G: 20 POWDER, FOR SOLUTION ORAL at 08:23

## 2017-08-30 ASSESSMENT — ACTIVITIES OF DAILY LIVING (ADL)
DRESS: SCRUBS (BEHAVIORAL HEALTH)
ORAL_HYGIENE: INDEPENDENT
DRESS: SCRUBS (BEHAVIORAL HEALTH)
ORAL_HYGIENE: INDEPENDENT
LAUNDRY: WITH SUPERVISION
GROOMING: INDEPENDENT
LAUNDRY: WITH SUPERVISION
GROOMING: INDEPENDENT

## 2017-08-30 NOTE — PROGRESS NOTES
"  [facility]  Daily Progress Note  Mildred Rascon  [id number]  August 30, 2017     Interval History:   Mildred reports she is doing \"fine\" today. Tells me \"leave me alone, I'm fine.\" She is able to tell me the year is 2017, she is in [facility], and that cameron is president. She tells me, \"there's nothing wrong with me\" and that there is \"no way\" that her ammonia is high. She is eating and drinking without issue. No abdominal pain. No dysuria, urinary urgency or frequency. She tells me her bowels are regular and she already had 2 today. She denies any chest pain, sob, or dyspnea.    ROS:   Please see HPI, otherwise, complete 10 point review of systems is negative.      Physical Exam:   Vitals were reviewed  Blood pressure 93/58, pulse 64, temperature 97.8  F (36.6  C), temperature source Oral, resp. rate 16, weight 93.4 kg (206 lb), SpO2 95 %, not currently breastfeeding.  General:alert, NAD, resting in bed, A&O x 3, responds appropriately to questions  HEENT: MMM  Cardiovascular: RRR, S1S2, no m/r/g  Lungs:CTAB, no wheezing or crackles  Abdomen: normoactive BS, soft with no distention and no tenderness   Vascular: trace edema  Neurologic: AAO X 3, CN 2-12 grossly intact, no focal deficits. NO asterixis.   Skin: no generalized jaundice      Assessment and Plan:   Mildred Rascon is a 42 year old female with a history of hepatic cirrhosis 2/2 alcohol use c/b HE, asthma, polysubstance abuse, depression, and anxiety admitted to [facility] for acute psychosis. Please see initial consult note dated 8/9/2017 by Dr. Ari Shah. Medicine was re-consulted to evaluate elevated ammonia levels.     Elevated Ammonia. Without any s/sx of hepatic encephalopathy - she is A&O x 3, stooling 3-4 times per day (? reliability of information), and has no asterixis on exam.  Ammonia 121 this morning, with repeat 94 without any intervention in between. Patient has hx of hepatic cirrhosis c/b multiple " episodes of hepatic encephalopathy (most recently 4/2017). Maintained on lactulose and rifaximin PTA, although documented poor compliance - has been compliant here. No s/sx of infection. Afebrile. Abd soft, nt/nd.   - Continue rifaximin  - Continue lactulose 20g TID, [facility] protocol ordered. Goal to have 3-4 BMs per day, will order lactulose 1x daily prn if pt has not had 3 bowel movements by 10PM.   - Monitor for s/sx of hepatic encephalopathy    - Medicine will re-evaluate patient tomorrow. Will hold on repeating ammonia at this time given stability of patient without any s/sx of HE.       Attestation:  I have reviewed today's vital signs, notes, medications, labs and imaging.        Cheryle Billy [facility]  Internal Medicine [facility] Hospitalist  [phone]  August 30, 2017

## 2017-08-30 NOTE — PROGRESS NOTES
08/30/17 1417   Behavioral Health   Hallucinations denies / not responding to hallucinations   Thinking distractable   Orientation time: oriented;date: oriented;place: oriented;person: oriented   Memory baseline memory   Insight poor   Judgement impaired   Eye Contact at examiner   Affect blunted, flat   Mood depressed   Physical Appearance/Attire appears stated age   Hygiene well groomed   Suicidality other (see comments)  (Denies)   Self Injury other (see comment)  (Denies)   Activity isolative;withdrawn   Speech coherent;clear   Medication Sensitivity no stated side effects   Psychomotor / Gait balanced;steady   Psycho Education   Type of Intervention 1:1 intervention   Response participates, initiates socially appropriate   Hours 0.5   Activities of Daily Living   Hygiene/Grooming independent   Oral Hygiene independent   Dress scrubs (behavioral health)   Laundry with supervision   Room Organization independent   Activity   Activity Level of Assistance independent   Behavioral Health Interventions   Psychotic Symptoms maintain safety precautions;assist patient in developing safety plan;assist patient in following safety plan;encourage participation / independence with adls;provide emotional support;establish therapeutic relationship   Social and Therapeutic Interventions (Psychotic Symptoms) encourage socialization with peers;encourage participation in therapeutic groups and milieu activities   Pt spent most of her time in the room sleeping. She denies suicidal ideation or anxiety. She had both meals with no problems.

## 2017-08-30 NOTE — PLAN OF CARE
Ammonia level 121 this morning.  Pt is in her room resting. Pt is arousable and is oriented X4.  Internal med has been notified.  Will continue to monitor pt as ordered.

## 2017-08-30 NOTE — PROGRESS NOTES
"[facility], [place]   Psychiatric Progress Note        Interim History:   The patient's care was discussed with the treatment team during the daily team meeting and/or staff's chart notes were reviewed.  Staff report patient spent most of the time in her bed. She got out to get her tray, then, returned to her room and ate there. She remains irritable and refuses 1:1. Gives short answers: \"I am fine, I am OK\". Stated that she was OK, didn't appear to be impressed/interested after hearing that her ammonia level was up trending. Staff continues to report that the patient responds to internal stimuli, though, she denies it and becomes upset when this issue is mentioned.        Medications:       risperiDONE  1 mg Oral QAM     risperiDONE  2 mg Oral At Bedtime     pantoprazole  40 mg Oral Daily     multivitamin, therapeutic with minerals  1 tablet Oral Daily     folic acid  1 mg Oral Daily     lactulose  20 g Oral TID     rifaximin  550 mg Oral BID     spironolactone  50 mg Oral Daily     furosemide  20 mg Oral Daily          Allergies:     Allergies   Allergen Reactions     Acetaminophen      Ambien [Zolpidem Tartrate] Other (See Comments)     Sleep walks and eats     Ciprofloxacin Other (See Comments)     Seizure.     Citalopram Nausea and Vomiting          Labs:     Recent Results (from the past 24 hour(s))   Ammonia    Collection Time: 08/30/17  8:21 AM   Result Value Ref Range    Ammonia 121 (HH) 10 - 50 umol/L   Ammonia    Collection Time: 08/30/17 10:38 AM   Result Value Ref Range    Ammonia 94 (H) 10 - 50 umol/L          Psychiatric Examination:     BP 93/58 (BP Location: Left arm)  Pulse 64  Temp 97.8  F (36.6  C) (Oral)  Resp 16  Wt 93.4 kg (206 lb)  SpO2 95%  BMI 33.25 kg/m2  Weight is 206 lbs 0 oz  Body mass index is 33.25 kg/(m^2).           Appearance: dressed in hospital scrubs, laying in bed covered with blanket.  Attitude:  guarded and uncooperative  Eye Contact:  poor "   Mood: sad  Affect:  intensity is exaggerated at times, at times flat.  Speech: limited speech production  Psychomotor Behavior:  no evidence of tardive dyskinesia, dystonia, or tics and physical agitation  Throught Process:  Slightly more organized.  Associations:  loosening of associations present at times.  Thought Content:  no evidence of suicidal ideation or homicidal ideation, Visual hallucinations are likely.   Insight:  limited  Judgement:  poor  Oriented to:  self and place  Attention Span and Concentration:  limited  Recent and Remote Memory:  poor         Precautions:     Behavioral Orders   Procedures     Code 2     Elopement precautions     Routine Programming     As clinically indicated     Seizure precautions     Status 15     Every 15 minutes.     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             Plan:   Patient is fully MI committed. Per CTC, she is waiting for placement to [facility], see above. Despite mild increase in ammonia level patient appears to be less confused, more aware of her meds. She is being followed by IM.

## 2017-08-30 NOTE — PROGRESS NOTES
The pt was again isolative to her room.  She didn't come out at all for groups or meals.  When this writer checked in with her, I got about 2 minutes in which she emphatically denied hallucinating (although she was very obviously responding) and denied SI or SIB     08/29/17 1525   Behavioral [facility]   Hallucinations appears responding;denies / not responding to hallucinations;other (see comment)  (Emphatically denies hallucinations, but still responding)   Thinking distractable;poor concentration   Orientation person: oriented;place: oriented;time: oriented   Memory baseline memory   Insight poor   Judgement impaired   Eye Contact at floor;into space;staring   Affect blunted, flat;irritable   Mood depressed;anxious;labile;mood is calm;irritable   Physical Appearance/Attire untidy;disheveled   Hygiene neglected grooming - unclean body, hair, teeth   Suicidality other (see comments)  (pt denies)   Self Injury other (see comment)  (none observed)   Activity isolative;withdrawn;refusal;other (see comment)  (pt still refuses to come out willingly)   Speech clear;coherent   Medication Sensitivity no stated side effects;no observed side effects   Psychomotor / Gait agitated   Substance Withdrawal Interventions   Social and Therapeutic Interventions (Substance Withdrawal) encourage socialization with peers;encourage effective boundaries with peers;encourage participation in therapeutic groups and milieu activities   Safety   Suicidality status 15   Coping/Psychosocial   Verbalized Emotional State frustration   Supportive Measures verbalization of feelings encouraged   Psycho Education   Type of Intervention 1:1 intervention   Response participates with cues/redirection   Hours 0.5   Treatment Detail check-in   Group Therapy Session   Group Attendance refused to attend group session   Activities of Daily Living   Hygiene/Grooming prompts;independent   Oral Hygiene independent;prompts   Dress scrubs (behavioral health)   Room  Organization prompts   Activity   Activity Level of Assistance independent   Behavioral Health Interventions   Psychotic Symptoms maintain safety precautions;monitor need to revise level of observation;maintain safe secure environment;reality orientation;simple, clear language;decrease environmental stimulation;redirection of intrusive behaviors;redirection of aggressive behaviors;assist patient in following safety plan;encourage nutrition and hydration;encourage participation / independence with adls;provide emotional support;establish therapeutic relationship   Social and Therapeutic Interventions (Psychotic Symptoms) encourage socialization with peers;encourage effective boundaries with peers;encourage participation in therapeutic groups and milieu activities

## 2017-08-31 PROCEDURE — 25000132 ZZH RX MED GY IP 250 OP 250 PS 637: Performed by: EMERGENCY MEDICINE

## 2017-08-31 PROCEDURE — 99207 ZZC CDG-MDM COMPONENT: MEETS LOW - DOWN CODED: CPT | Performed by: PHYSICIAN ASSISTANT

## 2017-08-31 PROCEDURE — 99232 SBSQ HOSP IP/OBS MODERATE 35: CPT | Performed by: PHYSICIAN ASSISTANT

## 2017-08-31 PROCEDURE — 99232 SBSQ HOSP IP/OBS MODERATE 35: CPT | Performed by: PSYCHIATRY & NEUROLOGY

## 2017-08-31 PROCEDURE — 25000132 ZZH RX MED GY IP 250 OP 250 PS 637: Performed by: NURSE PRACTITIONER

## 2017-08-31 PROCEDURE — 25000132 ZZH RX MED GY IP 250 OP 250 PS 637: Performed by: PSYCHIATRY & NEUROLOGY

## 2017-08-31 PROCEDURE — 12400007 ZZH R&B MH INTERMEDIATE UMMC

## 2017-08-31 RX ADMIN — Medication 20 MG: at 08:29

## 2017-08-31 RX ADMIN — SPIRONOLACTONE 50 MG: 50 TABLET ORAL at 08:29

## 2017-08-31 RX ADMIN — FOLIC ACID 1 MG: 1 TABLET ORAL at 08:29

## 2017-08-31 RX ADMIN — LACTULOSE 20 G: 20 POWDER, FOR SOLUTION ORAL at 13:12

## 2017-08-31 RX ADMIN — RISPERIDONE 1 MG: 1 TABLET ORAL at 08:29

## 2017-08-31 RX ADMIN — MULTIPLE VITAMINS W/ MINERALS TAB 1 TABLET: TAB at 08:29

## 2017-08-31 RX ADMIN — RIFAXIMIN 550 MG: 550 TABLET ORAL at 20:35

## 2017-08-31 RX ADMIN — LACTULOSE 20 G: 20 POWDER, FOR SOLUTION ORAL at 20:35

## 2017-08-31 RX ADMIN — RISPERIDONE 2 MG: 2 TABLET ORAL at 21:15

## 2017-08-31 RX ADMIN — LACTULOSE 20 G: 20 POWDER, FOR SOLUTION ORAL at 09:30

## 2017-08-31 RX ADMIN — PANTOPRAZOLE SODIUM 40 MG: 40 TABLET, DELAYED RELEASE ORAL at 08:29

## 2017-08-31 RX ADMIN — RIFAXIMIN 550 MG: 550 TABLET ORAL at 08:29

## 2017-08-31 ASSESSMENT — ACTIVITIES OF DAILY LIVING (ADL)
HYGIENE/GROOMING: PROMPTS
ORAL_HYGIENE: INDEPENDENT
DRESS: SCRUBS (BEHAVIORAL HEALTH)
ORAL_HYGIENE: PROMPTS
LAUNDRY: WITH SUPERVISION
DRESS: SCRUBS (BEHAVIORAL HEALTH);PROMPTS
GROOMING: INDEPENDENT

## 2017-08-31 NOTE — PROGRESS NOTES
"  [facility]  Daily Progress Note  Mildred Rascon  [id number]  August 31, 2017     Interval History:   Mildred continues to report that she is \"fine\" and just wants to be left alone. She is alert and oriented to person, place and time. Able to tell me the year is 2017, the president is Doe, and that she is at the [facility] in the hospital. She tells me she is stooling just fine. She does not want to talk any further at this time. She refuses to have ammonia checked again because \"you guys keep messing up the level.\"   ROS:   Please see HPI, otherwise, complete 10 point review of systems is negative.      Physical Exam:   Vitals were reviewed  Blood pressure 93/58, pulse 64, temperature 97.6  F (36.4  C), temperature source Oral, resp. rate 16, weight 93.4 kg (206 lb), SpO2 95 %, not currently breastfeeding.  General:alert, NAD, resting in bed, A&O x 3, grumpy, agitated  HEENT: NCAT, anicteric sclera, MMM  Cardiovascular: refused  Lungs: refused  Abdomen: normoactive BS, soft with no distention and no tenderness   Vascular:  refused  Neurologic: AAO X 3, CN 2-12 grossly intact, no focal deficits, no asterixis  Skin: no generalized jaundice     Assessment and Plan:   Mildred Rascon is a 42 year old female with a history of hepatic cirrhosis 2/2 alcohol use c/b HE, asthma, polysubstance abuse, depression, and anxiety admitted to [facility] for acute psychosis. Please see initial consult note dated 8/9/2017 by Dr. Ari Shah. Medicine was re-consulted to evaluate elevated ammonia levels on 8/30.      Elevated Ammonia. Without any s/sx of hepatic encephalopathy - she is A&O x 3, stooling 3-4 times per day (? reliability of information), and has no asterixis on exam.  Ammonia 121 8/30 in the morning, with repeat 94 without any intervention in between. Patient has hx of hepatic cirrhosis c/b multiple episodes of hepatic encephalopathy (most recently 4/2017). Maintained " on lactulose and rifaximin PTA, although documented poor compliance - has been compliant here. No s/sx of infection. Afebrile. Abd soft, nt/nd. She is refusing to have more labs drawn at this time, she is agitated, but appears to be coherent and understanding.   - Continue rifaximin  - Continue lactulose 20g TID. Goal to have 3-4 BMs per day, ordered lactulose 1x daily prn if pt has not had 3 clear documented bowel movements by 10PM.   - Monitor for s/sx of hepatic encephalopathy       Medicine will sign-off. Please page if patient becomes altered or if there are any questions or concerns.       Attestation:  I have reviewed today's vital signs, notes, medications, labs and imaging.      Cheyrle Tracey  Internal Medicine [facility] Hospitalist  [phone]  August 31, 2017

## 2017-08-31 NOTE — PROGRESS NOTES
Neuro check complete.  AOx3.  Didn't know what day of th week it was.  Bilateral strength equal in all extremities.  Appropriate eye contact with writer.      Gave her 16 ounces of crystal lite strawberry lemonade which she drank rapidly.  Asking for food.

## 2017-08-31 NOTE — PROGRESS NOTES
"[facility], [place]   Psychiatric Progress Note        Interim History:   The patient's care was discussed with the treatment team during the daily team meeting and/or staff's chart notes were reviewed.  Staff report patient spent most of the time in her bed. She gets out to get her tray, then, returns to her room and eats there. She remains irritable and refuses 1:1. Refuses to go to groups. Gives short answers: \"I am fine, I am OK\". Followed by IM for hepatic problems, recent up trending ammonia level. Staff continues to report that the patient responds to internal stimuli, though, she denies it and becomes upset when this issue is mentioned.        Medications:       risperiDONE  1 mg Oral QAM     risperiDONE  2 mg Oral At Bedtime     pantoprazole  40 mg Oral Daily     multivitamin, therapeutic with minerals  1 tablet Oral Daily     folic acid  1 mg Oral Daily     lactulose  20 g Oral TID     rifaximin  550 mg Oral BID     spironolactone  50 mg Oral Daily     furosemide  20 mg Oral Daily          Allergies:     Allergies   Allergen Reactions     Acetaminophen      Ambien [Zolpidem Tartrate] Other (See Comments)     Sleep walks and eats     Ciprofloxacin Other (See Comments)     Seizure.     Citalopram Nausea and Vomiting          Labs:     No results found for this or any previous visit (from the past 24 hour(s)).       Psychiatric Examination:     BP 93/58 (BP Location: Left arm)  Pulse 64  Temp 97.6  F (36.4  C) (Oral)  Resp 16  Wt 93.4 kg (206 lb)  SpO2 95%  BMI 33.25 kg/m2  Weight is 206 lbs 0 oz  Body mass index is 33.25 kg/(m^2).           Appearance: dressed in hospital scrubs, laying in bed covered with blanket.  Attitude:  guarded and minimally cooperative  Eye Contact:  poor   Mood: sad  Affect:  intensity is exaggerated at times, at times flat.  Speech: limited speech production  Psychomotor Behavior:  no evidence of tardive dyskinesia, dystonia, or tics and physical " agitation  Throught Process:  Slightly more organized.  Associations:  loosening of associations present at times.  Thought Content:  no evidence of suicidal ideation or homicidal ideation, Visual hallucinations are likely.   Insight:  limited  Judgement:  poor  Oriented to:  self and place  Attention Span and Concentration:  limited  Recent and Remote Memory:  poor         Precautions:     Behavioral Orders   Procedures     Code 2     Elopement precautions     Routine Programming     As clinically indicated     Seizure precautions     Status 15     Every 15 minutes.     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             Plan:   Patient is fully MI committed. Per CTC, she is waiting for placement to [facility], see above. Despite mild increase in ammonia level patient appears to be less confused, more aware of her meds. She is being followed by IM.

## 2017-08-31 NOTE — PLAN OF CARE
"Problem: Psychotic Symptoms  Goal: Psychotic Symptoms  Signs and symptoms of listed problems will be absent or manageable.   Outcome: No Change     48 Hour Nurse Note:  Met with pt for 1:1.  Pt reports she is doing \"fine\".  Pt presents with a flat and blunted affect.  Pt  Had an improved mood this shift - lessened irritability.  Pt denies depression, anxiety, SI, SIB, AH, and VH. Writer did not witness pt responding to IS, no staff witnessed pt responding to IS during this shift.      Pt is alert and oriented x 4.  Pt also knew president, date, time of day, location, etc.      Pt on I and O -Pt ate 100% supper, and sips of water.  Pt reports she had zero BM on this shift - so PRN lactulose (see orders) was administered.  Pt reports urine output, but \"misses the hat\" so writer can not confirm urine output.      Pt was isolative and withdrawn during the shift.  Pt compliant with her medications.  Pt had no further concerns for staff or writer.    Will continue to monitor and assess.      "

## 2017-09-01 LAB
ALBUMIN UR-MCNC: NEGATIVE MG/DL
AMORPH CRY #/AREA URNS HPF: ABNORMAL /HPF
APPEARANCE UR: CLEAR
BILIRUB UR QL STRIP: NEGATIVE
COLOR UR AUTO: YELLOW
GLUCOSE UR STRIP-MCNC: NEGATIVE MG/DL
HGB UR QL STRIP: NEGATIVE
KETONES UR STRIP-MCNC: NEGATIVE MG/DL
LEUKOCYTE ESTERASE UR QL STRIP: NEGATIVE
NITRATE UR QL: NEGATIVE
PH UR STRIP: 7.5 PH (ref 5–7)
RBC #/AREA URNS AUTO: <1 /HPF (ref 0–2)
SOURCE: ABNORMAL
SP GR UR STRIP: 1.01 (ref 1–1.03)
UROBILINOGEN UR STRIP-MCNC: 0.2 MG/DL (ref 0–2)
WBC #/AREA URNS AUTO: 0 /HPF (ref 0–2)

## 2017-09-01 PROCEDURE — 12400007 ZZH R&B MH INTERMEDIATE UMMC

## 2017-09-01 PROCEDURE — 81001 URINALYSIS AUTO W/SCOPE: CPT | Performed by: PHYSICIAN ASSISTANT

## 2017-09-01 PROCEDURE — 25000132 ZZH RX MED GY IP 250 OP 250 PS 637: Performed by: NURSE PRACTITIONER

## 2017-09-01 PROCEDURE — 25000132 ZZH RX MED GY IP 250 OP 250 PS 637: Performed by: PSYCHIATRY & NEUROLOGY

## 2017-09-01 PROCEDURE — 99207 ZZC CDG-MDM COMPONENT: MEETS MODERATE - UP CODED: CPT | Performed by: PSYCHIATRY & NEUROLOGY

## 2017-09-01 PROCEDURE — 99232 SBSQ HOSP IP/OBS MODERATE 35: CPT | Performed by: PSYCHIATRY & NEUROLOGY

## 2017-09-01 PROCEDURE — 87086 URINE CULTURE/COLONY COUNT: CPT | Performed by: PHYSICIAN ASSISTANT

## 2017-09-01 PROCEDURE — 87088 URINE BACTERIA CULTURE: CPT | Performed by: PHYSICIAN ASSISTANT

## 2017-09-01 PROCEDURE — 25000132 ZZH RX MED GY IP 250 OP 250 PS 637: Performed by: EMERGENCY MEDICINE

## 2017-09-01 RX ADMIN — SPIRONOLACTONE 50 MG: 50 TABLET ORAL at 08:42

## 2017-09-01 RX ADMIN — RIFAXIMIN 550 MG: 550 TABLET ORAL at 08:42

## 2017-09-01 RX ADMIN — LACTULOSE 20 G: 20 POWDER, FOR SOLUTION ORAL at 08:42

## 2017-09-01 RX ADMIN — Medication 20 MG: at 08:42

## 2017-09-01 RX ADMIN — HYDROXYZINE HYDROCHLORIDE 25 MG: 25 TABLET ORAL at 21:59

## 2017-09-01 RX ADMIN — MULTIPLE VITAMINS W/ MINERALS TAB 1 TABLET: TAB at 08:42

## 2017-09-01 RX ADMIN — LACTULOSE 20 G: 20 POWDER, FOR SOLUTION ORAL at 13:39

## 2017-09-01 RX ADMIN — RIFAXIMIN 550 MG: 550 TABLET ORAL at 20:35

## 2017-09-01 RX ADMIN — PANTOPRAZOLE SODIUM 40 MG: 40 TABLET, DELAYED RELEASE ORAL at 08:42

## 2017-09-01 RX ADMIN — RISPERIDONE 1 MG: 1 TABLET ORAL at 08:42

## 2017-09-01 RX ADMIN — FOLIC ACID 1 MG: 1 TABLET ORAL at 08:42

## 2017-09-01 RX ADMIN — LACTULOSE 20 G: 20 POWDER, FOR SOLUTION ORAL at 20:36

## 2017-09-01 RX ADMIN — RISPERIDONE 2 MG: 2 TABLET ORAL at 21:25

## 2017-09-01 ASSESSMENT — ACTIVITIES OF DAILY LIVING (ADL)
HYGIENE/GROOMING: PROMPTS
LAUNDRY: WITH SUPERVISION
GROOMING: INDEPENDENT;PROMPTS
DRESS: INDEPENDENT
DRESS: SCRUBS (BEHAVIORAL HEALTH);INDEPENDENT;PROMPTS
ORAL_HYGIENE: PROMPTS
ORAL_HYGIENE: INDEPENDENT;PROMPTS

## 2017-09-01 NOTE — PROGRESS NOTES
Intake: 90% of meal  -780 mL    Output: 1 urine in bowl (per pt)  -No BM this shift    Pt stated that she has been isolating to her room because she has difficulty seeing the television without her glasses. Pt is oriented x4 and denies any changes to her cognition. Pt is still visibly responding to internal stimuli by picking invisible objects off of her skin and air.

## 2017-09-01 NOTE — PROGRESS NOTES
09/01/17 1423   Behavioral Health   Hallucinations denies / not responding to hallucinations   Thinking poor concentration   Orientation place: oriented;person: oriented   Memory baseline memory   Insight poor   Judgement impaired   Eye Contact at examiner   Affect blunted, flat   Mood mood is calm   Physical Appearance/Attire disheveled   Hygiene neglected grooming - unclean body, hair, teeth   Suicidality other (see comments)  (pt denied)   Self Injury other (see comment)  (pt denied)   Elopement (none observed)   Activity isolative   Speech clear   Medication Sensitivity no stated side effects   Psychomotor / Gait (JOSE)   Activities of Daily Living   Hygiene/Grooming prompts   Oral Hygiene prompts   Dress independent   Room Organization prompts   Behavioral Health Interventions   Psychotic Symptoms maintain safety precautions;monitor need to revise level of observation;maintain safe secure environment;reality orientation;decrease environmental stimulation;simple, clear language;assist patient in developing safety plan;assist patient in following safety plan;encourage participation / independence with adls;establish therapeutic relationship;assist with developing & utilizing healthy coping strategies;build upon strengths;provide emotional support;encourage nutrition and hydration   Social and Therapeutic Interventions (Psychotic Symptoms) encourage effective boundaries with peers;encourage participation in therapeutic groups and milieu activities;encourage socialization with peers     When checking in with the pt she denied thoughts of SI/SIB. She did report having a headache. Her favorite thing on this shift was eating a chocolate chip cookie. Mildred stayed in her room for the entirety of this shift. Pt continuously slept.

## 2017-09-01 NOTE — PROGRESS NOTES
"[facility], [place]   Psychiatric Progress Note        Interim History:   The patient's care was discussed with the treatment team during the daily team meeting and/or staff's chart notes were reviewed.  Staff report patient spent most of the time in her bed. She gets out to get her tray, then, returns to her room and eats there. She sleeps during the day and doesn't sleep at night. She remains irritable and refuses 1:1. Refuses to go to groups. Gives short answers: \"I am fine, I am OK\". Got upset when asked to leave and I told her that she was committed. Still refuses antidepressant. Staff continues to report that the patient responds to internal stimuli, though, she denies it and becomes upset when this issue is mentioned.        Medications:       risperiDONE  1 mg Oral QAM     risperiDONE  2 mg Oral At Bedtime     pantoprazole  40 mg Oral Daily     multivitamin, therapeutic with minerals  1 tablet Oral Daily     folic acid  1 mg Oral Daily     lactulose  20 g Oral TID     rifaximin  550 mg Oral BID     spironolactone  50 mg Oral Daily     furosemide  20 mg Oral Daily          Allergies:     Allergies   Allergen Reactions     Acetaminophen      Ambien [Zolpidem Tartrate] Other (See Comments)     Sleep walks and eats     Ciprofloxacin Other (See Comments)     Seizure.     Citalopram Nausea and Vomiting          Labs:     No results found for this or any previous visit (from the past 24 hour(s)).       Psychiatric Examination:     /58  Pulse 119  Temp 98.2  F (36.8  C) (Oral)  Resp 16  Wt 93.4 kg (206 lb)  SpO2 95%  BMI 33.25 kg/m2  Weight is 206 lbs 0 oz  Body mass index is 33.25 kg/(m^2).           Appearance: dressed in hospital scrubs, laying in bed covered with blanket.  Attitude:  guarded and minimally cooperative  Eye Contact:  poor   Mood: sad  Affect:  intensity is exaggerated at times, at times flat.  Speech: limited speech production  Psychomotor Behavior:  no " evidence of tardive dyskinesia, dystonia, or tics and physical agitation  Throught Process:  Slightly more organized.  Associations:  loosening of associations present at times.  Thought Content:  no evidence of suicidal ideation or homicidal ideation, Visual hallucinations are likely.   Insight:  limited  Judgement:  poor  Oriented to:  self and place  Attention Span and Concentration:  limited  Recent and Remote Memory:  poor         Precautions:     Behavioral Orders   Procedures     Code 2     Elopement precautions     Routine Programming     As clinically indicated     Seizure precautions     Status 15     Every 15 minutes.     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             Plan:   Patient is fully MI committed. Per CTC, she is waiting for placement to [facility], see above. Despite mild increase in ammonia level patient appears to be less confused, more aware of her meds. She is being followed by IM.

## 2017-09-01 NOTE — PROGRESS NOTES
Pt much the same. In bed all of shift. Only came out to try and exit off stairs exit at end of hallway. Got mad when redirected and yelling back at staff. Later heard pt crying in room. Otherwise, still irritable with staffs when interacting with her.        08/31/17 2223   Behavioral Health   Hallucinations denies / not responding to hallucinations   Thinking distractable   Orientation person: oriented;place: oriented;date: oriented;time: oriented   Memory baseline memory   Insight poor   Judgement impaired   Eye Contact into space   Affect blunted, flat;irritable   Mood irritable   Physical Appearance/Attire disheveled   Hygiene neglected grooming - unclean body, hair, teeth   Elopement Trying handles of doors;Loitering near exit doors   Activity withdrawn;isolative   Speech clear;coherent   Medication Sensitivity no observed side effects;no stated side effects   Psychomotor / Gait balanced;steady   Activities of Daily Living   Hygiene/Grooming prompts   Oral Hygiene prompts   Dress scrubs (behavioral health);prompts   Room Organization prompts

## 2017-09-01 NOTE — PLAN OF CARE
Problem: General Plan of Care (Inpatient Behavioral)  Goal: Team Discussion  Team Plan:   BEHAVIORAL TEAM DISCUSSION     Participants: dr. Payne, MARYBEL Elmore, RN Elvie Cornejo  Progress: none  Continued Stay Criteria/Rationale: pt is on the wait list for [facility]  Medical/Physical: encephalon   Precautions:   Behavioral Orders   Procedures     Code 2     Elopement precautions     Routine Programming       As clinically indicated     Seizure precautions     Status 15       Every 15 minutes.     Withdrawal precautions     Plan: transfer to [facility] when bed available, manage meds per psychiatry and internal medicine.  Rationale for change in precautions or plan: no changes

## 2017-09-02 PROCEDURE — 25000132 ZZH RX MED GY IP 250 OP 250 PS 637: Performed by: PSYCHIATRY & NEUROLOGY

## 2017-09-02 PROCEDURE — 25000132 ZZH RX MED GY IP 250 OP 250 PS 637: Performed by: NURSE PRACTITIONER

## 2017-09-02 PROCEDURE — 25000132 ZZH RX MED GY IP 250 OP 250 PS 637: Performed by: EMERGENCY MEDICINE

## 2017-09-02 PROCEDURE — 12400007 ZZH R&B MH INTERMEDIATE UMMC

## 2017-09-02 PROCEDURE — 25000132 ZZH RX MED GY IP 250 OP 250 PS 637: Performed by: INTERNAL MEDICINE

## 2017-09-02 RX ORDER — PSEUDOEPHEDRINE HCL 60 MG
60 TABLET ORAL EVERY 6 HOURS PRN
Status: DISCONTINUED | OUTPATIENT
Start: 2017-09-02 | End: 2018-01-02 | Stop reason: HOSPADM

## 2017-09-02 RX ADMIN — LACTULOSE 20 G: 20 POWDER, FOR SOLUTION ORAL at 09:49

## 2017-09-02 RX ADMIN — PSEUDOEPHEDRINE HCL 60 MG: 30 TABLET, FILM COATED ORAL at 22:49

## 2017-09-02 RX ADMIN — RIFAXIMIN 550 MG: 550 TABLET ORAL at 21:08

## 2017-09-02 RX ADMIN — FOLIC ACID 1 MG: 1 TABLET ORAL at 09:50

## 2017-09-02 RX ADMIN — LACTULOSE 20 G: 20 POWDER, FOR SOLUTION ORAL at 13:56

## 2017-09-02 RX ADMIN — SPIRONOLACTONE 50 MG: 50 TABLET ORAL at 09:50

## 2017-09-02 RX ADMIN — LACTULOSE 20 G: 20 POWDER, FOR SOLUTION ORAL at 21:08

## 2017-09-02 RX ADMIN — PANTOPRAZOLE SODIUM 40 MG: 40 TABLET, DELAYED RELEASE ORAL at 09:50

## 2017-09-02 RX ADMIN — Medication 20 MG: at 09:50

## 2017-09-02 RX ADMIN — ALBUTEROL SULFATE 2 PUFF: 90 AEROSOL, METERED RESPIRATORY (INHALATION) at 22:09

## 2017-09-02 RX ADMIN — RIFAXIMIN 550 MG: 550 TABLET ORAL at 09:50

## 2017-09-02 RX ADMIN — MULTIPLE VITAMINS W/ MINERALS TAB 1 TABLET: TAB at 09:50

## 2017-09-02 RX ADMIN — RISPERIDONE 2 MG: 2 TABLET ORAL at 21:08

## 2017-09-02 RX ADMIN — RISPERIDONE 1 MG: 1 TABLET ORAL at 09:51

## 2017-09-02 ASSESSMENT — ACTIVITIES OF DAILY LIVING (ADL)
HYGIENE/GROOMING: WITH ASSISTANCE;INDEPENDENT
ORAL_HYGIENE: INDEPENDENT
LAUNDRY: UNABLE TO COMPLETE
DRESS: SCRUBS (BEHAVIORAL HEALTH)

## 2017-09-02 NOTE — PLAN OF CARE
Problem: Psychotic Symptoms  Goal: Psychotic Symptoms  Signs and symptoms of listed problems will be absent or manageable.   Outcome: Improving  48 Hour Assessment    Intake:   -100% of dinner tray  -540 mL of water  -240 mL of juice  -180 mL of coffee  -237 mL of diet pop    Output:  400 mL of urine (in hat; sent to lab for culture and UA)  1 solid BM, per pt    Pt states feeling like she has symptoms of a cold. Pt was seen frequently blowing and wiping her runny nose. Pt did not have a fever (temp at 98 F) and was not diaphoretic. Provided pt with water and ginger ale.    Pt pleasant with staff, and conversed appropriately with writer regarding her family and children. She is oriented to person, place and situation, and does not display signs of confusion. Pt bedding changed, and room cleaned. Provided with clean scrubs, as well as toiletries.     SI/SIB: Pt denies     Auditory/Visual Hallucinations: Pt denies, but appears responding. However, far much less than previous times.    No additional concerns at this time. Will continue to monitor and assess.

## 2017-09-02 NOTE — PROGRESS NOTES
09/02/17 1400   Behavioral Health   Thoughts/Cognition (WDL) ex;eye contact;thinking   Thinking intact   Orientation person: oriented;place: oriented;date: oriented;time: oriented   Memory long term   Insight poor;denial of illness   Judgement impaired   Eye Contact at examiner   Affect/Mood (WDL) WDL   Affect sad;irritable   Mood depressed;hopeless;irritable   ADL Assessment (WDL) ex;hygiene;physical appearance   Physical Appearance/Attire untidy;disheveled   Hygiene neglected grooming - unclean body, hair, teeth   Suicidality (WDL) WDL   Self Injury (WDL) WDL   Elopement (WDL) WDL   Elopement Statements about wanting to leave   Activity (WDL) WDL   Activity isolative;withdrawn   Speech (WDL) ex;speech   Speech circumstantial   Medication Sensitivity (WDL) WDL   Medication Sensitivity no observed side effects   Psychomotor Gait (WDL) WDL   Psychomotor / Gait (In bed all shift)   Overt Agression (WDL) WDL   Substance Withdrawal   Substance Withdrawal None   Sleep/Rest/Relaxation   Day/Evening Time Hours resting in bed   Group Therapy Session   Group Attendance refused to attend group session   Activities of Daily Living   Hygiene/Grooming with assistance;independent   Oral Hygiene independent   Dress scrubs (behavioral health)   Laundry unable to complete   Room Organization unable   Behavioral Health Interventions   Psychotic Symptoms maintain safety precautions;encourage nutrition and hydration;encourage participation / independence with adls;provide emotional support;establish therapeutic relationship;assist with developing & utilizing healthy coping strategies;build upon strengths   Social and Therapeutic Interventions (Psychotic Symptoms) encourage socialization with peers;encourage participation in therapeutic groups and milieu activities     Pt Stayed in bed all shift. She stated that she did not feel well, and vomited a little. When asked if she was ok, she only stated that she wanted Ginger Ale. Writer  informed her nurse and her nurse met with her.

## 2017-09-03 LAB
BACTERIA SPEC CULT: ABNORMAL
BACTERIA SPEC CULT: ABNORMAL
Lab: ABNORMAL
SPECIMEN SOURCE: ABNORMAL

## 2017-09-03 PROCEDURE — 12400007 ZZH R&B MH INTERMEDIATE UMMC

## 2017-09-03 PROCEDURE — 25000132 ZZH RX MED GY IP 250 OP 250 PS 637: Performed by: PSYCHIATRY & NEUROLOGY

## 2017-09-03 PROCEDURE — 25000132 ZZH RX MED GY IP 250 OP 250 PS 637: Performed by: EMERGENCY MEDICINE

## 2017-09-03 PROCEDURE — 25000132 ZZH RX MED GY IP 250 OP 250 PS 637: Performed by: NURSE PRACTITIONER

## 2017-09-03 PROCEDURE — 25000132 ZZH RX MED GY IP 250 OP 250 PS 637: Performed by: INTERNAL MEDICINE

## 2017-09-03 RX ADMIN — MULTIPLE VITAMINS W/ MINERALS TAB 1 TABLET: TAB at 09:15

## 2017-09-03 RX ADMIN — PSEUDOEPHEDRINE HCL 60 MG: 30 TABLET, FILM COATED ORAL at 17:16

## 2017-09-03 RX ADMIN — Medication 20 MG: at 09:15

## 2017-09-03 RX ADMIN — RIFAXIMIN 550 MG: 550 TABLET ORAL at 09:15

## 2017-09-03 RX ADMIN — PANTOPRAZOLE SODIUM 40 MG: 40 TABLET, DELAYED RELEASE ORAL at 09:15

## 2017-09-03 RX ADMIN — RISPERIDONE 2 MG: 2 TABLET ORAL at 21:05

## 2017-09-03 RX ADMIN — LACTULOSE 20 G: 20 POWDER, FOR SOLUTION ORAL at 14:26

## 2017-09-03 RX ADMIN — LACTULOSE 20 G: 20 POWDER, FOR SOLUTION ORAL at 21:04

## 2017-09-03 RX ADMIN — SPIRONOLACTONE 50 MG: 50 TABLET ORAL at 09:15

## 2017-09-03 RX ADMIN — FOLIC ACID 1 MG: 1 TABLET ORAL at 09:15

## 2017-09-03 RX ADMIN — LACTULOSE 20 G: 20 POWDER, FOR SOLUTION ORAL at 09:15

## 2017-09-03 RX ADMIN — RISPERIDONE 1 MG: 1 TABLET ORAL at 09:15

## 2017-09-03 RX ADMIN — RIFAXIMIN 550 MG: 550 TABLET ORAL at 21:04

## 2017-09-03 ASSESSMENT — ACTIVITIES OF DAILY LIVING (ADL)
LAUNDRY: WITH SUPERVISION
DRESS: SCRUBS (BEHAVIORAL HEALTH)
GROOMING: PROMPTS
HYGIENE/GROOMING: PROMPTS
ORAL_HYGIENE: PROMPTS
ORAL_HYGIENE: INDEPENDENT;PROMPTS
ORAL_HYGIENE: PROMPTS
DRESS: SCRUBS (BEHAVIORAL HEALTH);PROMPTS
LAUNDRY: UNABLE TO COMPLETE
GROOMING: INDEPENDENT;PROMPTS
DRESS: PROMPTS;SCRUBS (BEHAVIORAL HEALTH)

## 2017-09-03 NOTE — PLAN OF CARE
"Problem: Psychotic Symptoms  Goal: Psychotic Symptoms  Signs and symptoms of listed problems will be absent or manageable.   Outcome: Improving  /59  Pulse 85  Temp 98.2  F (36.8  C) (Oral)  Resp 16  Wt 93.4 kg (206 lb)  SpO2 95%  BMI 33.25 kg/m2     Intake:   Coffee 180 mL  Juice 360 mL  75% of evening meal tray    Output:  No reported urine output or BM, and no urine or BM observed in toilet bowl    Pt isolated to her room for the shift. She states feeling \"terrible\" due to a stuffy nose, headache and body aches. In addition, pt has not urinated this shift, and reports feelings of increased fluid retention. Paged medical provider, Sonia Flores MD and explained pt condition and liver disease, but he stated the lack of urination was not concerning. Provider wrote order for Sudafed for pt congestion. Pt declined this medication as of 2300.    She is pleasant with staff, and has made no attempts to leave the unit. Writer did not witness signs of pt responding to internal stimuli, and pt states that she is not confused or disoriented in any way. Pt took all medications with no issue.    As of this note, pt is sleeping comfortably in her room. No additional concerns at this time. Will continue to assess.    "

## 2017-09-03 NOTE — PROGRESS NOTES
09/03/17 1449   Behavioral Health   Hallucinations auditory;appears responding   Thinking poor concentration   Orientation person: oriented;place: oriented;date: oriented;time: oriented   Memory baseline memory   Insight poor   Judgement impaired   Eye Contact at examiner   Affect blunted, flat;sad   Mood depressed;anxious   Physical Appearance/Attire neat   Hygiene well groomed   Suicidality other (see comments)  (denied )   Self Injury other (see comment)  (denied)   Elopement (asking when she going to leave )   Activity isolative;withdrawn   Speech clear   Medication Sensitivity no stated side effects   Psychomotor / Gait slow;balanced   Psycho Education   Type of Intervention 1:1 intervention   Response refuses   Hours 0.5   Treatment Detail (check in )   Activities of Daily Living   Hygiene/Grooming independent;prompts   Oral Hygiene independent;prompts   Dress scrubs (behavioral health);prompts   Laundry with supervision   Room Organization prompts   Groups   Details (isolative )   Behavioral Health Interventions   Psychotic Symptoms maintain safety precautions;encourage nutrition and hydration;encourage participation / independence with adls;provide emotional support;establish therapeutic relationship;assist with developing & utilizing healthy coping strategies;build upon strengths   Social and Therapeutic Interventions (Psychotic Symptoms) encourage socialization with peers;encourage participation in therapeutic groups and milieu activities      Pt spent the entire shift in her room. Pt requested that her meals be brought to her room. Pt stated having auditory hallucination and appeared to be responding to internal stimuli. Pt complained of general body aches. With some encouragement/motivation from staff, patient showered and bedding was changed. Pt denied SI/SIB.

## 2017-09-04 PROCEDURE — 12400007 ZZH R&B MH INTERMEDIATE UMMC

## 2017-09-04 PROCEDURE — 25000132 ZZH RX MED GY IP 250 OP 250 PS 637: Performed by: NURSE PRACTITIONER

## 2017-09-04 PROCEDURE — 25000132 ZZH RX MED GY IP 250 OP 250 PS 637: Performed by: PSYCHIATRY & NEUROLOGY

## 2017-09-04 PROCEDURE — 25000132 ZZH RX MED GY IP 250 OP 250 PS 637: Performed by: INTERNAL MEDICINE

## 2017-09-04 PROCEDURE — 25000132 ZZH RX MED GY IP 250 OP 250 PS 637: Performed by: EMERGENCY MEDICINE

## 2017-09-04 RX ADMIN — RIFAXIMIN 550 MG: 550 TABLET ORAL at 08:34

## 2017-09-04 RX ADMIN — PSEUDOEPHEDRINE HCL 60 MG: 30 TABLET, FILM COATED ORAL at 18:22

## 2017-09-04 RX ADMIN — FOLIC ACID 1 MG: 1 TABLET ORAL at 08:33

## 2017-09-04 RX ADMIN — RISPERIDONE 2 MG: 2 TABLET ORAL at 20:22

## 2017-09-04 RX ADMIN — LACTULOSE 20 G: 20 POWDER, FOR SOLUTION ORAL at 08:33

## 2017-09-04 RX ADMIN — SPIRONOLACTONE 50 MG: 50 TABLET ORAL at 08:32

## 2017-09-04 RX ADMIN — Medication 20 MG: at 08:32

## 2017-09-04 RX ADMIN — RISPERIDONE 1 MG: 1 TABLET ORAL at 08:32

## 2017-09-04 RX ADMIN — RIFAXIMIN 550 MG: 550 TABLET ORAL at 20:22

## 2017-09-04 RX ADMIN — PANTOPRAZOLE SODIUM 40 MG: 40 TABLET, DELAYED RELEASE ORAL at 08:32

## 2017-09-04 RX ADMIN — MULTIPLE VITAMINS W/ MINERALS TAB 1 TABLET: TAB at 08:33

## 2017-09-04 RX ADMIN — LACTULOSE 20 G: 20 POWDER, FOR SOLUTION ORAL at 20:22

## 2017-09-04 RX ADMIN — LACTULOSE 20 G: 20 POWDER, FOR SOLUTION ORAL at 15:14

## 2017-09-04 ASSESSMENT — ACTIVITIES OF DAILY LIVING (ADL)
HYGIENE/GROOMING: PROMPTS
GROOMING: PROMPTS
DRESS: SCRUBS (BEHAVIORAL HEALTH)
LAUNDRY: UNABLE TO COMPLETE
ORAL_HYGIENE: PROMPTS
DRESS: SCRUBS (BEHAVIORAL HEALTH)
ORAL_HYGIENE: PROMPTS
LAUNDRY: UNABLE TO COMPLETE

## 2017-09-04 NOTE — PROGRESS NOTES
Pt's  Franklin and his mother came to visit and wanted to know when pt can come home. Writer reminded them that pt's provisional discharge was revoked and detailed the events that lead her here. Writer brought up discharge to the [facility] in [place] and pt adamantly stated she would not go there. Pt wanted her EBT card brought up for her  to take home.    Writer also gave franklin the name and number of pt's SEBAS Villagran.

## 2017-09-04 NOTE — PROGRESS NOTES
Pt spent all of shift in room. No attempts at exit doors this shift. Isolative and withdrawn, observed refusing most activities - vitals, phone calls, and coming out to grab dinner. Did not want check-in.       09/03/17 2151   Behavioral Health   Thinking distractable;poor concentration   Orientation place: oriented   Memory baseline memory   Insight poor   Judgement impaired   Affect blunted, flat;sad   Mood depressed   Physical Appearance/Attire disheveled;attire appropriate to age and situation;appears stated age   Hygiene neglected grooming - unclean body, hair, teeth   Activity isolative;withdrawn;refusal   Speech clear;coherent   Medication Sensitivity no observed side effects;no stated side effects   Psychomotor / Gait steady;balanced   Activities of Daily Living   Hygiene/Grooming prompts   Oral Hygiene prompts   Dress prompts;scrubs (behavioral health)   Room Organization prompts

## 2017-09-04 NOTE — PROGRESS NOTES
"Pt was depressed, isolative and irritable.     Pt ate breakfast and has not showered. Pt states \" I am still sick\" (as in cold, sick)    Pt had a visit from her boyfriend/??? And his mother. The visit was pleasant, they went to the OT room and had lunch together (from the hospital tray). Visitors waited for security to bring something from Security office up. Visitors couldn't wait any longer so they left without the item.     Pt did not want to talk or check in. Pt stated \"I just want to go home with my \".    Pt denies depression, SI and SIB.       09/04/17 1300   Behavioral Health   Hallucinations denies / not responding to hallucinations   Thinking poor concentration   Orientation person: oriented;place: oriented;date: oriented;time: oriented   Memory baseline memory   Insight poor   Judgement impaired   Eye Contact at examiner   Affect full range affect;irritable   Mood irritable   Physical Appearance/Attire appears stated age;disheveled   Hygiene neglected grooming - unclean body, hair, teeth   Suicidality other (see comments)  (Pt denied )   Self Injury other (see comment)  (Pt denies, none observed )   Elopement (None stated, nor observed )   Activity withdrawn;isolative   Speech coherent   Medication Sensitivity no stated side effects;no observed side effects   Psychomotor / Gait unsteady;slow   Activities of Daily Living   Hygiene/Grooming prompts   Oral Hygiene prompts   Dress scrubs (behavioral health)   Laundry unable to complete   Room Organization prompts   Behavioral Health Interventions   Psychotic Symptoms maintain safety precautions;monitor need to revise level of observation;maintain safe secure environment;reality orientation;simple, clear language;decrease environmental stimulation;redirection of intrusive behaviors;redirection of aggressive behaviors;assist patient in following safety plan;assist patient in developing safety plan;encourage nutrition and hydration;encourage " participation / independence with adls;provide emotional support;establish therapeutic relationship;assist with developing & utilizing healthy coping strategies;assess patient response to medication;build upon strengths   Social and Therapeutic Interventions (Psychotic Symptoms) encourage socialization with peers;encourage effective boundaries with peers;encourage participation in therapeutic groups and milieu activities

## 2017-09-05 PROCEDURE — 25000132 ZZH RX MED GY IP 250 OP 250 PS 637: Performed by: NURSE PRACTITIONER

## 2017-09-05 PROCEDURE — 25000132 ZZH RX MED GY IP 250 OP 250 PS 637: Performed by: PSYCHIATRY & NEUROLOGY

## 2017-09-05 PROCEDURE — 25000132 ZZH RX MED GY IP 250 OP 250 PS 637: Performed by: PHYSICIAN ASSISTANT

## 2017-09-05 PROCEDURE — 12400007 ZZH R&B MH INTERMEDIATE UMMC

## 2017-09-05 PROCEDURE — 25000132 ZZH RX MED GY IP 250 OP 250 PS 637: Performed by: EMERGENCY MEDICINE

## 2017-09-05 RX ADMIN — MULTIPLE VITAMINS W/ MINERALS TAB 1 TABLET: TAB at 08:30

## 2017-09-05 RX ADMIN — PANTOPRAZOLE SODIUM 40 MG: 40 TABLET, DELAYED RELEASE ORAL at 08:30

## 2017-09-05 RX ADMIN — RISPERIDONE 1 MG: 1 TABLET ORAL at 08:30

## 2017-09-05 RX ADMIN — LACTULOSE 20 G: 20 POWDER, FOR SOLUTION ORAL at 08:30

## 2017-09-05 RX ADMIN — FOLIC ACID 1 MG: 1 TABLET ORAL at 08:31

## 2017-09-05 RX ADMIN — Medication 20 MG: at 08:30

## 2017-09-05 RX ADMIN — LACTULOSE 20 G: 20 SOLUTION ORAL at 20:48

## 2017-09-05 RX ADMIN — LACTULOSE 20 G: 20 POWDER, FOR SOLUTION ORAL at 14:37

## 2017-09-05 RX ADMIN — LACTULOSE 20 G: 10 SOLUTION ORAL at 22:43

## 2017-09-05 RX ADMIN — RISPERIDONE 2 MG: 2 TABLET ORAL at 22:04

## 2017-09-05 RX ADMIN — SPIRONOLACTONE 50 MG: 50 TABLET ORAL at 08:30

## 2017-09-05 RX ADMIN — RIFAXIMIN 550 MG: 550 TABLET ORAL at 08:30

## 2017-09-05 RX ADMIN — LACTULOSE 20 G: 20 POWDER, FOR SOLUTION ORAL at 19:51

## 2017-09-05 RX ADMIN — RIFAXIMIN 550 MG: 550 TABLET ORAL at 19:51

## 2017-09-05 ASSESSMENT — ACTIVITIES OF DAILY LIVING (ADL)
LAUNDRY: WITH SUPERVISION
GROOMING: INDEPENDENT
ORAL_HYGIENE: INDEPENDENT;PROMPTS
DRESS: SCRUBS (BEHAVIORAL HEALTH)
GROOMING: INDEPENDENT;PROMPTS
LAUNDRY: WITH SUPERVISION
DRESS: SCRUBS (BEHAVIORAL HEALTH);INDEPENDENT
ORAL_HYGIENE: INDEPENDENT

## 2017-09-05 NOTE — PROGRESS NOTES
Pt remained in room for entirety of shift. When asked if she was having hallucinations or delusions responded no, by observation she was responding to internal stimuli by slowly moving her hands in the air above her and grasping at said space. Ate dinner and had no visitors.        09/04/17 2100   Behavioral Health   Hallucinations appears responding   Thinking confused;poor concentration   Orientation time: oriented;date: oriented;place: oriented;person: oriented   Memory baseline memory   Insight poor   Judgement impaired   Eye Contact into space;at examiner   Affect incongruent   Mood mood is calm   Physical Appearance/Attire attire appropriate to age and situation;appears stated age   Hygiene well groomed   Suicidality other (see comments)  (none stated or observed)   Self Injury other (see comment)  (none stated or observed)   Elopement (none)   Activity withdrawn;isolative   Speech coherent   Medication Sensitivity no observed side effects;no stated side effects   Psychomotor / Gait slow  (laying down)   Activities of Daily Living   Hygiene/Grooming prompts   Oral Hygiene prompts   Dress scrubs (behavioral health)   Laundry unable to complete   Room Organization prompts

## 2017-09-06 PROCEDURE — 25000132 ZZH RX MED GY IP 250 OP 250 PS 637: Performed by: PSYCHIATRY & NEUROLOGY

## 2017-09-06 PROCEDURE — 25000132 ZZH RX MED GY IP 250 OP 250 PS 637: Performed by: EMERGENCY MEDICINE

## 2017-09-06 PROCEDURE — 12400007 ZZH R&B MH INTERMEDIATE UMMC

## 2017-09-06 PROCEDURE — 99233 SBSQ HOSP IP/OBS HIGH 50: CPT | Performed by: PSYCHIATRY & NEUROLOGY

## 2017-09-06 PROCEDURE — 99207 ZZC CDG-MDM COMPONENT: MEETS MODERATE - UP CODED: CPT | Performed by: PSYCHIATRY & NEUROLOGY

## 2017-09-06 PROCEDURE — 25000132 ZZH RX MED GY IP 250 OP 250 PS 637: Performed by: NURSE PRACTITIONER

## 2017-09-06 RX ORDER — HALOPERIDOL 5 MG/1
5 TABLET ORAL EVERY 6 HOURS PRN
Status: DISCONTINUED | OUTPATIENT
Start: 2017-09-06 | End: 2018-01-02 | Stop reason: HOSPADM

## 2017-09-06 RX ORDER — HALOPERIDOL 5 MG/1
2.5 TABLET ORAL 2 TIMES DAILY
Status: DISCONTINUED | OUTPATIENT
Start: 2017-09-06 | End: 2017-12-06 | Stop reason: CLARIF

## 2017-09-06 RX ADMIN — RIFAXIMIN 550 MG: 550 TABLET ORAL at 09:25

## 2017-09-06 RX ADMIN — OLANZAPINE 10 MG: 5 TABLET, FILM COATED ORAL at 03:29

## 2017-09-06 RX ADMIN — LACTULOSE 20 G: 20 POWDER, FOR SOLUTION ORAL at 14:05

## 2017-09-06 RX ADMIN — PANTOPRAZOLE SODIUM 40 MG: 40 TABLET, DELAYED RELEASE ORAL at 09:25

## 2017-09-06 RX ADMIN — Medication 20 MG: at 09:25

## 2017-09-06 RX ADMIN — MULTIPLE VITAMINS W/ MINERALS TAB 1 TABLET: TAB at 09:25

## 2017-09-06 RX ADMIN — SPIRONOLACTONE 50 MG: 50 TABLET ORAL at 09:26

## 2017-09-06 RX ADMIN — FOLIC ACID 1 MG: 1 TABLET ORAL at 09:25

## 2017-09-06 RX ADMIN — LACTULOSE 20 G: 20 POWDER, FOR SOLUTION ORAL at 09:26

## 2017-09-06 RX ADMIN — RIFAXIMIN 550 MG: 550 TABLET ORAL at 21:07

## 2017-09-06 RX ADMIN — OLANZAPINE 10 MG: 5 TABLET, FILM COATED ORAL at 16:48

## 2017-09-06 RX ADMIN — RISPERIDONE 1 MG: 1 TABLET ORAL at 01:08

## 2017-09-06 RX ADMIN — RISPERIDONE 2 MG: 2 TABLET ORAL at 21:07

## 2017-09-06 RX ADMIN — HALOPERIDOL 2.5 MG: 1 TABLET ORAL at 21:07

## 2017-09-06 RX ADMIN — LACTULOSE 20 G: 20 POWDER, FOR SOLUTION ORAL at 21:06

## 2017-09-06 RX ADMIN — RISPERIDONE 1 MG: 1 TABLET ORAL at 09:26

## 2017-09-06 RX ADMIN — LACTULOSE 20 G: 20 SOLUTION ORAL at 01:04

## 2017-09-06 ASSESSMENT — ACTIVITIES OF DAILY LIVING (ADL)
GROOMING: PROMPTS
ORAL_HYGIENE: PROMPTS
HYGIENE/GROOMING: PROMPTS
LAUNDRY: UNABLE TO COMPLETE
ORAL_HYGIENE: PROMPTS
DRESS: SCRUBS (BEHAVIORAL HEALTH)

## 2017-09-06 NOTE — PROGRESS NOTES
"Pt refused vital signs    Pt was yelling in room \"Don't come near me! Don't fucking touch me!\" Writer went to pt room to check in on her, and pt stated \"There are people threatening to kill me over there.\" Pt was tearful, and visibly frightened. Writer asked when these visions began, and pt stated \"Last night. That's why I only slept for one hour.\" Asked pt if she would like a medication to help her, and she said \"I would take that.\" Writer offered pt 10 mg Zyprexa prn, and she was agreeable to taking this.    Informed physician that pt was increasingly paranoid and responding to internal stimuli.    Pt took all scheduled medications without issue.  "

## 2017-09-06 NOTE — PLAN OF CARE
"Problem: Psychotic Symptoms  Goal: Psychotic Symptoms  Signs and symptoms of listed problems will be absent or manageable.   Outcome: No Change  48 Hour Assessment    Intake: 75% of dinner tray  - 774 mL fluid (coffee, juice, milk, ginger ale)    Output: No BM; 200 mL urine in hat; no additional reported, or observed, urine output    Pt declined vitals.     Pt isolated to her room for the majority of the evening. She appears to be increasingly more confused, attempting to leave the unit via emergency exit and stating \"I have discharge papers. Where are they? I spoke to everyone today and they said I could leave.\" Pt was visibly responding to internal stimuli, but pulling objects out of the air. Behavior was different enough for pt, that on-call provider was paged, per lactulose protocol. Provider stated to wait 15 minutes after scheduled lactulose dose. Pt continued to wander to the emergency exit, and needed to be redirected by writer and staff. In addition, she continued to make statements regarding needing to leave, and that she had discharge paperwork. Pt was redirectable to her room, and was agreeable to taking prn lactulose.      SI/SIB: Denies    Pt took medications without issue.    No additional concerns at this time. Will continue to monitor and assess.         "

## 2017-09-06 NOTE — PROGRESS NOTES
[facility], [place]   Psychiatric Progress Note        Interim History:   The patient's care was discussed with the treatment team during the daily team meeting and/or staff's chart notes were reviewed.  Staff report patient spent most of the time in her bed. She became agitated at night and was seen yelling, demanding to be discharged. Stated that she had her discharge papers as a reason for discharge. She was recently visited by her  who during visit asked staff when the patient was supposed to be discharged. He was explained that patient was fully committed and waiting for a bed at [facility]. Staff also reported that patient seemed to be experiencing Visual hallucinations trying to  things from the air. She denied Auditory hallucinations and Visual hallucinations during today's visit with me, said she had a good visit with her , said that they were  for 2 months but knew each other for 3 years. She asked about discharge but quietly accepted when I told her that she was waiting for a transfer to [facility]>         Medications:       risperiDONE  1 mg Oral QAM     risperiDONE  2 mg Oral At Bedtime     pantoprazole  40 mg Oral Daily     multivitamin, therapeutic with minerals  1 tablet Oral Daily     folic acid  1 mg Oral Daily     lactulose  20 g Oral TID     rifaximin  550 mg Oral BID     spironolactone  50 mg Oral Daily     furosemide  20 mg Oral Daily          Allergies:     Allergies   Allergen Reactions     Acetaminophen      Ambien [Zolpidem Tartrate] Other (See Comments)     Sleep walks and eats     Ciprofloxacin Other (See Comments)     Seizure.     Citalopram Nausea and Vomiting          Labs:     No results found for this or any previous visit (from the past 24 hour(s)).       Psychiatric Examination:     /67  Pulse 95  Temp 97.7  F (36.5  C) (Oral)  Resp 16  Wt 93.4 kg (206 lb)  SpO2 95%  BMI 33.25 kg/m2  Weight is 206 lbs 0 oz  Body mass index  "is 33.25 kg/(m^2).           Appearance: dressed in hospital scrubs, laying on seizure pads on the floor, covered with blanket.  Attitude:  guarded and partially cooperative  Eye Contact:  poor   Mood: sad  Affect:  intensity is exaggerated at times, at times flat.  Speech: limited speech production  Psychomotor Behavior:  no evidence of tardive dyskinesia, dystonia, or tics and physical agitation  Throught Process:  Slightly more organized.  Associations:  loosening of associations present at times.  Thought Content:  no evidence of suicidal ideation or homicidal ideation, Visual hallucinations are likely.   Insight:  limited  Judgement:  poor  Oriented to:  self and place  Attention Span and Concentration:  limited  Recent and Remote Memory:  poor         Precautions:     Behavioral Orders   Procedures     Code 2     Elopement precautions     Routine Programming     As clinically indicated     Seizure precautions     Status 15     Every 15 minutes.     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             Plan:   Patient is fully MI committed. Per CTC, she is waiting for placement to [facility], see above. Despite mild increase in ammonia level patient appears to be less confused, more aware of her meds. She is on a lactulose protocol. She is being followed by IM.     ADDENDUM: at around 5 pm I was informed by RN that patient was demanding to be discharged from the hospital, reporting visual hallucinations: seeing how her wedding ring was changing color, seeing suspicious people in her room. I went and met with her again. She confirmed that she saw some suspicious people in her room, Visual hallucinations. Said that her orders for discharge were signed by [place]: \"she is native, " "you don't know her\". Will start oral scheduled and prn Haldol, request LFTs and ammonia level.       "

## 2017-09-06 NOTE — PLAN OF CARE
"Problem: Psychotic Symptoms  Goal: Psychotic Symptoms  Signs and symptoms of listed problems will be absent or manageable.   Outcome: No Change  Mildred was isolative to room most of the shift. Sleeping on and off. Estimate of sleep about 2-3hrs. She was out of her room for meals, but would grab her tray and bring it back to her room.      She was oriented to self. She knew she was a hospital but that was it. She lingered briefly near the back exit door, but was easily redirectable away from it. She mentioned on a few occassions thinking she was able to go home today, but when explained why she could not, was accepting of the answer. She said she felt \"fine\", her mood was \"fine\". She did not endorse AH but did ask me to  the diamonds on the floor and put them on the table for her.     Staff that know her better feel that she is at her baseline mentation since admission.     She was encouraged to perform oral care and shower, stated she would later but would not give a time when pressed.       "

## 2017-09-06 NOTE — PROGRESS NOTES
Patient was standing at the exit door at the end of the hallway at 0100 this shift crying and loudly yelling that she had discharge orders to leave now.  This went on for about 10 minutes.  Patient then agreed to take the lactulose and prn Risperdal.  Void and stool x1 right after prns given.  Agreed to go back to her room and try to continue sleeping.  At 0300, patient again came out of her room and went to the same exit door.  This time she quietly walked down, stood for about a minute, and went back to her room. Will continue to monitor and support patient who is fairly redirectable.    At 0332, Zyprexa 10mg given for continued yelling and what appeared to be hallucinations.  At times she was lying on the seizure pads next to her bed crying.  Not long after the 0330 Zyprexa, patient stopped crying and yelling, but did not get any further sleep. She is now sitting in her room quietly.

## 2017-09-07 LAB
ALBUMIN SERPL-MCNC: 2.9 G/DL (ref 3.4–5)
ALP SERPL-CCNC: 146 U/L (ref 40–150)
ALT SERPL W P-5'-P-CCNC: 45 U/L (ref 0–50)
AMMONIA PLAS-SCNC: 52 UMOL/L (ref 10–50)
AST SERPL W P-5'-P-CCNC: 57 U/L (ref 0–45)
BILIRUB DIRECT SERPL-MCNC: 0.3 MG/DL (ref 0–0.2)
BILIRUB SERPL-MCNC: 1.2 MG/DL (ref 0.2–1.3)
PROT SERPL-MCNC: 7.4 G/DL (ref 6.8–8.8)

## 2017-09-07 PROCEDURE — 80076 HEPATIC FUNCTION PANEL: CPT | Performed by: PSYCHIATRY & NEUROLOGY

## 2017-09-07 PROCEDURE — 99207 ZZC CDG-MDM COMPONENT: MEETS MODERATE - UP CODED: CPT | Performed by: PSYCHIATRY & NEUROLOGY

## 2017-09-07 PROCEDURE — 25000132 ZZH RX MED GY IP 250 OP 250 PS 637: Performed by: EMERGENCY MEDICINE

## 2017-09-07 PROCEDURE — 36415 COLL VENOUS BLD VENIPUNCTURE: CPT | Performed by: PSYCHIATRY & NEUROLOGY

## 2017-09-07 PROCEDURE — 12400007 ZZH R&B MH INTERMEDIATE UMMC

## 2017-09-07 PROCEDURE — 99233 SBSQ HOSP IP/OBS HIGH 50: CPT | Performed by: PSYCHIATRY & NEUROLOGY

## 2017-09-07 PROCEDURE — 82140 ASSAY OF AMMONIA: CPT | Performed by: PSYCHIATRY & NEUROLOGY

## 2017-09-07 PROCEDURE — 25000132 ZZH RX MED GY IP 250 OP 250 PS 637: Performed by: PSYCHIATRY & NEUROLOGY

## 2017-09-07 PROCEDURE — 25000132 ZZH RX MED GY IP 250 OP 250 PS 637: Performed by: NURSE PRACTITIONER

## 2017-09-07 RX ADMIN — HALOPERIDOL 2.5 MG: 1 TABLET ORAL at 09:03

## 2017-09-07 RX ADMIN — Medication 20 MG: at 09:03

## 2017-09-07 RX ADMIN — FOLIC ACID 1 MG: 1 TABLET ORAL at 09:03

## 2017-09-07 RX ADMIN — RIFAXIMIN 550 MG: 550 TABLET ORAL at 20:39

## 2017-09-07 RX ADMIN — PANTOPRAZOLE SODIUM 40 MG: 40 TABLET, DELAYED RELEASE ORAL at 09:03

## 2017-09-07 RX ADMIN — LACTULOSE 20 G: 20 POWDER, FOR SOLUTION ORAL at 20:39

## 2017-09-07 RX ADMIN — LACTULOSE 20 G: 20 POWDER, FOR SOLUTION ORAL at 09:03

## 2017-09-07 RX ADMIN — SPIRONOLACTONE 50 MG: 50 TABLET ORAL at 09:03

## 2017-09-07 RX ADMIN — LACTULOSE 20 G: 20 POWDER, FOR SOLUTION ORAL at 13:55

## 2017-09-07 RX ADMIN — RIFAXIMIN 550 MG: 550 TABLET ORAL at 09:03

## 2017-09-07 RX ADMIN — HALOPERIDOL 2.5 MG: 1 TABLET ORAL at 20:39

## 2017-09-07 RX ADMIN — MULTIPLE VITAMINS W/ MINERALS TAB 1 TABLET: TAB at 09:03

## 2017-09-07 RX ADMIN — RISPERIDONE 2 MG: 2 TABLET ORAL at 21:43

## 2017-09-07 RX ADMIN — RISPERIDONE 1 MG: 1 TABLET ORAL at 09:03

## 2017-09-07 ASSESSMENT — ACTIVITIES OF DAILY LIVING (ADL)
HYGIENE/GROOMING: PROMPTS
ORAL_HYGIENE: PROMPTS
DRESS: SCRUBS (BEHAVIORAL HEALTH)

## 2017-09-07 NOTE — PROGRESS NOTES
Patient appeared to respond to internal stimuli at many times during the shift, often insisting to staff that there were invisible people in her room harassing her. Patient also made delusional statements regarding her legal and medical situations and tried the handle of the fire exit in an attempt to leave. Patient isolated to her room almost all shift and reacted to staff with irritability and refusal. Patient appeared confused and disoriented.     09/06/17 2000   Behavioral Health   Hallucinations appears responding   Thinking confused;distractable;delusional;paranoid;poor concentration   Orientation person: oriented;situation, disoriented   Insight poor   Judgement impaired   Eye Contact into space   Affect tense;irritable   Mood anxious;irritable   Physical Appearance/Attire disheveled   Hygiene neglected grooming - unclean body, hair, teeth   Suicidality other (see comments)  (None stated or observed)   Self Injury other (see comment)  (None stated or observed)   Elopement Loitering near exit doors;Trying handles of doors;Statements about wanting to leave;Hallucinations directing behavior   Activity isolative;withdrawn;refusal   Speech clear;coherent   Medication Sensitivity no stated side effects;no observed side effects   Psychomotor / Gait slow   Activities of Daily Living   Hygiene/Grooming prompts   Oral Hygiene prompts   Dress scrubs (behavioral health)   Laundry unable to complete   Room Organization prompts

## 2017-09-07 NOTE — PLAN OF CARE
"Problem: Psychotic Symptoms  Goal: Psychotic Symptoms  Signs and symptoms of listed problems will be absent or manageable.      Pt was isolative to her room this shift.  Pt displays a flat affect, compliant with meds but does not attend any group.  Pt denies AH but appears responding.  During check-in pt reported \"I am fine just tired.\"  Pt is oriented to self and place does not know the time or date.  Will continue to monitor pt as ordered.        "

## 2017-09-07 NOTE — PROGRESS NOTES
[facility], [place]   Psychiatric Progress Note        Interim History:   The patient's care was discussed with the treatment team during the daily team meeting and/or staff's chart notes were reviewed.  Staff report patient spent most of the time in her bed. She again became agitated in the evening, told staff that there were people in her room who were after her, seemed to be responding to internal stimuli. She was given prn medication and then, fell asleep. She continues to spend all the time in her room, doesn't go to groups. Compliant with meds, no complaints of side effects. Ammonia level was 52, lower than before.          Medications:       haloperidol  2.5 mg Oral BID     risperiDONE  1 mg Oral QAM     risperiDONE  2 mg Oral At Bedtime     pantoprazole  40 mg Oral Daily     multivitamin, therapeutic with minerals  1 tablet Oral Daily     folic acid  1 mg Oral Daily     lactulose  20 g Oral TID     rifaximin  550 mg Oral BID     spironolactone  50 mg Oral Daily     furosemide  20 mg Oral Daily          Allergies:     Allergies   Allergen Reactions     Acetaminophen      Ambien [Zolpidem Tartrate] Other (See Comments)     Sleep walks and eats     Ciprofloxacin Other (See Comments)     Seizure.     Citalopram Nausea and Vomiting          Labs:     Recent Results (from the past 24 hour(s))   Hepatic panel    Collection Time: 09/07/17  8:07 AM   Result Value Ref Range    Bilirubin Direct 0.3 (H) 0.0 - 0.2 mg/dL    Bilirubin Total 1.2 0.2 - 1.3 mg/dL    Albumin 2.9 (L) 3.4 - 5.0 g/dL    Protein Total 7.4 6.8 - 8.8 g/dL    Alkaline Phosphatase 146 40 - 150 U/L    ALT 45 0 - 50 U/L    AST 57 (H) 0 - 45 U/L   Ammonia    Collection Time: 09/07/17  8:07 AM   Result Value Ref Range    Ammonia 52 (H) 10 - 50 umol/L          Psychiatric Examination:     /67  Pulse 95  Temp 97.6  F (36.4  C) (Oral)  Resp 16  Wt 93.4 kg (206 lb)  SpO2 95%  BMI 33.25 kg/m2  Weight is 206 lbs 0 oz   Body mass index is 33.25 kg/(m^2).           Appearance: dressed in hospital scrubs, seen in bed.  Attitude:  guarded and partially cooperative, somnolent  Eye Contact:  poor   Mood: sad  Affect:  intensity is exaggerated at times, at times flat.  Speech: limited speech production  Psychomotor Behavior:  no evidence of tardive dyskinesia, dystonia, or tics and physical agitation  Throught Process:  Slightly more organized.  Associations:  loosening of associations present at times.  Thought Content:  no evidence of suicidal ideation or homicidal ideation, Visual hallucinations are likely, didn't voice delusional ideas today, voiced them yesterday.   Insight:  limited  Judgement:  poor  Oriented to:  self and place  Attention Span and Concentration:  limited  Recent and Remote Memory:  poor         Precautions:     Behavioral Orders   Procedures     Code 2     Elopement precautions     Routine Programming     As clinically indicated     Seizure precautions     Status 15     Every 15 minutes.     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             Plan:   Patient is fully MI committed. Per CTC, she is waiting for placement to [facility], see above. Degree of her confusion/psychotic symptoms fluctuate from day to day, it doesn't seem to correlate to her ammonia level. She is on a lactulose protocol. She is being followed by IM. No medication changes today.

## 2017-09-08 PROCEDURE — 25000132 ZZH RX MED GY IP 250 OP 250 PS 637: Performed by: PSYCHIATRY & NEUROLOGY

## 2017-09-08 PROCEDURE — 25000132 ZZH RX MED GY IP 250 OP 250 PS 637: Performed by: NURSE PRACTITIONER

## 2017-09-08 PROCEDURE — 99232 SBSQ HOSP IP/OBS MODERATE 35: CPT | Performed by: PSYCHIATRY & NEUROLOGY

## 2017-09-08 PROCEDURE — 12400007 ZZH R&B MH INTERMEDIATE UMMC

## 2017-09-08 PROCEDURE — 99207 ZZC CDG-MDM COMPONENT: MEETS MODERATE - UP CODED: CPT | Performed by: PSYCHIATRY & NEUROLOGY

## 2017-09-08 PROCEDURE — 25000132 ZZH RX MED GY IP 250 OP 250 PS 637: Performed by: EMERGENCY MEDICINE

## 2017-09-08 RX ADMIN — FOLIC ACID 1 MG: 1 TABLET ORAL at 08:56

## 2017-09-08 RX ADMIN — LACTULOSE 20 G: 20 POWDER, FOR SOLUTION ORAL at 20:33

## 2017-09-08 RX ADMIN — HALOPERIDOL 2.5 MG: 1 TABLET ORAL at 08:56

## 2017-09-08 RX ADMIN — RIFAXIMIN 550 MG: 550 TABLET ORAL at 08:57

## 2017-09-08 RX ADMIN — RISPERIDONE 1 MG: 1 TABLET ORAL at 08:56

## 2017-09-08 RX ADMIN — SPIRONOLACTONE 50 MG: 50 TABLET ORAL at 08:56

## 2017-09-08 RX ADMIN — LACTULOSE 20 G: 20 POWDER, FOR SOLUTION ORAL at 13:57

## 2017-09-08 RX ADMIN — HALOPERIDOL 2.5 MG: 1 TABLET ORAL at 20:33

## 2017-09-08 RX ADMIN — RIFAXIMIN 550 MG: 550 TABLET ORAL at 20:33

## 2017-09-08 RX ADMIN — Medication 20 MG: at 08:56

## 2017-09-08 RX ADMIN — RISPERIDONE 2 MG: 2 TABLET ORAL at 20:33

## 2017-09-08 RX ADMIN — PANTOPRAZOLE SODIUM 40 MG: 40 TABLET, DELAYED RELEASE ORAL at 08:56

## 2017-09-08 RX ADMIN — LACTULOSE 20 G: 20 POWDER, FOR SOLUTION ORAL at 08:56

## 2017-09-08 RX ADMIN — MULTIPLE VITAMINS W/ MINERALS TAB 1 TABLET: TAB at 08:57

## 2017-09-08 ASSESSMENT — ACTIVITIES OF DAILY LIVING (ADL)
HYGIENE/GROOMING: PROMPTS
DRESS: SCRUBS (BEHAVIORAL HEALTH);INDEPENDENT
HYGIENE/GROOMING: PROMPTS
ORAL_HYGIENE: PROMPTS
ORAL_HYGIENE: PROMPTS
DRESS: SCRUBS (BEHAVIORAL HEALTH)

## 2017-09-08 NOTE — PROGRESS NOTES
"[facility], [place]   Psychiatric Progress Note        Interim History:   The patient's care was discussed with the treatment team during the daily team meeting and/or staff's chart notes were reviewed.  Staff report patient spent most of the time in her bed. She reported suffering from cold, but declined when I asked if she needed cough medication: \"I will get over it\". Complained of tiredness. Staff reports that patient seems to be responding to internal stimuli. She didn't voice delusional ideas during visit with me.       Medications:       haloperidol  2.5 mg Oral BID     risperiDONE  1 mg Oral QAM     risperiDONE  2 mg Oral At Bedtime     pantoprazole  40 mg Oral Daily     multivitamin, therapeutic with minerals  1 tablet Oral Daily     folic acid  1 mg Oral Daily     lactulose  20 g Oral TID     rifaximin  550 mg Oral BID     spironolactone  50 mg Oral Daily     furosemide  20 mg Oral Daily          Allergies:     Allergies   Allergen Reactions     Acetaminophen      Ambien [Zolpidem Tartrate] Other (See Comments)     Sleep walks and eats     Ciprofloxacin Other (See Comments)     Seizure.     Citalopram Nausea and Vomiting          Labs:     No results found for this or any previous visit (from the past 24 hour(s)).       Psychiatric Examination:     BP 99/47  Pulse 71  Temp 97.7  F (36.5  C) (Oral)  Resp 16  Wt 93.4 kg (206 lb)  SpO2 95%  BMI 33.25 kg/m2  Weight is 206 lbs 0 oz  Body mass index is 33.25 kg/(m^2).           Appearance: dressed in hospital scrubs, seen in bed.  Attitude:  guarded and partially cooperative, somnolent  Eye Contact:  poor   Mood: sad  Affect:  intensity is exaggerated at times, at times flat.  Speech: limited speech production  Psychomotor Behavior:  no evidence of tardive dyskinesia, dystonia, or tics and physical agitation  Throught Process:  Slightly more organized.  Associations:  loosening of associations present at times.  Thought " Content:  no evidence of suicidal ideation or homicidal ideation, Visual hallucinations are likely, didn't voice delusional ideas today, voiced them yesterday.   Insight:  limited  Judgement:  poor  Oriented to:  self and place  Attention Span and Concentration:  limited  Recent and Remote Memory:  poor         Precautions:     Behavioral Orders   Procedures     Code 2     Elopement precautions     Routine Programming     As clinically indicated     Seizure precautions     Status 15     Every 15 minutes.     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             Plan:   Patient is fully MI committed. Per CTC, she is waiting for placement to [facility], see above. Degree of her confusion/psychotic symptoms fluctuate from day to day, it doesn't seem to correlate to her ammonia level. She is on a lactulose protocol. She is being followed by IM. No medication changes today.

## 2017-09-08 NOTE — PROGRESS NOTES
Patient isolated to her room for the entire shift and appeared to respond to internal stimuli. Patient was irritable upon approach and answered staff questions only minimally. Patient expressed delusional thoughts and appeared disoriented and confused.     09/07/17 2100   Behavioral Health   Hallucinations appears responding   Thinking confused;distractable;delusional;paranoid;poor concentration   Orientation person: oriented;place: oriented;date, disoriented;time, disoriented   Insight poor   Judgement impaired   Eye Contact at examiner   Affect blunted, flat;irritable   Mood labile;irritable   Physical Appearance/Attire untidy;disheveled   Hygiene neglected grooming - unclean body, hair, teeth   Suicidality other (see comments)  (Denies)   Self Injury other (see comment)  (Denies)   Elopement Statements about wanting to leave;Hallucinations directing behavior   Activity isolative;withdrawn   Speech clear   Medication Sensitivity no stated side effects;no observed side effects   Psychomotor / Gait balanced;steady   Activities of Daily Living   Hygiene/Grooming prompts   Oral Hygiene prompts   Dress scrubs (behavioral health)   Room Organization prompts

## 2017-09-08 NOTE — PROGRESS NOTES
Received voicemail from SEBAS Rausch requesting we fax updated progress notes and MAR to [facility] at [place]: [phone]. Writer faxed notes.

## 2017-09-08 NOTE — PROGRESS NOTES
"   09/08/17 1211   Behavioral Health   Hallucinations other (see comment)  (JOSE)   Thinking poor concentration;distractable;paranoid   Orientation person: oriented;place: oriented   Memory baseline memory   Insight poor   Judgement impaired   Eye Contact at floor;into space;rolling   Affect blunted, flat;irritable   Mood labile;irritable   Physical Appearance/Attire untidy;disheveled   Hygiene neglected grooming - unclean body, hair, teeth   Suicidality other (see comments)  (denies)   Self Injury other (see comment)  (denies)   Elopement (Does not appear)   Activity isolative;withdrawn   Speech rambling   Medication Sensitivity no stated side effects   Psychomotor / Gait unsteady;foot dragging   Psycho Education   Type of Intervention 1:1 intervention   Response participates with encouragement   Hours 0.5   Activities of Daily Living   Hygiene/Grooming prompts   Oral Hygiene prompts   Dress scrubs (behavioral health)   Room Organization prompts   Behavioral Health Interventions   Psychotic Symptoms redirection of aggressive behaviors;encourage participation / independence with adls;establish therapeutic relationship;provide emotional support   Social and Therapeutic Interventions (Psychotic Symptoms) encourage socialization with peers;encourage participation in therapeutic groups and milieu activities     Pt was isolative in her room for all of the shift. Pt complained about knee and leg pain. Pt stated that she still suffering from a cold. Overall, she described her feelings as \"crap\". Pt denied any plans or thoughts of SI and SIB. Pt denied feeling depressed and anxious, however as this author was checking in with her, the pt was very agitated and annoyed as she was answering the questions.   "

## 2017-09-09 LAB — AMMONIA PLAS-SCNC: 103 UMOL/L (ref 10–50)

## 2017-09-09 PROCEDURE — 12400007 ZZH R&B MH INTERMEDIATE UMMC

## 2017-09-09 PROCEDURE — 36415 COLL VENOUS BLD VENIPUNCTURE: CPT | Performed by: HOSPITALIST

## 2017-09-09 PROCEDURE — 25000132 ZZH RX MED GY IP 250 OP 250 PS 637: Performed by: EMERGENCY MEDICINE

## 2017-09-09 PROCEDURE — 25000132 ZZH RX MED GY IP 250 OP 250 PS 637: Performed by: NURSE PRACTITIONER

## 2017-09-09 PROCEDURE — 25000132 ZZH RX MED GY IP 250 OP 250 PS 637: Performed by: PSYCHIATRY & NEUROLOGY

## 2017-09-09 PROCEDURE — 82140 ASSAY OF AMMONIA: CPT | Performed by: HOSPITALIST

## 2017-09-09 RX ORDER — LACTULOSE 10 G/10G
30 SOLUTION ORAL 3 TIMES DAILY
Status: DISCONTINUED | OUTPATIENT
Start: 2017-09-10 | End: 2018-01-02 | Stop reason: HOSPADM

## 2017-09-09 RX ADMIN — FOLIC ACID 1 MG: 1 TABLET ORAL at 09:00

## 2017-09-09 RX ADMIN — LACTULOSE 20 G: 20 POWDER, FOR SOLUTION ORAL at 14:09

## 2017-09-09 RX ADMIN — Medication 20 MG: at 09:00

## 2017-09-09 RX ADMIN — HALOPERIDOL 2.5 MG: 1 TABLET ORAL at 09:00

## 2017-09-09 RX ADMIN — LACTULOSE 20 G: 20 POWDER, FOR SOLUTION ORAL at 09:00

## 2017-09-09 RX ADMIN — RIFAXIMIN 550 MG: 550 TABLET ORAL at 22:09

## 2017-09-09 RX ADMIN — LACTULOSE 20 G: 20 SOLUTION ORAL at 20:23

## 2017-09-09 RX ADMIN — PANTOPRAZOLE SODIUM 40 MG: 40 TABLET, DELAYED RELEASE ORAL at 09:00

## 2017-09-09 RX ADMIN — RISPERIDONE 2 MG: 2 TABLET ORAL at 22:09

## 2017-09-09 RX ADMIN — LACTULOSE 20 G: 20 SOLUTION ORAL at 22:09

## 2017-09-09 RX ADMIN — RISPERIDONE 1 MG: 1 TABLET ORAL at 09:00

## 2017-09-09 RX ADMIN — SPIRONOLACTONE 50 MG: 50 TABLET ORAL at 09:00

## 2017-09-09 RX ADMIN — RIFAXIMIN 550 MG: 550 TABLET ORAL at 08:59

## 2017-09-09 RX ADMIN — MULTIPLE VITAMINS W/ MINERALS TAB 1 TABLET: TAB at 09:00

## 2017-09-09 RX ADMIN — HALOPERIDOL 2.5 MG: 1 TABLET ORAL at 22:09

## 2017-09-09 RX ADMIN — LACTULOSE 20 G: 20 SOLUTION ORAL at 18:26

## 2017-09-09 ASSESSMENT — ACTIVITIES OF DAILY LIVING (ADL)
HYGIENE/GROOMING: PROMPTS
DRESS: SCRUBS (BEHAVIORAL HEALTH)
DRESS: SCRUBS (BEHAVIORAL HEALTH);INDEPENDENT
ORAL_HYGIENE: PROMPTS
ORAL_HYGIENE: PROMPTS
GROOMING: PROMPTS

## 2017-09-09 NOTE — PROGRESS NOTES
"AT approx 1645 pt was standing near the fire exit door, turning the handle.  Writer approached pt, and she stated \"I am leaving here\" \"My  owns this building\"  \"I can discharge now\".  Pt speech was slowed (appeared to have thought blocking).  Pt appeared to be responding to IS.  Pt still oriented to self, place, and date  Pt was easily redirected to her room.      Writer offered pt 5 mg haldol PO for psychosis.  Pt refused the medication \"I am not taking that!\"    Will continue to monitor and assess.  "

## 2017-09-09 NOTE — PROGRESS NOTES
09/08/17 2213   Behavioral Health   Hallucinations visual   Thinking poor concentration;distractable;confused   Orientation person: oriented   Memory baseline memory   Insight poor   Judgement impaired   Eye Contact at floor;into space   Affect blunted, flat   Physical Appearance/Attire appears stated age;attire appropriate to age and situation;disheveled   Hygiene neglected grooming - unclean body, hair, teeth   Suicidality other (see comments)  (none reported by pt)   Self Injury other (see comment)  (none reported by pt)   Elopement Loitering near exit doors   Activity isolative;withdrawn   Speech clear   Psychomotor / Gait balanced;steady   Activities of Daily Living   Hygiene/Grooming prompts   Oral Hygiene prompts   Dress scrubs (behavioral health);independent   Room Organization prompts   Significant Event   Significant Event Other (see comments)  (shift summary)   Behavioral Health Interventions   Psychotic Symptoms maintain safety precautions;monitor need to revise level of observation;maintain safe secure environment;reality orientation;simple, clear language;decrease environmental stimulation;assist patient in developing safety plan;assist patient in following safety plan;encourage nutrition and hydration;encourage participation / independence with adls;provide emotional support;establish therapeutic relationship;assist with developing & utilizing healthy coping strategies;build upon strengths;monitor need for prn medication;monitor confusion, memory loss, decision making ability and reorient / intervent as needed   Social and Therapeutic Interventions (Psychotic Symptoms) encourage socialization with peers;encourage effective boundaries with peers;encourage participation in therapeutic groups and milieu activities         Pt spent majority of the shift in her room except at dinner time. Pt then did stand down by the exit door just staring at it for a few minutes. Pt wouldn't check in with this writer.  No SI/SIB observed this shift.

## 2017-09-09 NOTE — PLAN OF CARE
Problem: Psychotic Symptoms  Goal: Psychotic Symptoms  Signs and symptoms of listed problems will be absent or manageable.   Outcome: Improving  48 Hour Nursing Assessment:  Day 31 of hospitalization.  Mildred continues to not want to talk to staff.  She is eating all of her meals.  Today her morning BP was 91/47.  After drinking some fluids it pollo to 103/51.  She does little besides lay in her bed for most of the day and eat her meals.  She seemed a bit more responsive and spontaneous this morning as compared to last week.  She did not know the day of the week but was otherwise oriented.

## 2017-09-09 NOTE — PROGRESS NOTES
"At approx 1800 pt was out in the hallway, and was grabbing at invisible objects.  Pt only oriented to self.  Pt appeared more confused and agitated then earlier assessment.  Pt states her last BM was \"Earlier today\"\", but she is a poor historian.  Per flowsheets she had 1 BM today.  Nurse initiated lactulose protocol.  Pt would not take first dose until after supper, approx 1830.  Refusing VS currently, most recent vitals at 1630 WNL.  House officer paged.    Will continue to monitor and assess.  "

## 2017-09-10 ENCOUNTER — APPOINTMENT (OUTPATIENT)
Dept: ULTRASOUND IMAGING | Facility: CLINIC | Age: 42
End: 2017-09-10
Attending: HOSPITALIST
Payer: MEDICAID

## 2017-09-10 LAB — AMMONIA PLAS-SCNC: 86 UMOL/L (ref 10–50)

## 2017-09-10 PROCEDURE — 25000132 ZZH RX MED GY IP 250 OP 250 PS 637: Performed by: PSYCHIATRY & NEUROLOGY

## 2017-09-10 PROCEDURE — 25000132 ZZH RX MED GY IP 250 OP 250 PS 637: Performed by: NURSE PRACTITIONER

## 2017-09-10 PROCEDURE — 25000132 ZZH RX MED GY IP 250 OP 250 PS 637: Performed by: HOSPITALIST

## 2017-09-10 PROCEDURE — 12400007 ZZH R&B MH INTERMEDIATE UMMC

## 2017-09-10 PROCEDURE — 82140 ASSAY OF AMMONIA: CPT | Performed by: PHYSICIAN ASSISTANT

## 2017-09-10 PROCEDURE — 25000132 ZZH RX MED GY IP 250 OP 250 PS 637: Performed by: EMERGENCY MEDICINE

## 2017-09-10 PROCEDURE — 36415 COLL VENOUS BLD VENIPUNCTURE: CPT | Performed by: PHYSICIAN ASSISTANT

## 2017-09-10 PROCEDURE — 76700 US EXAM ABDOM COMPLETE: CPT | Mod: XS

## 2017-09-10 RX ADMIN — RISPERIDONE 1 MG: 1 TABLET ORAL at 08:22

## 2017-09-10 RX ADMIN — PANTOPRAZOLE SODIUM 40 MG: 40 TABLET, DELAYED RELEASE ORAL at 08:22

## 2017-09-10 RX ADMIN — Medication 20 MG: at 08:23

## 2017-09-10 RX ADMIN — HALOPERIDOL 2.5 MG: 1 TABLET ORAL at 19:08

## 2017-09-10 RX ADMIN — LACTULOSE 10 G: 10 POWDER, FOR SOLUTION ORAL at 11:00

## 2017-09-10 RX ADMIN — HALOPERIDOL 2.5 MG: 1 TABLET ORAL at 08:22

## 2017-09-10 RX ADMIN — LACTULOSE 30 G: 10 POWDER, FOR SOLUTION ORAL at 13:54

## 2017-09-10 RX ADMIN — FOLIC ACID 1 MG: 1 TABLET ORAL at 08:22

## 2017-09-10 RX ADMIN — MULTIPLE VITAMINS W/ MINERALS TAB 1 TABLET: TAB at 08:22

## 2017-09-10 RX ADMIN — RIFAXIMIN 550 MG: 550 TABLET ORAL at 19:08

## 2017-09-10 RX ADMIN — LACTULOSE 30 G: 10 POWDER, FOR SOLUTION ORAL at 19:08

## 2017-09-10 RX ADMIN — SPIRONOLACTONE 50 MG: 50 TABLET ORAL at 08:22

## 2017-09-10 RX ADMIN — RIFAXIMIN 550 MG: 550 TABLET ORAL at 08:22

## 2017-09-10 RX ADMIN — LACTULOSE 20 G: 20 SOLUTION ORAL at 08:22

## 2017-09-10 ASSESSMENT — ACTIVITIES OF DAILY LIVING (ADL)
LAUNDRY: UNABLE TO COMPLETE
GROOMING: INDEPENDENT
DRESS: SCRUBS (BEHAVIORAL HEALTH)
DRESS: SCRUBS (BEHAVIORAL HEALTH)
ORAL_HYGIENE: PROMPTS
ORAL_HYGIENE: PROMPTS
GROOMING: PROMPTS

## 2017-09-10 NOTE — PROGRESS NOTES
Mildred took all of her morning medications except Lactulose 30 as it wasn't available.  Was given lactulose 20 and was then taken to ultrasound.  On return, she was given the lactulose 30 but only took about 1/5th of it.  Will encourage her to take more.  She is fully oriented today even knowing the day of the week.  The report from the staff that took her to ultrasound said she was polite, and followed directions.    1240 - Ammonia is 86 down from 103 of yesterday.  Ultrasound is done and reported on.  Please see results.  Mildred is currently up and showering at her own request.

## 2017-09-10 NOTE — PROGRESS NOTES
When presented with 0030 dose of lactulose,she adamantly refused. Cursing and shouting that she will not take it and will refuse any intervention to give it other than orally. Says she will take it in the morning. MD notified of patients refusal. Will try to restart in AM. Unable to do HE assessment due to lack of cooperation from patient. At 0630 awakened and offered Lactulose. Remains irritable and resistant to taking medication. Said she will take it later.

## 2017-09-10 NOTE — PROGRESS NOTES
Results for RIO SNYDER (MRN [id number]) as of 9/9/2017 20:33   Ref. Range 9/9/2017 19:12   Ammonia Latest Ref Range: 10 - 50 umol/L 103 ()       Lab notified writer of critical value.  Writer informed house officer.  Plan is to continue lactulose protocol until pt has 3-4 bms.

## 2017-09-10 NOTE — ED PROVIDER NOTES
Results for RIO SNYDER (MRN [id number]) as of 9/9/2017 20:12   Ref. Range 9/9/2017 19:12   Ammonia Latest Ref Range: 10 - 50 umol/L 103 ()       Writer was informed of critical value with lab.  Writer informed house officer about value.  Lactulose protocol is to be continued until pt poops 3-4 times.    Will continue to monitor and assess.

## 2017-09-10 NOTE — PROGRESS NOTES
Chart reviewed. Noted to have high ammonia level. Based on prior notes, mental status changes does not seem to correlate to ammonia levels. None the less, we will initiate in lactulose protocol to bring ammonia down and see if mentation get better. I will also incrase scheduled lactulose to 30 g TID. It does not look like she was having 3 BMs per day on 20 g TID. No signs of fever noted. We will get Ultrasound of the liver tomorrow given several times ammonia has been elevated. We will look for portal vein thrombosis. If significant fluid is seen on exam, we may consider tap to assess SBP given fluctuating mentation. UA has been negative in the past. She was also started on psychotropic medications, which can cause changes in mental status as well.

## 2017-09-10 NOTE — PROGRESS NOTES
Pt is withdrawn spending a majority of her time in her room including meals. Pt attended to her ADL's independently today. Pt is denying any hallucinations, SI and urges for SIB. Pt is generally cooperative with staff requests and appropriate.

## 2017-09-10 NOTE — PROGRESS NOTES
Administered 3rd dose of lactulose protocol, pt drank half the dose, then tossed it across the room.  Pt is agitated and easily annoyed.  PT is oriented to self and place.  Pt has displayed no insight into her illness.  Pt has had 1 BM today, none on this shift.  Will pass to incoming nurse to continue lactulose administration.  Next dose due 9/10/17 at 0030.

## 2017-09-10 NOTE — PROGRESS NOTES
Pt more out of room but trying to open exit door at end of hallway several times. Needed to be redirected multiple times and was agitated all times. Had one instance of crying back in room after redirection. Was irritable with everyone this shift. Multiple staffs seeing pt pick at air and responding in room. Pt also stated that she talked to her . When asked how, she claimed in her room she talked to her  who told her to open the exit doors. Pt gets agitated when asked about her doctor and hospitalization especially of discharge plans. Otherwise, pt needed a lot of prompting this shift.       09/09/17 2230   Behavioral Health   Hallucinations auditory;appears responding;visual   Thinking distractable;poor concentration   Orientation place: oriented   Memory baseline memory   Insight poor   Judgement impaired   Eye Contact at examiner   Affect sad;tense;irritable;blunted, flat;angry   Mood depressed;irritable;labile   Physical Appearance/Attire disheveled;untidy   Hygiene neglected grooming - unclean body, hair, teeth   Suicidality other (see comments)  (none stated/observed)   Self Injury other (see comment)  (none stated/observed)   Elopement Loitering near exit doors;Trying handles of doors;Statements about wanting to leave   Activity withdrawn;isolative;refusal   Speech coherent;clear   Medication Sensitivity no observed side effects;no stated side effects   Psychomotor / Gait foot dragging;steady   Activities of Daily Living   Hygiene/Grooming prompts   Oral Hygiene prompts   Dress scrubs (behavioral health)   Room Organization prompts

## 2017-09-10 NOTE — PROGRESS NOTES
Brief Medicine Note:    Internal medicine contacted overnight regarding mental status changes and elevated ammonia which was noted to be 103. Patient was on scheduled and prn lactulose protocol, however, appears if she was ongoing getting scheduled lactulose and was not having adequate BMs. Scheduled lactulose increased from 20 mg tid to 30 mg tid overnight.     Per nursing, alert and oriented x3 this morning. Stooling adequately with increased lactulose dosing. Repeat ammonia improved to 86 (103). Abdominal US with dopplers 9/10 without ascites, antegrade flow of portal vein. Decreased velocity could suggest intermittent portal HTN and/or reversal of flow. Retrograde flow of splenic vein. Indeterminate hypoattenuating lesion within the cirrhotic liver, R lobe. Suggestion of faint posterior acoustic enhancement, solid mass cannot be excluded. Splenomegaly.  - Follow up OP for MRI to assess mass, ordered by medicine  - No indicated to continue trending ammonia unless mental status changes  - Medicine will sign off. Please contact if having <3-4 BMs per day despite scheduled and prn lactulose or acute changes occur    Bridgette Champion PA-C  Internal Medicine [facility]  [phone]

## 2017-09-11 PROCEDURE — 25000132 ZZH RX MED GY IP 250 OP 250 PS 637: Performed by: EMERGENCY MEDICINE

## 2017-09-11 PROCEDURE — 12400007 ZZH R&B MH INTERMEDIATE UMMC

## 2017-09-11 PROCEDURE — 25000132 ZZH RX MED GY IP 250 OP 250 PS 637: Performed by: NURSE PRACTITIONER

## 2017-09-11 PROCEDURE — 25000132 ZZH RX MED GY IP 250 OP 250 PS 637: Performed by: HOSPITALIST

## 2017-09-11 PROCEDURE — 99232 SBSQ HOSP IP/OBS MODERATE 35: CPT | Performed by: PSYCHIATRY & NEUROLOGY

## 2017-09-11 PROCEDURE — 25000132 ZZH RX MED GY IP 250 OP 250 PS 637: Performed by: PSYCHIATRY & NEUROLOGY

## 2017-09-11 RX ORDER — FERROUS GLUCONATE 324(38)MG
324 TABLET ORAL
Status: DISCONTINUED | OUTPATIENT
Start: 2017-09-12 | End: 2018-01-02 | Stop reason: HOSPADM

## 2017-09-11 RX ORDER — DEXTROMETHORPHAN POLISTIREX 30 MG/5ML
30 SUSPENSION ORAL EVERY 12 HOURS SCHEDULED
Status: DISCONTINUED | OUTPATIENT
Start: 2017-09-11 | End: 2017-09-22

## 2017-09-11 RX ADMIN — LACTULOSE 30 G: 10 POWDER, FOR SOLUTION ORAL at 14:43

## 2017-09-11 RX ADMIN — RISPERIDONE 1 MG: 1 TABLET ORAL at 09:25

## 2017-09-11 RX ADMIN — DEXTROMETHORPHAN 30 MG: 30 SUSPENSION, EXTENDED RELEASE ORAL at 19:33

## 2017-09-11 RX ADMIN — HALOPERIDOL 2.5 MG: 1 TABLET ORAL at 09:27

## 2017-09-11 RX ADMIN — Medication 20 MG: at 09:24

## 2017-09-11 RX ADMIN — LACTULOSE 20 G: 20 SOLUTION ORAL at 17:17

## 2017-09-11 RX ADMIN — RISPERIDONE 2 MG: 2 TABLET ORAL at 21:33

## 2017-09-11 RX ADMIN — SPIRONOLACTONE 50 MG: 50 TABLET ORAL at 09:25

## 2017-09-11 RX ADMIN — LACTULOSE 30 G: 10 POWDER, FOR SOLUTION ORAL at 09:27

## 2017-09-11 RX ADMIN — LACTULOSE 20 G: 20 SOLUTION ORAL at 21:32

## 2017-09-11 RX ADMIN — FOLIC ACID 1 MG: 1 TABLET ORAL at 09:25

## 2017-09-11 RX ADMIN — HALOPERIDOL 2.5 MG: 1 TABLET ORAL at 19:32

## 2017-09-11 RX ADMIN — RIFAXIMIN 550 MG: 550 TABLET ORAL at 09:32

## 2017-09-11 RX ADMIN — PANTOPRAZOLE SODIUM 40 MG: 40 TABLET, DELAYED RELEASE ORAL at 09:40

## 2017-09-11 RX ADMIN — LACTULOSE 30 G: 10 POWDER, FOR SOLUTION ORAL at 19:31

## 2017-09-11 RX ADMIN — RIFAXIMIN 550 MG: 550 TABLET ORAL at 19:32

## 2017-09-11 RX ADMIN — MULTIPLE VITAMINS W/ MINERALS TAB 1 TABLET: TAB at 09:25

## 2017-09-11 RX ADMIN — LORAZEPAM 1 MG: 1 TABLET ORAL at 17:17

## 2017-09-11 ASSESSMENT — ACTIVITIES OF DAILY LIVING (ADL)
DRESS: PROMPTS;SCRUBS (BEHAVIORAL HEALTH)
ORAL_HYGIENE: PROMPTS
DRESS: STREET CLOTHES;INDEPENDENT
HYGIENE/GROOMING: PROMPTS
GROOMING: PROMPTS
LAUNDRY: WITH SUPERVISION
ORAL_HYGIENE: PROMPTS

## 2017-09-11 NOTE — PROGRESS NOTES
"Pt was screaming and crying in her room, stating \"They are going to kill me! They are in my room, and you can't see them because they are invisible. You are stepping on their face!\" Pt requested something \"To knock me out so I can sleep.\" She went on to say that she attempted to sleep, but became agitated. Due to increased confusion, writer implemented lactulose protocol, per orders; 20 g lactulose given, and will repeat q2h (gave pt 20 mg prn; second dose at 1930 was scheduled dose of 30 g, so prn was not given; will given third dose of prn at 2130). 1 mg prn Ativan was given for agitation.    At the time of this note, pt stated feeling less agitated.    Intake: 100% of banana, macaroni and cheese, green beans; 80 % of chicken tenders  90 mL of milk  240 mL cranberry juice    Output: 1 urine (per pt report); no BM    Pt took all medications without issues. Will continue to monitor pt.  "

## 2017-09-11 NOTE — PLAN OF CARE
"Problem: Psychotic Symptoms  Goal: Psychotic Symptoms  Signs and symptoms of listed problems will be absent or manageable.   Outcome: No Change     48 Hour Nurse Note:  Pt states she is \"fine\".  Pt reports anxiety, but denies all other mental health concerns.  During the day shift and start of this shift, pt had no apparent psychotic symptoms.  At approx 1700 pt was out in the milieu, testing doors, grabbing at invisible objects. Pt could still state the date, her location, and her name.  Pt also knew the time of day and the president.  At 1830, pt was sitting on top of her bed \"making a movie\" and was frustrated when staff would  her \"movie\" by standing on her room's floor. Pt still oriented x3, but appeared to be responding to AH and VH. Pt has had difficulty with ammonia, most recently the day before.  Writer assessed pt as Orla stage 1( due to confusion and altered mood) and tried to start lactulose protocol.  Pt refused the PRN lactulose \"I am going to throw that at you if you bring it near me\".  Pt accepted scheduled lactulose at 1900, but not receptive to PRNs (pt remembers which medications are scheduled for her).  Pt refusing all PRNs. Pt refusing VS.  AT 2100, pt still AOx3.  Pt reports she had 2 BMs during this shift, bringing daily total to 3 (goal as per IM).  After 2nd BM pt, was less agitated and restless.  Still oriented x 3.    When pt refused lactulose protocol, writer paged IM.  House officer stated it is important for pt to continue taking lactulose and to update primary team.  Writer paged, and updated on call physician (Dr. Kitchen).     Pt had poor intake this shift, eating 50% of supper (normally 90%) and 420 ml of fluids.    Pt refused scheduled risperdal at 2200 \"I am sleeping, I don't want it\"  Still AOx3 at 2200.    Will continue to monitor and assess.        "

## 2017-09-11 NOTE — PROGRESS NOTES
[facility], [place]   Psychiatric Progress Note        Interim History:   The patient's care was discussed with the treatment team during the daily team meeting and/or staff's chart notes were reviewed.  Staff report patient spent most of the time in her bed. She reported suffering from cold, today said that she could use cough medication. Staff reports that patient seems to be responding to internal stimuli. She didn't voice delusional ideas during visit with me. Was oriented to self, time and place. Per RN's note, short time after meeting with me patient was standing next to the unit's door, waiting for her  to take her home, despite the fact that she had been repeatedly explained she was not about to be discharged.        Medications:       [START ON 9/12/2017] ferrous gluconate  324 mg Oral Daily with breakfast     dextromethorphan  30 mg Oral Q12H SHIMON     lactulose  30 g Oral TID     haloperidol  2.5 mg Oral BID     risperiDONE  1 mg Oral QAM     risperiDONE  2 mg Oral At Bedtime     pantoprazole  40 mg Oral Daily     multivitamin, therapeutic with minerals  1 tablet Oral Daily     folic acid  1 mg Oral Daily     rifaximin  550 mg Oral BID     spironolactone  50 mg Oral Daily     furosemide  20 mg Oral Daily          Allergies:     Allergies   Allergen Reactions     Acetaminophen      Ambien [Zolpidem Tartrate] Other (See Comments)     Sleep walks and eats     Ciprofloxacin Other (See Comments)     Seizure.     Citalopram Nausea and Vomiting          Labs:     No results found for this or any previous visit (from the past 24 hour(s)).       Psychiatric Examination:     /65  Pulse 95  Temp 97.6  F (36.4  C)  Resp 16  Wt 93.4 kg (206 lb)  SpO2 95%  BMI 33.25 kg/m2  Weight is 206 lbs 0 oz  Body mass index is 33.25 kg/(m^2).           Appearance: dressed in hospital scrubs, seen in bed.  Attitude:  guarded and partially cooperative, somnolent  Eye Contact:  poor    Mood: sad  Affect:  intensity is exaggerated at times, at times flat.  Speech: limited speech production  Psychomotor Behavior:  no evidence of tardive dyskinesia, dystonia, or tics and physical agitation  Throught Process:  Slightly more organized.  Associations:  loosening of associations present at times.  Thought Content:  no evidence of suicidal ideation or homicidal ideation, Visual hallucinations are likely, didn't voice delusional ideas today,   Insight:  limited  Judgement:  poor  Oriented to:  self and place  Attention Span and Concentration:  limited  Recent and Remote Memory:  poor         Precautions:     Behavioral Orders   Procedures     Code 2     Elopement precautions     Routine Programming     As clinically indicated     Seizure precautions     Status 15     Every 15 minutes.     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             Plan:   Patient is fully MI committed. Per CTC, she is waiting for placement to [facility], see above. Degree of her confusion/psychotic symptoms fluctuate from day to day, it doesn't seem to correlate to her ammonia level. She is on a lactulose protocol. She is being followed by IM. Will start on Robitussin for cough.

## 2017-09-11 NOTE — PROGRESS NOTES
09/11/17 1400   Behavioral Health   Hallucinations appears responding;denies / not responding to hallucinations   Thinking distractable;poor concentration   Orientation person: oriented;place: oriented;date: oriented;time: oriented   Memory baseline memory   Insight poor   Judgement impaired   Eye Contact into space   Affect blunted, flat;sad;tense;irritable   Mood depressed;anxious;irritable   Physical Appearance/Attire disheveled   Hygiene neglected grooming - unclean body, hair, teeth   Suicidality other (see comments)  (denied )   Self Injury other (see comment)  (denied )   Elopement Loitering near exit doors;Trying handles of doors;Hypervigilance to activities on and off the unit;Statements about wanting to leave   Activity isolative;withdrawn;restless   Speech clear;coherent   Medication Sensitivity no stated side effects   Psychomotor / Gait balanced;steady   Psycho Education   Type of Intervention 1:1 intervention   Response participates, initiates socially appropriate   Hours 0.5   Treatment Detail (check in )   Activities of Daily Living   Hygiene/Grooming prompts   Oral Hygiene prompts   Dress street clothes;independent   Laundry with supervision   Room Organization independent   Groups   Details (isolative )   Behavioral Health Interventions   Psychotic Symptoms maintain safety precautions;encourage nutrition and hydration;encourage participation / independence with adls;provide emotional support;establish therapeutic relationship;assist with developing & utilizing healthy coping strategies;build upon strengths   Social and Therapeutic Interventions (Psychotic Symptoms) encourage socialization with peers;encourage participation in therapeutic groups and milieu activities   Pt was isolative to her room most of the shift. Requested that her meals be brought to her room. Came out of her room at the end of the shift and paced the kaba for few minutes. Pt was observed multiple time standing at one of the  exit doors (end of hallway), handling the door handle and touching the door keypads  with the intention to leave the unit. Stated that she's waiting for her  and wants to leave. Pt's mood was blunted and flat, denied hallucinations but appeared to be responding to voices. Pt emma all of her meals: input: sausage, [place] toast, cheese burger, baked  Chips, x4 cranberry juices , output: 1300 mL

## 2017-09-11 NOTE — PROGRESS NOTES
"Mildred knew date, place, person.  She drank all of her lactulose.  She said she had one BM and 2 voids.  A few minutes later she stood by the exit door at end of hallway \"waiting for her :.  "

## 2017-09-12 LAB — GLUCOSE BLDC GLUCOMTR-MCNC: 99 MG/DL (ref 70–99)

## 2017-09-12 PROCEDURE — 25000132 ZZH RX MED GY IP 250 OP 250 PS 637: Performed by: PSYCHIATRY & NEUROLOGY

## 2017-09-12 PROCEDURE — 25000132 ZZH RX MED GY IP 250 OP 250 PS 637: Performed by: EMERGENCY MEDICINE

## 2017-09-12 PROCEDURE — 25000132 ZZH RX MED GY IP 250 OP 250 PS 637: Performed by: HOSPITALIST

## 2017-09-12 PROCEDURE — 12400007 ZZH R&B MH INTERMEDIATE UMMC

## 2017-09-12 PROCEDURE — 00000146 ZZHCL STATISTIC GLUCOSE BY METER IP

## 2017-09-12 PROCEDURE — 25000132 ZZH RX MED GY IP 250 OP 250 PS 637: Performed by: NURSE PRACTITIONER

## 2017-09-12 RX ADMIN — MULTIPLE VITAMINS W/ MINERALS TAB 1 TABLET: TAB at 08:20

## 2017-09-12 RX ADMIN — RIFAXIMIN 550 MG: 550 TABLET ORAL at 20:23

## 2017-09-12 RX ADMIN — SPIRONOLACTONE 50 MG: 50 TABLET ORAL at 08:19

## 2017-09-12 RX ADMIN — LACTULOSE 30 G: 10 POWDER, FOR SOLUTION ORAL at 13:51

## 2017-09-12 RX ADMIN — RISPERIDONE 1 MG: 1 TABLET ORAL at 08:19

## 2017-09-12 RX ADMIN — DEXTROMETHORPHAN 30 MG: 30 SUSPENSION, EXTENDED RELEASE ORAL at 20:29

## 2017-09-12 RX ADMIN — FOLIC ACID 1 MG: 1 TABLET ORAL at 08:18

## 2017-09-12 RX ADMIN — DEXTROMETHORPHAN 30 MG: 30 SUSPENSION, EXTENDED RELEASE ORAL at 08:22

## 2017-09-12 RX ADMIN — LACTULOSE 30 G: 10 POWDER, FOR SOLUTION ORAL at 20:24

## 2017-09-12 RX ADMIN — FERROUS GLUCONATE 324 MG: 324 TABLET ORAL at 08:21

## 2017-09-12 RX ADMIN — RISPERIDONE 2 MG: 2 TABLET ORAL at 22:19

## 2017-09-12 RX ADMIN — RIFAXIMIN 550 MG: 550 TABLET ORAL at 08:18

## 2017-09-12 RX ADMIN — PANTOPRAZOLE SODIUM 40 MG: 40 TABLET, DELAYED RELEASE ORAL at 08:19

## 2017-09-12 RX ADMIN — Medication 20 MG: at 08:19

## 2017-09-12 RX ADMIN — LACTULOSE 30 G: 10 POWDER, FOR SOLUTION ORAL at 08:20

## 2017-09-12 RX ADMIN — HALOPERIDOL 2.5 MG: 1 TABLET ORAL at 08:18

## 2017-09-12 RX ADMIN — HALOPERIDOL 2.5 MG: 1 TABLET ORAL at 20:23

## 2017-09-12 ASSESSMENT — ACTIVITIES OF DAILY LIVING (ADL)
DRESS: SCRUBS (BEHAVIORAL HEALTH)
DRESS: SCRUBS (BEHAVIORAL HEALTH)
GROOMING: PROMPTS
ORAL_HYGIENE: INDEPENDENT
HYGIENE/GROOMING: INDEPENDENT
ORAL_HYGIENE: INDEPENDENT

## 2017-09-12 NOTE — PLAN OF CARE
Problem: Psychotic Symptoms  Goal: Psychotic Symptoms  Signs and symptoms of listed problems will be absent or manageable.   Outcome: No Change  Patient is more irritable today.  Voids and refuses to measure. States she has had daily bowel movement but has not been observed.  Appetite is excellent eats all of her meals. Request staff to leave her room and shut the door.  Difficult to assess.  Keeps sheet over her head with no eye contact.

## 2017-09-12 NOTE — PROGRESS NOTES
"Pt had more distress beginning of shift. Right away, pt trying at the back doors but was more redirectable. Went up to look at snacks and took a few bites of pudding. Became labile in room afterwards, shouting. Perry pt saying, \"stay away from me.\" When staffs approached, pt mentioning that \"they're\" over in the corner and \"why can't you guys see them?\" Pt then given medication and was calmer around dinner. Pt able to have a pleasant conversation with writer afterwards. She became animated when talking about the [employer] playing. Prompted to be up and watch the game but she didn't want to. Otherwise, actually able to joke around during before completely laying down in bed with blankets covering. When asked if she feels safe, she says \"better now\" after given meds and that she's scared of the \"people over there\" in corner of room. When asked to explain who the people are, she says \"it's complicated.\" Still saying she can talk to her  through an airpiece in her ear (she proceeded to pull out her hair). Thinks the courts did let her go off her commitment hence why asking to leave. No mention of SI/SIB but some safety concerns about herself. No other behavioral issues rest of shift.       09/11/17 2049   Behavioral Health   Hallucinations auditory;visual;appears responding   Thinking distractable;paranoid;delusional;confused;poor concentration   Orientation place: oriented   Memory baseline memory   Insight denial of illness;poor   Judgement impaired   Eye Contact into space;staring;at examiner   Affect blunted, flat;sad;tense   Mood depressed;grandiose;anxious;labile   Physical Appearance/Attire disheveled;untidy   Hygiene neglected grooming - unclean body, hair, teeth   Suicidality other (see comments)  (none stated/observed)   Self Injury other (see comment)  (denies)   Elopement Loitering near exit doors;Trying handles of doors;Hallucinations directing behavior   Activity withdrawn;isolative   Speech " clear;coherent   Medication Sensitivity no observed side effects;no stated side effects   Psychomotor / Gait slow;steady   Activities of Daily Living   Hygiene/Grooming prompts   Oral Hygiene prompts   Dress prompts;scrubs (behavioral health)   Room Organization prompts

## 2017-09-13 PROCEDURE — 12400007 ZZH R&B MH INTERMEDIATE UMMC

## 2017-09-13 PROCEDURE — 25000132 ZZH RX MED GY IP 250 OP 250 PS 637: Performed by: NURSE PRACTITIONER

## 2017-09-13 PROCEDURE — 25000132 ZZH RX MED GY IP 250 OP 250 PS 637: Performed by: EMERGENCY MEDICINE

## 2017-09-13 PROCEDURE — 25000132 ZZH RX MED GY IP 250 OP 250 PS 637: Performed by: PSYCHIATRY & NEUROLOGY

## 2017-09-13 PROCEDURE — 25000132 ZZH RX MED GY IP 250 OP 250 PS 637: Performed by: HOSPITALIST

## 2017-09-13 PROCEDURE — 99232 SBSQ HOSP IP/OBS MODERATE 35: CPT | Performed by: PSYCHIATRY & NEUROLOGY

## 2017-09-13 RX ORDER — LACTULOSE 10 G/15ML
30 SOLUTION ORAL 3 TIMES DAILY
Status: DISCONTINUED | OUTPATIENT
Start: 2017-09-13 | End: 2017-09-14 | Stop reason: CLARIF

## 2017-09-13 RX ADMIN — LACTULOSE 30 G: 20 SOLUTION ORAL at 15:13

## 2017-09-13 RX ADMIN — HALOPERIDOL 2.5 MG: 1 TABLET ORAL at 08:36

## 2017-09-13 RX ADMIN — RISPERIDONE 2 MG: 2 TABLET ORAL at 20:41

## 2017-09-13 RX ADMIN — MULTIPLE VITAMINS W/ MINERALS TAB 1 TABLET: TAB at 08:36

## 2017-09-13 RX ADMIN — HALOPERIDOL 2.5 MG: 1 TABLET ORAL at 20:41

## 2017-09-13 RX ADMIN — RIFAXIMIN 550 MG: 550 TABLET ORAL at 20:41

## 2017-09-13 RX ADMIN — RIFAXIMIN 550 MG: 550 TABLET ORAL at 08:36

## 2017-09-13 RX ADMIN — LACTULOSE 30 G: 10 POWDER, FOR SOLUTION ORAL at 08:35

## 2017-09-13 RX ADMIN — PANTOPRAZOLE SODIUM 40 MG: 40 TABLET, DELAYED RELEASE ORAL at 08:35

## 2017-09-13 RX ADMIN — RISPERIDONE 1 MG: 1 TABLET ORAL at 08:35

## 2017-09-13 RX ADMIN — FOLIC ACID 1 MG: 1 TABLET ORAL at 08:35

## 2017-09-13 RX ADMIN — Medication 20 MG: at 08:35

## 2017-09-13 RX ADMIN — SPIRONOLACTONE 50 MG: 50 TABLET ORAL at 08:36

## 2017-09-13 RX ADMIN — DEXTROMETHORPHAN 30 MG: 30 SUSPENSION, EXTENDED RELEASE ORAL at 08:36

## 2017-09-13 RX ADMIN — FERROUS GLUCONATE 324 MG: 324 TABLET ORAL at 08:36

## 2017-09-13 ASSESSMENT — ACTIVITIES OF DAILY LIVING (ADL)
DRESS: SCRUBS (BEHAVIORAL HEALTH)
LAUNDRY: WITH SUPERVISION
GROOMING: INDEPENDENT
GROOMING: INDEPENDENT
DRESS: SCRUBS (BEHAVIORAL HEALTH)
ORAL_HYGIENE: INDEPENDENT
LAUNDRY: UNABLE TO COMPLETE
ORAL_HYGIENE: INDEPENDENT

## 2017-09-13 NOTE — PLAN OF CARE
Pharmacy is out of the lactulose packets, they will be available tomorrow.  In the meantime, pharmacy will send us the lactulose solution (30 g) from pharmacy the one in the pyxis is only (20 g).  Checked with pt and pt is ok with getting the solution until the packets come in tomorrow.

## 2017-09-13 NOTE — PROGRESS NOTES
Slept most of the evening tonight. When awake it appeared that she was responding to some type of internal stimuli as she was picking things out of the air. Otherwise withdrawn from the milieu tonight. Quiet in her room when awake.

## 2017-09-13 NOTE — PLAN OF CARE
Problem: Psychotic Symptoms  Goal: Psychotic Symptoms  Signs and symptoms of listed problems will be absent or manageable.      Pt spent the entire shift in her room, more oriented today no confusion noted.  Pt reports she had 1 BM and 2 voids none visualized by staff.  Pt denies having any thoughts to hurt herself.  Appetite is good had 100% of breakfast and 75% of lunch.  Pt drank her lactulose and took all her medication.  Will continue to monitor pt as ordered.

## 2017-09-13 NOTE — PLAN OF CARE
Problem: General Plan of Care (Inpatient Behavioral)  Goal: Team Discussion  Team Plan:   BEHAVIORAL TEAM DISCUSSION     Participants: Dr. Payne, RN Tatiana, CTC Tracie Elmore, ALT member Myra Haywood  Progress: minimal  Continued Stay Criteria/Rationale: PD was revoked, waiting for bed at [facility]  Medical/Physical: liver failure   Precautions:   Behavioral Orders   Procedures     Code 2     Elopement precautions     Routine Programming       As clinically indicated     Seizure precautions     Status 15       Every 15 minutes.     Withdrawal precautions     Plan: transfer to [facility]  Rationale for change in precautions or plan:no change

## 2017-09-13 NOTE — PROGRESS NOTES
"Pt refused vitals    Pt isolated to her room for the entirety of the shift. When writer encouraged pt to come to the lounge to eat, she stated \"Can someone bring me my tray? My knees are hurting.\" Writer also offered to change pt bed linens, but she declined to have this done.    Intake: Pt at about 75% of her meal (hamburger with bun, baked chips)  237 mL of milk  120 mL of lemonade  237 mL of water    Output: pt reported 1 urine output; no BM    Pt reported being oriented to self, states she knows where she is, and denies confusion. Pt did not make any delusional statements this shift, and no attempts were made to elope from the unit.    No additional concerns at this time. Will continue to monitor and assess.  "

## 2017-09-14 PROCEDURE — 99232 SBSQ HOSP IP/OBS MODERATE 35: CPT | Performed by: PSYCHIATRY & NEUROLOGY

## 2017-09-14 PROCEDURE — 25000132 ZZH RX MED GY IP 250 OP 250 PS 637: Performed by: HOSPITALIST

## 2017-09-14 PROCEDURE — 25000132 ZZH RX MED GY IP 250 OP 250 PS 637: Performed by: NURSE PRACTITIONER

## 2017-09-14 PROCEDURE — 25000132 ZZH RX MED GY IP 250 OP 250 PS 637: Performed by: EMERGENCY MEDICINE

## 2017-09-14 PROCEDURE — 12400007 ZZH R&B MH INTERMEDIATE UMMC

## 2017-09-14 PROCEDURE — 25000132 ZZH RX MED GY IP 250 OP 250 PS 637: Performed by: PSYCHIATRY & NEUROLOGY

## 2017-09-14 RX ADMIN — RISPERIDONE 1 MG: 1 TABLET ORAL at 08:47

## 2017-09-14 RX ADMIN — FOLIC ACID 1 MG: 1 TABLET ORAL at 08:46

## 2017-09-14 RX ADMIN — MULTIPLE VITAMINS W/ MINERALS TAB 1 TABLET: TAB at 08:47

## 2017-09-14 RX ADMIN — HALOPERIDOL 2.5 MG: 1 TABLET ORAL at 08:47

## 2017-09-14 RX ADMIN — LACTULOSE 30 G: 10 POWDER, FOR SOLUTION ORAL at 13:50

## 2017-09-14 RX ADMIN — RISPERIDONE 2 MG: 2 TABLET ORAL at 21:28

## 2017-09-14 RX ADMIN — LACTULOSE 30 G: 10 POWDER, FOR SOLUTION ORAL at 20:22

## 2017-09-14 RX ADMIN — RIFAXIMIN 550 MG: 550 TABLET ORAL at 08:46

## 2017-09-14 RX ADMIN — Medication 20 MG: at 08:47

## 2017-09-14 RX ADMIN — PANTOPRAZOLE SODIUM 40 MG: 40 TABLET, DELAYED RELEASE ORAL at 08:49

## 2017-09-14 RX ADMIN — LACTULOSE 30 G: 10 POWDER, FOR SOLUTION ORAL at 08:46

## 2017-09-14 RX ADMIN — FERROUS GLUCONATE 324 MG: 324 TABLET ORAL at 08:47

## 2017-09-14 RX ADMIN — SPIRONOLACTONE 50 MG: 50 TABLET ORAL at 08:47

## 2017-09-14 RX ADMIN — HALOPERIDOL 2.5 MG: 1 TABLET ORAL at 20:22

## 2017-09-14 RX ADMIN — RIFAXIMIN 550 MG: 550 TABLET ORAL at 20:22

## 2017-09-14 RX ADMIN — DEXTROMETHORPHAN 30 MG: 30 SUSPENSION, EXTENDED RELEASE ORAL at 20:23

## 2017-09-14 ASSESSMENT — ACTIVITIES OF DAILY LIVING (ADL)
DRESS: SCRUBS (BEHAVIORAL HEALTH)
LAUNDRY: WITH SUPERVISION
GROOMING: INDEPENDENT
ORAL_HYGIENE: INDEPENDENT

## 2017-09-14 NOTE — PROGRESS NOTES
"Patient was isolative to room for majority of the shift. She came out in the beginning, but stayed in room rest of evening. Her dinner tray was brought to her, and she ate about 50% of her meal tray. Patient continues to dump out hat in toilet so unsure of accurate output. Per day stay she had 1 BM and voided 2x. She tried to refuse all meds this evening and got mad at writer stating \"You always bring them when I'm already sleeping!!\" Had other RN attempt to give her HS meds and she took the pills, but would not take any of the oral medications including the lactulose. Patient appears irritable upon approach. Will continue to monitor and assess.  "

## 2017-09-14 NOTE — PROGRESS NOTES
[facility], [place]   Psychiatric Progress Note        Interim History:   The patient's care was discussed with the treatment team during the daily team meeting and/or staff's chart notes were reviewed.  Staff report patient spent most of the time in her bed. She is less confused, but periodically seen by staff picking things from the air. She told me that she wanted to go home, became upset when I told her that she could not go home as it was unsafe for her and refused to talk. Per Psych associate's note later on she was seen sitting and watching TV with other patients on the floor.        Medications:       ferrous gluconate  324 mg Oral Daily with breakfast     dextromethorphan  30 mg Oral Q12H SHIMON     lactulose  30 g Oral TID     haloperidol  2.5 mg Oral BID     risperiDONE  1 mg Oral QAM     risperiDONE  2 mg Oral At Bedtime     pantoprazole  40 mg Oral Daily     multivitamin, therapeutic with minerals  1 tablet Oral Daily     folic acid  1 mg Oral Daily     rifaximin  550 mg Oral BID     spironolactone  50 mg Oral Daily     furosemide  20 mg Oral Daily          Allergies:     Allergies   Allergen Reactions     Acetaminophen      Ambien [Zolpidem Tartrate] Other (See Comments)     Sleep walks and eats     Ciprofloxacin Other (See Comments)     Seizure.     Citalopram Nausea and Vomiting          Labs:     No results found for this or any previous visit (from the past 24 hour(s)).       Psychiatric Examination:     /65  Pulse 95  Temp 97.5  F (36.4  C) (Oral)  Resp 16  Wt 93.4 kg (206 lb)  SpO2 95%  BMI 33.25 kg/m2  Weight is 206 lbs 0 oz  Body mass index is 33.25 kg/(m^2).           Appearance: dressed in hospital scrubs, seen in bed.  Attitude:  guarded and partially cooperative, somnolent  Eye Contact:  poor   Mood: sad  Affect:  Blunted most of the time  Speech: limited speech production  Psychomotor Behavior:  no evidence of tardive dyskinesia, dystonia, or tics and  physical agitation  Throught Process:  Slightly more organized.  Associations:  loosening of associations present at times.  Thought Content:  no evidence of suicidal ideation or homicidal ideation, Visual hallucinations are likely, didn't voice delusional ideas today,   Insight:  limited  Judgement:  poor  Oriented to:  self and place  Attention Span and Concentration:  limited  Recent and Remote Memory:  poor         Precautions:     Behavioral Orders   Procedures     Code 2     Elopement precautions     Routine Programming     As clinically indicated     Seizure precautions     Status 15     Every 15 minutes.     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             Plan:   Patient is fully MI committed. Per CTC, she is waiting for placement to [facility], see above. Degree of her confusion/psychotic symptoms fluctuate from day to day, it doesn't seem to correlate to her ammonia level. She is on a lactulose protocol. She is being followed by IM. No medication changes today. Will continue to provide with support and structure, encourage to spend more time outside. CTC will check with outpatient team if they would agree with patient's placement to nursing home.

## 2017-09-14 NOTE — PROGRESS NOTES
Mildred was disoriented to the day by 2 days.  She thought today was Tuesday rather than the actual day of Thursday.  She was oriented to place situation and person.  She was sitting up eating a full breakfast.  She was cooperative with taking her medication.

## 2017-09-14 NOTE — PROGRESS NOTES
09/14/17 1415   Behavioral Health   Hallucinations denies / not responding to hallucinations   Thinking distractable   Orientation time: oriented;date: oriented;place: oriented;person: oriented   Memory baseline memory   Insight poor   Judgement impaired   Eye Contact at examiner   Affect full range affect   Mood mood is calm   Physical Appearance/Attire appears stated age   Hygiene well groomed   Suicidality other (see comments)  (Denies)   Self Injury other (see comment)  (Denies)   Activity isolative   Speech coherent;clear   Medication Sensitivity no stated side effects   Psychomotor / Gait balanced;steady   Psycho Education   Type of Intervention 1:1 intervention   Response participates, initiates socially appropriate   Hours 0.5   Activities of Daily Living   Hygiene/Grooming independent   Oral Hygiene independent   Dress scrubs (behavioral health)   Laundry with supervision   Room Organization independent   Activity   Activity Level of Assistance independent   Behavioral Health Interventions   Psychotic Symptoms maintain safety precautions;assist patient in developing safety plan;assist patient in following safety plan;provide emotional support   Social and Therapeutic Interventions (Psychotic Symptoms) encourage socialization with peers;encourage participation in therapeutic groups and milieu activities   Pt had a shower this shift and she reports that she is doing well. She appears isolative to room but she was observed in the lounge watching TV. She denies suicidal ideation or hearing voices.

## 2017-09-15 PROCEDURE — 99232 SBSQ HOSP IP/OBS MODERATE 35: CPT | Performed by: PSYCHIATRY & NEUROLOGY

## 2017-09-15 PROCEDURE — 25000132 ZZH RX MED GY IP 250 OP 250 PS 637: Performed by: HOSPITALIST

## 2017-09-15 PROCEDURE — 25000132 ZZH RX MED GY IP 250 OP 250 PS 637: Performed by: NURSE PRACTITIONER

## 2017-09-15 PROCEDURE — 25000132 ZZH RX MED GY IP 250 OP 250 PS 637: Performed by: EMERGENCY MEDICINE

## 2017-09-15 PROCEDURE — 25000132 ZZH RX MED GY IP 250 OP 250 PS 637: Performed by: PSYCHIATRY & NEUROLOGY

## 2017-09-15 PROCEDURE — 12400007 ZZH R&B MH INTERMEDIATE UMMC

## 2017-09-15 RX ADMIN — HALOPERIDOL 2.5 MG: 1 TABLET ORAL at 20:00

## 2017-09-15 RX ADMIN — LACTULOSE 30 G: 10 POWDER, FOR SOLUTION ORAL at 20:00

## 2017-09-15 RX ADMIN — HALOPERIDOL 2.5 MG: 1 TABLET ORAL at 08:45

## 2017-09-15 RX ADMIN — RISPERIDONE 2 MG: 2 TABLET ORAL at 21:26

## 2017-09-15 RX ADMIN — SPIRONOLACTONE 50 MG: 50 TABLET ORAL at 08:46

## 2017-09-15 RX ADMIN — LACTULOSE 30 G: 10 POWDER, FOR SOLUTION ORAL at 14:13

## 2017-09-15 RX ADMIN — LACTULOSE 30 G: 10 POWDER, FOR SOLUTION ORAL at 08:45

## 2017-09-15 RX ADMIN — FOLIC ACID 1 MG: 1 TABLET ORAL at 08:46

## 2017-09-15 RX ADMIN — RISPERIDONE 1 MG: 1 TABLET ORAL at 08:45

## 2017-09-15 RX ADMIN — RIFAXIMIN 550 MG: 550 TABLET ORAL at 20:00

## 2017-09-15 RX ADMIN — Medication 20 MG: at 08:45

## 2017-09-15 RX ADMIN — PANTOPRAZOLE SODIUM 40 MG: 40 TABLET, DELAYED RELEASE ORAL at 08:45

## 2017-09-15 RX ADMIN — FERROUS GLUCONATE 324 MG: 324 TABLET ORAL at 08:46

## 2017-09-15 RX ADMIN — RIFAXIMIN 550 MG: 550 TABLET ORAL at 08:45

## 2017-09-15 RX ADMIN — MULTIPLE VITAMINS W/ MINERALS TAB 1 TABLET: TAB at 08:45

## 2017-09-15 RX ADMIN — DEXTROMETHORPHAN 30 MG: 30 SUSPENSION, EXTENDED RELEASE ORAL at 20:02

## 2017-09-15 ASSESSMENT — ACTIVITIES OF DAILY LIVING (ADL)
ORAL_HYGIENE: INDEPENDENT
ORAL_HYGIENE: INDEPENDENT
DRESS: SCRUBS (BEHAVIORAL HEALTH)
GROOMING: INDEPENDENT
LAUNDRY: WITH SUPERVISION
LAUNDRY: WITH SUPERVISION
GROOMING: INDEPENDENT
DRESS: INDEPENDENT

## 2017-09-15 NOTE — PROGRESS NOTES
09/15/17 1300   Behavioral Health   Hallucinations denies / not responding to hallucinations   Thinking distractable   Orientation time: oriented;date: oriented;place: oriented   Memory baseline memory   Insight poor   Judgement impaired   Eye Contact at examiner   Affect full range affect   Mood depressed;mood is calm   Physical Appearance/Attire appears stated age   Hygiene well groomed   Suicidality other (see comments)  (Denies)   Self Injury other (see comment)  (Denies)   Activity isolative;withdrawn   Speech coherent;clear   Medication Sensitivity no stated side effects   Psychomotor / Gait steady;balanced   Psycho Education   Type of Intervention 1:1 intervention   Response participates, initiates socially appropriate   Hours 0.5   Activities of Daily Living   Hygiene/Grooming independent   Oral Hygiene independent   Dress scrubs (behavioral health)   Laundry with supervision   Room Organization independent   Activity   Activity Level of Assistance independent   Behavioral Health Interventions   Psychotic Symptoms maintain safety precautions;assist patient in developing safety plan;assist patient in following safety plan;provide emotional support;establish therapeutic relationship   Social and Therapeutic Interventions (Psychotic Symptoms) encourage socialization with peers;encourage participation in therapeutic groups and milieu activities   Pt observed this morning for breakfast and lunch ate in her room. She denies suicidal ideation and hearing voices. She appears isolative to self.

## 2017-09-15 NOTE — PROGRESS NOTES
[facility], [place]   Psychiatric Progress Note        Interim History:   The patient's care was discussed with the treatment team during the daily team meeting and/or staff's chart notes were reviewed.  Staff report patient spent most of the time in her bed. She is less confused, but periodically seen by staff picking things from the air. She was combing her hair during today's visit with me. Was oriented to self, place, not to date. Had poor eye contact, appeared to be sad, though, said she was OK. Per RN's report today patient first time during this hospitalization got out to get her meds.        Medications:       ferrous gluconate  324 mg Oral Daily with breakfast     dextromethorphan  30 mg Oral Q12H SHIMON     lactulose  30 g Oral TID     haloperidol  2.5 mg Oral BID     risperiDONE  1 mg Oral QAM     risperiDONE  2 mg Oral At Bedtime     pantoprazole  40 mg Oral Daily     multivitamin, therapeutic with minerals  1 tablet Oral Daily     folic acid  1 mg Oral Daily     rifaximin  550 mg Oral BID     spironolactone  50 mg Oral Daily     furosemide  20 mg Oral Daily          Allergies:     Allergies   Allergen Reactions     Acetaminophen      Ambien [Zolpidem Tartrate] Other (See Comments)     Sleep walks and eats     Ciprofloxacin Other (See Comments)     Seizure.     Citalopram Nausea and Vomiting          Labs:     No results found for this or any previous visit (from the past 24 hour(s)).       Psychiatric Examination:     /65  Pulse 95  Temp 97.6  F (36.4  C) (Oral)  Resp 16  Wt 93.9 kg (207 lb)  SpO2 95%  BMI 33.41 kg/m2  Weight is 207 lbs 0 oz  Body mass index is 33.41 kg/(m^2).           Appearance: dressed in hospital scrubs, seen in bed.  Attitude:  guarded and partially cooperative, somnolent  Eye Contact:  poor   Mood: sad  Affect:  Blunted most of the time  Speech: limited speech production, monotone voice.  Psychomotor Behavior:  no evidence of tardive  dyskinesia, dystonia, or tics and physical agitation  Throught Process:  Slightly more organized.  Associations:  loosening of associations present at times.  Thought Content:  no evidence of suicidal ideation or homicidal ideation, Visual hallucinations are likely, didn't voice delusional ideas today,   Insight:  limited  Judgement:  poor  Oriented to:  self and place  Attention Span and Concentration:  limited  Recent and Remote Memory:  poor         Precautions:     Behavioral Orders   Procedures     Code 2     Elopement precautions     Routine Programming     As clinically indicated     Seizure precautions     Status 15     Every 15 minutes.     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             Plan:   Patient is fully MI committed. Per CTC, she is waiting for placement to [facility], see above. Degree of her confusion/psychotic symptoms fluctuate from day to day, it doesn't seem to correlate to her ammonia level. She is on a lactulose protocol. She is being followed by IM. No medication changes today. Will continue to provide with support and structure, encourage to spend more time outside. CTC will check with outpatient team if they agree with patient's placement to nursing home.

## 2017-09-16 PROCEDURE — 25000132 ZZH RX MED GY IP 250 OP 250 PS 637: Performed by: NURSE PRACTITIONER

## 2017-09-16 PROCEDURE — 12400007 ZZH R&B MH INTERMEDIATE UMMC

## 2017-09-16 PROCEDURE — 25000132 ZZH RX MED GY IP 250 OP 250 PS 637: Performed by: HOSPITALIST

## 2017-09-16 PROCEDURE — 25000132 ZZH RX MED GY IP 250 OP 250 PS 637: Performed by: EMERGENCY MEDICINE

## 2017-09-16 PROCEDURE — 25000132 ZZH RX MED GY IP 250 OP 250 PS 637: Performed by: PSYCHIATRY & NEUROLOGY

## 2017-09-16 RX ADMIN — LACTULOSE 30 G: 10 POWDER, FOR SOLUTION ORAL at 18:58

## 2017-09-16 RX ADMIN — DEXTROMETHORPHAN 30 MG: 30 SUSPENSION, EXTENDED RELEASE ORAL at 09:12

## 2017-09-16 RX ADMIN — RISPERIDONE 1 MG: 1 TABLET ORAL at 09:00

## 2017-09-16 RX ADMIN — FERROUS GLUCONATE 324 MG: 324 TABLET ORAL at 09:11

## 2017-09-16 RX ADMIN — LACTULOSE 30 G: 10 POWDER, FOR SOLUTION ORAL at 15:25

## 2017-09-16 RX ADMIN — PANTOPRAZOLE SODIUM 40 MG: 40 TABLET, DELAYED RELEASE ORAL at 09:11

## 2017-09-16 RX ADMIN — MULTIPLE VITAMINS W/ MINERALS TAB 1 TABLET: TAB at 09:11

## 2017-09-16 RX ADMIN — SPIRONOLACTONE 50 MG: 50 TABLET ORAL at 09:00

## 2017-09-16 RX ADMIN — RIFAXIMIN 550 MG: 550 TABLET ORAL at 09:11

## 2017-09-16 RX ADMIN — HALOPERIDOL 2.5 MG: 1 TABLET ORAL at 09:11

## 2017-09-16 RX ADMIN — RIFAXIMIN 550 MG: 550 TABLET ORAL at 18:59

## 2017-09-16 RX ADMIN — FOLIC ACID 1 MG: 1 TABLET ORAL at 09:11

## 2017-09-16 RX ADMIN — RISPERIDONE 2 MG: 2 TABLET ORAL at 21:01

## 2017-09-16 RX ADMIN — HALOPERIDOL 2.5 MG: 1 TABLET ORAL at 18:59

## 2017-09-16 RX ADMIN — Medication 20 MG: at 09:11

## 2017-09-16 RX ADMIN — LACTULOSE 30 G: 10 POWDER, FOR SOLUTION ORAL at 09:13

## 2017-09-16 ASSESSMENT — ACTIVITIES OF DAILY LIVING (ADL)
ORAL_HYGIENE: PROMPTS
LAUNDRY: WITH SUPERVISION
LAUNDRY: WITH SUPERVISION
DRESS: SCRUBS (BEHAVIORAL HEALTH);PROMPTS
GROOMING: PROMPTS
GROOMING: PROMPTS
DRESS: INDEPENDENT
ORAL_HYGIENE: PROMPTS

## 2017-09-16 NOTE — PROGRESS NOTES
Pt isolated to her room for the entirety of the shift. She came out once to walk to the emergency exit door, but was easily redirected and made no delusional statements about her  coming to pick her up. When pt was walking back into her room, she made steps as if she were stepping over objects that were invisible on the floor. Pt denied hallucinations of any kind. Pt also states being oriented to self and situation, but disoriented to date and time.    Intake: 75% of meal (mashed potatoes and gravy, pot roast and corn)  180 mL of coffee  120 mL of juice  120 mL of water    Output (per pt report): urine x 2 in toilet bowl; 1 BM

## 2017-09-16 NOTE — PROGRESS NOTES
Pt spent the entire shift isolative and withdrawn to her room. Came out and requested for a headphone. Pt's mood was calm, blunted affect and appropriate on the unit. Pt ate 100% of her meals. Denied hallucination si and SIB. With staff assistance, pt changed bedding.     09/16/17 1454   Behavioral Health   Hallucinations denies / not responding to hallucinations   Thinking poor concentration   Orientation person: oriented;place: oriented;date: oriented;time: oriented   Memory baseline memory   Insight poor   Judgement impaired   Eye Contact at examiner   Affect blunted, flat   Mood mood is calm   Physical Appearance/Attire appears stated age   Hygiene neglected grooming - unclean body, hair, teeth   Suicidality other (see comments)  (denies )   Self Injury other (see comment)  (denies )   Elopement (none today )   Activity isolative;withdrawn   Speech clear;coherent   Medication Sensitivity no stated side effects   Psychomotor / Gait balanced;steady   Psycho Education   Type of Intervention 1:1 intervention   Response participates, initiates socially appropriate   Hours 0.5   Treatment Detail (check in)   Activities of Daily Living   Hygiene/Grooming prompts   Oral Hygiene prompts   Dress independent   Laundry with supervision   Room Organization independent   Groups   Details (isolative )   Behavioral Health Interventions   Psychotic Symptoms maintain safety precautions;encourage nutrition and hydration;encourage participation / independence with adls;provide emotional support;establish therapeutic relationship;assist with developing & utilizing healthy coping strategies;build upon strengths   Social and Therapeutic Interventions (Psychotic Symptoms) encourage socialization with peers;encourage participation in therapeutic groups and milieu activities

## 2017-09-16 NOTE — PROGRESS NOTES
Pt isolated to room all shift including dinner, denies and does not elaborate on most questions, says she's med compliant but believes the medications are not helping. Denies SI SIB.     09/15/17 7428   Behavioral Health   Hallucinations denies / not responding to hallucinations   Thinking poor concentration   Orientation person: oriented;place: oriented   Memory baseline memory   Insight poor   Judgement impaired   Eye Contact at examiner   Affect blunted, flat   Mood mood is calm   Physical Appearance/Attire attire appropriate to age and situation   Hygiene neglected grooming - unclean body, hair, teeth   Suicidality other (see comments)  (denies)   Self Injury other (see comment)  (denies)   Elopement (N/A)   Activity isolative;withdrawn   Speech clear;coherent   Medication Sensitivity no stated side effects;no observed side effects   Psychomotor / Gait balanced;steady   Psycho Education   Type of Intervention 1:1 intervention   Response participates with encouragement   Hours 0.5   Treatment Detail check in   Activities of Daily Living   Hygiene/Grooming independent   Oral Hygiene independent   Dress independent   Laundry with supervision   Room Organization independent   Behavioral Health Interventions   Psychotic Symptoms maintain safety precautions;monitor need to revise level of observation;maintain safe secure environment;reality orientation;simple, clear language;decrease environmental stimulation;redirection of intrusive behaviors;redirection of aggressive behaviors;encourage nutrition and hydration;encourage participation / independence with adls;provide emotional support;establish therapeutic relationship;build upon strengths;monitor need for prn medication;monitor confusion, memory loss, decision making ability and reorient / intervent as needed   Social and Therapeutic Interventions (Psychotic Symptoms) encourage socialization with peers;encourage effective boundaries with peers;encourage  participation in therapeutic groups and milieu activities

## 2017-09-17 PROCEDURE — 25000132 ZZH RX MED GY IP 250 OP 250 PS 637: Performed by: HOSPITALIST

## 2017-09-17 PROCEDURE — 12400007 ZZH R&B MH INTERMEDIATE UMMC

## 2017-09-17 PROCEDURE — 25000132 ZZH RX MED GY IP 250 OP 250 PS 637: Performed by: EMERGENCY MEDICINE

## 2017-09-17 PROCEDURE — 25000132 ZZH RX MED GY IP 250 OP 250 PS 637: Performed by: NURSE PRACTITIONER

## 2017-09-17 PROCEDURE — 25000132 ZZH RX MED GY IP 250 OP 250 PS 637: Performed by: PSYCHIATRY & NEUROLOGY

## 2017-09-17 RX ADMIN — HALOPERIDOL 2.5 MG: 1 TABLET ORAL at 20:14

## 2017-09-17 RX ADMIN — PANTOPRAZOLE SODIUM 40 MG: 40 TABLET, DELAYED RELEASE ORAL at 09:08

## 2017-09-17 RX ADMIN — LACTULOSE 30 G: 10 POWDER, FOR SOLUTION ORAL at 09:09

## 2017-09-17 RX ADMIN — RIFAXIMIN 550 MG: 550 TABLET ORAL at 20:14

## 2017-09-17 RX ADMIN — Medication 20 MG: at 09:08

## 2017-09-17 RX ADMIN — RIFAXIMIN 550 MG: 550 TABLET ORAL at 09:08

## 2017-09-17 RX ADMIN — HALOPERIDOL 2.5 MG: 1 TABLET ORAL at 09:08

## 2017-09-17 RX ADMIN — MULTIPLE VITAMINS W/ MINERALS TAB 1 TABLET: TAB at 09:08

## 2017-09-17 RX ADMIN — RISPERIDONE 2 MG: 2 TABLET ORAL at 20:15

## 2017-09-17 RX ADMIN — LACTULOSE 30 G: 10 POWDER, FOR SOLUTION ORAL at 14:54

## 2017-09-17 RX ADMIN — RISPERIDONE 1 MG: 1 TABLET ORAL at 09:08

## 2017-09-17 RX ADMIN — SPIRONOLACTONE 50 MG: 50 TABLET ORAL at 09:08

## 2017-09-17 RX ADMIN — FERROUS GLUCONATE 324 MG: 324 TABLET ORAL at 09:07

## 2017-09-17 RX ADMIN — FOLIC ACID 1 MG: 1 TABLET ORAL at 09:08

## 2017-09-17 RX ADMIN — LACTULOSE 30 G: 10 POWDER, FOR SOLUTION ORAL at 20:14

## 2017-09-17 ASSESSMENT — ACTIVITIES OF DAILY LIVING (ADL)
GROOMING: PROMPTS
LAUNDRY: WITH SUPERVISION
HYGIENE/GROOMING: PROMPTS
ORAL_HYGIENE: PROMPTS
ORAL_HYGIENE: PROMPTS
DRESS: SCRUBS (BEHAVIORAL HEALTH)
DRESS: SCRUBS (BEHAVIORAL HEALTH)

## 2017-09-17 NOTE — PLAN OF CARE
"Problem: Psychotic Symptoms  Goal: Psychotic Symptoms  Signs and symptoms of listed problems will be absent or manageable.   Outcome: No Change  48 Hour Assessment    Pt isolated to her room for the entirety of the shift. Pt denies all confusion, and states that she is oriented to person and place. However, pt approached writer at  and stated that \"Everyone needs to get out. My  and I own this building. It's not [facility], it's [place] [facility]. So, you need to call the police.\" Writer attempted to orient pt to current situation, but pt responded, \"No, it's not [facility]. Did you call the police?\" Due to patient's increasing confusion, HS medications were given early. Included in these medications was 30 mg of lactulose and 2.5 mg of Haldol.    Encouraged pt to come out to the lounge, and she responded \"OK\", but continued to isolate to her room.     SI/SIB: Pt denies     Auditory/Visual Hallucinations: Pt continues to appear responding to internal stimuli by picking items out of the air. In addition, she often does not make eye contact with writer and stares off into space.     No additional concerns at this time. Will continue to monitor and assess.      "

## 2017-09-17 NOTE — PROGRESS NOTES
09/17/17 1511   Behavioral Health   Hallucinations appears responding   Thinking delusional;poor concentration   Orientation date, disoriented;time, disoriented   Memory baseline memory   Insight denial of illness;poor   Judgement impaired   Eye Contact into space;staring   Affect blunted, flat;irritable   Physical Appearance/Attire untidy;disheveled   Hygiene neglected grooming - unclean body, hair, teeth   Activity isolative;withdrawn   Medication Sensitivity no stated side effects;no observed side effects   Activities of Daily Living   Hygiene/Grooming prompts   Oral Hygiene prompts   Dress scrubs (behavioral health)   Laundry with supervision   Room Organization prompts     Pt. Seemed to be calm and isolative and. Pt. Spent most of the time sleeping/napping. PT. Denied suicidal ideation and self injury.

## 2017-09-17 NOTE — PROGRESS NOTES
Pt irritable. Requires redirection. Pt trying emergency exit door multiple times during the shift. Pt refused check in. Pt states they want to be left alone by staff. Pt called 911 and when asked to hang up, was irritable. Stated they have called 911 before and will do it again.      09/17/17 1800   Behavioral Health   Hallucinations appears responding   Thinking poor concentration;delusional;distractable;confused   Orientation date, disoriented;time, disoriented;person: oriented   Memory confabulation   Insight denial of illness;poor   Judgement impaired   Eye Contact into space;staring   Affect sad;irritable;blunted, flat   Physical Appearance/Attire disheveled   Hygiene neglected grooming - unclean body, hair, teeth   Activity isolative;withdrawn   Speech clear;coherent   Medication Sensitivity no observed side effects;no stated side effects   Psychomotor / Gait balanced   Safety   Suicidality status 15;unpredictable frequency of checking patient   Seizure precautions clutter free environment   Elopement status 15;no shoes;room away from unit doors;behavioral scrubs (pajamas);signs posted on unit entrance / exit doors;distraction;engagement in unit activities   Psycho Education   Type of Intervention 1:1 intervention   Response refuses   Hours 0.5   Activities of Daily Living   Hygiene/Grooming prompts   Oral Hygiene prompts   Dress scrubs (behavioral health)   Room Organization prompts   Activity   Activity Level of Assistance independent   Behavioral Health Interventions   Psychotic Symptoms maintain safety precautions;maintain safe secure environment;reality orientation;assess patient response to medication   Social and Therapeutic Interventions (Psychotic Symptoms) encourage socialization with peers;encourage effective boundaries with peers;encourage participation in therapeutic groups and milieu activities

## 2017-09-18 PROCEDURE — 25000132 ZZH RX MED GY IP 250 OP 250 PS 637: Performed by: HOSPITALIST

## 2017-09-18 PROCEDURE — 12400007 ZZH R&B MH INTERMEDIATE UMMC

## 2017-09-18 PROCEDURE — 25000132 ZZH RX MED GY IP 250 OP 250 PS 637: Performed by: EMERGENCY MEDICINE

## 2017-09-18 PROCEDURE — 25000132 ZZH RX MED GY IP 250 OP 250 PS 637: Performed by: NURSE PRACTITIONER

## 2017-09-18 PROCEDURE — 25000132 ZZH RX MED GY IP 250 OP 250 PS 637: Performed by: PSYCHIATRY & NEUROLOGY

## 2017-09-18 PROCEDURE — 99232 SBSQ HOSP IP/OBS MODERATE 35: CPT | Performed by: PSYCHIATRY & NEUROLOGY

## 2017-09-18 RX ADMIN — RIFAXIMIN 550 MG: 550 TABLET ORAL at 08:23

## 2017-09-18 RX ADMIN — MULTIPLE VITAMINS W/ MINERALS TAB 1 TABLET: TAB at 08:23

## 2017-09-18 RX ADMIN — LACTULOSE 30 G: 10 POWDER, FOR SOLUTION ORAL at 08:23

## 2017-09-18 RX ADMIN — HALOPERIDOL 2.5 MG: 1 TABLET ORAL at 08:23

## 2017-09-18 RX ADMIN — RISPERIDONE 1 MG: 1 TABLET ORAL at 08:23

## 2017-09-18 RX ADMIN — RISPERIDONE 2 MG: 2 TABLET ORAL at 21:17

## 2017-09-18 RX ADMIN — DEXTROMETHORPHAN 30 MG: 30 SUSPENSION, EXTENDED RELEASE ORAL at 08:25

## 2017-09-18 RX ADMIN — HALOPERIDOL 2.5 MG: 1 TABLET ORAL at 21:16

## 2017-09-18 RX ADMIN — RIFAXIMIN 550 MG: 550 TABLET ORAL at 21:16

## 2017-09-18 RX ADMIN — PANTOPRAZOLE SODIUM 40 MG: 40 TABLET, DELAYED RELEASE ORAL at 08:23

## 2017-09-18 RX ADMIN — FOLIC ACID 1 MG: 1 TABLET ORAL at 08:23

## 2017-09-18 RX ADMIN — SPIRONOLACTONE 50 MG: 50 TABLET ORAL at 08:23

## 2017-09-18 RX ADMIN — LACTULOSE 30 G: 10 POWDER, FOR SOLUTION ORAL at 21:16

## 2017-09-18 RX ADMIN — FERROUS GLUCONATE 324 MG: 324 TABLET ORAL at 08:23

## 2017-09-18 RX ADMIN — Medication 20 MG: at 08:23

## 2017-09-18 RX ADMIN — LACTULOSE 30 G: 10 POWDER, FOR SOLUTION ORAL at 13:56

## 2017-09-18 ASSESSMENT — ACTIVITIES OF DAILY LIVING (ADL)
GROOMING: INDEPENDENT
LAUNDRY: WITH SUPERVISION
GROOMING: INDEPENDENT
DRESS: SCRUBS (BEHAVIORAL HEALTH)
DRESS: INDEPENDENT
ORAL_HYGIENE: INDEPENDENT
LAUNDRY: WITH SUPERVISION
ORAL_HYGIENE: INDEPENDENT

## 2017-09-18 NOTE — PROGRESS NOTES
[facility] placed call to patient's [facility] ([phone]) requesting a call back to discuss other placement options (i.e locked memory care unit) as patient has likely reached her baseline and is waiting for a bed at [facility].    3:30pm.  Spoke with Shira, confirmed that referral had been made to [facility] ([phone]; [phone] fax) around 9/8/17 but requested clinical information that was faxed from us was not received by Leonard.  Writer ray updated progress notes along with [facility] & [facility] and medical consult to Rosalia in admissions.  They will review. Patient's medical insurance has lapsed and patient has not been cooperative with signing paperwork to reinstate it yet. Shira indicates she plans to visit patient on the unit on Tuesday to try to get this done.

## 2017-09-18 NOTE — PROGRESS NOTES
09/18/17 1401   Behavioral Health   Hallucinations denies / not responding to hallucinations   Thinking distractable   Orientation date: oriented;time: oriented;place: oriented;person: oriented   Memory baseline memory   Insight poor   Judgement impaired   Eye Contact at examiner   Affect full range affect   Mood depressed;mood is calm   Physical Appearance/Attire appears stated age   Hygiene well groomed   Suicidality other (see comments)  (dENIES)   Self Injury other (see comment)  (dENIES)   Activity isolative;withdrawn;restless   Speech coherent;clear   Psychomotor / Gait steady;balanced   Psycho Education   Type of Intervention 1:1 intervention   Response participates, initiates socially appropriate   Hours 0.5   Activities of Daily Living   Hygiene/Grooming independent   Oral Hygiene independent   Dress scrubs (behavioral health)   Laundry with supervision   Room Organization independent   Activity   Activity Level of Assistance independent   Behavioral Health Interventions   Psychotic Symptoms maintain safety precautions;assist patient in developing safety plan;assist patient in following safety plan;provide emotional support   Social and Therapeutic Interventions (Psychotic Symptoms) encourage socialization with peers;encourage participation in therapeutic groups and milieu activities   Pt spent most of her time in room lying in bed and denies having  suicidal ideation. She had both meals and she gets her tray and takes to her room. She reports having good appetite and sleeping ok.

## 2017-09-18 NOTE — PROGRESS NOTES
"Pt continues to visibly respond to internal stimuli by picking items out of the air. In addition, pt made several attempts to leave the unit because, as she states, \"My  is outside waiting to pick me up.\" Pt was tearful, and stated to writer that she's frustrated with still being in the hospital. Pt made several calls to 911 via unit telephones, and needed redirecting. Writer offered pt prn Haldol and Lactulose, but she refused both.    Pt did take all scheduled HS medications with no issue.    Pt denies having urine output or a BM this shift. No output visible in hat or toilet bowl. No additional concerns at this time. Will continue to monitor and assess.  "

## 2017-09-18 NOTE — PROGRESS NOTES
"[facility], [place]   Psychiatric Progress Note        Interim History:   The patient's care was discussed with the treatment team during the daily team meeting and/or staff's chart notes were reviewed.  There was little change in the patient's condition/symptoms over weekend. She periodically gets confused, demands to be discharged, picks things from the air, however reports good appetite. Staff report patient spent most of the time in her bed. Compliant with all her meds, but refuses prn meds if she doesn't want them, refuses antidepressants.   Per [facility] report: \"CTC placed call to patient's [facility] Waldo Villagran ([phone]) requesting a call back to discuss other placement options (i.e locked memory care unit) as patient has likely reached her baseline and is waiting for a bed at [facility].     3:30pm.  Spoke with Shira, confirmed that referral had been made to [facility] ([phone]; [phone] fax) around 9/8/17 but requested clinical information that was faxed from us was not received by Leonard.  Writer ray updated progress notes along with H & P and medical consult to Rosalia in admissions.  They will review. Patient's medical insurance has lapsed and patient has not been cooperative with signing paperwork to reinstate it yet. Shira indicates she plans to visit patient on the unit on Tuesday to try to get this done. \"        Medications:       ferrous gluconate  324 mg Oral Daily with breakfast     dextromethorphan  30 mg Oral Q12H SHIMON     lactulose  30 g Oral TID     haloperidol  2.5 mg Oral BID     risperiDONE  1 mg Oral QAM     risperiDONE  2 mg Oral At Bedtime     pantoprazole  40 mg Oral Daily     multivitamin, therapeutic with minerals  1 tablet Oral Daily     folic acid  1 mg Oral Daily     rifaximin  550 mg Oral BID     spironolactone  50 mg Oral Daily     furosemide  20 mg Oral Daily          Allergies:     Allergies   Allergen Reactions     Acetaminophen      Ambien " [Zolpidem Tartrate] Other (See Comments)     Sleep walks and eats     Ciprofloxacin Other (See Comments)     Seizure.     Citalopram Nausea and Vomiting          Labs:     No results found for this or any previous visit (from the past 24 hour(s)).       Psychiatric Examination:     /65  Pulse 95  Temp 97.6  F (36.4  C) (Oral)  Resp 16  Wt 93.9 kg (207 lb)  SpO2 95%  BMI 33.41 kg/m2  Weight is 207 lbs 0 oz  Body mass index is 33.41 kg/(m^2).               Lying Orthostatic BP: 103/51         Sitting Orthostatic BP:  (ref)         Standing Orthostatic BP:  (ref)     Appearance: dressed in hospital scrubs, seen in bed.  Attitude:  guarded and partially cooperative, somnolent  Eye Contact:  poor   Mood: sad  Affect:  Blunted most of the time  Speech: limited speech production, monotone voice.  Psychomotor Behavior:  no evidence of tardive dyskinesia, dystonia, or tics and physical agitation  Throught Process:  Slightly more organized.  Associations:  loosening of associations present at times.  Thought Content:  no evidence of suicidal ideation or homicidal ideation, Visual hallucinations are likely, didn't voice delusional ideas today,   Insight:  limited  Judgement:  poor  Oriented to:  self and place  Attention Span and Concentration:  limited  Recent and Remote Memory:  poor         Precautions:     Behavioral Orders   Procedures     Code 2     Elopement precautions     Routine Programming     As clinically indicated     Seizure precautions     Status 15     Every 15 minutes.     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             Plan:   Patient is fully MI committed. Per CTC she was referred to [facility], patient's records were refaxed today. [facility]. Degree of her confusion/psychotic symptoms fluctuate from day to day, it doesn't seem to correlate to her ammonia level. She is on a lactulose protocol. She is being followed by IM. No medication changes today. Will continue to provide with support and structure, encourage to spend more time outside.

## 2017-09-19 PROCEDURE — 25000132 ZZH RX MED GY IP 250 OP 250 PS 637: Performed by: PSYCHIATRY & NEUROLOGY

## 2017-09-19 PROCEDURE — 12400007 ZZH R&B MH INTERMEDIATE UMMC

## 2017-09-19 PROCEDURE — 25000132 ZZH RX MED GY IP 250 OP 250 PS 637: Performed by: HOSPITALIST

## 2017-09-19 PROCEDURE — 25000132 ZZH RX MED GY IP 250 OP 250 PS 637: Performed by: EMERGENCY MEDICINE

## 2017-09-19 PROCEDURE — 25000132 ZZH RX MED GY IP 250 OP 250 PS 637: Performed by: NURSE PRACTITIONER

## 2017-09-19 RX ADMIN — PANTOPRAZOLE SODIUM 40 MG: 40 TABLET, DELAYED RELEASE ORAL at 08:24

## 2017-09-19 RX ADMIN — RIFAXIMIN 550 MG: 550 TABLET ORAL at 08:24

## 2017-09-19 RX ADMIN — LACTULOSE 30 G: 10 POWDER, FOR SOLUTION ORAL at 14:04

## 2017-09-19 RX ADMIN — RIFAXIMIN 550 MG: 550 TABLET ORAL at 21:23

## 2017-09-19 RX ADMIN — HALOPERIDOL 2.5 MG: 1 TABLET ORAL at 21:24

## 2017-09-19 RX ADMIN — FOLIC ACID 1 MG: 1 TABLET ORAL at 08:24

## 2017-09-19 RX ADMIN — MULTIPLE VITAMINS W/ MINERALS TAB 1 TABLET: TAB at 08:24

## 2017-09-19 RX ADMIN — RISPERIDONE 1 MG: 1 TABLET ORAL at 08:24

## 2017-09-19 RX ADMIN — Medication 20 MG: at 08:24

## 2017-09-19 RX ADMIN — RISPERIDONE 2 MG: 2 TABLET ORAL at 21:24

## 2017-09-19 RX ADMIN — FERROUS GLUCONATE 324 MG: 324 TABLET ORAL at 08:24

## 2017-09-19 RX ADMIN — LACTULOSE 30 G: 10 POWDER, FOR SOLUTION ORAL at 21:23

## 2017-09-19 RX ADMIN — HALOPERIDOL 2.5 MG: 1 TABLET ORAL at 08:24

## 2017-09-19 RX ADMIN — LACTULOSE 30 G: 10 POWDER, FOR SOLUTION ORAL at 08:24

## 2017-09-19 RX ADMIN — SPIRONOLACTONE 50 MG: 50 TABLET ORAL at 08:24

## 2017-09-19 ASSESSMENT — ACTIVITIES OF DAILY LIVING (ADL)
ORAL_HYGIENE: INDEPENDENT
GROOMING: SHOWER;INDEPENDENT
ORAL_HYGIENE: PROMPTS
LAUNDRY: UNABLE TO COMPLETE
HYGIENE/GROOMING: PROMPTS
DRESS: SCRUBS (BEHAVIORAL HEALTH)
DRESS: SCRUBS (BEHAVIORAL HEALTH);INDEPENDENT

## 2017-09-19 NOTE — PROGRESS NOTES
"   09/18/17 2100   Behavioral Health   Hallucinations auditory;visual   Thinking confused;distractable;poor concentration   Orientation situation, disoriented   Memory other (see comment)  (unable to assess )   Insight poor   Judgement impaired   Eye Contact at examiner   Affect blunted, flat;sad   Mood mood is calm   Physical Appearance/Attire disheveled   Hygiene neglected grooming - unclean body, hair, teeth   Suicidality other (see comments)  (denies )   Self Injury other (see comment)  (denies )   Elopement Statements about wanting to leave   Activity isolative;withdrawn   Speech clear;coherent   Activities of Daily Living   Hygiene/Grooming independent   Oral Hygiene independent   Dress independent   Laundry with supervision   Room Organization independent     Pt denied SI and SIB.  Pt reported feeling \" sad (10) \" because I can't get out of here you guys are holding me hostage you know that's a federal crime that's a felony.\"  Pt seems disoriented, blunted affect irritated, spend most of the shift in her room and ate supper.  Pt tried door handle asked if this writer can open the door for her pt was redirected.  Pt is independent with ADL's.  Pt reported no nutritional concerns.  "

## 2017-09-19 NOTE — PROGRESS NOTES
Pt's SEBAS Silva stopped by to have pt complete the MA application and to report that [facility] does have pt's information and are reviewing her case.

## 2017-09-19 NOTE — PLAN OF CARE
Problem: Psychotic Symptoms  Goal: Psychotic Symptoms  Signs and symptoms of listed problems will be absent or manageable.   Outcome: No Change  48 Hour Nursing Assessment:  Day 41 of hospitalization.  Mildred showered and shampooed her hair today.  She has been up more today,sitting on her bed rather than laying down resting.  She denies SI.  She denies hallucinations but seems to be responding to internal stimuli.  She is eating well and drinking adequate fluids.  Although she has not done this today, she was checking exit doors yesterday and is still considered an elopement risk.  She is taking her medications without difficulty. She is oriented to all sphere's except for knowing the day of the week.    1415 - Mildred is a bit more irritable this afternoon and has been hanging around the exit doors.  Staff are watching closely.

## 2017-09-20 PROCEDURE — 25000132 ZZH RX MED GY IP 250 OP 250 PS 637: Performed by: HOSPITALIST

## 2017-09-20 PROCEDURE — 99231 SBSQ HOSP IP/OBS SF/LOW 25: CPT | Performed by: PSYCHIATRY & NEUROLOGY

## 2017-09-20 PROCEDURE — 25000132 ZZH RX MED GY IP 250 OP 250 PS 637: Performed by: NURSE PRACTITIONER

## 2017-09-20 PROCEDURE — 25000132 ZZH RX MED GY IP 250 OP 250 PS 637: Performed by: EMERGENCY MEDICINE

## 2017-09-20 PROCEDURE — 25000132 ZZH RX MED GY IP 250 OP 250 PS 637: Performed by: PSYCHIATRY & NEUROLOGY

## 2017-09-20 PROCEDURE — 12400007 ZZH R&B MH INTERMEDIATE UMMC

## 2017-09-20 RX ADMIN — HALOPERIDOL 2.5 MG: 1 TABLET ORAL at 08:52

## 2017-09-20 RX ADMIN — RIFAXIMIN 550 MG: 550 TABLET ORAL at 08:51

## 2017-09-20 RX ADMIN — LACTULOSE 30 G: 10 POWDER, FOR SOLUTION ORAL at 08:52

## 2017-09-20 RX ADMIN — PANTOPRAZOLE SODIUM 40 MG: 40 TABLET, DELAYED RELEASE ORAL at 08:52

## 2017-09-20 RX ADMIN — SPIRONOLACTONE 50 MG: 50 TABLET ORAL at 08:52

## 2017-09-20 RX ADMIN — RISPERIDONE 2 MG: 2 TABLET ORAL at 21:19

## 2017-09-20 RX ADMIN — LACTULOSE 30 G: 10 POWDER, FOR SOLUTION ORAL at 20:36

## 2017-09-20 RX ADMIN — LACTULOSE 30 G: 10 POWDER, FOR SOLUTION ORAL at 14:07

## 2017-09-20 RX ADMIN — MULTIPLE VITAMINS W/ MINERALS TAB 1 TABLET: TAB at 08:51

## 2017-09-20 RX ADMIN — RISPERIDONE 1 MG: 1 TABLET ORAL at 08:51

## 2017-09-20 RX ADMIN — Medication 20 MG: at 08:52

## 2017-09-20 RX ADMIN — FERROUS GLUCONATE 324 MG: 324 TABLET ORAL at 08:51

## 2017-09-20 RX ADMIN — RIFAXIMIN 550 MG: 550 TABLET ORAL at 20:36

## 2017-09-20 RX ADMIN — FOLIC ACID 1 MG: 1 TABLET ORAL at 08:51

## 2017-09-20 RX ADMIN — HALOPERIDOL 2.5 MG: 1 TABLET ORAL at 20:36

## 2017-09-20 ASSESSMENT — ACTIVITIES OF DAILY LIVING (ADL)
HYGIENE/GROOMING: INDEPENDENT
ORAL_HYGIENE: INDEPENDENT
DRESS: INDEPENDENT
ORAL_HYGIENE: INDEPENDENT
GROOMING: INDEPENDENT
DRESS: INDEPENDENT

## 2017-09-20 NOTE — PROGRESS NOTES
09/20/17 1612   Behavioral Health   Hallucinations denies / not responding to hallucinations   Thinking poor concentration   Orientation person: oriented;place: oriented;date, disoriented   Memory baseline memory   Insight poor   Judgement impaired   Eye Contact at examiner   Affect blunted, flat   Mood depressed   Physical Appearance/Attire disheveled   Hygiene neglected grooming - unclean body, hair, teeth   Suicidality (Denies)   Self Injury (Denies)   Elopement (Nothing to report)   Activity isolative;withdrawn   Speech clear;coherent   Psychomotor / Gait balanced;steady   Psycho Education   Type of Intervention 1:1 intervention   Response participates, initiates socially appropriate   Hours 0.5   Treatment Detail (CHeck In)   Activities of Daily Living   Hygiene/Grooming independent   Oral Hygiene independent   Dress independent   Room Organization independent   Behavioral Health Interventions   Psychotic Symptoms build upon strengths;encourage participation / independence with adls;provide emotional support   Social and Therapeutic Interventions (Psychotic Symptoms) encourage socialization with peers;encourage participation in therapeutic groups and milieu activities   Patient was in her room most of the shift, but did come out for breakfast and lunch, although she eat in her room.  The patient was napping most of the shift and was not social with peers.  The patient did come out of her room ad sit in the lounge area watching TV twice this shift, but only for a few minutes each time.  The was new behavior for this patient and she reports it was her goal to try and come out of her room more.  The patient denies SI and SiB.

## 2017-09-20 NOTE — PROGRESS NOTES
[facility], [place]   Psychiatric Progress Note        Interim History:   The patient's care was discussed with the treatment team during the daily team meeting and/or staff's chart notes were reviewed.  There was little change in the patient's condition/symptoms over weekend. She periodically gets confused, demands to be discharged, picks things from the air, however reports good appetite. Staff report patient spent most of the time in her bed. Compliant with all her meds. Recently she started spending more time outside of her room, was seen watching TV with other patients but for only short period of time, then, returned back to her room. She was referred to [facility], they are reviewing her papers.        Medications:       ferrous gluconate  324 mg Oral Daily with breakfast     dextromethorphan  30 mg Oral Q12H SHIMON     lactulose  30 g Oral TID     haloperidol  2.5 mg Oral BID     risperiDONE  1 mg Oral QAM     risperiDONE  2 mg Oral At Bedtime     pantoprazole  40 mg Oral Daily     multivitamin, therapeutic with minerals  1 tablet Oral Daily     folic acid  1 mg Oral Daily     rifaximin  550 mg Oral BID     spironolactone  50 mg Oral Daily     furosemide  20 mg Oral Daily          Allergies:     Allergies   Allergen Reactions     Acetaminophen      Ambien [Zolpidem Tartrate] Other (See Comments)     Sleep walks and eats     Ciprofloxacin Other (See Comments)     Seizure.     Citalopram Nausea and Vomiting          Labs:     No results found for this or any previous visit (from the past 24 hour(s)).       Psychiatric Examination:     /70  Pulse 71  Temp 97.7  F (36.5  C) (Oral)  Resp 18  Wt 93.9 kg (207 lb)  SpO2 95%  BMI 33.41 kg/m2  Weight is 207 lbs 0 oz  Body mass index is 33.41 kg/(m^2).               Lying Orthostatic BP: 103/51         Sitting Orthostatic BP:  (ref)         Standing Orthostatic BP:  (ref)     Appearance: dressed in hospital scrubs, seen in  bed.  Attitude:  guarded and partially cooperative, somnolent  Eye Contact:  poor   Mood: sad  Affect:  Blunted most of the time, at times smiles: this is new for her.  Speech: limited speech production, monotone voice.  Psychomotor Behavior:  no evidence of tardive dyskinesia, dystonia, or tics and physical agitation  Throught Process:  Slightly more organized.  Associations:  loosening of associations present at times.  Thought Content:  no evidence of suicidal ideation or homicidal ideation, Visual hallucinations are likely, didn't voice delusional ideas today,   Insight:  limited  Judgement:  poor  Oriented to:  self and place  Attention Span and Concentration:  limited  Recent and Remote Memory:  poor         Precautions:     Behavioral Orders   Procedures     Code 2     Elopement precautions     Routine Programming     As clinically indicated     Seizure precautions     Status 15     Every 15 minutes.     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             Plan:   Patient is fully MI committed. Per CTC she was referred to [facility], [facility]. She is also waiting for placement to [facility], see above. Degree of her confusion/psychotic symptoms fluctuate from day to day, it doesn't seem to correlate to her ammonia level. She is on a lactulose protocol. She is being followed by IM. No medication changes today. Will continue to provide with support and structure, encourage to spend more time outside.

## 2017-09-20 NOTE — PROGRESS NOTES
Patient appears to respond to visual and auditory hallucinations and presents with labile and irritable mood. Patient made multiple statements about wanting to leave and spent time waiting at the fire exit where she claimed her  was trying to let her out. Patient isolated to her room most of the shift.     09/19/17 2200   Behavioral Health   Hallucinations appears responding   Thinking confused;delusional;poor concentration   Orientation person: oriented;place: oriented   Insight poor   Judgement impaired   Eye Contact at examiner   Affect irritable   Mood labile   Physical Appearance/Attire disheveled   Hygiene neglected grooming - unclean body, hair, teeth   Suicidality other (see comments)  (Denies)   Self Injury other (see comment)  (Denies)   Elopement Loitering near exit doors;Statements about wanting to leave;Hallucinations directing behavior   Activity isolative;withdrawn   Speech pressured   Psychomotor / Gait balanced;steady   Activities of Daily Living   Hygiene/Grooming prompts   Oral Hygiene prompts   Dress scrubs (behavioral health)   Laundry unable to complete   Room Organization prompts

## 2017-09-21 PROCEDURE — 25000132 ZZH RX MED GY IP 250 OP 250 PS 637: Performed by: HOSPITALIST

## 2017-09-21 PROCEDURE — 25000132 ZZH RX MED GY IP 250 OP 250 PS 637: Performed by: EMERGENCY MEDICINE

## 2017-09-21 PROCEDURE — 25000132 ZZH RX MED GY IP 250 OP 250 PS 637: Performed by: NURSE PRACTITIONER

## 2017-09-21 PROCEDURE — 25000132 ZZH RX MED GY IP 250 OP 250 PS 637: Performed by: PSYCHIATRY & NEUROLOGY

## 2017-09-21 PROCEDURE — 12400007 ZZH R&B MH INTERMEDIATE UMMC

## 2017-09-21 PROCEDURE — 99231 SBSQ HOSP IP/OBS SF/LOW 25: CPT | Performed by: PSYCHIATRY & NEUROLOGY

## 2017-09-21 RX ADMIN — RIFAXIMIN 550 MG: 550 TABLET ORAL at 21:05

## 2017-09-21 RX ADMIN — RIFAXIMIN 550 MG: 550 TABLET ORAL at 08:29

## 2017-09-21 RX ADMIN — SPIRONOLACTONE 50 MG: 50 TABLET ORAL at 08:29

## 2017-09-21 RX ADMIN — FOLIC ACID 1 MG: 1 TABLET ORAL at 08:30

## 2017-09-21 RX ADMIN — MULTIPLE VITAMINS W/ MINERALS TAB 1 TABLET: TAB at 08:30

## 2017-09-21 RX ADMIN — FERROUS GLUCONATE 324 MG: 324 TABLET ORAL at 08:30

## 2017-09-21 RX ADMIN — HALOPERIDOL 2.5 MG: 1 TABLET ORAL at 21:06

## 2017-09-21 RX ADMIN — LACTULOSE 30 G: 10 POWDER, FOR SOLUTION ORAL at 21:05

## 2017-09-21 RX ADMIN — HALOPERIDOL 2.5 MG: 1 TABLET ORAL at 08:30

## 2017-09-21 RX ADMIN — LACTULOSE 30 G: 10 POWDER, FOR SOLUTION ORAL at 14:06

## 2017-09-21 RX ADMIN — PANTOPRAZOLE SODIUM 40 MG: 40 TABLET, DELAYED RELEASE ORAL at 08:30

## 2017-09-21 RX ADMIN — RISPERIDONE 1 MG: 1 TABLET ORAL at 08:30

## 2017-09-21 RX ADMIN — LACTULOSE 30 G: 10 POWDER, FOR SOLUTION ORAL at 08:29

## 2017-09-21 RX ADMIN — Medication 20 MG: at 08:30

## 2017-09-21 RX ADMIN — RISPERIDONE 2 MG: 2 TABLET ORAL at 21:05

## 2017-09-21 ASSESSMENT — ACTIVITIES OF DAILY LIVING (ADL)
GROOMING: PROMPTS
DRESS: PROMPTS
ORAL_HYGIENE: PROMPTS

## 2017-09-21 NOTE — PROGRESS NOTES
Pt isolated to her room for the majority of the shift. She stated knowing that she is at [facility], knows who she is, and knew the date. No attempts were made to elope from the unit.    Pt took all medications without issue.

## 2017-09-21 NOTE — PROGRESS NOTES
[facility], [place]   Psychiatric Progress Note        Interim History:   The patient's care was discussed with the treatment team during the daily team meeting and/or staff's chart notes were reviewed.  There was little change in the patient's condition/symptoms over weekend. She periodically gets confused, demands to be discharged, picks things from the air, however reports good appetite. Staff report patient spent most of the time in her bed. Compliant with all her meds. Recently she started spending more time outside of her room, was seen watching TV with other patients but for only short period of time. Today she asked me when she could be discharged home, became irritated when I told her that we were waiting for a bed for her and refused to continue interview.        Medications:       ferrous gluconate  324 mg Oral Daily with breakfast     dextromethorphan  30 mg Oral Q12H SHIMON     lactulose  30 g Oral TID     haloperidol  2.5 mg Oral BID     risperiDONE  1 mg Oral QAM     risperiDONE  2 mg Oral At Bedtime     pantoprazole  40 mg Oral Daily     multivitamin, therapeutic with minerals  1 tablet Oral Daily     folic acid  1 mg Oral Daily     rifaximin  550 mg Oral BID     spironolactone  50 mg Oral Daily     furosemide  20 mg Oral Daily          Allergies:     Allergies   Allergen Reactions     Acetaminophen      Ambien [Zolpidem Tartrate] Other (See Comments)     Sleep walks and eats     Ciprofloxacin Other (See Comments)     Seizure.     Citalopram Nausea and Vomiting          Labs:     No results found for this or any previous visit (from the past 24 hour(s)).       Psychiatric Examination:     /70  Pulse 71  Temp 97.7  F (36.5  C) (Oral)  Resp 20  Wt 94.3 kg (208 lb)  SpO2 95%  BMI 33.57 kg/m2  Weight is 208 lbs 0 oz  Body mass index is 33.57 kg/(m^2).                 Lying Orthostatic BP: 103/51         Sitting Orthostatic BP: 105/67         Standing Orthostatic BP:  98/63     Appearance: dressed in hospital scrubs, seen in bed.  Attitude:  guarded and partially cooperative, somnolent  Eye Contact:  poor   Mood: sad  Affect:  Blunted most of the time, at times smiles: this is new for her.  Speech: limited speech production, monotone voice.  Psychomotor Behavior:  no evidence of tardive dyskinesia, dystonia, or tics and physical agitation  Throught Process:  Slightly more organized.  Associations:  loosening of associations present at times.  Thought Content:  no evidence of suicidal ideation or homicidal ideation, Visual hallucinations are likely, didn't voice delusional ideas today,   Insight:  limited  Judgement:  poor  Oriented to:  self and place, part of date  Attention Span and Concentration:  limited  Recent and Remote Memory:  poor         Precautions:     Behavioral Orders   Procedures     Code 2     Elopement precautions     Routine Programming     As clinically indicated     Seizure precautions     Status 15     Every 15 minutes.     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             Plan:   Patient is fully MI committed. Per CTC she was referred to [facility], [facility]. She is also waiting for placement to [facility], see above. Degree of her confusion/psychotic symptoms fluctuate from day to day, it doesn't seem to correlate to her ammonia level. She is on a lactulose protocol. She is being followed by IM. No medication changes today. Will discontinue strict measurements of intake and output. Will continue to provide with support and structure, encourage to spend more time outside.

## 2017-09-21 NOTE — PROGRESS NOTES
Appears to be responding to internal stimuli. Cognitive state was labile this evening going from crying to sleeping in her room. Calm and isolative in her room for most of the evening laying awake in her room.

## 2017-09-21 NOTE — PLAN OF CARE
Problem: Psychotic Symptoms  Goal: Psychotic Symptoms  Signs and symptoms of listed problems will be absent or manageable.   Outcome: No Change  48 Hour Nursing Assessment:  Day 33 of hospitalization.  Mildred has stayed in her room all day except for meals.  She appears asleep on rounds. She denies being suicidal.  She denies hallucinations but appears to be responding to internal stimuli.  She was oriented except for the day.  She has eaten  Her meals and her appetite is good.

## 2017-09-22 PROCEDURE — 99231 SBSQ HOSP IP/OBS SF/LOW 25: CPT | Performed by: PSYCHIATRY & NEUROLOGY

## 2017-09-22 PROCEDURE — 25000132 ZZH RX MED GY IP 250 OP 250 PS 637: Performed by: PSYCHIATRY & NEUROLOGY

## 2017-09-22 PROCEDURE — 25000132 ZZH RX MED GY IP 250 OP 250 PS 637: Performed by: HOSPITALIST

## 2017-09-22 PROCEDURE — 99207 ZZC CDG-MDM COMPONENT: MEETS LOW - DOWN CODED: CPT | Performed by: PSYCHIATRY & NEUROLOGY

## 2017-09-22 PROCEDURE — 12400007 ZZH R&B MH INTERMEDIATE UMMC

## 2017-09-22 PROCEDURE — 25000132 ZZH RX MED GY IP 250 OP 250 PS 637: Performed by: NURSE PRACTITIONER

## 2017-09-22 PROCEDURE — 25000132 ZZH RX MED GY IP 250 OP 250 PS 637: Performed by: EMERGENCY MEDICINE

## 2017-09-22 RX ADMIN — MULTIPLE VITAMINS W/ MINERALS TAB 1 TABLET: TAB at 09:23

## 2017-09-22 RX ADMIN — DEXTROMETHORPHAN 30 MG: 30 SUSPENSION, EXTENDED RELEASE ORAL at 09:24

## 2017-09-22 RX ADMIN — SPIRONOLACTONE 50 MG: 50 TABLET ORAL at 09:23

## 2017-09-22 RX ADMIN — RIFAXIMIN 550 MG: 550 TABLET ORAL at 09:27

## 2017-09-22 RX ADMIN — FERROUS GLUCONATE 324 MG: 324 TABLET ORAL at 09:24

## 2017-09-22 RX ADMIN — LACTULOSE 30 G: 10 POWDER, FOR SOLUTION ORAL at 21:18

## 2017-09-22 RX ADMIN — FOLIC ACID 1 MG: 1 TABLET ORAL at 09:23

## 2017-09-22 RX ADMIN — HALOPERIDOL 2.5 MG: 1 TABLET ORAL at 21:19

## 2017-09-22 RX ADMIN — PANTOPRAZOLE SODIUM 40 MG: 40 TABLET, DELAYED RELEASE ORAL at 09:23

## 2017-09-22 RX ADMIN — LACTULOSE 30 G: 10 POWDER, FOR SOLUTION ORAL at 09:23

## 2017-09-22 RX ADMIN — RISPERIDONE 2 MG: 2 TABLET ORAL at 21:19

## 2017-09-22 RX ADMIN — HALOPERIDOL 2.5 MG: 1 TABLET ORAL at 09:22

## 2017-09-22 RX ADMIN — RISPERIDONE 1 MG: 1 TABLET ORAL at 09:24

## 2017-09-22 RX ADMIN — LACTULOSE 30 G: 10 POWDER, FOR SOLUTION ORAL at 15:25

## 2017-09-22 RX ADMIN — Medication 20 MG: at 09:26

## 2017-09-22 RX ADMIN — RIFAXIMIN 550 MG: 550 TABLET ORAL at 21:18

## 2017-09-22 ASSESSMENT — ACTIVITIES OF DAILY LIVING (ADL)
LAUNDRY: WITH SUPERVISION
DRESS: SCRUBS (BEHAVIORAL HEALTH);INDEPENDENT
GROOMING: INDEPENDENT
ORAL_HYGIENE: INDEPENDENT

## 2017-09-22 NOTE — PROGRESS NOTES
Pt appeared to be oriented to person, situation, and date. Pt also denies being confused. Pt was more visible in the milieu today, attending and participating in Community meeting, as well as watching television for a short time. Pt made one statement that

## 2017-09-22 NOTE — PROGRESS NOTES
"   09/22/17 1300   Behavioral Health   Hallucinations denies / not responding to hallucinations   Thinking distractable;poor concentration   Orientation person: oriented;place: oriented   Memory other (see comment)  (unable to assess )   Insight poor   Judgement impaired   Eye Contact at examiner   Affect blunted, flat   Mood depressed;mood is calm   Physical Appearance/Attire neat   Hygiene well groomed   Suicidality other (see comments)   Self Injury other (see comment)   Elopement Statements about wanting to leave  (Pt is on elopment precaution )   Activity isolative;withdrawn   Speech clear;coherent   Activities of Daily Living   Hygiene/Grooming independent   Oral Hygiene independent   Dress scrubs (behavioral health);independent   Laundry with supervision   Room Organization independent       Pt denied SI and SIB.  Pt reported feeling depressed (10), agitated (10) and irritated (10).  Pt reported overall feeling \"alright.\"  Pt seems calm, showered, ate meals, isolative, withdrawn, spend most of the shift in bed, pt did not socialized with anyone and did not attend group.  Pt came out of her room and stand by the Exit door and staring at the door pt was redirected.   Pt daily goal \" shower.\"  Pt goal after discharge \" I don't know what is there to do nothing leave me alone now.\"  Pt is independent with ADL's.  Pt reported no nutritional concerns.  Pt wants visitors.    "

## 2017-09-22 NOTE — PROGRESS NOTES
[facility], [place]   Psychiatric Progress Note        Interim History:   The patient's care was discussed with the treatment team during the daily team meeting and/or staff's chart notes were reviewed.  Patient reports feeling more irritable and depressed, however, was seen more outside and on occasion participates in community meetings. Oriented times 3. She showered today. Focused on leaving hospital, doesn't want to hear that she would have to wait for placement. When she hears that she becomes irritable and stops talking. Overall, though, appears to be more cooperative, eats better, takes her meds, selectively interacts with unit staff.        Medications:       ferrous gluconate  324 mg Oral Daily with breakfast     dextromethorphan  30 mg Oral Q12H SHIMON     lactulose  30 g Oral TID     haloperidol  2.5 mg Oral BID     risperiDONE  1 mg Oral QAM     risperiDONE  2 mg Oral At Bedtime     pantoprazole  40 mg Oral Daily     multivitamin, therapeutic with minerals  1 tablet Oral Daily     folic acid  1 mg Oral Daily     rifaximin  550 mg Oral BID     spironolactone  50 mg Oral Daily     furosemide  20 mg Oral Daily          Allergies:     Allergies   Allergen Reactions     Acetaminophen      Ambien [Zolpidem Tartrate] Other (See Comments)     Sleep walks and eats     Ciprofloxacin Other (See Comments)     Seizure.     Citalopram Nausea and Vomiting          Labs:     No results found for this or any previous visit (from the past 24 hour(s)).       Psychiatric Examination:     /70  Pulse 71  Temp 97.7  F (36.5  C) (Oral)  Resp 20  Wt 94.3 kg (208 lb)  SpO2 95%  BMI 33.57 kg/m2  Weight is 208 lbs 0 oz  Body mass index is 33.57 kg/(m^2).                 Lying Orthostatic BP: 103/51         Sitting Orthostatic BP: 105/67         Standing Orthostatic BP: 98/63       Appearance: dressed in hospital scrubs, seen in bed.  Attitude:  guarded and partially cooperative,   Eye Contact:  limited  Mood: sad  Affect:  Blunted most of the time, at times smiles: this is new for her.  Speech: limited speech production, monotone voice.  Psychomotor Behavior:  no evidence of tardive dyskinesia, dystonia, or tics and physical agitation  Throught Process:  Slightly more organized.  Associations:  loosening of associations present at times.  Thought Content:  no evidence of suicidal ideation or homicidal ideation, Visual hallucinations are likely, didn't voice delusional ideas today,   Insight:  limited  Judgement:  poor  Oriented to:  self and place, part of date  Attention Span and Concentration:  limited  Recent and Remote Memory:  poor         Precautions:     Behavioral Orders   Procedures     Code 2     Elopement precautions     Routine Programming     As clinically indicated     Seizure precautions     Status 15     Every 15 minutes.     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             Plan:   Patient is fully MI committed. Per CTC she was referred to [facility], [facility]. She is also waiting for placement to [facility], see above. Degree of her confusion/psychotic symptoms fluctuate from day to day, it doesn't seem to correlate to her ammonia level. She is on a lactulose protocol. She is being followed by IM. No medication changes today. Will continue to provide with support and structure, encourage to spend more time outside.

## 2017-09-23 PROCEDURE — 12400007 ZZH R&B MH INTERMEDIATE UMMC

## 2017-09-23 PROCEDURE — 25000132 ZZH RX MED GY IP 250 OP 250 PS 637: Performed by: PSYCHIATRY & NEUROLOGY

## 2017-09-23 PROCEDURE — 25000132 ZZH RX MED GY IP 250 OP 250 PS 637: Performed by: HOSPITALIST

## 2017-09-23 PROCEDURE — 25000132 ZZH RX MED GY IP 250 OP 250 PS 637: Performed by: EMERGENCY MEDICINE

## 2017-09-23 PROCEDURE — 25000132 ZZH RX MED GY IP 250 OP 250 PS 637: Performed by: NURSE PRACTITIONER

## 2017-09-23 RX ADMIN — RIFAXIMIN 550 MG: 550 TABLET ORAL at 08:53

## 2017-09-23 RX ADMIN — LACTULOSE 30 G: 10 POWDER, FOR SOLUTION ORAL at 20:26

## 2017-09-23 RX ADMIN — RISPERIDONE 1 MG: 1 TABLET ORAL at 08:53

## 2017-09-23 RX ADMIN — LACTULOSE 30 G: 10 POWDER, FOR SOLUTION ORAL at 08:52

## 2017-09-23 RX ADMIN — HALOPERIDOL 2.5 MG: 1 TABLET ORAL at 08:52

## 2017-09-23 RX ADMIN — FERROUS GLUCONATE 324 MG: 324 TABLET ORAL at 08:52

## 2017-09-23 RX ADMIN — Medication 20 MG: at 08:53

## 2017-09-23 RX ADMIN — HALOPERIDOL 2.5 MG: 1 TABLET ORAL at 20:26

## 2017-09-23 RX ADMIN — RIFAXIMIN 550 MG: 550 TABLET ORAL at 20:26

## 2017-09-23 RX ADMIN — RISPERIDONE 2 MG: 2 TABLET ORAL at 20:26

## 2017-09-23 RX ADMIN — LACTULOSE 30 G: 10 POWDER, FOR SOLUTION ORAL at 13:47

## 2017-09-23 RX ADMIN — PANTOPRAZOLE SODIUM 40 MG: 40 TABLET, DELAYED RELEASE ORAL at 08:52

## 2017-09-23 RX ADMIN — MULTIPLE VITAMINS W/ MINERALS TAB 1 TABLET: TAB at 08:53

## 2017-09-23 RX ADMIN — SPIRONOLACTONE 50 MG: 50 TABLET ORAL at 08:52

## 2017-09-23 RX ADMIN — FOLIC ACID 1 MG: 1 TABLET ORAL at 08:52

## 2017-09-23 ASSESSMENT — ACTIVITIES OF DAILY LIVING (ADL)
LAUNDRY: UNABLE TO COMPLETE
FALL_HISTORY_WITHIN_LAST_SIX_MONTHS: YES
TRANSFERRING: 0-->INDEPENDENT
GROOMING: INDEPENDENT
GROOMING: INDEPENDENT
NUMBER_OF_TIMES_PATIENT_HAS_FALLEN_WITHIN_LAST_SIX_MONTHS: 5
ORAL_HYGIENE: INDEPENDENT
COGNITION: 2 - DIFFICULTY WITH ORGANIZING THOUGHTS
LAUNDRY: WITH SUPERVISION
DRESS: INDEPENDENT
RETIRED_EATING: 0-->INDEPENDENT
DRESS: SCRUBS (BEHAVIORAL HEALTH);INDEPENDENT
RETIRED_COMMUNICATION: 0-->UNDERSTANDS/COMMUNICATES WITHOUT DIFFICULTY
ORAL_HYGIENE: INDEPENDENT
DRESS: 0-->INDEPENDENT
BATHING: 0-->INDEPENDENT
SWALLOWING: 0-->SWALLOWS FOODS/LIQUIDS WITHOUT DIFFICULTY
TOILETING: 0-->INDEPENDENT
AMBULATION: 0-->INDEPENDENT

## 2017-09-23 NOTE — PROGRESS NOTES
Mildred was fully oriented today.  She has been out of her room to watch TV and eat lunch.  This is a positive change in behavior.

## 2017-09-23 NOTE — PROGRESS NOTES
09/23/17 1443   Behavioral Health   Hallucinations visual  (pt denies but observed pt picking things out of the air)   Thinking poor concentration   Orientation person: oriented;place: oriented;date: oriented;time: oriented   Memory baseline memory   Insight poor   Judgement impaired   Eye Contact at examiner   Affect blunted, flat   Mood labile   Physical Appearance/Attire attire appropriate to age and situation   Hygiene well groomed   Suicidality (denies)   Self Injury (denies)   Elopement (none shown)   Activity withdrawn   Speech clear;coherent   Medication Sensitivity no stated side effects   Psychomotor / Gait balanced   Psycho Education   Type of Intervention 1:1 intervention   Response participates, initiates socially appropriate   Hours 0.5   Activities of Daily Living   Hygiene/Grooming independent   Oral Hygiene independent   Dress independent   Laundry with supervision   Room Organization independent   pt calm and more visible in the milieu, watching TV with peers. Pt stated it felt good to be more social. Pt listening to music and room and napping. Pt denies hallucinations but observed pt picking things out of the air that arent there. Pt denies SI and SIB. Pt out for meals.

## 2017-09-23 NOTE — BRIEF OP NOTE
Pt appears to be more clear in thought and less disorganized than previous observations.  Appears to still be responding to internal stimuli, but at a lesser rate, or they are just not as disturbing to pt as previous observation.  Pt has been more visible to milieu the past couple of shifts, and has attended community meeting at least twice in the last few days.  Before this week, staff had not seen pt participate in any group activities.

## 2017-09-23 NOTE — PLAN OF CARE
"Problem: Psychotic Symptoms  Goal: Psychotic Symptoms  Signs and symptoms of listed problems will be absent or manageable.   Outcome: Improving     48 Hour Nurse Note:  Pt states she feels \"ok\".  Pt denies depression, anxiety, AH, VH, HI and paranoia.  Pt presents with a flat affect.  She has slowed speech, with pauses between answers.  Pt was visible in the milieu (improvement) and watching TV.  Pt appears less agitated in general.  She has appropriate responses to questions.  Pt is A O x 2 - does not date.      Pt still appears to be responding to IS; however, she is better able to express her needs.    Pt made no statements and had no behaviors regarding elopement.     Pt reports she showered on 9/22.  Pt reports no nutritional concerns.  Pt reported pain in her knee and was receptive to an ice pack.    Pt was compliant with her medications. Went to her room approx 2000.     Will continue to monitor and assess.      "

## 2017-09-24 PROCEDURE — 12400007 ZZH R&B MH INTERMEDIATE UMMC

## 2017-09-24 PROCEDURE — 25000132 ZZH RX MED GY IP 250 OP 250 PS 637: Performed by: PSYCHIATRY & NEUROLOGY

## 2017-09-24 PROCEDURE — 25000132 ZZH RX MED GY IP 250 OP 250 PS 637: Performed by: HOSPITALIST

## 2017-09-24 PROCEDURE — 25000132 ZZH RX MED GY IP 250 OP 250 PS 637: Performed by: NURSE PRACTITIONER

## 2017-09-24 PROCEDURE — 25000132 ZZH RX MED GY IP 250 OP 250 PS 637: Performed by: EMERGENCY MEDICINE

## 2017-09-24 RX ADMIN — RISPERIDONE 2 MG: 2 TABLET ORAL at 21:02

## 2017-09-24 RX ADMIN — MULTIPLE VITAMINS W/ MINERALS TAB 1 TABLET: TAB at 08:46

## 2017-09-24 RX ADMIN — SPIRONOLACTONE 50 MG: 50 TABLET ORAL at 08:46

## 2017-09-24 RX ADMIN — HALOPERIDOL 2.5 MG: 1 TABLET ORAL at 21:02

## 2017-09-24 RX ADMIN — PANTOPRAZOLE SODIUM 40 MG: 40 TABLET, DELAYED RELEASE ORAL at 08:46

## 2017-09-24 RX ADMIN — HALOPERIDOL 2.5 MG: 1 TABLET ORAL at 08:46

## 2017-09-24 RX ADMIN — FOLIC ACID 1 MG: 1 TABLET ORAL at 08:46

## 2017-09-24 RX ADMIN — RIFAXIMIN 550 MG: 550 TABLET ORAL at 21:02

## 2017-09-24 RX ADMIN — LACTULOSE 30 G: 10 POWDER, FOR SOLUTION ORAL at 08:46

## 2017-09-24 RX ADMIN — Medication 20 MG: at 08:47

## 2017-09-24 RX ADMIN — LACTULOSE 30 G: 10 POWDER, FOR SOLUTION ORAL at 21:01

## 2017-09-24 RX ADMIN — RIFAXIMIN 550 MG: 550 TABLET ORAL at 08:46

## 2017-09-24 RX ADMIN — LACTULOSE 30 G: 10 POWDER, FOR SOLUTION ORAL at 13:57

## 2017-09-24 RX ADMIN — RISPERIDONE 1 MG: 1 TABLET ORAL at 08:46

## 2017-09-24 RX ADMIN — FERROUS GLUCONATE 324 MG: 324 TABLET ORAL at 08:47

## 2017-09-24 ASSESSMENT — ACTIVITIES OF DAILY LIVING (ADL)
DRESS: SCRUBS (BEHAVIORAL HEALTH)
DRESS: STREET CLOTHES
GROOMING: INDEPENDENT
ORAL_HYGIENE: INDEPENDENT
HYGIENE/GROOMING: PROMPTS
ORAL_HYGIENE: PROMPTS
LAUNDRY: WITH SUPERVISION

## 2017-09-24 NOTE — PROGRESS NOTES
09/24/17 1351   Behavioral Health   Hallucinations appears responding   Thinking poor concentration;distractable   Orientation time: oriented;date: oriented;place: oriented;person: oriented   Memory baseline memory   Insight poor   Judgement impaired   Eye Contact into space   Affect blunted, flat;angry   Mood elated;anxious   Physical Appearance/Attire appears stated age   Hygiene well groomed   Suicidality other (see comments)  (Denies)   Self Injury other (see comment)  (Denies)   Activity isolative;withdrawn   Speech clear;coherent   Psychomotor / Gait balanced;steady   Psycho Education   Type of Intervention 1:1 intervention   Response participates, initiates socially appropriate   Hours 0.5   Activities of Daily Living   Hygiene/Grooming independent   Oral Hygiene independent   Dress street clothes   Laundry with supervision   Room Organization independent   Behavioral Health Interventions   Psychotic Symptoms maintain safety precautions;maintain safe secure environment;assist patient in following safety plan;assist patient in developing safety plan;provide emotional support   Social and Therapeutic Interventions (Psychotic Symptoms) encourage socialization with peers;encourage participation in therapeutic groups and milieu activities   Pt was observed at end kaba  Exit door. She was crying in her room and stating she wants to leave here. She appears isolative to room and she minimal interaction. She denies suicidal ideation but appears responding to internal stimuli.

## 2017-09-25 PROCEDURE — 25000132 ZZH RX MED GY IP 250 OP 250 PS 637: Performed by: HOSPITALIST

## 2017-09-25 PROCEDURE — 25000132 ZZH RX MED GY IP 250 OP 250 PS 637: Performed by: NURSE PRACTITIONER

## 2017-09-25 PROCEDURE — 99207 ZZC CDG-MDM COMPONENT: MEETS MODERATE - UP CODED: CPT | Performed by: PSYCHIATRY & NEUROLOGY

## 2017-09-25 PROCEDURE — 12400007 ZZH R&B MH INTERMEDIATE UMMC

## 2017-09-25 PROCEDURE — 99232 SBSQ HOSP IP/OBS MODERATE 35: CPT | Performed by: PSYCHIATRY & NEUROLOGY

## 2017-09-25 PROCEDURE — 25000132 ZZH RX MED GY IP 250 OP 250 PS 637: Performed by: EMERGENCY MEDICINE

## 2017-09-25 PROCEDURE — 25000132 ZZH RX MED GY IP 250 OP 250 PS 637: Performed by: PSYCHIATRY & NEUROLOGY

## 2017-09-25 RX ADMIN — FERROUS GLUCONATE 324 MG: 324 TABLET ORAL at 10:03

## 2017-09-25 RX ADMIN — MULTIPLE VITAMINS W/ MINERALS TAB 1 TABLET: TAB at 10:03

## 2017-09-25 RX ADMIN — PANTOPRAZOLE SODIUM 40 MG: 40 TABLET, DELAYED RELEASE ORAL at 10:02

## 2017-09-25 RX ADMIN — HALOPERIDOL 2.5 MG: 1 TABLET ORAL at 10:02

## 2017-09-25 RX ADMIN — LACTULOSE 30 G: 10 POWDER, FOR SOLUTION ORAL at 10:03

## 2017-09-25 RX ADMIN — RIFAXIMIN 550 MG: 550 TABLET ORAL at 10:02

## 2017-09-25 RX ADMIN — SPIRONOLACTONE 50 MG: 50 TABLET ORAL at 10:08

## 2017-09-25 RX ADMIN — RISPERIDONE 1 MG: 1 TABLET ORAL at 10:03

## 2017-09-25 RX ADMIN — FOLIC ACID 1 MG: 1 TABLET ORAL at 10:02

## 2017-09-25 RX ADMIN — Medication 20 MG: at 10:08

## 2017-09-25 ASSESSMENT — ACTIVITIES OF DAILY LIVING (ADL)
DRESS: SCRUBS (BEHAVIORAL HEALTH)
GROOMING: INDEPENDENT
ORAL_HYGIENE: INDEPENDENT
HYGIENE/GROOMING: INDEPENDENT
ORAL_HYGIENE: INDEPENDENT
DRESS: SCRUBS (BEHAVIORAL HEALTH);INDEPENDENT

## 2017-09-25 NOTE — PLAN OF CARE
"Problem: Psychotic Symptoms  Goal: Psychotic Symptoms  Signs and symptoms of listed problems will be absent or manageable.   Outcome: No Change  48 hour nursing assessment:  Pt evaluation continues. Assessed mood, anxiety, thoughts, and behavior. Is progressing towards goals. Encourage participation in groups and developing healthy coping skills. Pt denies auditory or visual  hallucinations. Refer to daily team meeting notes for individualized plan of care. Will continue to assess.   Patient presented as mostly calm this morning, was able to converse with writer appropriately though was disoriented to date. Patient also stated that her last name is \"left hand\", states that the last name we have on her chart is \"old\". Patient is observed to be eating well at meal times. Patient requested and was allowed to shower this morning. Patient was cooperative with medications.   At 1400 pt was observed near the unit doors, acting as though she were pulling on a rope between the doors and asked if writer would open the door so she could leave. Patient was oriented to place and situation. Writer attempted to redirect patient and she began to scream \"don't touch me!\" at writer, then walked on her own down to her room. Writer attempted to assess patient a few minutes later and offer PRN medication for hallucinations, and patient refused to talk to writer. Patient is currently listening to music on headphones and staring at the ceiling. Will attempt to assess patient and offer PRN medication if necessary before the end of shift. Patient has not made any suicidal or self-injurious statements today. Will continue to monitor and assess.       "

## 2017-09-25 NOTE — PROGRESS NOTES
"[facility], [place]   Psychiatric Progress Note        Interim History:   The patient's care was discussed with the treatment team during the daily team meeting and/or staff's chart notes were reviewed.  Patient reports feeling more irritable and depressed, however, was seen more outside and on occasion participates in community meetings. Oriented times 3. She showered today. Focused on leaving hospital, doesn't want to hear that she would have to wait for placement. Overall, though, appears to be more cooperative, eats better, takes her meds, selectively interacts with unit staff.   Per RN's note: \"Patient presented as mostly calm this morning, was able to converse with writer appropriately though was disoriented to date. Patient also stated that her last name is \"left hand\", states that the last name we have on her chart is \"old\". Patient is observed to be eating well at meal times. Patient requested and was allowed to shower this morning. Patient was cooperative with medications.   At 1400 pt was observed near the unit doors, acting as though she were pulling on a rope between the doors and asked if writer would open the door so she could leave. Patient was oriented to place and situation. Writer attempted to redirect patient and she began to scream \"don't touch me!\" at writer, then walked on her own down to her room. Writer attempted to assess patient a few minutes later and offer PRN medication for hallucinations, and patient refused to talk to writer. Patient is currently listening to music on headphones and staring at the ceiling. Will attempt to assess patient and offer PRN medication if necessary before the end of shift. Patient has not made any suicidal or self-injurious statements today.       Medications:       ferrous gluconate  324 mg Oral Daily with breakfast     lactulose  30 g Oral TID     haloperidol  2.5 mg Oral BID     risperiDONE  1 mg Oral QAM     risperiDONE  2 mg " Oral At Bedtime     pantoprazole  40 mg Oral Daily     multivitamin, therapeutic with minerals  1 tablet Oral Daily     folic acid  1 mg Oral Daily     rifaximin  550 mg Oral BID     spironolactone  50 mg Oral Daily     furosemide  20 mg Oral Daily          Allergies:     Allergies   Allergen Reactions     Acetaminophen      Ambien [Zolpidem Tartrate] Other (See Comments)     Sleep walks and eats     Ciprofloxacin Other (See Comments)     Seizure.     Citalopram Nausea and Vomiting          Labs:     No results found for this or any previous visit (from the past 24 hour(s)).       Psychiatric Examination:     /70  Pulse 71  Temp 98.1  F (36.7  C) (Oral)  Resp 18  Wt 95.3 kg (210 lb 3.2 oz)  SpO2 95%  BMI 33.93 kg/m2  Weight is 210 lbs 3.2 oz  Body mass index is 33.93 kg/(m^2).                   Lying Orthostatic BP: 103/51         Sitting Orthostatic BP: 122/69         Standing Orthostatic BP:  (Ref)       Appearance: dressed in hospital scrubs, seen in bed.  Attitude:  guarded and partially cooperative,   Eye Contact: limited  Mood: sad  Affect:  Blunted most of the time, at times smiles: this is new for her.  Speech: limited speech production, monotone voice.  Psychomotor Behavior:  no evidence of tardive dyskinesia, dystonia, or tics and physical agitation  Throught Process:  Slightly more organized.  Associations:  loosening of associations present at times.  Thought Content:  no evidence of suicidal ideation or homicidal ideation, Visual hallucinations are likely, didn't voice delusional ideas today,   Insight:  limited  Judgement:  poor  Oriented to:  self and place, part of date  Attention Span and Concentration:  limited  Recent and Remote Memory:  poor         Precautions:     Behavioral Orders   Procedures     Code 2     Elopement precautions     Routine Programming     As clinically indicated     Seizure precautions     Status 15     Every 15 minutes.     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             Plan:   Patient is fully MI committed. Per CTC she was referred to [facility], [facility]. She is also waiting for placement to [facility], see above. Degree of her confusion/psychotic symptoms fluctuate from day to day, it doesn't seem to correlate to her ammonia level. She is on a lactulose protocol. She is being followed by IM. No medication changes today. Will continue to provide with support and structure, encourage to spend more time outside.

## 2017-09-25 NOTE — PROGRESS NOTES
Pt kept in room most entire shift. Had some instances at beginning of shift going to exit door at end of hallway but remained in room. Asked staff to bring the dinner tray to her room today. Otherwise, stayed in bed and had blanket over her head most of the time. Observed pt responding to internal stimuli as she stares at walls and picks at the air. Still wanting to leave and didn't want to talk much or be out this shift.       09/24/17 2124   Behavioral Health   Hallucinations appears responding;auditory;visual   Thinking poor concentration   Orientation place: oriented   Insight poor   Judgement impaired   Eye Contact at examiner   Affect blunted, flat;sad   Mood mood is calm;depressed   Physical Appearance/Attire appears stated age;attire appropriate to age and situation   Hygiene neglected grooming - unclean body, hair, teeth   Suicidality other (see comments)  (none stated/observed)   Self Injury other (see comment)  (none stated/observed)   Elopement Loitering near exit doors;Trying handles of doors;Statements about wanting to leave;Hallucinations directing behavior   Activity isolative;withdrawn   Speech clear;coherent   Medication Sensitivity no observed side effects;no stated side effects   Psychomotor / Gait balanced;steady   Activities of Daily Living   Hygiene/Grooming prompts   Oral Hygiene prompts   Dress scrubs (behavioral health)   Room Organization prompts

## 2017-09-26 PROCEDURE — 25000132 ZZH RX MED GY IP 250 OP 250 PS 637: Performed by: PSYCHIATRY & NEUROLOGY

## 2017-09-26 PROCEDURE — 25000132 ZZH RX MED GY IP 250 OP 250 PS 637: Performed by: HOSPITALIST

## 2017-09-26 PROCEDURE — 12400007 ZZH R&B MH INTERMEDIATE UMMC

## 2017-09-26 PROCEDURE — 25000132 ZZH RX MED GY IP 250 OP 250 PS 637: Performed by: NURSE PRACTITIONER

## 2017-09-26 PROCEDURE — 25000132 ZZH RX MED GY IP 250 OP 250 PS 637: Performed by: EMERGENCY MEDICINE

## 2017-09-26 RX ADMIN — HALOPERIDOL 2.5 MG: 1 TABLET ORAL at 09:14

## 2017-09-26 RX ADMIN — LACTULOSE 30 G: 10 POWDER, FOR SOLUTION ORAL at 20:32

## 2017-09-26 RX ADMIN — RISPERIDONE 1 MG: 1 TABLET ORAL at 09:14

## 2017-09-26 RX ADMIN — FERROUS GLUCONATE 324 MG: 324 TABLET ORAL at 09:14

## 2017-09-26 RX ADMIN — FOLIC ACID 1 MG: 1 TABLET ORAL at 09:14

## 2017-09-26 RX ADMIN — LACTULOSE 10 G: 10 POWDER, FOR SOLUTION ORAL at 09:12

## 2017-09-26 RX ADMIN — PANTOPRAZOLE SODIUM 40 MG: 40 TABLET, DELAYED RELEASE ORAL at 09:14

## 2017-09-26 RX ADMIN — HALOPERIDOL 2.5 MG: 1 TABLET ORAL at 20:32

## 2017-09-26 RX ADMIN — RIFAXIMIN 550 MG: 550 TABLET ORAL at 09:14

## 2017-09-26 RX ADMIN — Medication 20 MG: at 09:14

## 2017-09-26 RX ADMIN — RIFAXIMIN 550 MG: 550 TABLET ORAL at 20:32

## 2017-09-26 RX ADMIN — MULTIPLE VITAMINS W/ MINERALS TAB 1 TABLET: TAB at 09:14

## 2017-09-26 RX ADMIN — SPIRONOLACTONE 50 MG: 50 TABLET ORAL at 09:14

## 2017-09-26 RX ADMIN — RISPERIDONE 2 MG: 2 TABLET ORAL at 22:02

## 2017-09-26 ASSESSMENT — ACTIVITIES OF DAILY LIVING (ADL)
HYGIENE/GROOMING: PROMPTS
LAUNDRY: WITH SUPERVISION
DRESS: SCRUBS (BEHAVIORAL HEALTH);INDEPENDENT;PROMPTS
ORAL_HYGIENE: INDEPENDENT;PROMPTS
LAUNDRY: WITH SUPERVISION
ORAL_HYGIENE: INDEPENDENT
GROOMING: INDEPENDENT;PROMPTS
DRESS: SCRUBS (BEHAVIORAL HEALTH);PROMPTS

## 2017-09-26 NOTE — PROGRESS NOTES
Pt was not social or active this shift. Stayed mostly in room but had some instances of coming out to watch movies for a bit or loitering at exit doors - both double doors and exit door at end of hallway. Said her knees hurt so she didn't want to come out or grab her dinner tray. Pt observed responding to internal stimuli - staring into space in when in milieu and in room and also grabbing at air. However, she denies AH/VH. Had an instance of crying loudly in room, saying she wants to leave and that everyone here in [place] is fake - the doctors and staff here and that we are keeping her here against her will. Pt redirectable in conversation and mood stabilized rest of shift.        09/25/17 2207   Behavioral Health   Hallucinations visual;auditory;appears responding;denies / not responding to hallucinations  (observed responding but denies)   Thinking distractable;confused;paranoid;poor concentration   Orientation place: oriented   Memory baseline memory   Insight poor   Judgement impaired   Eye Contact at examiner   Affect blunted, flat;sad   Mood depressed   Physical Appearance/Attire appears stated age;attire appropriate to age and situation   Hygiene other (see comment)  (adequate)   Suicidality other (see comments)  (denies)   Self Injury other (see comment)  (denies)   Elopement Loitering near exit doors;Trying handles of doors;Statements about wanting to leave;Hallucinations directing behavior   Activity isolative;withdrawn;restless   Speech coherent;clear   Medication Sensitivity no observed side effects;no stated side effects   Psychomotor / Gait balanced;steady   Activities of Daily Living   Hygiene/Grooming independent   Oral Hygiene independent   Dress scrubs (behavioral health);independent   Room Organization independent

## 2017-09-26 NOTE — PROGRESS NOTES
09/26/17 1506   Behavioral Health   Hallucinations appears responding   Thinking delusional;confused;paranoid   Orientation situation, disoriented;place, disoriented;person, disoriented;date, disoriented;time, disoriented   Memory confabulation   Insight poor   Judgement impaired   Eye Contact at examiner   Affect angry;tense;irritable   Mood labile;irritable   Physical Appearance/Attire attire appropriate to age and situation   Hygiene neglected grooming - unclean body, hair, teeth   Suicidality other (see comments)  (JOSE)   Self Injury other (see comment)  (JOSE)   Elopement Loitering near exit doors;Statements about wanting to leave   Activity withdrawn;isolative;hyperactive (agitated, impulsive)   Speech clear   Medication Sensitivity no observed side effects   Psychomotor / Gait balanced;steady   Psycho Education   Type of Intervention other (see comment)  (Observation)   Response observes from a distance   Hours 2   Activities of Daily Living   Hygiene/Grooming prompts   Oral Hygiene independent   Dress scrubs (behavioral health);prompts   Laundry with supervision   Room Organization prompts     Pt was loitering near the exit door few times in the shift. Pt made statement desiring to leave the unit. Pt kept stating that her and her  own this hospitals. Pt was seen sobbing in her room. When asked what is wrong, she again declared her desire to leave. Pt came out of her room for meals and eat her meals in the lounge. Pt also came out of her room to watch a movie with the other pts.

## 2017-09-26 NOTE — PROGRESS NOTES
"Pt was crying and yelling in her room. Staff approached her asking if they could help. Pt then stated things like; \"the movie is over, now you can let me out\", \" if you cant help me get out now, then leave my fucking room\", \"once I leave im calling the  on everyone that doesn't help me and get you arrested\". Pt stayed in her room and calmed down a couple minutes after staff left.   "

## 2017-09-26 NOTE — PROGRESS NOTES
Pt drank 1/3 of applejuice with Lactulose and refused remaining. Pt has refused Lactulose x3 doses. On-call Doctor updated and he requested writer to pass along for team on Wednesday.   Will continue to monitor and offer medication.

## 2017-09-26 NOTE — PROGRESS NOTES
"Pt displayed more confused behavior this shift. She came out of her room and stood by the exit door several times, and made picking gestures in the air with her hands. When asked if there is anything writer can do, she stated \"No.\"    In addition, pt refused all scheduled medications today. She stated, \"I don't want them! What are you even doing here, this isn't a hospital!\" As of this note, writer offered medications again to pt, and she did not respond to writer.    Offered pt 2200 Risperdal, and she initially refused. Writer went back about 30 minutes later to offer medication to pt again, and pt did not wake up, even with multiple attempts made.    Will continue to monitor and assess.  "

## 2017-09-27 PROCEDURE — 25000132 ZZH RX MED GY IP 250 OP 250 PS 637: Performed by: EMERGENCY MEDICINE

## 2017-09-27 PROCEDURE — 25000132 ZZH RX MED GY IP 250 OP 250 PS 637: Performed by: HOSPITALIST

## 2017-09-27 PROCEDURE — 12400007 ZZH R&B MH INTERMEDIATE UMMC

## 2017-09-27 PROCEDURE — 25000132 ZZH RX MED GY IP 250 OP 250 PS 637: Performed by: NURSE PRACTITIONER

## 2017-09-27 PROCEDURE — 25000132 ZZH RX MED GY IP 250 OP 250 PS 637: Performed by: PSYCHIATRY & NEUROLOGY

## 2017-09-27 PROCEDURE — 99232 SBSQ HOSP IP/OBS MODERATE 35: CPT | Performed by: PSYCHIATRY & NEUROLOGY

## 2017-09-27 RX ADMIN — LACTULOSE 30 G: 10 POWDER, FOR SOLUTION ORAL at 08:24

## 2017-09-27 RX ADMIN — FOLIC ACID 1 MG: 1 TABLET ORAL at 08:24

## 2017-09-27 RX ADMIN — RISPERIDONE 2 MG: 2 TABLET ORAL at 20:23

## 2017-09-27 RX ADMIN — RISPERIDONE 1 MG: 1 TABLET ORAL at 08:24

## 2017-09-27 RX ADMIN — RIFAXIMIN 550 MG: 550 TABLET ORAL at 20:20

## 2017-09-27 RX ADMIN — FERROUS GLUCONATE 324 MG: 324 TABLET ORAL at 08:23

## 2017-09-27 RX ADMIN — Medication 20 MG: at 08:24

## 2017-09-27 RX ADMIN — MULTIPLE VITAMINS W/ MINERALS TAB 1 TABLET: TAB at 08:24

## 2017-09-27 RX ADMIN — SPIRONOLACTONE 50 MG: 50 TABLET ORAL at 08:23

## 2017-09-27 RX ADMIN — PANTOPRAZOLE SODIUM 40 MG: 40 TABLET, DELAYED RELEASE ORAL at 08:23

## 2017-09-27 RX ADMIN — LACTULOSE 30 G: 10 POWDER, FOR SOLUTION ORAL at 14:24

## 2017-09-27 RX ADMIN — HALOPERIDOL 2.5 MG: 1 TABLET ORAL at 20:23

## 2017-09-27 RX ADMIN — RIFAXIMIN 550 MG: 550 TABLET ORAL at 08:23

## 2017-09-27 RX ADMIN — HALOPERIDOL 2.5 MG: 1 TABLET ORAL at 08:23

## 2017-09-27 RX ADMIN — LACTULOSE 30 G: 10 POWDER, FOR SOLUTION ORAL at 20:23

## 2017-09-27 ASSESSMENT — ACTIVITIES OF DAILY LIVING (ADL)
GROOMING: INDEPENDENT
HYGIENE/GROOMING: PROMPTS
ORAL_HYGIENE: INDEPENDENT
DRESS: INDEPENDENT
LAUNDRY: WITH SUPERVISION
LAUNDRY: WITH SUPERVISION
ORAL_HYGIENE: INDEPENDENT
DRESS: INDEPENDENT

## 2017-09-27 NOTE — PLAN OF CARE
"Problem: Psychotic Symptoms  Goal: Psychotic Symptoms  Signs and symptoms of listed problems will be absent or manageable.   Outcome: No Change  Pt was visible on and off throughout the evening. She was frequently seen picking items out of the air, but denies AH and VH. Her mood is labile, and was seen yelling at staff and writer, as well as crying in her room shortly after. When writer approached pt to offer her the missed dose of lactulose from during the day, pt began yelling, \"What are you even doing here? You think you can help me? Just get the hell out of my room!\" When writer attempted to explain the importance of the medications she was prescribed, pt responded, \"I don't need them! What are you trying to just pump me full of medications? My doctor said I am ok, and I don't need my medications.\" Pt went on to say, \"This isn't even a hospital. My part is done, so I should be able to go. I own this building, so get off my property!\" At this time pt was visibly responding to internal stimuli by picking items out of the air.    About 45 minutes following check-in, pt was heard crying in her room. When writer approached pt while she was crying, pt stated \"Just go away. There's nothing you can do.\"     No additional concerns at this time. Will continue to monitor and assess.      "

## 2017-09-27 NOTE — PROGRESS NOTES
Called [facility] [phone].  They are looking into referral and will call writer back.     Left a Vm with pt's CCM to confirm referrals were made to both [facility] and [facility].

## 2017-09-27 NOTE — PROGRESS NOTES
"[facility], [place]   Psychiatric Progress Note        Interim History:   The patient's care was discussed with the treatment team during the daily team meeting and/or staff's chart notes were reviewed.  Patient reports feeling more irritable and depressed, however, was seen more outside and on occasion participates in community meetings. Still has periods of confusion, today stated that she needed to leave right away, that she owned this building and we could not keep her here: \"You can call Police on me, I am leaving\". Refused to talk to me, later was seen standing next to the unit exit door.   Per CTC: \"Called [facility] [phone].  They are looking into referral and will call writer back.      Left a Vm with pt's CCM to confirm referrals were made to both [facility] and [place].\"       Medications:       ferrous gluconate  324 mg Oral Daily with breakfast     lactulose  30 g Oral TID     haloperidol  2.5 mg Oral BID     risperiDONE  1 mg Oral QAM     risperiDONE  2 mg Oral At Bedtime     pantoprazole  40 mg Oral Daily     multivitamin, therapeutic with minerals  1 tablet Oral Daily     folic acid  1 mg Oral Daily     rifaximin  550 mg Oral BID     spironolactone  50 mg Oral Daily     furosemide  20 mg Oral Daily          Allergies:     Allergies   Allergen Reactions     Acetaminophen      Ambien [Zolpidem Tartrate] Other (See Comments)     Sleep walks and eats     Ciprofloxacin Other (See Comments)     Seizure.     Citalopram Nausea and Vomiting          Labs:     No results found for this or any previous visit (from the past 24 hour(s)).       Psychiatric Examination:     /70  Pulse 71  Temp 97.7  F (36.5  C) (Oral)  Resp 18  Wt 94.9 kg (209 lb 3.2 oz)  SpO2 95%  BMI 33.77 kg/m2  Weight is 209 lbs 3.2 oz  Body mass index is 33.77 kg/(m^2).               Lying Orthostatic BP: 103/51         Sitting Orthostatic BP: 108/60         Standing Orthostatic BP: " 98/60       Appearance: dressed in hospital scrubs, seen in bed.  Attitude:  guarded and partially cooperative,   Eye Contact: limited  Mood: sad  Affect:  Blunted most of the time, at times smiles: this is new for her.  Speech: limited speech production, monotone voice.  Psychomotor Behavior:  no evidence of tardive dyskinesia, dystonia, or tics and physical agitation  Throught Process:  Overall, slightly more organized.  Associations:  loosening of associations present at times.  Thought Content:  no evidence of suicidal ideation or homicidal ideation, Visual hallucinations are likely, delusions are present.   Insight:  limited  Judgement:  poor  Oriented to:  self and place, part of date  Attention Span and Concentration:  limited  Recent and Remote Memory:  poor         Precautions:     Behavioral Orders   Procedures     Code 2     Elopement precautions     Routine Programming     As clinically indicated     Seizure precautions     Status 15     Every 15 minutes.     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             Plan:   Patient is fully MI committed. Per CTC she was referred to [facility], [facility]. [facility] still reviewing referral. She is also waiting for placement to [facility], see above. Degree of her confusion/psychotic symptoms fluctuate from day to day, it doesn't seem to correlate to her ammonia level. She is on a lactulose protocol. She is being followed by IM. No medication changes today. Will continue to provide with support and structure, encourage to spend more time outside.

## 2017-09-27 NOTE — PROGRESS NOTES
"   09/27/17 1428   Behavioral Health   Hallucinations appears responding   Thinking paranoid;delusional;confused   Orientation place, disoriented;date, disoriented;time, disoriented;situation, disoriented   Memory confabulation   Insight poor   Judgement impaired   Eye Contact at examiner   Affect irritable;angry   Mood irritable;labile   Physical Appearance/Attire untidy   Hygiene neglected grooming - unclean body, hair, teeth   Suicidality other (see comments)  (JOSE)   Self Injury other (see comment)  (JOSE)   Elopement Loitering near exit doors;Statements about wanting to leave;Hallucinations directing behavior   Activity hyperactive (agitated, impulsive)   Speech clear   Medication Sensitivity no observed side effects   Psychomotor / Gait balanced;steady   Psycho Education   Type of Intervention other (see comment)  (Observation)   Response observes from a distance   Hours 3   Activities of Daily Living   Hygiene/Grooming prompts   Oral Hygiene independent   Dress independent   Laundry with supervision   Room Organization prompts   Behavioral Health Interventions   Psychotic Symptoms redirection of aggressive behaviors;maintain safety precautions;maintain safe secure environment;reality orientation;encourage participation / independence with adls;provide emotional support;build upon strengths   Social and Therapeutic Interventions (Psychotic Symptoms) encourage participation in therapeutic groups and milieu activities     Pt was observed standing near the exit doors throughout the shift. Pt came over to the  and kept asking the staffs if they have any papers for her to sign. When asked who told you this, pt responded by saying \"the people with the earpiece.\" Pt also kept asking the staff when she can leave the unit. Pt was isolative in her room for the majority of the shift.    "

## 2017-09-28 PROCEDURE — 25000132 ZZH RX MED GY IP 250 OP 250 PS 637: Performed by: EMERGENCY MEDICINE

## 2017-09-28 PROCEDURE — 99207 ZZC CDG-MDM COMPONENT: MEETS MODERATE - UP CODED: CPT | Performed by: PSYCHIATRY & NEUROLOGY

## 2017-09-28 PROCEDURE — 25000132 ZZH RX MED GY IP 250 OP 250 PS 637: Performed by: HOSPITALIST

## 2017-09-28 PROCEDURE — 25000132 ZZH RX MED GY IP 250 OP 250 PS 637: Performed by: NURSE PRACTITIONER

## 2017-09-28 PROCEDURE — 99232 SBSQ HOSP IP/OBS MODERATE 35: CPT | Performed by: PSYCHIATRY & NEUROLOGY

## 2017-09-28 PROCEDURE — 12400007 ZZH R&B MH INTERMEDIATE UMMC

## 2017-09-28 PROCEDURE — 25000132 ZZH RX MED GY IP 250 OP 250 PS 637: Performed by: PSYCHIATRY & NEUROLOGY

## 2017-09-28 RX ADMIN — RISPERIDONE 1 MG: 1 TABLET ORAL at 08:45

## 2017-09-28 RX ADMIN — HALOPERIDOL 2.5 MG: 1 TABLET ORAL at 20:34

## 2017-09-28 RX ADMIN — RIFAXIMIN 550 MG: 550 TABLET ORAL at 08:46

## 2017-09-28 RX ADMIN — Medication 20 MG: at 08:45

## 2017-09-28 RX ADMIN — RIFAXIMIN 550 MG: 550 TABLET ORAL at 20:35

## 2017-09-28 RX ADMIN — PANTOPRAZOLE SODIUM 40 MG: 40 TABLET, DELAYED RELEASE ORAL at 08:47

## 2017-09-28 RX ADMIN — MULTIPLE VITAMINS W/ MINERALS TAB 1 TABLET: TAB at 08:46

## 2017-09-28 RX ADMIN — FOLIC ACID 1 MG: 1 TABLET ORAL at 08:45

## 2017-09-28 RX ADMIN — RISPERIDONE 2 MG: 2 TABLET ORAL at 20:35

## 2017-09-28 RX ADMIN — LACTULOSE 30 G: 10 POWDER, FOR SOLUTION ORAL at 13:52

## 2017-09-28 RX ADMIN — SPIRONOLACTONE 50 MG: 50 TABLET ORAL at 08:45

## 2017-09-28 RX ADMIN — HALOPERIDOL 2.5 MG: 1 TABLET ORAL at 08:46

## 2017-09-28 RX ADMIN — FERROUS GLUCONATE 324 MG: 324 TABLET ORAL at 08:46

## 2017-09-28 ASSESSMENT — ACTIVITIES OF DAILY LIVING (ADL)
LAUNDRY: WITH SUPERVISION
GROOMING: INDEPENDENT;PROMPTS
DRESS: INDEPENDENT
ORAL_HYGIENE: INDEPENDENT
GROOMING: INDEPENDENT
DRESS: SCRUBS (BEHAVIORAL HEALTH)
ORAL_HYGIENE: INDEPENDENT

## 2017-09-28 NOTE — PROGRESS NOTES
[facility] can not accept pt because they don't have a locked unit.  They recommended pt be referred to [facility] (which has already been done per notes).     Writer called [facility] [phone] and they said that they would run pt's MA to see if that was up and running again and then call writer back.

## 2017-09-28 NOTE — PROGRESS NOTES
Mildred took her 1400 lactulose.  However, she has refused to cooperate with any neuro checks.  She has been irritable and crabby.

## 2017-09-28 NOTE — PROGRESS NOTES
"   09/27/17 2000   Behavioral Health   Hallucinations denies / not responding to hallucinations   Thinking poor concentration   Orientation place: oriented   Memory other (see comment)  (unable to assess)   Insight poor   Judgement impaired   Eye Contact at examiner   Affect angry;sad;irritable   Mood depressed;mood is calm;irritable   Physical Appearance/Attire disheveled   Hygiene neglected grooming - unclean body, hair, teeth   Suicidality other (see comments)   Self Injury other (see comment)   Elopement Loitering near exit doors   Activity other (see comment)  (visible in the milieu )   Speech clear   Activities of Daily Living   Hygiene/Grooming independent   Oral Hygiene independent   Dress independent   Laundry with supervision   Room Organization independent       Pt denied SI and SIB.  Pt reported feeling angry, depressed (10), agitated (10), irritable (10) and anxious (10) \" jigna I'm not at home.\"  Pt seems calm, visible in the milieu watching tv and ate supper.  Pt daily goal \" to get out of here.\"  Pt goal after discharge \" live at home and be in my own home.\"  Pt is independent with ADL's.  Pt reported no nutritional concerns.  Pt concerns \" just wondering when can I leave.\"  Pt wants visitors.    "

## 2017-09-28 NOTE — PROGRESS NOTES
Received call back from [facility].  They were not able to location pt's referral information.      Writer faxed over referral information to them at [phone]. Phone number is [phone].

## 2017-09-28 NOTE — PROGRESS NOTES
"[facility], [place]   Psychiatric Progress Note        Interim History:   The patient's care was discussed with the treatment team during the daily team meeting and/or staff's chart notes were reviewed.  Patient reports feeling \"OK, but why do you keep me here, I want to be discharged home\". She was oriented times 3, didn't voice clearly delusional ideas. She refused Lactulose today am: \"I don't need lactulose\". I told her that refusal to take Lactulose could cause her ammonia level to rise and slow down her discharge from this hospital. She said she would not refuse to take Lactulose in the future.      Left a Vm with pt's CCM to confirm referrals were made to both [facility] and [facility].\"       Medications:       ferrous gluconate  324 mg Oral Daily with breakfast     lactulose  30 g Oral TID     haloperidol  2.5 mg Oral BID     risperiDONE  1 mg Oral QAM     risperiDONE  2 mg Oral At Bedtime     pantoprazole  40 mg Oral Daily     multivitamin, therapeutic with minerals  1 tablet Oral Daily     folic acid  1 mg Oral Daily     rifaximin  550 mg Oral BID     spironolactone  50 mg Oral Daily     furosemide  20 mg Oral Daily          Allergies:     Allergies   Allergen Reactions     Acetaminophen      Ambien [Zolpidem Tartrate] Other (See Comments)     Sleep walks and eats     Ciprofloxacin Other (See Comments)     Seizure.     Citalopram Nausea and Vomiting          Labs:     No results found for this or any previous visit (from the past 24 hour(s)).       Psychiatric Examination:     /70  Pulse 71  Temp 98  F (36.7  C) (Oral)  Resp 18  Wt 94.8 kg (209 lb)  SpO2 95%  BMI 33.73 kg/m2  Weight is 209 lbs 0 oz  Body mass index is 33.73 kg/(m^2).               Lying Orthostatic BP: 103/51         Sitting Orthostatic BP: 108/60         Standing Orthostatic BP: 98/60       Appearance: dressed in hospital scrubs, seen in bed.  Attitude:  guarded and partially cooperative,   Eye " Contact: limited  Mood: sad  Affect:  Blunted most of the time, at times smiles: this is new for her.  Speech: limited speech production, monotone voice.  Psychomotor Behavior:  no evidence of tardive dyskinesia, dystonia, or tics and physical agitation  Throught Process:  Overall, slightly more organized.  Associations:  loosening of associations present at times.  Thought Content:  no evidence of suicidal ideation or homicidal ideation, Visual hallucinations are likely, delusions are present. She voices them off and on.   Insight:  limited  Judgement:  poor  Oriented to:  self and place, part of date  Attention Span and Concentration:  limited  Recent and Remote Memory:  poor         Precautions:     Behavioral Orders   Procedures     Code 2     Elopement precautions     Routine Programming     As clinically indicated     Seizure precautions     Status 15     Every 15 minutes.     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             Plan:   Patient is fully MI committed. Per CTC she was referred to [facility], [facility]. [facility] today said that they didn't receive patient's papers, they were refaxed by [facility]. She is also waiting for placement to [facility], see above. Degree of her confusion/psychotic symptoms fluctuate from day to day, it doesn't seem to correlate to her ammonia level. She is on a lactulose protocol. She is being followed by IM. No medication changes today. Will continue to provide with support and structure, encourage to spend more time outside.

## 2017-09-28 NOTE — PROGRESS NOTES
Mildred refused her lactulose this morning.   She was rather irritable.    1015 - Informed Dr. Payne of patient refusing lactulose.

## 2017-09-28 NOTE — PROGRESS NOTES
Referral information faxed to the following [facility]:    [facility]  [facility]

## 2017-09-28 NOTE — PROGRESS NOTES
09/28/17 1351   Behavioral Health   Hallucinations visual   Thinking confused;poor concentration   Orientation situation, disoriented   Memory baseline memory   Insight denial of illness   Judgement impaired   Eye Contact at examiner   Affect blunted, flat   Mood anxious;irritable   Physical Appearance/Attire attire appropriate to age and situation   Hygiene neglected grooming - unclean body, hair, teeth   Suicidality (denies)   Self Injury (denies)   Elopement Loitering near exit doors;Trying handles of doors;Statements about wanting to leave;Hallucinations directing behavior   Activity restless  (restless)   Speech clear;coherent   Medication Sensitivity no stated side effects   Psychomotor / Gait unsteady   Psycho Education   Type of Intervention 1:1 intervention   Response participates, initiates socially appropriate   Hours 0.5   Activities of Daily Living   Hygiene/Grooming independent   Oral Hygiene independent   Dress independent   Laundry with supervision   Room Organization independent   pt restless at times. Pt loiteting at far exit door, trying handles and wanting to leave. Pt denies hallucinations but observed responding as pt will pick things out of the air that arent there. Pt seemed to have less hallucinations than yesterday. Pt irritable and labile mood. Pt denies any mental health issues. Pt out for meals and did attend community meeting with strong encouragement.

## 2017-09-29 PROCEDURE — 25000132 ZZH RX MED GY IP 250 OP 250 PS 637: Performed by: EMERGENCY MEDICINE

## 2017-09-29 PROCEDURE — 25000132 ZZH RX MED GY IP 250 OP 250 PS 637: Performed by: PSYCHIATRY & NEUROLOGY

## 2017-09-29 PROCEDURE — 25000132 ZZH RX MED GY IP 250 OP 250 PS 637: Performed by: HOSPITALIST

## 2017-09-29 PROCEDURE — 99232 SBSQ HOSP IP/OBS MODERATE 35: CPT | Performed by: PSYCHIATRY & NEUROLOGY

## 2017-09-29 PROCEDURE — 12400007 ZZH R&B MH INTERMEDIATE UMMC

## 2017-09-29 PROCEDURE — 25000132 ZZH RX MED GY IP 250 OP 250 PS 637: Performed by: NURSE PRACTITIONER

## 2017-09-29 RX ADMIN — RIFAXIMIN 550 MG: 550 TABLET ORAL at 21:02

## 2017-09-29 RX ADMIN — FERROUS GLUCONATE 324 MG: 324 TABLET ORAL at 08:49

## 2017-09-29 RX ADMIN — Medication 20 MG: at 08:49

## 2017-09-29 RX ADMIN — HALOPERIDOL 2.5 MG: 1 TABLET ORAL at 21:02

## 2017-09-29 RX ADMIN — LACTULOSE 30 G: 10 POWDER, FOR SOLUTION ORAL at 21:01

## 2017-09-29 RX ADMIN — RISPERIDONE 1 MG: 1 TABLET ORAL at 08:49

## 2017-09-29 RX ADMIN — FOLIC ACID 1 MG: 1 TABLET ORAL at 08:49

## 2017-09-29 RX ADMIN — RIFAXIMIN 550 MG: 550 TABLET ORAL at 08:49

## 2017-09-29 RX ADMIN — PANTOPRAZOLE SODIUM 40 MG: 40 TABLET, DELAYED RELEASE ORAL at 08:49

## 2017-09-29 RX ADMIN — HALOPERIDOL 2.5 MG: 1 TABLET ORAL at 08:49

## 2017-09-29 RX ADMIN — MULTIPLE VITAMINS W/ MINERALS TAB 1 TABLET: TAB at 08:49

## 2017-09-29 RX ADMIN — RISPERIDONE 2 MG: 2 TABLET ORAL at 21:02

## 2017-09-29 RX ADMIN — SPIRONOLACTONE 50 MG: 50 TABLET ORAL at 08:49

## 2017-09-29 ASSESSMENT — ACTIVITIES OF DAILY LIVING (ADL)
ORAL_HYGIENE: INDEPENDENT
GROOMING: INDEPENDENT
LAUNDRY: WITH SUPERVISION
DRESS: INDEPENDENT
DRESS: INDEPENDENT
ORAL_HYGIENE: INDEPENDENT
GROOMING: INDEPENDENT

## 2017-09-29 NOTE — PROGRESS NOTES
09/29/17 1452   Behavioral Health   Hallucinations denies / not responding to hallucinations   Thinking poor concentration;distractable   Orientation person: oriented;place: oriented;date: oriented   Memory baseline memory   Insight poor   Judgement impaired   Eye Contact into space   Affect sad;irritable   Mood anxious;depressed;hopeless   Physical Appearance/Attire attire appropriate to age and situation   Hygiene body odor   Suicidality other (see comments)  (Denies)   Self Injury other (see comment)  (Denies)   Elopement Trying handles of doors;Loitering near exit doors;Statements about wanting to leave   Activity isolative;withdrawn   Speech clear;coherent   Psychomotor / Gait balanced;steady;slow   Psycho Education   Type of Intervention 1:1 intervention   Response participates, initiates socially appropriate   Hours 0.5   Treatment Detail (Check In)   Activities of Daily Living   Hygiene/Grooming independent  (Showers every few days, none today)   Oral Hygiene independent   Dress independent   Room Organization independent   Behavioral Health Interventions   Psychotic Symptoms simple, clear language;establish therapeutic relationship;build upon strengths   Social and Therapeutic Interventions (Psychotic Symptoms) encourage participation in therapeutic groups and milieu activities   Patient was in her room most of the shift and did not attend any groups.  The patient did come out of her room for both meals and eat both in the dinning area.  The patient was not social during meals, but remained in the milieu. The patient did come out of her room once and was loitering by the fire exit at the end out the unit hallway.  The patient tried the door handle once and was redirected away from the doors.  The patient was heard being upset with the doctor while he was doing rounds and was making statements about wanting to leave the hospital.  The patient was frustrated because she has been here over 30 days and does  not know why she is not being allowed to go home. The patient denies SI and SiB.

## 2017-09-29 NOTE — PROGRESS NOTES
[facility], [place]   Psychiatric Progress Note        Interim History:   The patient's care was discussed with the treatment team during the daily team meeting and/or staff's chart notes were reviewed. She has been more isolative, focused on being discharged. Refused morning lactulose, although, as recently as yesterday promised me to start taking it. Became angry when I told her that refusal to take medications could prolong her hospitalization, became angry and refused to talk. Formal orientation check could not be done, patient did say today that she had been hospitalized for longer than month which is correct.   Per CTC: Voice message from Lucero with [facility] calling to reprot that they are not able to accept the patient to their facility due to safety concerns. They are not a locked unit and the patient is a high elopement risk. They also have concerns about patient's active drug use and significant mental health issues.     Voice message from Shirin at [facility] reporting they do not have a memory care unit and will not be able to meet the needs of the patient.     Tracie velásquez Villa at [facility] requesting call. Writer KEVIN in return.     Phone call with Mira from [facility]. She has a bed available in one of their secure units. There is a room near nurses station where patients wear an arm band that prevents the elevator from working. The stairs require a code to use. If patient is one who stands by elevator door pushing button, it would not work, since it would shut down th elevator needed by the facility. If patient is redirectable it could work. Writer consulted with RN who decided that patient would probably be one to stand by the elevator pressing the button. Writer called Mira to ask if patient could be on wait list for the other unit that is more secure and she confirmed patient is on the wait list.      Left a Vm with pt's CCM to confirm  "referrals were made to both [facility] and [facility].\"       Medications:       ferrous gluconate  324 mg Oral Daily with breakfast     lactulose  30 g Oral TID     haloperidol  2.5 mg Oral BID     risperiDONE  1 mg Oral QAM     risperiDONE  2 mg Oral At Bedtime     pantoprazole  40 mg Oral Daily     multivitamin, therapeutic with minerals  1 tablet Oral Daily     folic acid  1 mg Oral Daily     rifaximin  550 mg Oral BID     spironolactone  50 mg Oral Daily     furosemide  20 mg Oral Daily          Allergies:     Allergies   Allergen Reactions     Acetaminophen      Ambien [Zolpidem Tartrate] Other (See Comments)     Sleep walks and eats     Ciprofloxacin Other (See Comments)     Seizure.     Citalopram Nausea and Vomiting          Labs:     No results found for this or any previous visit (from the past 24 hour(s)).       Psychiatric Examination:     /64 (BP Location: Right arm)  Pulse 84  Temp 97.1  F (36.2  C) (Oral)  Resp 18  Wt 94.8 kg (209 lb)  SpO2 95%  BMI 33.73 kg/m2  Weight is 209 lbs 0 oz  Body mass index is 33.73 kg/(m^2).                 Lying Orthostatic BP: 103/51         Sitting Orthostatic BP: 108/68         Standing Orthostatic BP: 98/60       Appearance: dressed in hospital scrubs, seen in bed, had headphones on.  Attitude:  guarded and minimally cooperative,   Eye Contact: limited  Mood: sad  Affect:  Blunted most of the time, at times smiles: this is new for her.  Speech: limited speech production, monotone voice.  Psychomotor Behavior:  no evidence of tardive dyskinesia, dystonia, or tics and physical agitation  Throught Process:  Overall, slightly more organized.  Associations:  loosening of associations present at times.  Thought Content:  no evidence of suicidal ideation or homicidal ideation, Visual hallucinations are likely, delusions are present. She voices them off and on.   Insight:  limited  Judgement:  poor  Oriented to:  self and place, part of date  Attention Span and " Concentration:  limited  Recent and Remote Memory:  poor         Precautions:     Behavioral Orders   Procedures     Code 2     Elopement precautions     Routine Programming     As clinically indicated     Seizure precautions     Status 15     Every 15 minutes.     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             Plan:   Patient is fully MI committed. Per CTC patient was rejected by a number of memory care units, see note above, referred and being reviewed by others. She is also waiting for placement to [facility], see above. Degree of her confusion/psychotic symptoms fluctuate from day to day, it doesn't seem to correlate to her ammonia level. She is on a lactulose protocol, but recently has been refusing to take it. She is being followed by IM. No medication changes today. Will recheck LFTs and ammonia level on Monday. Will continue to provide with support and structure, encourage to spend more time outside.

## 2017-09-29 NOTE — PROGRESS NOTES
[facility] to report that they are not able to accept the patient to their facility due to safety concerns. They are not a locked unit and the patient is a high elopement risk. They also have concerns about patient's active drug use and significant mental health issues.    Voice message from Shirin at [facility] reporting they do not have a memory care unit and will not be able to meet the needs of the patient.    Tracie velásquez [facility] at [facility] requesting call. Writer KEVIN in return.    Phone call with Mira from [facility]. She has a bed available in one of their secure units. There is a room near nurses station where patients wear an arm band that prevents the elevator from working. The stairs require a code to use. If patient is one who stands by elevator door pushing button, it would not work, since it would shut down th elevator needed by the facility. If patient is redirectable it could work. Writer consulted with RN who decided that patient would probably be one to stand by the elevator pressing the button. Writer called [facility] to ask if patient could be on wait list for the other unit that is more secure and she confirmed patient is on the wait list.

## 2017-09-29 NOTE — PROGRESS NOTES
"The pt came out for dinner only.  She was confused and talking to (presumably) her boyfriend to \" help her.\"  She reported seeing \"invisible people\" and that staff were ignoring them.  She denied having hallucinations, despite responding to stimuli.  She was appropriate in the milieu, and didn't attempt to elope or block the doors.     09/28/17 2120   Behavioral Health   Hallucinations visual   Thinking confused;delusional   Orientation person: oriented;place: oriented;situation, disoriented   Memory baseline memory   Insight denial of illness   Judgement impaired   Eye Contact at examiner   Affect blunted, flat;irritable   Mood anxious;irritable   Physical Appearance/Attire attire appropriate to age and situation   Hygiene neglected grooming - unclean body, hair, teeth   Suicidality other (see comments)  (pt denies)   Self Injury other (see comment)  (pt denies)   Elopement (pt did not attempt to elope this shift)   Activity withdrawn;refusal   Speech clear;coherent   Medication Sensitivity no stated side effects;no observed side effects   Psychomotor / Gait unsteady   Substance Withdrawal Interventions   Social and Therapeutic Interventions (Substance Withdrawal) encourage socialization with peers;encourage effective boundaries with peers;encourage participation in therapeutic groups and milieu activities   Safety   Suicidality status 15   Coping/Psychosocial   Verbalized Emotional State frustration;sadness;loneliness;powerlessness   Supportive Measures relaxation techniques promoted;self-care encouraged   Psycho Education   Type of Intervention 1:1 intervention   Response participates with encouragement   Hours 0.5   Treatment Detail check-in   Group Therapy Session   Group Attendance refused to attend group session   Activities of Daily Living   Hygiene/Grooming independent;prompts   Oral Hygiene independent   Dress scrubs (behavioral health)   Room Organization independent   Activity   Activity Assistance " Provided independent   Behavioral Health Interventions   Psychotic Symptoms maintain safety precautions;monitor need to revise level of observation;maintain safe secure environment;reality orientation;simple, clear language;encourage nutrition and hydration   Social and Therapeutic Interventions (Psychotic Symptoms) encourage socialization with peers;encourage effective boundaries with peers;encourage participation in therapeutic groups and milieu activities

## 2017-09-29 NOTE — PROGRESS NOTES
Aranza has refused both lactulose doses today.  She refused to do neuro checks.  She has been standing at the door a number of times wanting out.  She said she would only cooperate is I would help release her from the hospital.

## 2017-09-29 NOTE — PROGRESS NOTES
"Pt has been quite psychotic thru out the evening. She came out and stated tot his RN that she wanted her paperwork to [place]. This around 1700. Pt very delusional and states she will not stay another night. \"Give me my DC paperwork, I own this place\"! Pt was yelling and swearing by then but eventually returned to her room. Pt also refused her lactulose this evening and asked this RN and female staff to leave her room.   "

## 2017-09-30 PROCEDURE — 25000132 ZZH RX MED GY IP 250 OP 250 PS 637: Performed by: HOSPITALIST

## 2017-09-30 PROCEDURE — 25000132 ZZH RX MED GY IP 250 OP 250 PS 637: Performed by: PSYCHIATRY & NEUROLOGY

## 2017-09-30 PROCEDURE — 12400007 ZZH R&B MH INTERMEDIATE UMMC

## 2017-09-30 PROCEDURE — 25000132 ZZH RX MED GY IP 250 OP 250 PS 637: Performed by: NURSE PRACTITIONER

## 2017-09-30 PROCEDURE — 25000132 ZZH RX MED GY IP 250 OP 250 PS 637: Performed by: EMERGENCY MEDICINE

## 2017-09-30 RX ADMIN — HALOPERIDOL 2.5 MG: 1 TABLET ORAL at 09:38

## 2017-09-30 RX ADMIN — Medication 20 MG: at 09:37

## 2017-09-30 RX ADMIN — RIFAXIMIN 550 MG: 550 TABLET ORAL at 21:54

## 2017-09-30 RX ADMIN — FOLIC ACID 1 MG: 1 TABLET ORAL at 09:37

## 2017-09-30 RX ADMIN — HALOPERIDOL 2.5 MG: 1 TABLET ORAL at 21:54

## 2017-09-30 RX ADMIN — LACTULOSE 30 G: 10 POWDER, FOR SOLUTION ORAL at 09:38

## 2017-09-30 RX ADMIN — LACTULOSE 30 G: 10 POWDER, FOR SOLUTION ORAL at 21:54

## 2017-09-30 RX ADMIN — RISPERIDONE 1 MG: 1 TABLET ORAL at 09:37

## 2017-09-30 RX ADMIN — PANTOPRAZOLE SODIUM 40 MG: 40 TABLET, DELAYED RELEASE ORAL at 09:37

## 2017-09-30 RX ADMIN — SPIRONOLACTONE 50 MG: 50 TABLET ORAL at 09:38

## 2017-09-30 RX ADMIN — RISPERIDONE 2 MG: 2 TABLET ORAL at 21:54

## 2017-09-30 RX ADMIN — RIFAXIMIN 550 MG: 550 TABLET ORAL at 09:39

## 2017-09-30 RX ADMIN — LACTULOSE 30 G: 10 POWDER, FOR SOLUTION ORAL at 15:21

## 2017-09-30 RX ADMIN — MULTIPLE VITAMINS W/ MINERALS TAB 1 TABLET: TAB at 09:37

## 2017-09-30 RX ADMIN — FERROUS GLUCONATE 324 MG: 324 TABLET ORAL at 09:37

## 2017-09-30 ASSESSMENT — ACTIVITIES OF DAILY LIVING (ADL)
DRESS: SCRUBS (BEHAVIORAL HEALTH);INDEPENDENT
LAUNDRY: WITH SUPERVISION
DRESS: INDEPENDENT
ORAL_HYGIENE: INDEPENDENT
LAUNDRY: WITH SUPERVISION
ORAL_HYGIENE: INDEPENDENT;PROMPTS
GROOMING: INDEPENDENT;PROMPTS
GROOMING: INDEPENDENT

## 2017-09-30 NOTE — PROGRESS NOTES
"Pt was isolated/withdrawn to her room most of the shift. Came out of her room for both meals but took her trays to her room. Pt showered and bedding was changed with . Pt was observed lying in bed staring at the ceiling. Pt redirected multiple times for loitering near the exit door. The pt was handling the exit door handle at the end of the unit hallway. When asked to leave the door handle,pt responded \"why am I still here?\" Pt complained of pain in both her knees. Denied SI/SIB and hallucinations.       09/30/17 1100   Behavioral Health   Hallucinations denies / not responding to hallucinations   Thinking distractable;poor concentration   Orientation person: oriented;place: oriented   Memory baseline memory   Insight poor   Judgement impaired   Eye Contact at floor   Affect blunted, flat   Mood anxious   Physical Appearance/Attire appears stated age   Hygiene well groomed   Suicidality (denied )   Self Injury other (see comment)  (denied )   Elopement Loitering near exit doors;Trying handles of doors;Statements about wanting to leave   Activity isolative   Speech pressured;clear;coherent   Medication Sensitivity no stated side effects   Psychomotor / Gait balanced;steady   Psycho Education   Type of Intervention 1:1 intervention   Response refuses   Hours 0.5   Treatment Detail (check in )   Activities of Daily Living   Hygiene/Grooming independent   Oral Hygiene independent   Dress independent   Laundry with supervision   Room Organization prompts   Groups   Details (did not attend groups)   Behavioral Health Interventions   Psychotic Symptoms maintain safety precautions;encourage nutrition and hydration;encourage participation / independence with adls;provide emotional support;establish therapeutic relationship;assist with developing & utilizing healthy coping strategies;build upon strengths   Social and Therapeutic Interventions (Psychotic Symptoms) encourage socialization with peers;encourage " participation in therapeutic groups and milieu activities

## 2017-09-30 NOTE — PROGRESS NOTES
Pt took all HS medications without issue.    She reports being oriented to person and place. However, she does not believe that [facility] is a real hospital. In addition, she appears to still be responding to internal stimuli, but denies AH and VH.     No additional concerns at this time. Will continue to monitor and assess.

## 2017-09-30 NOTE — PROGRESS NOTES
"   09/29/17 2100   Behavioral Health   Hallucinations denies / not responding to hallucinations   Thinking poor concentration   Orientation person: oriented;place: oriented   Memory baseline memory   Insight poor   Judgement impaired   Eye Contact at examiner   Affect sad;irritable   Mood depressed;anxious;mood is calm   Physical Appearance/Attire disheveled   Hygiene neglected grooming - unclean body, hair, teeth   Suicidality other (see comments)  (denies )   Self Injury other (see comment)  (denies )   Elopement Statements about wanting to leave  (Pt is on elopment precaution )   Activity isolative;withdrawn   Speech clear;coherent   Activities of Daily Living   Hygiene/Grooming independent   Oral Hygiene independent   Dress independent   Laundry with supervision   Room Organization independent       Pt denied SI and SIB.  Pt reported feeling depressed (5), agitated (5), irritated (5), sad (5) and anxious (5) \" because I'm suppose to be home I'm not suppose to be here.\"  Overall pt reported feeling \" ok tired.\"  Pt seems calm, ate meals, isolative spend most of the shift in bed. Pt daily goal \" nothing.\" Pt goal after discharge \" I don't know do what I do at home nothing relax just be around my family.\"  Pt is independent with ADL's.  Pt reported no nutritional concerns.  Pt wants visitors.    "

## 2017-10-01 PROCEDURE — 25000132 ZZH RX MED GY IP 250 OP 250 PS 637: Performed by: NURSE PRACTITIONER

## 2017-10-01 PROCEDURE — 25000132 ZZH RX MED GY IP 250 OP 250 PS 637: Performed by: PSYCHIATRY & NEUROLOGY

## 2017-10-01 PROCEDURE — 25000132 ZZH RX MED GY IP 250 OP 250 PS 637: Performed by: HOSPITALIST

## 2017-10-01 PROCEDURE — 25000132 ZZH RX MED GY IP 250 OP 250 PS 637: Performed by: EMERGENCY MEDICINE

## 2017-10-01 PROCEDURE — 12400007 ZZH R&B MH INTERMEDIATE UMMC

## 2017-10-01 RX ADMIN — RISPERIDONE 1 MG: 1 TABLET ORAL at 09:13

## 2017-10-01 RX ADMIN — Medication 20 MG: at 09:13

## 2017-10-01 RX ADMIN — MULTIPLE VITAMINS W/ MINERALS TAB 1 TABLET: TAB at 09:13

## 2017-10-01 RX ADMIN — PANTOPRAZOLE SODIUM 40 MG: 40 TABLET, DELAYED RELEASE ORAL at 09:13

## 2017-10-01 RX ADMIN — HALOPERIDOL 2.5 MG: 1 TABLET ORAL at 09:13

## 2017-10-01 RX ADMIN — RIFAXIMIN 550 MG: 550 TABLET ORAL at 09:12

## 2017-10-01 RX ADMIN — FERROUS GLUCONATE 324 MG: 324 TABLET ORAL at 09:13

## 2017-10-01 RX ADMIN — LACTULOSE 30 G: 10 POWDER, FOR SOLUTION ORAL at 09:13

## 2017-10-01 RX ADMIN — LACTULOSE 30 G: 10 POWDER, FOR SOLUTION ORAL at 14:20

## 2017-10-01 RX ADMIN — SPIRONOLACTONE 50 MG: 50 TABLET ORAL at 09:13

## 2017-10-01 RX ADMIN — LACTULOSE 30 G: 10 POWDER, FOR SOLUTION ORAL at 20:22

## 2017-10-01 RX ADMIN — HALOPERIDOL 2.5 MG: 1 TABLET ORAL at 20:22

## 2017-10-01 RX ADMIN — RISPERIDONE 2 MG: 2 TABLET ORAL at 20:58

## 2017-10-01 RX ADMIN — FOLIC ACID 1 MG: 1 TABLET ORAL at 09:13

## 2017-10-01 RX ADMIN — RIFAXIMIN 550 MG: 550 TABLET ORAL at 20:22

## 2017-10-01 ASSESSMENT — ACTIVITIES OF DAILY LIVING (ADL)
DRESS: SCRUBS (BEHAVIORAL HEALTH);INDEPENDENT
DRESS: INDEPENDENT
HYGIENE/GROOMING: INDEPENDENT
LAUNDRY: WITH SUPERVISION
ORAL_HYGIENE: INDEPENDENT
GROOMING: INDEPENDENT
ORAL_HYGIENE: INDEPENDENT

## 2017-10-01 NOTE — PROGRESS NOTES
Pt was more visible in the milieu than usual and watching sports with others. Pt was more welcoming upon approached, laughing and being social with staff. Pt even agreed to check-in with this writer. Reports feeling depressed and sad about not being with her family. Mentioned wanting to be discharge to go home and see her family. Reports good appetite but poor concentration. Denied SI/SIB and hallucinations.      10/01/17 1514   Behavioral Health   Hallucinations denies / not responding to hallucinations   Thinking distractable   Orientation person: oriented;place: oriented   Memory baseline memory   Insight poor   Judgement impaired   Eye Contact at examiner   Affect full range affect   Mood depressed;mood is calm   Physical Appearance/Attire appears stated age   Hygiene neglected grooming - unclean body, hair, teeth   Suicidality other (see comments)  (denied )   Self Injury other (see comment)  (denied )   Elopement Statements about wanting to leave   Activity withdrawn   Speech clear   Medication Sensitivity no stated side effects   Psychomotor / Gait balanced;steady   Psycho Education   Type of Intervention 1:1 intervention   Response participates, initiates socially appropriate   Hours 0.5   Treatment Detail (check in)   Activities of Daily Living   Hygiene/Grooming independent   Oral Hygiene independent   Dress independent   Laundry with supervision   Room Organization prompts   Groups   Details (did not attend groups)   Behavioral Health Interventions   Psychotic Symptoms maintain safety precautions;encourage nutrition and hydration;encourage participation / independence with adls;provide emotional support;establish therapeutic relationship;assist with developing & utilizing healthy coping strategies;build upon strengths   Social and Therapeutic Interventions (Psychotic Symptoms) encourage socialization with peers;encourage participation in therapeutic groups and milieu activities

## 2017-10-01 NOTE — PLAN OF CARE
"Problem: Psychotic Symptoms  Goal: Psychotic Symptoms  Signs and symptoms of listed problems will be absent or manageable.   Outcome: No Change  48 Hour Assessment    Pt isolated to her room for the entirety of the shift. She made several trips to exit doors, stating \"I'm trying to leave. I have papers that say I'm supposed to leave.\" Pt was redirectable away from doors.     On several occasions, pt was heard crying in her room. When writer approached, pt responded, \"They keep fucking with me! My ex- keeps telling me that I'm going to leave tomorrow, and then I never do. The film is over         SI/SIB: Pt denies     Auditory/Visual Hallucinations: Pt denies, but she appears responding to internal stimuli by picking items out of the air, and stating \"They took the ear buds out of my ears, but I still hear voices.\" When asked if she is hearing something this writer could not hear, pt began screaming , \"No! I'm not having hallucinations! They're not hallucinations!\"           "

## 2017-10-02 LAB
ALBUMIN SERPL-MCNC: 2.9 G/DL (ref 3.4–5)
ALP SERPL-CCNC: 136 U/L (ref 40–150)
ALT SERPL W P-5'-P-CCNC: 32 U/L (ref 0–50)
AMMONIA PLAS-SCNC: 90 UMOL/L (ref 10–50)
AST SERPL W P-5'-P-CCNC: 32 U/L (ref 0–45)
BILIRUB DIRECT SERPL-MCNC: 0.2 MG/DL (ref 0–0.2)
BILIRUB SERPL-MCNC: 1.1 MG/DL (ref 0.2–1.3)
PROT SERPL-MCNC: 7.3 G/DL (ref 6.8–8.8)

## 2017-10-02 PROCEDURE — 36415 COLL VENOUS BLD VENIPUNCTURE: CPT | Performed by: PSYCHIATRY & NEUROLOGY

## 2017-10-02 PROCEDURE — 25000132 ZZH RX MED GY IP 250 OP 250 PS 637: Performed by: PSYCHIATRY & NEUROLOGY

## 2017-10-02 PROCEDURE — 12400007 ZZH R&B MH INTERMEDIATE UMMC

## 2017-10-02 PROCEDURE — 25000132 ZZH RX MED GY IP 250 OP 250 PS 637: Performed by: HOSPITALIST

## 2017-10-02 PROCEDURE — 25000132 ZZH RX MED GY IP 250 OP 250 PS 637: Performed by: NURSE PRACTITIONER

## 2017-10-02 PROCEDURE — 82140 ASSAY OF AMMONIA: CPT | Performed by: PSYCHIATRY & NEUROLOGY

## 2017-10-02 PROCEDURE — 80076 HEPATIC FUNCTION PANEL: CPT | Performed by: PSYCHIATRY & NEUROLOGY

## 2017-10-02 PROCEDURE — 99232 SBSQ HOSP IP/OBS MODERATE 35: CPT | Performed by: PSYCHIATRY & NEUROLOGY

## 2017-10-02 PROCEDURE — 25000132 ZZH RX MED GY IP 250 OP 250 PS 637: Performed by: EMERGENCY MEDICINE

## 2017-10-02 RX ADMIN — MULTIPLE VITAMINS W/ MINERALS TAB 1 TABLET: TAB at 08:31

## 2017-10-02 RX ADMIN — SPIRONOLACTONE 50 MG: 50 TABLET ORAL at 08:31

## 2017-10-02 RX ADMIN — HALOPERIDOL 2.5 MG: 1 TABLET ORAL at 08:31

## 2017-10-02 RX ADMIN — Medication 20 MG: at 08:31

## 2017-10-02 RX ADMIN — RIFAXIMIN 550 MG: 550 TABLET ORAL at 20:47

## 2017-10-02 RX ADMIN — FOLIC ACID 1 MG: 1 TABLET ORAL at 08:31

## 2017-10-02 RX ADMIN — LACTULOSE 30 G: 10 POWDER, FOR SOLUTION ORAL at 08:31

## 2017-10-02 RX ADMIN — HALOPERIDOL 2.5 MG: 1 TABLET ORAL at 20:46

## 2017-10-02 RX ADMIN — OLANZAPINE 10 MG: 5 TABLET, FILM COATED ORAL at 18:22

## 2017-10-02 RX ADMIN — PANTOPRAZOLE SODIUM 40 MG: 40 TABLET, DELAYED RELEASE ORAL at 08:31

## 2017-10-02 RX ADMIN — RIFAXIMIN 550 MG: 550 TABLET ORAL at 08:31

## 2017-10-02 RX ADMIN — FERROUS GLUCONATE 324 MG: 324 TABLET ORAL at 08:31

## 2017-10-02 RX ADMIN — RISPERIDONE 1 MG: 1 TABLET ORAL at 08:31

## 2017-10-02 RX ADMIN — LACTULOSE 30 G: 10 POWDER, FOR SOLUTION ORAL at 20:46

## 2017-10-02 ASSESSMENT — ACTIVITIES OF DAILY LIVING (ADL)
GROOMING: INDEPENDENT
ORAL_HYGIENE: INDEPENDENT
ORAL_HYGIENE: INDEPENDENT
GROOMING: INDEPENDENT
LAUNDRY: WITH SUPERVISION
DRESS: SCRUBS (BEHAVIORAL HEALTH);INDEPENDENT
DRESS: SCRUBS (BEHAVIORAL HEALTH);INDEPENDENT

## 2017-10-02 NOTE — PROGRESS NOTES
[facility], [place]   Psychiatric Progress Note        Interim History:   The patient's care was discussed with the treatment team during the daily team meeting and/or staff's chart notes were reviewed. She has been more isolative, focused on being discharged. Refused morning lactulose, although, as recently as yesterday promised me to start taking it. Became angry when I told her that refusal to take medications could prolong her hospitalization, became angry and refused to talk. Formal orientation check could not be done, patient did say today that she had been hospitalized for longer than month which is correct.   Per CTC: Voice message from [place] with [facility] calling to reprot that they are not able to accept the patient to their facility due to safety concerns. They are not a locked unit and the patient is a high elopement risk. They also have concerns about patient's active drug use and significant mental health issues.     Voice message from Shirin at [facility] reporting they do not have a memory care unit and will not be able to meet the needs of the patient.     Tracie velásquez Villa at [facility] requesting call. Writer KEVIN in return.     Phone call with Mira from [facility]. She has a bed available in one of their secure units. There is a room near nurses station where patients wear an arm band that prevents the elevator from working. The stairs require a code to use. If patient is one who stands by elevator door pushing button, it would not work, since it would shut down th elevator needed by the facility. If patient is redirectable it could work. Writer consulted with RN who decided that patient would probably be one to stand by the elevator pressing the button. Writer called Mira to ask if patient could be on wait list for the other unit that is more secure and she confirmed patient is on the wait list.      Left a Vm with pt's CCM to confirm  "referrals were made to both [facility] and [facility].\"       Medications:       ferrous gluconate  324 mg Oral Daily with breakfast     lactulose  30 g Oral TID     haloperidol  2.5 mg Oral BID     risperiDONE  1 mg Oral QAM     risperiDONE  2 mg Oral At Bedtime     pantoprazole  40 mg Oral Daily     multivitamin, therapeutic with minerals  1 tablet Oral Daily     folic acid  1 mg Oral Daily     rifaximin  550 mg Oral BID     spironolactone  50 mg Oral Daily     furosemide  20 mg Oral Daily          Allergies:     Allergies   Allergen Reactions     Acetaminophen      Ambien [Zolpidem Tartrate] Other (See Comments)     Sleep walks and eats     Ciprofloxacin Other (See Comments)     Seizure.     Citalopram Nausea and Vomiting          Labs:     Recent Results (from the past 24 hour(s))   Hepatic panel    Collection Time: 10/02/17  8:38 AM   Result Value Ref Range    Bilirubin Direct 0.2 0.0 - 0.2 mg/dL    Bilirubin Total 1.1 0.2 - 1.3 mg/dL    Albumin 2.9 (L) 3.4 - 5.0 g/dL    Protein Total 7.3 6.8 - 8.8 g/dL    Alkaline Phosphatase 136 40 - 150 U/L    ALT 32 0 - 50 U/L    AST 32 0 - 45 U/L   Ammonia    Collection Time: 10/02/17  8:38 AM   Result Value Ref Range    Ammonia 90 (H) 10 - 50 umol/L          Psychiatric Examination:     /54  Pulse 74  Temp 98  F (36.7  C) (Oral)  Resp 16  Wt 95.3 kg (210 lb)  SpO2 95%  BMI 33.89 kg/m2  Weight is 210 lbs 0 oz  Body mass index is 33.89 kg/(m^2).                 Lying Orthostatic BP: 103/51         Sitting Orthostatic BP: 108/68         Standing Orthostatic BP: 98/60       Appearance: dressed in hospital scrubs, seen in bed, had headphones on.  Attitude:  guarded and minimally cooperative,   Eye Contact: limited  Mood: sad  Affect:  Blunted most of the time, at times smiles: this is new for her.  Speech: limited speech production, monotone voice.  Psychomotor Behavior:  no evidence of tardive dyskinesia, dystonia, or tics and physical agitation  Throught Process:  " Overall, slightly more organized.  Associations:  loosening of associations present at times.  Thought Content:  no evidence of suicidal ideation or homicidal ideation, Visual hallucinations are likely, delusions are present. She voices them off and on.   Insight:  limited  Judgement:  poor  Oriented to:  self and place, part of date  Attention Span and Concentration:  limited  Recent and Remote Memory:  poor         Precautions:     Behavioral Orders   Procedures     Code 2     Elopement precautions     Routine Programming     As clinically indicated     Seizure precautions     Status 15     Every 15 minutes.     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             Plan:   Patient is fully MI committed. Per CTC patient was rejected by a number of memory care units, see note above, referred and being reviewed by others. She is also waiting for placement to [facility], see above. Degree of her confusion/psychotic symptoms fluctuate from day to day, it doesn't seem to correlate to her ammonia level. She is on a lactulose protocol, but recently has been refusing to take it. She is being followed by IM. No medication changes today. Will recheck LFTs and ammonia level on Monday. Will continue to provide with support and structure, encourage to spend more time outside.

## 2017-10-02 NOTE — PROGRESS NOTES
"Pt was out in beginning of shift watching football game and movie. However, stayed in bed rest of shift. Did not see as much loitering and grabbing at the door handles. However, heard pt crying in her room. Overheard her crying \"stay away from me.\" Still seeing her grab at the air. Otherwise, no lability or much irritability noted this shift.        10/01/17 5281   Behavioral Health   Hallucinations appears responding;auditory;visual   Thinking distractable;paranoid;poor concentration   Orientation person: oriented;place: oriented;date: oriented;time: oriented   Memory short term   Insight denial of illness   Judgement impaired   Eye Contact at examiner   Affect blunted, flat;sad   Mood depressed;hopeless   Physical Appearance/Attire appears stated age;attire appropriate to age and situation   Hygiene well groomed   Suicidality other (see comments)  (none stated/observed)   Self Injury other (see comment)  (none stated/observed)   Elopement (none this shift)   Activity isolative   Speech coherent;clear   Medication Sensitivity no stated side effects;no observed side effects   Psychomotor / Gait balanced;steady   Activities of Daily Living   Hygiene/Grooming independent   Oral Hygiene independent   Dress scrubs (behavioral health);independent   Room Organization independent     "

## 2017-10-02 NOTE — PROGRESS NOTES
Voice mail from Tracie with [facility] at [facility] who reprots that patient has been denied admission.

## 2017-10-02 NOTE — PROGRESS NOTES
Mildred took her AM lactulose but refused the 1400 dose.  When asked, she said she might take it if it was left.  Lactulose left in patient's room on the chance that she will take it.  Will check back to see if it has been taken.

## 2017-10-02 NOTE — PLAN OF CARE
Problem: Psychotic Symptoms  Goal: Psychotic Symptoms  Signs and symptoms of listed problems will be absent or manageable.   Outcome: No Change  48 Hour Nursing Assessment:  Day 23 of hospitalization.  Mildred spent some time in the lounge watching TV but ate breakfast in her room.  Her current ammonia level is 90.  She was fully oriented today but did not wish to talk.  She denied SI and hallucinations but she appears to be responding to internal stimuli.  She was not irritable today.  She continues to eat most meals.  She cries periodically and talks about wanting out of the hospital.

## 2017-10-02 NOTE — PROGRESS NOTES
[facility], [place]   Psychiatric Progress Note        Interim History:   Patient was admitted to this facility on 8/9/17 with symptoms of periodical confusion, Visual hallucinations. She has a history of liver cirrhosis and chronic hepatic encephalopathy. There have been few changes in the patient's condition since her admission. Despite being treated with Risperdal, Haldol, she still appears to have Visual hallucinations, at times makes delusional statements and focused on discharge. Patient's insight and judgment remain poor, she frequently seen standing next to the unit door demanding to be discharged. Her symptoms appear to be her recent baseline. She is fully committed and on a waiting list for [facility] and was referred to a number of memory units.     The patient's care was discussed with the treatment team during the daily team meeting and/or staff's chart notes were reviewed. She has been more social, spending more time watching TV, sitting at the patients' lounge but having few interactions with other patients on the floor or staff. Focused on being discharge. She was oriented to self, time (was 5 days off) and place.      Per CTC: Voice mail from [place] with [facility] at [facility] who reprots that patient has been denied admission.          Medications:       ferrous gluconate  324 mg Oral Daily with breakfast     lactulose  30 g Oral TID     haloperidol  2.5 mg Oral BID     risperiDONE  1 mg Oral QAM     risperiDONE  2 mg Oral At Bedtime     pantoprazole  40 mg Oral Daily     multivitamin, therapeutic with minerals  1 tablet Oral Daily     folic acid  1 mg Oral Daily     rifaximin  550 mg Oral BID     spironolactone  50 mg Oral Daily     furosemide  20 mg Oral Daily          Allergies:     Allergies   Allergen Reactions     Acetaminophen      Ambien [Zolpidem Tartrate] Other (See Comments)     Sleep walks and eats     Ciprofloxacin Other (See Comments)     Seizure.     Citalopram  Nausea and Vomiting          Labs:     Recent Results (from the past 24 hour(s))   Hepatic panel    Collection Time: 10/02/17  8:38 AM   Result Value Ref Range    Bilirubin Direct 0.2 0.0 - 0.2 mg/dL    Bilirubin Total 1.1 0.2 - 1.3 mg/dL    Albumin 2.9 (L) 3.4 - 5.0 g/dL    Protein Total 7.3 6.8 - 8.8 g/dL    Alkaline Phosphatase 136 40 - 150 U/L    ALT 32 0 - 50 U/L    AST 32 0 - 45 U/L   Ammonia    Collection Time: 10/02/17  8:38 AM   Result Value Ref Range    Ammonia 90 (H) 10 - 50 umol/L          Psychiatric Examination:     /54  Pulse 74  Temp 98  F (36.7  C) (Oral)  Resp 16  Wt 95.3 kg (210 lb)  SpO2 95%  BMI 33.89 kg/m2  Weight is 210 lbs 0 oz  Body mass index is 33.89 kg/(m^2).                   Lying Orthostatic BP: 103/51         Sitting Orthostatic BP:  (refused)         Standing Orthostatic BP:  (refused)     Appearance: dressed in hospital scrubs, seen in bed, had headphones on.  Attitude:  guarded and partially cooperative,   Eye Contact: limited  Mood: sad  Affect:  Blunted most of the time, at times smiles: this is new for her.  Speech: limited speech production, monotone voice.  Psychomotor Behavior:  no evidence of tardive dyskinesia, dystonia, or tics and physical agitation  Throught Process:  Overall, slightly more organized.  Associations:  loosening of associations present at times.  Thought Content:  no evidence of suicidal ideation or homicidal ideation, Visual hallucinations are likely, delusions are present. She voices them off and on.   Insight:  limited  Judgement:  poor  Oriented to:  self and place, part of date  Attention Span and Concentration:  limited  Recent and Remote Memory:  poor         Precautions:     Behavioral Orders   Procedures     Code 2     Elopement precautions     Routine Programming     As clinically indicated     Seizure precautions     Status 15     Every 15 minutes.     Withdrawal precautions          DIagnoses:     Encephalopathy probably  multifactorial such as chronic hepatic encephalopathy, organic brain damage due to encephalopathy, alcohol drinking, possibly underlying psychotic illness; possibly head injury contributes to patient's symptoms.             Plan:   Patient is fully MI committed. Per CTC patient was rejected by a number of memory care units, see note above, referred and being reviewed by others. She is also waiting for placement to [facility], see above. Degree of her confusion/psychotic symptoms fluctuate from day to day, it doesn't seem to correlate to her ammonia level. She is on a lactulose protocol, but recently has been refusing to take it. She is being followed by IM. No medication changes today. There are no significant changes in ammonia level, LFTs. Albumin was lower. Will continue to provide with support and structure, encourage to spend more time outside. The patient will be taken over by another provider on Wednesday.

## 2017-10-03 PROCEDURE — 25000132 ZZH RX MED GY IP 250 OP 250 PS 637: Performed by: PSYCHIATRY & NEUROLOGY

## 2017-10-03 PROCEDURE — 25000132 ZZH RX MED GY IP 250 OP 250 PS 637: Performed by: EMERGENCY MEDICINE

## 2017-10-03 PROCEDURE — 25000132 ZZH RX MED GY IP 250 OP 250 PS 637: Performed by: HOSPITALIST

## 2017-10-03 PROCEDURE — 99232 SBSQ HOSP IP/OBS MODERATE 35: CPT | Performed by: PSYCHIATRY & NEUROLOGY

## 2017-10-03 PROCEDURE — 12400007 ZZH R&B MH INTERMEDIATE UMMC

## 2017-10-03 PROCEDURE — 25000132 ZZH RX MED GY IP 250 OP 250 PS 637: Performed by: NURSE PRACTITIONER

## 2017-10-03 RX ADMIN — HALOPERIDOL 2.5 MG: 1 TABLET ORAL at 09:10

## 2017-10-03 RX ADMIN — LACTULOSE 30 G: 10 POWDER, FOR SOLUTION ORAL at 09:09

## 2017-10-03 RX ADMIN — FOLIC ACID 1 MG: 1 TABLET ORAL at 09:10

## 2017-10-03 RX ADMIN — HALOPERIDOL 2.5 MG: 1 TABLET ORAL at 20:01

## 2017-10-03 RX ADMIN — RIFAXIMIN 550 MG: 550 TABLET ORAL at 20:01

## 2017-10-03 RX ADMIN — RISPERIDONE 1 MG: 1 TABLET ORAL at 09:10

## 2017-10-03 RX ADMIN — MULTIPLE VITAMINS W/ MINERALS TAB 1 TABLET: TAB at 09:10

## 2017-10-03 RX ADMIN — RISPERIDONE 2 MG: 2 TABLET ORAL at 20:01

## 2017-10-03 RX ADMIN — RIFAXIMIN 550 MG: 550 TABLET ORAL at 09:10

## 2017-10-03 RX ADMIN — FERROUS GLUCONATE 324 MG: 324 TABLET ORAL at 09:10

## 2017-10-03 RX ADMIN — Medication 20 MG: at 09:10

## 2017-10-03 RX ADMIN — SPIRONOLACTONE 50 MG: 50 TABLET ORAL at 09:10

## 2017-10-03 RX ADMIN — PANTOPRAZOLE SODIUM 40 MG: 40 TABLET, DELAYED RELEASE ORAL at 09:10

## 2017-10-03 ASSESSMENT — ACTIVITIES OF DAILY LIVING (ADL)
DRESS: INDEPENDENT
ORAL_HYGIENE: INDEPENDENT
LAUNDRY: WITH SUPERVISION
GROOMING: INDEPENDENT

## 2017-10-03 NOTE — PROGRESS NOTES
"/54  Pulse 74  Temp 98  F (36.7  C) (Oral)  Resp 16  Wt 95.3 kg (210 lb)  SpO2 95%  BMI 33.89 kg/m2    Pt was verbally agitated and aggressive with writer and other staff at times this shift. She screamed at writer, \"Get out of my room! You can't help me! I want all of you to get out of here!\" In addition, pt appears responding to internal stimuli by picking items out of the air, as well as yelling at objects that are not seen by writer.    Pt took a call from her , and he will be visiting her tomorrow. Pt states being ok with this.    Pt took medications without issue.  "

## 2017-10-03 NOTE — PROGRESS NOTES
"   10/03/17 1427   Behavioral Health   Hallucinations visual   Thinking confused;poor concentration   Orientation situation, disoriented   Memory baseline memory   Insight poor   Judgement impaired   Eye Contact at examiner   Affect blunted, flat   Mood labile   Physical Appearance/Attire disheveled   Hygiene neglected grooming - unclean body, hair, teeth   Suicidality (denies)   Self Injury (denies)   Elopement Statements about wanting to leave   Activity isolative;withdrawn   Speech clear   Medication Sensitivity no stated side effects   Psychomotor / Gait unsteady   Psycho Education   Type of Intervention 1:1 intervention   Response participates with encouragement   Hours 0.5   Activities of Daily Living   Hygiene/Grooming independent   Oral Hygiene independent   Dress independent   Laundry with supervision   Room Organization independent   pt isolative in room most of shift only out for meals and watched TV for about 30 minutes. Pt cried once during shift in room saying statements such as \" get out of here \", \"its not fair being stuck in here\". Pt observed picking things out of air that arent there. Pt denies any mental health issues.   "

## 2017-10-03 NOTE — PROGRESS NOTES
"[facility], [place]   Psychiatric Progress Note        Interim History:   The patient's care was discussed with the treatment team during the daily team meeting and/or staff's chart notes were reviewed.  Staff report patient refused Lactulose twice past 2 days but generally compliant with medications. Disorganized with bouts of mild confusion persist. Affect is labile, dramatic and tearful at times. Not attenidng groups. Demanding discharge but no elopement behavior. No SI or BHARATI. Engaged on approach and generally cooperative. Slept 6hrs last night. Out for meals.     The patient noted that she is feeling \"little depressed and sad\" because she is been here for long time and want to go home. She is not receptive to IRT, GH, residential CD or NF referral. She is open to referral outpatient CD program. Denied anxi, racing thoughts and hallucinations. No SI or BHARATI. Denied confusion. She was able to recall current and 5 pervious [place] presidents in correct order. She was oriented to time, month and year. She noted tolerating medications well. She does not believe that she has been confused. Denied constipation and noted having a BM this AM. C/o bilateral knee pain. Believes that Gabapentin was helpful in the past. Eating well. Sleeping well.     Discussed medications and care plan.        Medications:       ferrous gluconate  324 mg Oral Daily with breakfast     lactulose  30 g Oral TID     haloperidol  2.5 mg Oral BID     risperiDONE  1 mg Oral QAM     risperiDONE  2 mg Oral At Bedtime     pantoprazole  40 mg Oral Daily     multivitamin, therapeutic with minerals  1 tablet Oral Daily     folic acid  1 mg Oral Daily     rifaximin  550 mg Oral BID     spironolactone  50 mg Oral Daily     furosemide  20 mg Oral Daily          Allergies:     Allergies   Allergen Reactions     Acetaminophen      Ambien [Zolpidem Tartrate] Other (See Comments)     Sleep walks and eats     Ciprofloxacin Other (See " Comments)     Seizure.     Citalopram Nausea and Vomiting          Labs:   No results found for this or any previous visit (from the past 24 hour(s)).       Psychiatric Examination:     Vitals:    09/30/17 0847 10/01/17 1035 10/01/17 1600 10/02/17 1627   BP:   122/73 108/54   BP Location: Right arm Left arm Left arm    Pulse:   79 74   Resp:   16    Temp:  98.1  F (36.7  C) 98.4  F (36.9  C) 98  F (36.7  C)   TempSrc: Oral Oral Oral Oral   SpO2:       Weight:  95.3 kg (210 lb)       Lying Orthostatic BP: 103/51      Lying Orthostatic Pulse: 64 bpm      Sitting Orthostatic BP:  (refused)      Sitting Orthostatic Pulse: 79 bpm      Standing Orthostatic BP:  (refused)      Standing Orthostatic Pulse: 135 bpm       Weight is 210 lbs 0 oz  Body mass index is 33.89 kg/(m^2).    Appearance: awake, alert, adequately groomed, appeared older than stated age and no apparent distress  Attitude:  cooperative, evasive and guarded  Eye Contact:  good  Mood:  sad  and depressed  Affect:  intensity is blunted and guarded  Speech:  clear, coherent and slow  Psychomotor Behavior:  no evidence of tardive dyskinesia, dystonia, or tics  Throught Process:  goal oriented  Associations:  no loose associations  Thought Content:  no evidence of suicidal ideation or homicidal ideation, no auditory hallucinations present, no visual hallucinations present, patient appears to be responding to internal stimuli and paranoia suspected  Insight:  limited  Judgement:  limited  Oriented to:  time, person, and place  Attention Span and Concentration:  fair  Recent and Remote Memory:  fair         Precautions:     Behavioral Orders   Procedures     Code 2     Elopement precautions     Routine Programming     As clinically indicated     Seizure precautions     Status 15     Every 15 minutes.     Withdrawal precautions          Diagnoses:     Cognitive Disorder NOS.   Encephalopathy probably multifactorial such as chronic hepatic encephalopathy, organic  brain damage due to encephalopathy, alcohol drinking, possibly underlying psychotic illness; possibly head injury contributes to patient's symptoms.   Alcohol use disorder.   amphetamine dependence vs abuse.          Plan:     Medications:  1.  Multivitamin 1 tablet daily.  Folic acid 1 mg daily.  Thiamine 100 mg daily.   2.  Lactulose 30 mL by mouth 4 times a day.   3.  Risperdal was restarted and adjusted to 1 mg qday and 2 mg qhs.    4.  Rifaximin 550 mg by mouth 3 times a day.   5.  Lactulose 30 mg TID.   6.  Haldol was subsequently added and adjusted to 2.5 mg BID. May consider consolidating neuroleptic once symptoms subsided.   7.  May consider adding Gabapentin to address restlessness and chronic pain.     -- Basic cognitive testing once symptoms subsided.   -- may consider MRI.     Legal Status and Disposition:  --  Under full MICD commitment.   -- awaiting transfer to [facility]. Declined but several NF and memory care unit. History of elopement.

## 2017-10-03 NOTE — PROGRESS NOTES
Placed call to [facility], [phone]. Spoke with admission to follow-up on previous note that stated [facility] would see if her MA was active and contact us back (note from 9/28). [facility] stated that they were not aware of this request. They did look up her MA while she was on the phone and stated it is still showing as inactive. She will look for the referral and then contact use back.    Writer placed call to pt's [facility] Shira Rausch, [phone] requesting return call to discuss MA renewal as well as get an update on pt's referrals. Waiting for return call.

## 2017-10-03 NOTE — PROGRESS NOTES
10/02/17 2300   Behavioral Health   Hallucinations denies / not responding to hallucinations   Thinking poor concentration   Orientation person: oriented;place: oriented   Memory baseline memory   Insight poor   Judgement impaired   Eye Contact at examiner   Affect blunted, flat   Mood mood is calm   Physical Appearance/Attire disheveled   Hygiene neglected grooming - unclean body, hair, teeth   Suicidality other (see comments)   Self Injury other (see comment)   Elopement (Pt is on elopment precaution )   Activity isolative;withdrawn   Activities of Daily Living   Hygiene/Grooming independent   Oral Hygiene independent   Dress scrubs (behavioral health);independent   Laundry with supervision   Room Organization independent       Pt denied SI and SIB.  Pt reported feeling tired.  Pt seems calm, isolative, withdrawn, sad, depressed and ate her meals.

## 2017-10-04 PROCEDURE — 25000132 ZZH RX MED GY IP 250 OP 250 PS 637: Performed by: NURSE PRACTITIONER

## 2017-10-04 PROCEDURE — 99232 SBSQ HOSP IP/OBS MODERATE 35: CPT | Performed by: PSYCHIATRY & NEUROLOGY

## 2017-10-04 PROCEDURE — 25000132 ZZH RX MED GY IP 250 OP 250 PS 637: Performed by: EMERGENCY MEDICINE

## 2017-10-04 PROCEDURE — 12400007 ZZH R&B MH INTERMEDIATE UMMC

## 2017-10-04 PROCEDURE — 25000132 ZZH RX MED GY IP 250 OP 250 PS 637: Performed by: PSYCHIATRY & NEUROLOGY

## 2017-10-04 RX ADMIN — FOLIC ACID 1 MG: 1 TABLET ORAL at 08:40

## 2017-10-04 RX ADMIN — PANTOPRAZOLE SODIUM 40 MG: 40 TABLET, DELAYED RELEASE ORAL at 08:40

## 2017-10-04 RX ADMIN — RISPERIDONE 1 MG: 1 TABLET ORAL at 08:40

## 2017-10-04 RX ADMIN — RIFAXIMIN 550 MG: 550 TABLET ORAL at 21:06

## 2017-10-04 RX ADMIN — RIFAXIMIN 550 MG: 550 TABLET ORAL at 08:40

## 2017-10-04 RX ADMIN — HALOPERIDOL 2.5 MG: 1 TABLET ORAL at 08:40

## 2017-10-04 RX ADMIN — SPIRONOLACTONE 50 MG: 50 TABLET ORAL at 08:40

## 2017-10-04 RX ADMIN — MULTIPLE VITAMINS W/ MINERALS TAB 1 TABLET: TAB at 08:40

## 2017-10-04 RX ADMIN — RISPERIDONE 2 MG: 2 TABLET ORAL at 21:06

## 2017-10-04 RX ADMIN — FERROUS GLUCONATE 324 MG: 324 TABLET ORAL at 08:40

## 2017-10-04 RX ADMIN — HALOPERIDOL 2.5 MG: 1 TABLET ORAL at 21:06

## 2017-10-04 RX ADMIN — Medication 20 MG: at 08:40

## 2017-10-04 ASSESSMENT — ACTIVITIES OF DAILY LIVING (ADL)
DRESS: INDEPENDENT
GROOMING: INDEPENDENT
ORAL_HYGIENE: INDEPENDENT
LAUNDRY: WITH SUPERVISION
GROOMING: INDEPENDENT
DRESS: INDEPENDENT
ORAL_HYGIENE: INDEPENDENT

## 2017-10-04 NOTE — PROGRESS NOTES
Mildred refused both doses of lactulose today.  She refused to answer any questions so was unable to do a neuro check.  She was observed eating and playing cribbage.  She has been sobbing in her room about being in the hospital so long.

## 2017-10-04 NOTE — PROGRESS NOTES
[facility], [place]   Psychiatric Progress Note        Interim History:   The patient's care was discussed with the treatment team during the daily team meeting and/or staff's chart notes were reviewed.  Staff report patient refusing Lactulose. Affect and mood has been labile, tearful at times but social and engaged at others. She was screaming hi her room and continues to demand discharge.  Compliant with other medications. Distracted with bouts of mild confusion.  Not attenidng groups. No SI or BHARATI. No elopement behavior. Sleeping well. Independent with ADL.     The patient's main concern was knee pain. She was dismissive of all other symptoms and denied confusion. She tells me that she has not refused medications and is will to take all what has been prescribed. Tells me that sleep and appetite had been intact. Feels frustrated with not being granted discharge but denied dep, anx and irritability. Denied hallucinations and paranoia. No SI or BHARATI.     Discussed medications and care plan.        Medications:       ferrous gluconate  324 mg Oral Daily with breakfast     lactulose  30 g Oral TID     haloperidol  2.5 mg Oral BID     risperiDONE  1 mg Oral QAM     risperiDONE  2 mg Oral At Bedtime     pantoprazole  40 mg Oral Daily     multivitamin, therapeutic with minerals  1 tablet Oral Daily     folic acid  1 mg Oral Daily     rifaximin  550 mg Oral BID     spironolactone  50 mg Oral Daily     furosemide  20 mg Oral Daily          Allergies:     Allergies   Allergen Reactions     Acetaminophen      Ambien [Zolpidem Tartrate] Other (See Comments)     Sleep walks and eats     Ciprofloxacin Other (See Comments)     Seizure.     Citalopram Nausea and Vomiting          Labs:   No results found for this or any previous visit (from the past 24 hour(s)).       Psychiatric Examination:     Vitals:    09/30/17 0847 10/01/17 1035 10/01/17 1600 10/02/17 1627   BP:   122/73 108/54   BP Location:  "Right arm Left arm Left arm    Pulse:   79 74   Resp:   16    Temp:  98.1  F (36.7  C) 98.4  F (36.9  C) 98  F (36.7  C)   TempSrc: Oral Oral Oral Oral   SpO2:       Weight:  95.3 kg (210 lb)       Lying Orthostatic BP: 103/51      Lying Orthostatic Pulse: 64 bpm      Sitting Orthostatic BP:  (refused)      Sitting Orthostatic Pulse: 79 bpm      Standing Orthostatic BP:  (refused)      Standing Orthostatic Pulse: 135 bpm       Weight is 210 lbs 0 oz  Body mass index is 33.89 kg/(m^2).    Appearance: awake, alert, adequately groomed, appeared older than stated age and no apparent distress  Attitude:  cooperative and guarded  Eye Contact:  good  Mood:  \"better\"  Affect:  intensity is blunted and guarded  Speech:  clear, coherent and slow  Psychomotor Behavior:  no evidence of tardive dyskinesia, dystonia, or tics  Throught Process:  goal oriented  Associations:  no loose associations  Thought Content:  no evidence of suicidal ideation or homicidal ideation, no auditory hallucinations present, no visual hallucinations present, patient appears to be responding to internal stimuli and paranoia suspected  Insight:  limited  Judgement:  limited  Oriented to:  time, person, and place  Attention Span and Concentration:  fair  Recent and Remote Memory:  fair         Precautions:     Behavioral Orders   Procedures     Code 2     Elopement precautions     Routine Programming     As clinically indicated     Seizure precautions     Status 15     Every 15 minutes.     Withdrawal precautions          Diagnoses:     Cognitive Disorder NOS.   Encephalopathy probably multifactorial such as chronic hepatic encephalopathy, organic brain damage due to encephalopathy, alcohol drinking, possibly underlying psychotic illness; possibly head injury contributes to patient's symptoms.   Alcohol use disorder.   amphetamine dependence vs abuse.          Plan:     Medications:  1.  Multivitamin 1 tablet daily.  Folic acid 1 mg daily.  Thiamine 100 " mg daily.   2.  Lactulose 30 mL by mouth 4 times a day.   3.  Risperdal was restarted and adjusted to 1 mg qday and 2 mg qhs.    4.  Rifaximin 550 mg by mouth 3 times a day.   5.  Lactulose 30 mg TID.   6.  Haldol was subsequently added and adjusted to 2.5 mg BID. May consider consolidating neuroleptic once symptoms subsided.   7.  May consider adding Gabapentin to address restlessness and chronic pain.     -- Basic cognitive testing once symptoms subsided.   -- may consider MRI.     Legal Status and Disposition:  --  Under full MICD commitment.   -- on waiting list for [facility]. Referred to several NF and memory care unit. History of elopement.

## 2017-10-04 NOTE — PROGRESS NOTES
10/04/17 1456   Behavioral Health   Hallucinations appears responding   Thinking poor concentration;distractable;delusional   Orientation person: oriented;situation, disoriented;place, disoriented   Memory baseline memory   Insight poor   Judgement impaired   Eye Contact at examiner   Affect angry;sad;tense   Mood depressed;hopeless   Physical Appearance/Attire disheveled;untidy   Hygiene neglected grooming - unclean body, hair, teeth   Suicidality other (see comments)  (Denies)   Self Injury other (see comment)  (Denies)   Elopement Distress about legal situation (holds for mental health or criminal);Statements about wanting to leave   Activity isolative;withdrawn;restless   Speech clear;coherent   Psychomotor / Gait balanced;steady;slow   Psycho Education   Type of Intervention 1:1 intervention   Response participates, initiates socially appropriate   Hours 0.5   Treatment Detail (Check In)   Activities of Daily Living   Hygiene/Grooming independent   Oral Hygiene independent   Dress independent   Room Organization independent   Behavioral Health Interventions   Psychotic Symptoms provide emotional support;assist with developing & utilizing healthy coping strategies   Social and Therapeutic Interventions (Psychotic Symptoms) encourage socialization with peers;encourage participation in therapeutic groups and milieu activities   Patient was in her room mos to of the shift, but did come out for both meals and remained in the dinning area to eat her meals.  The patient also remained in the dinning area after lunch and played cards with another patient for about an hour.  There was minimal social interaction between them during the game, but this is improved social behavior for the patient.  The patient did have two approximately 30 minute episodes of audible crying in her room, during which she was hyper verbal and was talking about auditory and and visual stimulations.  The patient was also talking in detail  wanting to leave and was loitering by the main exit and had to be redirected after lunch.  Patient denies SI and SiB.

## 2017-10-04 NOTE — PLAN OF CARE
Problem: Individualization  Goal: Patient Preferences  Outcome: No Change     In beginning of shift patient was overheard yelling and screaming from room. When staff attempted to approach her she would yell even louder. Patient eventually calmed down in time for dinner and then was appropriate for the rest of the shift. She came out for dinner and then was watching movies in the OT room by herself. She was med compliant except for her lactulose. She did request gabapentin, but she has none ordered. Said she talked to her DrLuis About it and would like some ordered. She denies any mental health symptoms. Was pleasant this evening. No behavioral concerns. Will continue to monitor and assess.

## 2017-10-04 NOTE — PROGRESS NOTES
Phone call with [facility] to inquire if patient could be referred to any CARE facilities. Was informed that patient level of acuity and care is too high for these facilities. [place], [facility], [place] and [facility] nursing facilities were recommended. Patient has already been referred to [facility].     Called Nisa in business office to check if patient MA is active. She reported it is active.    Phone call with Rosalia of [facility] who reported that they have openings in their secure unit. She requested writer send updated progress notes to fax: [phone]. Rosalia reported that according to their records, patient's MA is inactive. Writer reported that our records indicate her MA is active. Rosalia will re-check on MA status.    Faxed H&P and updated progress notes to Rosalia with [facility] at [phone]. LVM with [facility] Shira Rausch to update.    Faxed referral to [facility] at [phone].    Phone call with [facility] Shira Rausch regarding discharge plan. Shira reported that she referred patient to the [facility] and they are currently reviewing the file. CTC to follow up. Shira also reported that patient's MA is open and she has been sending documents to Rosalia at [facility] for the past month.    LVM with admissions at [facility] ([phone]) asking for call back on referral status.    Called Rosalia at [facility] to confirm receipt of fax and inform that SEBAS Silva confirmed that patient's MA is open.

## 2017-10-04 NOTE — PLAN OF CARE
"Problem: Psychotic Symptoms  Goal: Psychotic Symptoms  Signs and symptoms of listed problems will be absent or manageable.   Outcome: No Change     48 Hour Nurse Note:  Pt states she feels \"ok\". Pt presents with a flat and blunted affect.  Pt was in the milieu for the majority of the shift, but isolated from peers.  Pt is calm and pleasant on approach.  Pt endorses \"mild\" depression, from being on the unit.  Pt reports she uses distraction (watching TV) to cope with her depression.  Pt denies AH, VH, paranoia, SI, SIB, and anxiety.  Pt appeared to be responding to IS at the start of the shift (grabbing at invisible objects), but was absent of IS during the 1:1 interview.  Pt was crying in her room at the start of the shift, but was receptive to staff de-escalation.     Pt has a patient care order for a walker on the unit, to help with pain in her knees.  Pt denied further medical concerns.     Pt reports she is eating and drinking ok.  Pt reports she will take a shower tomorrow.  Pt had no further requests for staff.    Pt refused HS lactulose, refusal has become a pattern.     Will continue to monitor and assess.      "

## 2017-10-05 PROCEDURE — 25000132 ZZH RX MED GY IP 250 OP 250 PS 637: Performed by: EMERGENCY MEDICINE

## 2017-10-05 PROCEDURE — 25000132 ZZH RX MED GY IP 250 OP 250 PS 637: Performed by: PSYCHIATRY & NEUROLOGY

## 2017-10-05 PROCEDURE — 25000132 ZZH RX MED GY IP 250 OP 250 PS 637: Performed by: NURSE PRACTITIONER

## 2017-10-05 PROCEDURE — 25000132 ZZH RX MED GY IP 250 OP 250 PS 637: Performed by: HOSPITALIST

## 2017-10-05 PROCEDURE — 12400007 ZZH R&B MH INTERMEDIATE UMMC

## 2017-10-05 RX ADMIN — RISPERIDONE 1 MG: 1 TABLET ORAL at 08:45

## 2017-10-05 RX ADMIN — LACTULOSE 30 G: 10 POWDER, FOR SOLUTION ORAL at 13:53

## 2017-10-05 RX ADMIN — SPIRONOLACTONE 50 MG: 50 TABLET ORAL at 08:45

## 2017-10-05 RX ADMIN — FOLIC ACID 1 MG: 1 TABLET ORAL at 08:46

## 2017-10-05 RX ADMIN — RIFAXIMIN 550 MG: 550 TABLET ORAL at 20:23

## 2017-10-05 RX ADMIN — LACTULOSE 30 G: 10 POWDER, FOR SOLUTION ORAL at 20:22

## 2017-10-05 RX ADMIN — MULTIPLE VITAMINS W/ MINERALS TAB 1 TABLET: TAB at 08:45

## 2017-10-05 RX ADMIN — RISPERIDONE 2 MG: 2 TABLET ORAL at 21:01

## 2017-10-05 RX ADMIN — RIFAXIMIN 550 MG: 550 TABLET ORAL at 08:45

## 2017-10-05 RX ADMIN — FERROUS GLUCONATE 324 MG: 324 TABLET ORAL at 08:45

## 2017-10-05 RX ADMIN — Medication 20 MG: at 08:45

## 2017-10-05 RX ADMIN — PANTOPRAZOLE SODIUM 40 MG: 40 TABLET, DELAYED RELEASE ORAL at 08:46

## 2017-10-05 RX ADMIN — LACTULOSE 30 G: 10 POWDER, FOR SOLUTION ORAL at 08:46

## 2017-10-05 RX ADMIN — HALOPERIDOL 2.5 MG: 1 TABLET ORAL at 20:23

## 2017-10-05 RX ADMIN — HALOPERIDOL 2.5 MG: 1 TABLET ORAL at 08:45

## 2017-10-05 RX ADMIN — HALOPERIDOL 5 MG: 5 TABLET ORAL at 10:59

## 2017-10-05 ASSESSMENT — ACTIVITIES OF DAILY LIVING (ADL)
LAUNDRY: WITH SUPERVISION
ORAL_HYGIENE: PROMPTS
DRESS: PROMPTS
GROOMING: PROMPTS

## 2017-10-05 NOTE — PLAN OF CARE
"Patient was overheard talking and yelling in her room.  Staff approached pt and pt stated \"these people are bothering me can't you see.\"  Haldol  5mg prn was administered for increasing hallucinations and agitation.  Will monitor pt per protocol.   "

## 2017-10-05 NOTE — PROGRESS NOTES
Received request from [facility] admissions ([phone]) for H&P and the past weeks progress notes.    Records were faxed to [phone]

## 2017-10-05 NOTE — PLAN OF CARE
Pt appears calm, lying in bed denies auditory and visual hallucinations.  Pt responding to questions appropriately, pt is oriented to month and place only.  Will continue to assess pt.

## 2017-10-05 NOTE — PROGRESS NOTES
Patient had a rough day. Earlier during the shift pt was yelling, screaming and crying in her room. When this writer check in with patient, she mentioned wanting to leave and that she has been here too long. Pt expressed frustration about her situation. Stated she feels sad and depressed. Denied hallucinations but appeared responding to internal stimuli . At the end of the shift , pt approached the nursing desk asking staff if there is any work to be done. Pt was delusional as well. Approached  saying there is a tv light coming from her room.        10/05/17 3614   Behavioral Health   Hallucinations appears responding   Thinking poor concentration   Orientation person: oriented;place: oriented   Memory baseline memory   Insight poor   Judgement impaired   Eye Contact at examiner   Affect blunted, flat;sad   Mood depressed;anxious   Physical Appearance/Attire disheveled   Hygiene neglected grooming - unclean body, hair, teeth   Suicidality other (see comments)  (denied)   Self Injury other (see comment)  (denied )   Elopement Statements about wanting to leave;Hallucinations directing behavior   Activity isolative;withdrawn   Speech clear;coherent   Medication Sensitivity no stated side effects   Psychomotor / Gait unsteady  (uses walker for mobility)   Psycho Education   Type of Intervention 1:1 intervention   Response participates, initiates socially appropriate   Hours 0.5   Treatment Detail (check in)   Activities of Daily Living   Hygiene/Grooming prompts   Oral Hygiene prompts   Dress prompts   Laundry with supervision   Room Organization unable   Groups   Details (did not attend groups)   Behavioral Health Interventions   Psychotic Symptoms maintain safety precautions;encourage nutrition and hydration;encourage participation / independence with adls;provide emotional support;establish therapeutic relationship;assist with developing & utilizing healthy coping strategies;build upon strengths   Social  and Therapeutic Interventions (Psychotic Symptoms) encourage socialization with peers;encourage participation in therapeutic groups and milieu activities

## 2017-10-05 NOTE — PROGRESS NOTES
Re: Discharge planning      Writer received phone call from Tracie in admissions at [facility]. She reported that they have immediate openings in their locked facility and requested that we send clinical information to screen for appropriateness. Writer faxed over information. [facility] to follow up with Tracie.    Contact info: (P) [phone]                        (F) [phone]    Update: Writer received call from [facility] stating they would not be able to meet the patient's care needs therefore is not appropriate for their facility.      Jolly Mendoza, LPCC, LADC

## 2017-10-06 PROCEDURE — 99232 SBSQ HOSP IP/OBS MODERATE 35: CPT | Performed by: PSYCHIATRY & NEUROLOGY

## 2017-10-06 PROCEDURE — 12400007 ZZH R&B MH INTERMEDIATE UMMC

## 2017-10-06 PROCEDURE — 25000132 ZZH RX MED GY IP 250 OP 250 PS 637: Performed by: NURSE PRACTITIONER

## 2017-10-06 PROCEDURE — 25000132 ZZH RX MED GY IP 250 OP 250 PS 637: Performed by: HOSPITALIST

## 2017-10-06 PROCEDURE — 25000132 ZZH RX MED GY IP 250 OP 250 PS 637: Performed by: EMERGENCY MEDICINE

## 2017-10-06 PROCEDURE — 25000132 ZZH RX MED GY IP 250 OP 250 PS 637: Performed by: PSYCHIATRY & NEUROLOGY

## 2017-10-06 PROCEDURE — 97150 GROUP THERAPEUTIC PROCEDURES: CPT | Mod: GO

## 2017-10-06 RX ORDER — GABAPENTIN 100 MG/1
100 CAPSULE ORAL 3 TIMES DAILY
Status: DISCONTINUED | OUTPATIENT
Start: 2017-10-06 | End: 2017-11-16

## 2017-10-06 RX ADMIN — HALOPERIDOL 2.5 MG: 1 TABLET ORAL at 08:29

## 2017-10-06 RX ADMIN — RISPERIDONE 2 MG: 2 TABLET ORAL at 21:22

## 2017-10-06 RX ADMIN — PANTOPRAZOLE SODIUM 40 MG: 40 TABLET, DELAYED RELEASE ORAL at 08:28

## 2017-10-06 RX ADMIN — LACTULOSE 30 G: 10 POWDER, FOR SOLUTION ORAL at 08:30

## 2017-10-06 RX ADMIN — LACTULOSE 30 G: 10 POWDER, FOR SOLUTION ORAL at 21:19

## 2017-10-06 RX ADMIN — GABAPENTIN 100 MG: 100 CAPSULE ORAL at 21:22

## 2017-10-06 RX ADMIN — FOLIC ACID 1 MG: 1 TABLET ORAL at 08:28

## 2017-10-06 RX ADMIN — HALOPERIDOL 2.5 MG: 1 TABLET ORAL at 21:21

## 2017-10-06 RX ADMIN — MULTIPLE VITAMINS W/ MINERALS TAB 1 TABLET: TAB at 08:29

## 2017-10-06 RX ADMIN — FERROUS GLUCONATE 324 MG: 324 TABLET ORAL at 08:29

## 2017-10-06 RX ADMIN — RISPERIDONE 1 MG: 1 TABLET ORAL at 08:29

## 2017-10-06 RX ADMIN — GABAPENTIN 100 MG: 100 CAPSULE ORAL at 13:12

## 2017-10-06 RX ADMIN — RIFAXIMIN 550 MG: 550 TABLET ORAL at 08:29

## 2017-10-06 RX ADMIN — RIFAXIMIN 550 MG: 550 TABLET ORAL at 21:23

## 2017-10-06 RX ADMIN — Medication 20 MG: at 08:29

## 2017-10-06 RX ADMIN — LACTULOSE 30 G: 10 POWDER, FOR SOLUTION ORAL at 13:12

## 2017-10-06 RX ADMIN — SPIRONOLACTONE 50 MG: 50 TABLET ORAL at 08:29

## 2017-10-06 ASSESSMENT — ACTIVITIES OF DAILY LIVING (ADL)
ORAL_HYGIENE: INDEPENDENT
DRESS: INDEPENDENT
ORAL_HYGIENE: INDEPENDENT
GROOMING: INDEPENDENT
GROOMING: INDEPENDENT
LAUNDRY: WITH SUPERVISION
LAUNDRY: WITH SUPERVISION
DRESS: SCRUBS (BEHAVIORAL HEALTH)

## 2017-10-06 NOTE — PROGRESS NOTES
"Pt was visible in the milieu on and off throughout the shift. She continues to visibly respond to internal stimuli, as well as make delusional statements such as, \"Get them out of my room! Don't let them come near me! Will you check my ears to make sure they didn't put more earbuds in there?\" Writer suggested prn medications, but pt declined. Pt was able to stay calm on the unit, but had several more crying outbursts, and reported seeing people in her room.    Pt was agreeable to taking all scheduled medications.     Pt reported severe knee pain, rated at a 10 out of 10. On-call provider paged, and still waiting on a response. Provided pt with extra blankets to brace her knees and legs.    Pt remained calm for the rest of the evening. No additional concerns at this time. Will continue to monitor and assess.  "

## 2017-10-06 NOTE — PROGRESS NOTES
"Pt was more visible in the milieu, attended and participated in most OT groups. Pt was observed pacing the kaba. Pt appears bright and in better spirit and was social with staff. For once patient did not mention wanting to leave. Stated she feels \"great\" to be out and awake. Denied al psychotic symptoms.       10/06/17 1501   Behavioral Health   Hallucinations denies / not responding to hallucinations   Thinking distractable;delusional;paranoid;poor concentration   Orientation person: oriented;place: oriented   Memory baseline memory   Insight poor   Judgement impaired   Eye Contact at examiner   Affect blunted, flat   Mood depressed;mood is calm   Physical Appearance/Attire appears stated age   Hygiene well groomed   Suicidality other (see comments)  (denied )   Self Injury other (see comment)  (denied )   Elopement (was not observed and did not mentioned wanting to leave )   Activity withdrawn   Speech clear;coherent   Medication Sensitivity no stated side effects   Psychomotor / Gait balanced   Psycho Education   Type of Intervention 1:1 intervention   Response participates, initiates socially appropriate   Hours 0.5   Treatment Detail (check in )   Activities of Daily Living   Hygiene/Grooming independent   Oral Hygiene independent   Dress independent   Laundry with supervision   Room Organization prompts   Groups   Details (attended and participated in groups)   Behavioral Health Interventions   Psychotic Symptoms maintain safety precautions;encourage nutrition and hydration;encourage participation / independence with adls;provide emotional support;establish therapeutic relationship;assist with developing & utilizing healthy coping strategies;build upon strengths   Social and Therapeutic Interventions (Psychotic Symptoms) encourage socialization with peers;encourage participation in therapeutic groups and milieu activities     "

## 2017-10-06 NOTE — PROGRESS NOTES
"  [facility], [place]   Psychiatric Progress Note        Interim History:     The patient's care was discussed with the treatment team during the daily team meeting and/or staff's chart notes were reviewed.  Staff report patient continue to exhibit niko lability. Visible and engaged at times. Compliant with medications and care. She was crying and yelling in her room but not behavioral outbursts. Forgetful, distracted with bouts of mild confusion. No SI or BHARATI. No elopement behavior. Sleeping well. Independent with ADL. Focused on knee pain.     The patient noted that she was crying in her room because she is frustrated and \"just sick of being here\". Anxiety fluctuates. Knee pain worsen and requested to start on Gabapentin. Sleep and appetite are intact. Denied hallucinations, paranoia and racing thoughts. Oriented and recalls pervious encounter.     Discussed medications and care plan.        Medications:       ferrous gluconate  324 mg Oral Daily with breakfast     lactulose  30 g Oral TID     haloperidol  2.5 mg Oral BID     risperiDONE  1 mg Oral QAM     risperiDONE  2 mg Oral At Bedtime     pantoprazole  40 mg Oral Daily     multivitamin, therapeutic with minerals  1 tablet Oral Daily     folic acid  1 mg Oral Daily     rifaximin  550 mg Oral BID     spironolactone  50 mg Oral Daily     furosemide  20 mg Oral Daily          Allergies:     Allergies   Allergen Reactions     Acetaminophen      Ambien [Zolpidem Tartrate] Other (See Comments)     Sleep walks and eats     Ciprofloxacin Other (See Comments)     Seizure.     Citalopram Nausea and Vomiting          Labs:   No results found for this or any previous visit (from the past 24 hour(s)).       Psychiatric Examination:     Vitals:    10/02/17 1627 10/04/17 1623 10/05/17 0920 10/06/17 0830   BP: 108/54 109/59     BP Location:   Left arm Left arm   Pulse: 74 83     Resp:    16   Temp: 98  F (36.7  C)  97.6  F (36.4  C) 97.3  F (36.3 "  C)   TempSrc: Oral  Oral Oral   SpO2:       Weight:         Lying Orthostatic BP: 103/51      Lying Orthostatic Pulse: 64 bpm      Sitting Orthostatic BP:  (refused)      Sitting Orthostatic Pulse: 79 bpm      Standing Orthostatic BP:  (refused)      Standing Orthostatic Pulse: 135 bpm       Weight is 210 lbs 0 oz  Body mass index is 33.89 kg/(m^2).    Appearance: awake, alert, adequately groomed, appeared older than stated age and no apparent distress  Attitude:  cooperative and guarded  Eye Contact:  good  Mood:  anxious  Affect:  intensity is blunted  Speech:  clear, coherent and slow  Psychomotor Behavior:  no evidence of tardive dyskinesia, dystonia, or tics  Throught Process:  goal oriented  Associations:  no loose associations  Thought Content:  no evidence of suicidal ideation or homicidal ideation, no auditory hallucinations present, no visual hallucinations present and patient appears to be responding to internal stimuli  Insight:  limited  Judgement:  limited  Oriented to:  time, person, and place  Attention Span and Concentration:  fair  Recent and Remote Memory:  fair         Precautions:     Behavioral Orders   Procedures     Code 2     Elopement precautions     Routine Programming     As clinically indicated     Seizure precautions     Status 15     Every 15 minutes.     Withdrawal precautions          Diagnoses:     Cognitive Disorder NOS.   Encephalopathy probably multifactorial such as chronic hepatic encephalopathy, organic brain damage due to encephalopathy, alcohol drinking, possibly underlying psychotic illness; possibly head injury contributes to patient's symptoms.   Alcohol use disorder.   amphetamine dependence vs abuse.          Plan:     Medications:  1.  Multivitamin 1 tablet daily.  Folic acid 1 mg daily.  Thiamine 100 mg daily.   2.  Lactulose 30 mL by mouth 4 times a day.   3.  Risperdal was restarted and adjusted to 1 mg qday and 2 mg qhs.    4.  Rifaximin 550 mg by mouth 3 times a  day.   5.  Lactulose 30 mg TID.   6.  Haldol was subsequently added and adjusted to 2.5 mg BID. May consider consolidating neuroleptic once symptoms subsided.   7.  Gabapentin: start at 100 mg TID.      -- Basic cognitive testing once symptoms subsided.   -- may consider MRI if symptoms persist.   -- CBC, CMP and Ammonia level on 10/09  -- IM team to see, re: knee pain.     Legal Status and Disposition:  --  Under full MICD commitment.   -- on waiting list for [facility]. Referred to several NF and memory care unit. History of elopement.

## 2017-10-06 NOTE — PROGRESS NOTES
INITIAL OT ATTENDANCE:   Participated in 1.5 of 3.0 OT groups today; she followed the lead of peers and in the course of session wanted to work on three different projects, but respected OTR boundaries and accepted working on two projects instead.   The quality of her work was loose, with minimum planning.   She was social with staff and peers and seemed motivated to participate and engage with others today in a structured setting.  Her affect was bright on occasion and she was appropriate to topic at all times.

## 2017-10-07 PROCEDURE — 25000132 ZZH RX MED GY IP 250 OP 250 PS 637: Performed by: PSYCHIATRY & NEUROLOGY

## 2017-10-07 PROCEDURE — 25000132 ZZH RX MED GY IP 250 OP 250 PS 637: Performed by: EMERGENCY MEDICINE

## 2017-10-07 PROCEDURE — 25000132 ZZH RX MED GY IP 250 OP 250 PS 637: Performed by: NURSE PRACTITIONER

## 2017-10-07 PROCEDURE — 12400007 ZZH R&B MH INTERMEDIATE UMMC

## 2017-10-07 PROCEDURE — 25000132 ZZH RX MED GY IP 250 OP 250 PS 637: Performed by: HOSPITALIST

## 2017-10-07 RX ADMIN — Medication 20 MG: at 08:38

## 2017-10-07 RX ADMIN — RIFAXIMIN 550 MG: 550 TABLET ORAL at 08:37

## 2017-10-07 RX ADMIN — GABAPENTIN 100 MG: 100 CAPSULE ORAL at 13:55

## 2017-10-07 RX ADMIN — FOLIC ACID 1 MG: 1 TABLET ORAL at 08:38

## 2017-10-07 RX ADMIN — MULTIPLE VITAMINS W/ MINERALS TAB 1 TABLET: TAB at 08:38

## 2017-10-07 RX ADMIN — LACTULOSE 30 G: 10 POWDER, FOR SOLUTION ORAL at 08:37

## 2017-10-07 RX ADMIN — RISPERIDONE 2 MG: 2 TABLET ORAL at 20:12

## 2017-10-07 RX ADMIN — RISPERIDONE 1 MG: 1 TABLET ORAL at 08:38

## 2017-10-07 RX ADMIN — PANTOPRAZOLE SODIUM 40 MG: 40 TABLET, DELAYED RELEASE ORAL at 08:38

## 2017-10-07 RX ADMIN — RIFAXIMIN 550 MG: 550 TABLET ORAL at 20:12

## 2017-10-07 RX ADMIN — SPIRONOLACTONE 50 MG: 50 TABLET ORAL at 08:38

## 2017-10-07 RX ADMIN — HALOPERIDOL 2.5 MG: 1 TABLET ORAL at 08:38

## 2017-10-07 RX ADMIN — HALOPERIDOL 2.5 MG: 1 TABLET ORAL at 20:12

## 2017-10-07 RX ADMIN — LACTULOSE 30 G: 10 POWDER, FOR SOLUTION ORAL at 13:55

## 2017-10-07 RX ADMIN — LACTULOSE 30 G: 10 POWDER, FOR SOLUTION ORAL at 20:11

## 2017-10-07 RX ADMIN — GABAPENTIN 100 MG: 100 CAPSULE ORAL at 08:38

## 2017-10-07 RX ADMIN — FERROUS GLUCONATE 324 MG: 324 TABLET ORAL at 08:38

## 2017-10-07 RX ADMIN — GABAPENTIN 100 MG: 100 CAPSULE ORAL at 20:12

## 2017-10-07 ASSESSMENT — ACTIVITIES OF DAILY LIVING (ADL)
ORAL_HYGIENE: INDEPENDENT
HYGIENE/GROOMING: INDEPENDENT
ORAL_HYGIENE: INDEPENDENT
DRESS: SCRUBS (BEHAVIORAL HEALTH);INDEPENDENT
GROOMING: INDEPENDENT
DRESS: SCRUBS (BEHAVIORAL HEALTH);INDEPENDENT

## 2017-10-07 NOTE — PLAN OF CARE
Problem: Psychotic Symptoms  Goal: Psychotic Symptoms  Signs and symptoms of listed problems will be absent or manageable.   Outcome: Improving  48 Hour Nursing Assessment:  Day 58 of Hospitalization.  Aranza is using her walker and says the gabapentin is helping with the pain.  She was calm, polite and respectful in interactions today.  She is fully oriented.   She appears to be hallucinating although she denies this.  She has spent the majority of her day in bed but does come out for and is interested in her meals.

## 2017-10-07 NOTE — PROGRESS NOTES
The pt was visible in the milieu early on in the shift, but stayed in her room after dinner.  She was pleasant and appropriate on approach, with little or no sign of the typical yelling that has been her baseline in the evenings.  She didn't attend group, but watched movies in the OT room.     10/06/17 2028   Behavioral Health   Hallucinations auditory;visual;appears responding;denies / not responding to hallucinations   Thinking poor concentration;delusional   Orientation person: oriented;place: oriented   Memory baseline memory   Insight poor   Judgement impaired   Eye Contact at examiner   Affect full range affect   Mood mood is calm;depressed   Physical Appearance/Attire appears stated age   Hygiene well groomed   Suicidality other (see comments)  (pt denies)   Self Injury other (see comment)  (pt denies)   Elopement (Was not observed attempting to elope)   Activity other (see comment);withdrawn  (watched movues)   Speech clear;coherent   Medication Sensitivity no stated side effects;no observed side effects   Psychomotor / Gait slow  (walker)   Substance Withdrawal Interventions   Social and Therapeutic Interventions (Substance Withdrawal) encourage socialization with peers;encourage effective boundaries with peers;encourage participation in therapeutic groups and milieu activities   Safety   Suicidality status 15   Coping/Psychosocial   Verbalized Emotional State frustration   Supportive Measures active listening utilized;positive reinforcement provided;self-care encouraged   Psycho Education   Type of Intervention 1:1 intervention   Response participates with encouragement   Hours 0.5   Treatment Detail check-in   Group Therapy Session   Group Attendance refused to attend group session   Activities of Daily Living   Hygiene/Grooming independent   Oral Hygiene independent   Dress scrubs (behavioral health)   Laundry with supervision   Room Organization independent   Activity   Activity Assistance Provided  independent   Behavioral Health Interventions   Psychotic Symptoms maintain safety precautions;monitor need to revise level of observation;maintain safe secure environment;reality orientation;simple, clear language;decrease environmental stimulation;redirection of intrusive behaviors;redirection of aggressive behaviors;assist patient in following safety plan;encourage nutrition and hydration;provide emotional support;encourage participation / independence with adls   Social and Therapeutic Interventions (Psychotic Symptoms) encourage socialization with peers;encourage effective boundaries with peers;encourage participation in therapeutic groups and milieu activities

## 2017-10-08 PROCEDURE — 12400007 ZZH R&B MH INTERMEDIATE UMMC

## 2017-10-08 PROCEDURE — 25000132 ZZH RX MED GY IP 250 OP 250 PS 637: Performed by: PSYCHIATRY & NEUROLOGY

## 2017-10-08 PROCEDURE — 25000132 ZZH RX MED GY IP 250 OP 250 PS 637: Performed by: HOSPITALIST

## 2017-10-08 PROCEDURE — 25000132 ZZH RX MED GY IP 250 OP 250 PS 637: Performed by: EMERGENCY MEDICINE

## 2017-10-08 PROCEDURE — 25000132 ZZH RX MED GY IP 250 OP 250 PS 637: Performed by: NURSE PRACTITIONER

## 2017-10-08 RX ADMIN — LACTULOSE 30 G: 10 POWDER, FOR SOLUTION ORAL at 08:35

## 2017-10-08 RX ADMIN — PANTOPRAZOLE SODIUM 40 MG: 40 TABLET, DELAYED RELEASE ORAL at 08:35

## 2017-10-08 RX ADMIN — Medication 20 MG: at 08:35

## 2017-10-08 RX ADMIN — RIFAXIMIN 550 MG: 550 TABLET ORAL at 08:36

## 2017-10-08 RX ADMIN — SPIRONOLACTONE 50 MG: 50 TABLET ORAL at 08:35

## 2017-10-08 RX ADMIN — RIFAXIMIN 550 MG: 550 TABLET ORAL at 21:35

## 2017-10-08 RX ADMIN — RISPERIDONE 2 MG: 2 TABLET ORAL at 21:35

## 2017-10-08 RX ADMIN — MULTIPLE VITAMINS W/ MINERALS TAB 1 TABLET: TAB at 08:35

## 2017-10-08 RX ADMIN — LACTULOSE 30 G: 10 POWDER, FOR SOLUTION ORAL at 13:54

## 2017-10-08 RX ADMIN — FOLIC ACID 1 MG: 1 TABLET ORAL at 08:35

## 2017-10-08 RX ADMIN — GABAPENTIN 100 MG: 100 CAPSULE ORAL at 21:35

## 2017-10-08 RX ADMIN — GABAPENTIN 100 MG: 100 CAPSULE ORAL at 08:36

## 2017-10-08 RX ADMIN — RISPERIDONE 1 MG: 1 TABLET ORAL at 08:35

## 2017-10-08 RX ADMIN — HALOPERIDOL 2.5 MG: 1 TABLET ORAL at 21:35

## 2017-10-08 RX ADMIN — FERROUS GLUCONATE 324 MG: 324 TABLET ORAL at 08:36

## 2017-10-08 RX ADMIN — HALOPERIDOL 2.5 MG: 1 TABLET ORAL at 08:36

## 2017-10-08 RX ADMIN — LACTULOSE 30 G: 10 POWDER, FOR SOLUTION ORAL at 21:34

## 2017-10-08 RX ADMIN — GABAPENTIN 100 MG: 100 CAPSULE ORAL at 13:54

## 2017-10-08 ASSESSMENT — ACTIVITIES OF DAILY LIVING (ADL)
GROOMING: INDEPENDENT
DRESS: INDEPENDENT
ORAL_HYGIENE: INDEPENDENT
ORAL_HYGIENE: INDEPENDENT
LAUNDRY: WITH SUPERVISION
DRESS: INDEPENDENT
GROOMING: INDEPENDENT;PROMPTS

## 2017-10-08 NOTE — PROGRESS NOTES
10/08/17 1458   Behavioral Health   Hallucinations denies / not responding to hallucinations   Thinking poor concentration   Orientation place: oriented;person: oriented;date: oriented   Memory baseline memory   Insight poor   Judgement impaired   Eye Contact (Stayed under covers in bed)   Affect blunted, flat   Mood depressed;hopeless   Physical Appearance/Attire attire appropriate to age and situation   Hygiene well groomed  (Showered prior evening)   Suicidality other (see comments)  (Denies)   Self Injury other (see comment)  (Denies)   Elopement (Nothing to report)   Activity isolative;withdrawn   Speech clear;coherent   Medication Sensitivity no observed side effects   Psychomotor / Gait steady;balanced;slow  (Uses walker)   Psycho Education   Type of Intervention 1:1 intervention   Response participates, initiates socially appropriate   Hours 0.5   Treatment Detail (Check In)   Activities of Daily Living   Hygiene/Grooming independent  (Showered prior evening)   Oral Hygiene independent   Dress independent   Room Organization independent   Behavioral Health Interventions   Psychotic Symptoms provide emotional support   Social and Therapeutic Interventions (Psychotic Symptoms) encourage socialization with peers;encourage participation in therapeutic groups and milieu activities   Patient was in her room most of the shift sleeping.  The patient came out for both meals this shift and eat in the dining area.  The patient uses a walker.  The patient did not have any emotional outbursts during this shift and was not seen responding to internal stimuli.  The patient was reports feeling the same as she has the past few weeks and was stating that she feels ready to discharge.  The patient believes that she can go home, but does not seem to understand her care teams is trying to find her placement in a program.  The patient denies SI and SiB.

## 2017-10-08 NOTE — PROGRESS NOTES
Pt was calm this shift. Came out and stayed in milieu to watch movies and eat for a while before returning to room after dinner. Pt was appropriate in milieu and asked appropriately for things w/ staffs and other peers. No labile outbursts or behavioral issues this shift.        10/07/17 2102   Behavioral Health   Hallucinations appears responding   Thinking distractable   Orientation place: oriented   Memory baseline memory   Insight poor   Judgement impaired   Eye Contact at examiner   Affect blunted, flat   Mood mood is calm   Physical Appearance/Attire appears stated age;attire appropriate to age and situation   Hygiene other (see comment)  (adequate)   Suicidality (none stated/observed)   Self Injury other (see comment)  (none stated/observed)   Elopement (none)   Activity other (see comment)  (present in milieu for some time)   Speech clear;coherent   Medication Sensitivity no observed side effects;no stated side effects   Psychomotor / Gait steady;balanced   Activities of Daily Living   Hygiene/Grooming independent   Oral Hygiene independent   Dress scrubs (behavioral health);independent   Room Organization prompts

## 2017-10-09 LAB
ALBUMIN SERPL-MCNC: 2.6 G/DL (ref 3.4–5)
ALP SERPL-CCNC: 116 U/L (ref 40–150)
ALT SERPL W P-5'-P-CCNC: 26 U/L (ref 0–50)
AMMONIA PLAS-SCNC: 94 UMOL/L (ref 10–50)
ANION GAP SERPL CALCULATED.3IONS-SCNC: 7 MMOL/L (ref 3–14)
AST SERPL W P-5'-P-CCNC: 28 U/L (ref 0–45)
BILIRUB SERPL-MCNC: 0.7 MG/DL (ref 0.2–1.3)
BUN SERPL-MCNC: 13 MG/DL (ref 7–30)
CALCIUM SERPL-MCNC: 8.6 MG/DL (ref 8.5–10.1)
CHLORIDE SERPL-SCNC: 111 MMOL/L (ref 94–109)
CO2 SERPL-SCNC: 25 MMOL/L (ref 20–32)
CREAT SERPL-MCNC: 0.6 MG/DL (ref 0.52–1.04)
ERYTHROCYTE [DISTWIDTH] IN BLOOD BY AUTOMATED COUNT: 14.2 % (ref 10–15)
GFR SERPL CREATININE-BSD FRML MDRD: >90 ML/MIN/1.7M2
GLUCOSE SERPL-MCNC: 100 MG/DL (ref 70–99)
HCT VFR BLD AUTO: 36 % (ref 35–47)
HGB BLD-MCNC: 12.5 G/DL (ref 11.7–15.7)
MCH RBC QN AUTO: 32.1 PG (ref 26.5–33)
MCHC RBC AUTO-ENTMCNC: 34.7 G/DL (ref 31.5–36.5)
MCV RBC AUTO: 92 FL (ref 78–100)
PLATELET # BLD AUTO: 76 10E9/L (ref 150–450)
POTASSIUM SERPL-SCNC: 3.8 MMOL/L (ref 3.4–5.3)
PROT SERPL-MCNC: 6.7 G/DL (ref 6.8–8.8)
RBC # BLD AUTO: 3.9 10E12/L (ref 3.8–5.2)
SODIUM SERPL-SCNC: 143 MMOL/L (ref 133–144)
WBC # BLD AUTO: 4.3 10E9/L (ref 4–11)

## 2017-10-09 PROCEDURE — 25000132 ZZH RX MED GY IP 250 OP 250 PS 637: Performed by: NURSE PRACTITIONER

## 2017-10-09 PROCEDURE — 36415 COLL VENOUS BLD VENIPUNCTURE: CPT | Performed by: PSYCHIATRY & NEUROLOGY

## 2017-10-09 PROCEDURE — 25000132 ZZH RX MED GY IP 250 OP 250 PS 637: Performed by: HOSPITALIST

## 2017-10-09 PROCEDURE — 85027 COMPLETE CBC AUTOMATED: CPT | Performed by: PSYCHIATRY & NEUROLOGY

## 2017-10-09 PROCEDURE — 99232 SBSQ HOSP IP/OBS MODERATE 35: CPT | Performed by: PSYCHIATRY & NEUROLOGY

## 2017-10-09 PROCEDURE — 25000132 ZZH RX MED GY IP 250 OP 250 PS 637: Performed by: EMERGENCY MEDICINE

## 2017-10-09 PROCEDURE — 97150 GROUP THERAPEUTIC PROCEDURES: CPT | Mod: GO

## 2017-10-09 PROCEDURE — 82140 ASSAY OF AMMONIA: CPT | Performed by: PSYCHIATRY & NEUROLOGY

## 2017-10-09 PROCEDURE — 25000132 ZZH RX MED GY IP 250 OP 250 PS 637: Performed by: PSYCHIATRY & NEUROLOGY

## 2017-10-09 PROCEDURE — 80053 COMPREHEN METABOLIC PANEL: CPT | Performed by: PSYCHIATRY & NEUROLOGY

## 2017-10-09 PROCEDURE — 12400001 ZZH R&B MH UMMC

## 2017-10-09 RX ADMIN — PANTOPRAZOLE SODIUM 40 MG: 40 TABLET, DELAYED RELEASE ORAL at 08:42

## 2017-10-09 RX ADMIN — GABAPENTIN 100 MG: 100 CAPSULE ORAL at 08:42

## 2017-10-09 RX ADMIN — FOLIC ACID 1 MG: 1 TABLET ORAL at 08:42

## 2017-10-09 RX ADMIN — RISPERIDONE 2 MG: 2 TABLET ORAL at 21:18

## 2017-10-09 RX ADMIN — MULTIPLE VITAMINS W/ MINERALS TAB 1 TABLET: TAB at 08:42

## 2017-10-09 RX ADMIN — LACTULOSE 30 G: 10 POWDER, FOR SOLUTION ORAL at 20:48

## 2017-10-09 RX ADMIN — SPIRONOLACTONE 50 MG: 50 TABLET ORAL at 08:41

## 2017-10-09 RX ADMIN — RIFAXIMIN 550 MG: 550 TABLET ORAL at 20:49

## 2017-10-09 RX ADMIN — RIFAXIMIN 550 MG: 550 TABLET ORAL at 08:42

## 2017-10-09 RX ADMIN — Medication 20 MG: at 08:42

## 2017-10-09 RX ADMIN — HALOPERIDOL 2.5 MG: 1 TABLET ORAL at 08:42

## 2017-10-09 RX ADMIN — HALOPERIDOL 2.5 MG: 1 TABLET ORAL at 20:49

## 2017-10-09 RX ADMIN — LACTULOSE 30 G: 10 POWDER, FOR SOLUTION ORAL at 13:48

## 2017-10-09 RX ADMIN — LACTULOSE 30 G: 10 POWDER, FOR SOLUTION ORAL at 08:41

## 2017-10-09 RX ADMIN — RISPERIDONE 1 MG: 1 TABLET ORAL at 08:42

## 2017-10-09 RX ADMIN — GABAPENTIN 100 MG: 100 CAPSULE ORAL at 20:49

## 2017-10-09 RX ADMIN — FERROUS GLUCONATE 324 MG: 324 TABLET ORAL at 08:42

## 2017-10-09 RX ADMIN — GABAPENTIN 100 MG: 100 CAPSULE ORAL at 13:48

## 2017-10-09 ASSESSMENT — ACTIVITIES OF DAILY LIVING (ADL)
ORAL_HYGIENE: INDEPENDENT
GROOMING: INDEPENDENT
DRESS: INDEPENDENT
LAUNDRY: WITH SUPERVISION
DRESS: INDEPENDENT
GROOMING: INDEPENDENT
ORAL_HYGIENE: INDEPENDENT
LAUNDRY: WITH SUPERVISION

## 2017-10-09 NOTE — PLAN OF CARE
Problem: Psychotic Symptoms  Goal: Psychotic Symptoms  Signs and symptoms of listed problems will be absent or manageable.   Outcome: No Change  Mildred had a quiet shift. She was intermittently present in the milieu, though did not engage with peers or staff. Upon approach was pleasant and polite, though flat affect. Neuros stable. No delirium symptoms noted. Denies SI/SIB/AH, does not appear responding to internal stimuli. Steady on feet with walker. Med compliant.     Assessment of pt's progress toward meeting careplan goals:no change.

## 2017-10-09 NOTE — PROGRESS NOTES
10/09/17 1403   Behavioral Health   Hallucinations denies / not responding to hallucinations   Thinking poor concentration   Orientation person: oriented;place: oriented;date: oriented;time: oriented   Memory baseline memory   Insight poor   Judgement impaired   Eye Contact at examiner   Affect blunted, flat   Mood mood is calm   Physical Appearance/Attire attire appropriate to age and situation   Hygiene neglected grooming - unclean body, hair, teeth   Suicidality (denies)   Self Injury (denies)   Elopement Statements about wanting to leave   Activity withdrawn   Speech clear;coherent   Medication Sensitivity no stated side effects   Psychomotor / Gait balanced   Psycho Education   Type of Intervention 1:1 intervention   Response participates with encouragement   Hours 0.5   Activities of Daily Living   Hygiene/Grooming independent   Oral Hygiene independent   Dress independent   Laundry with supervision   Room Organization independent   pt mostly in room resting. Pt did attend OT craft groups. Pt labile mood. Pt making statements about wanting to leave and denies any mental health issues. Pt eating and drinking well. Pt saw minimal responding to hallucinations.

## 2017-10-09 NOTE — PROGRESS NOTES
10/09/17 1500   General Information   Date Initially Attended OT 10/06/17   Clinical Impression   Affect Flat;Restricted   Orientation Oriented to person, place and time   Appearance and ADLs General cleanliness observed in most areas   Attention to Internal Stimuli No observed signs   Interaction Skills Interacts appropriately with staff;Initiates appropriately with staff;Interacts appropriately with peers;Initiates appropriately with peers   Ability to Communicate Needs Independent   Verbal Content Clear;Appropriate to topic   Ability to Maintain Boundaries Maintains appropriate physical boundaries;Maintains appropriate verbal boundaries   Participation Initiates participation;Independently participates;Needs further assessment  (set up assistance needed due to mobility & endurance issues)   Concentration Concentrates 30+ minutes;Needs further assessment;Other (see comments)  (on self initiated creative tasks)   Ability to Concentrate With structure   Follows and Comprehends Directions Independently follows 2 step verbal directions   Memory Delayed and immediate recall intact   Organization Independently organizes simple tasks;Needs occasional assistance    Decision Making Independent;Changes mind frequently;Other (see comments)  (changes mind 1-2x before finalizing plan & following through)   Planning and Problem Solving Independently plans ahead;Occasionally needs assist/feedback   Ability to Apply and Learn Concepts Needs further assessment   Frustrations / Stress Tolerance Independently identifies sources of frustration/stress   Level of Insight Identifies needs with structure/support  (in context of group activities)   Self Esteem Accepts positive feedback   Social Supports Has knowledge of support systems

## 2017-10-09 NOTE — PROGRESS NOTES
Overall more organized, focused and she demonstrated better concentration when planning and working on creative tasks than when last seen by this OTR.   OTR requested she write down her 3-step plan for craft activity follow through; pt wrote down two steps but verbalized a 3rd step.  Then she changed her mind and selected another series of actions to take, changing the plan entirely before initiating project.   Once she began work on project, however, she followed through nicely and asked for assistance from both OTR and a peer x1, showing willingness to self advocate.     Pt has still not completed self assessment form; OTR does not believe pt will be able to do so independently until further stabilization based on interaction in group today.  Will attempt if she continues to improve later in the week.          OT PLAN:  Pt. Will engage in goal directed tasks to enhance concentration, organization, and problem solving. Pt. Will explore and practice coping skills to reduce stress in daily life. Encourage attendance and participation in scheduled Occupational Therapy sessions. Continue to assess and document progress.

## 2017-10-09 NOTE — PROGRESS NOTES
"  [facility], [place]   Psychiatric Progress Note        Interim History:     The patient's care was discussed with the treatment team during the daily team meeting and/or staff's chart notes were reviewed.  Staff report patient has been, overall, calmer, though, still has exhibited emotional lability. She explained it during today's meeting with me by saying that: \"I am mad for being here!\" She, then, calmed down and listened and responded politely when I pointed out that we needed to find a place for her and finding such a place could be hard if she continued to be labile. She has been more cooperative with medications, spending more time outside, at times attending OT groups. Didn't voice clearly delusional ideas, was oriented X 3. Ammonia is elevated, but not on a rise.    Discussed medications and care plan.        Medications:       gabapentin  100 mg Oral TID     ferrous gluconate  324 mg Oral Daily with breakfast     lactulose  30 g Oral TID     haloperidol  2.5 mg Oral BID     risperiDONE  1 mg Oral QAM     risperiDONE  2 mg Oral At Bedtime     pantoprazole  40 mg Oral Daily     multivitamin, therapeutic with minerals  1 tablet Oral Daily     folic acid  1 mg Oral Daily     rifaximin  550 mg Oral BID     spironolactone  50 mg Oral Daily     furosemide  20 mg Oral Daily          Allergies:     Allergies   Allergen Reactions     Acetaminophen      Ambien [Zolpidem Tartrate] Other (See Comments)     Sleep walks and eats     Ciprofloxacin Other (See Comments)     Seizure.     Citalopram Nausea and Vomiting          Labs:     Recent Results (from the past 24 hour(s))   CBC with platelets    Collection Time: 10/09/17  8:10 AM   Result Value Ref Range    WBC 4.3 4.0 - 11.0 10e9/L    RBC Count 3.90 3.8 - 5.2 10e12/L    Hemoglobin 12.5 11.7 - 15.7 g/dL    Hematocrit 36.0 35.0 - 47.0 %    MCV 92 78 - 100 fl    MCH 32.1 26.5 - 33.0 pg    MCHC 34.7 31.5 - 36.5 g/dL    RDW 14.2 10.0 - 15.0 % "    Platelet Count 76 (L) 150 - 450 10e9/L   Comprehensive metabolic panel    Collection Time: 10/09/17  8:10 AM   Result Value Ref Range    Sodium 143 133 - 144 mmol/L    Potassium 3.8 3.4 - 5.3 mmol/L    Chloride 111 (H) 94 - 109 mmol/L    Carbon Dioxide 25 20 - 32 mmol/L    Anion Gap 7 3 - 14 mmol/L    Glucose 100 (H) 70 - 99 mg/dL    Urea Nitrogen 13 7 - 30 mg/dL    Creatinine 0.60 0.52 - 1.04 mg/dL    GFR Estimate >90 >60 mL/min/1.7m2    GFR Estimate If Black >90 >60 mL/min/1.7m2    Calcium 8.6 8.5 - 10.1 mg/dL    Bilirubin Total 0.7 0.2 - 1.3 mg/dL    Albumin 2.6 (L) 3.4 - 5.0 g/dL    Protein Total 6.7 (L) 6.8 - 8.8 g/dL    Alkaline Phosphatase 116 40 - 150 U/L    ALT 26 0 - 50 U/L    AST 28 0 - 45 U/L   Ammonia    Collection Time: 10/09/17  8:10 AM   Result Value Ref Range    Ammonia 94 (H) 10 - 50 umol/L          Psychiatric Examination:     Vitals:    10/04/17 1623 10/05/17 0920 10/06/17 0830 10/09/17 0958   BP: 109/59   115/72   BP Location:  Left arm Left arm    Pulse: 83   75   Resp:   16 16   Temp:  97.6  F (36.4  C) 97.3  F (36.3  C) 98.5  F (36.9  C)   TempSrc:  Oral Oral Oral   SpO2:       Weight:                 Lying Orthostatic BP: 103/51         Sitting Orthostatic BP:  (Refused)         Standing Orthostatic BP:  (Refused)   Weight is 210 lbs 0 oz  Body mass index is 33.89 kg/(m^2).    Appearance: awake, alert, adequately groomed, appeared older than stated age and no apparent distress  Attitude:  cooperative and guarded  Eye Contact:  good  Mood:  anxious  Affect:  intensity is blunted  Speech:  clear, coherent and slow  Psychomotor Behavior:  no evidence of tardive dyskinesia, dystonia, or tics  Throught Process:  goal oriented  Associations:  no loose associations  Thought Content:  no evidence of suicidal ideation or homicidal ideation, no auditory hallucinations present, no visual hallucinations present and patient doesn't appear to be responding to internal stimuli  Insight:   limited  Judgement:  limited  Oriented to:  time, person, and place  Attention Span and Concentration:  fair  Recent and Remote Memory:  fair         Precautions:     Behavioral Orders   Procedures     Code 2     Elopement precautions     Routine Programming     As clinically indicated     Seizure precautions     Status 15     Every 15 minutes.     Withdrawal precautions          Diagnoses:     Cognitive Disorder NOS.   Encephalopathy probably multifactorial such as chronic hepatic encephalopathy, organic brain damage due to encephalopathy, alcohol drinking, possibly underlying psychotic illness; possibly head injury contributes to patient's symptoms.   Alcohol use disorder.   amphetamine dependence vs abuse.          Plan:     No medication changes today.    Legal Status and Disposition:  --  Under full MICD commitment.   -- on waiting list for [facility]. Referred to several NH and memory care unit, was rejected by number of them. History of elopement.

## 2017-10-10 PROCEDURE — 25000132 ZZH RX MED GY IP 250 OP 250 PS 637: Performed by: PSYCHIATRY & NEUROLOGY

## 2017-10-10 PROCEDURE — 12400007 ZZH R&B MH INTERMEDIATE UMMC

## 2017-10-10 PROCEDURE — 25000132 ZZH RX MED GY IP 250 OP 250 PS 637: Performed by: EMERGENCY MEDICINE

## 2017-10-10 PROCEDURE — 25000132 ZZH RX MED GY IP 250 OP 250 PS 637: Performed by: NURSE PRACTITIONER

## 2017-10-10 PROCEDURE — 25000132 ZZH RX MED GY IP 250 OP 250 PS 637: Performed by: HOSPITALIST

## 2017-10-10 RX ADMIN — LACTULOSE 30 G: 10 POWDER, FOR SOLUTION ORAL at 13:34

## 2017-10-10 RX ADMIN — SPIRONOLACTONE 50 MG: 50 TABLET ORAL at 08:45

## 2017-10-10 RX ADMIN — LACTULOSE 30 G: 10 POWDER, FOR SOLUTION ORAL at 08:44

## 2017-10-10 RX ADMIN — GABAPENTIN 100 MG: 100 CAPSULE ORAL at 20:41

## 2017-10-10 RX ADMIN — PANTOPRAZOLE SODIUM 40 MG: 40 TABLET, DELAYED RELEASE ORAL at 08:45

## 2017-10-10 RX ADMIN — GABAPENTIN 100 MG: 100 CAPSULE ORAL at 08:46

## 2017-10-10 RX ADMIN — HALOPERIDOL 2.5 MG: 1 TABLET ORAL at 20:41

## 2017-10-10 RX ADMIN — RIFAXIMIN 550 MG: 550 TABLET ORAL at 20:41

## 2017-10-10 RX ADMIN — GABAPENTIN 100 MG: 100 CAPSULE ORAL at 13:34

## 2017-10-10 RX ADMIN — RISPERIDONE 2 MG: 2 TABLET ORAL at 21:26

## 2017-10-10 RX ADMIN — RISPERIDONE 1 MG: 1 TABLET ORAL at 08:45

## 2017-10-10 RX ADMIN — FOLIC ACID 1 MG: 1 TABLET ORAL at 08:45

## 2017-10-10 RX ADMIN — Medication 20 MG: at 08:45

## 2017-10-10 RX ADMIN — MULTIPLE VITAMINS W/ MINERALS TAB 1 TABLET: TAB at 08:45

## 2017-10-10 RX ADMIN — FERROUS GLUCONATE 324 MG: 324 TABLET ORAL at 08:45

## 2017-10-10 RX ADMIN — LACTULOSE 30 G: 10 POWDER, FOR SOLUTION ORAL at 20:41

## 2017-10-10 RX ADMIN — HALOPERIDOL 2.5 MG: 1 TABLET ORAL at 08:45

## 2017-10-10 RX ADMIN — RIFAXIMIN 550 MG: 550 TABLET ORAL at 08:47

## 2017-10-10 ASSESSMENT — ACTIVITIES OF DAILY LIVING (ADL)
GROOMING: INDEPENDENT
DRESS: SCRUBS (BEHAVIORAL HEALTH);INDEPENDENT;PROMPTS
ORAL_HYGIENE: INDEPENDENT
DRESS: SCRUBS (BEHAVIORAL HEALTH)
ORAL_HYGIENE: INDEPENDENT
GROOMING: INDEPENDENT;SHOWER

## 2017-10-10 NOTE — PLAN OF CARE
Problem: Psychotic Symptoms  Goal: Psychotic Symptoms  Signs and symptoms of listed problems will be absent or manageable.   Outcome: Improving  Patient does come out for meals appetite is good. No physical complaints. Spends most of time in room. Will eat in lounge. No shower but will shower in evening.  Was heard crying.  Very minimal conversation.  Slept most of afternoon.

## 2017-10-10 NOTE — PROGRESS NOTES
Writer left message at [facility] regarding referral.     [facility] has not accepted pt into their program. They are requesting updated progress notes be faxed. They also mentioned pt does not have active MA.    Writer also left message with [facility] regarding discharge planning and MA.

## 2017-10-10 NOTE — PROGRESS NOTES
10/09/17 2200   Behavioral Health   Hallucinations denies / not responding to hallucinations   Thinking poor concentration   Orientation person: oriented;place: oriented   Memory baseline memory   Insight poor   Judgement impaired   Eye Contact at examiner   Affect blunted, flat   Mood mood is calm   Physical Appearance/Attire disheveled   Hygiene neglected grooming - unclean body, hair, teeth   Suicidality other (see comments)  (denies )   Self Injury other (see comment)  (denies )   Elopement (Pt is on Elopment precaution )   Activity isolative;withdrawn   Speech clear;coherent   Activities of Daily Living   Hygiene/Grooming independent   Oral Hygiene independent   Dress independent   Laundry with supervision   Room Organization independent       Pt denied SI and SIB.  Pt seems calm, isolative, withdrawn, ate meals and spend most of the shift in bed.

## 2017-10-11 PROCEDURE — 25000132 ZZH RX MED GY IP 250 OP 250 PS 637: Performed by: EMERGENCY MEDICINE

## 2017-10-11 PROCEDURE — 25000132 ZZH RX MED GY IP 250 OP 250 PS 637: Performed by: PSYCHIATRY & NEUROLOGY

## 2017-10-11 PROCEDURE — 25000132 ZZH RX MED GY IP 250 OP 250 PS 637: Performed by: HOSPITALIST

## 2017-10-11 PROCEDURE — 12400007 ZZH R&B MH INTERMEDIATE UMMC

## 2017-10-11 PROCEDURE — 25000132 ZZH RX MED GY IP 250 OP 250 PS 637: Performed by: NURSE PRACTITIONER

## 2017-10-11 PROCEDURE — 99232 SBSQ HOSP IP/OBS MODERATE 35: CPT | Performed by: PSYCHIATRY & NEUROLOGY

## 2017-10-11 RX ADMIN — PANTOPRAZOLE SODIUM 40 MG: 40 TABLET, DELAYED RELEASE ORAL at 08:17

## 2017-10-11 RX ADMIN — RISPERIDONE 1 MG: 1 TABLET ORAL at 08:17

## 2017-10-11 RX ADMIN — MULTIPLE VITAMINS W/ MINERALS TAB 1 TABLET: TAB at 08:17

## 2017-10-11 RX ADMIN — LACTULOSE 30 G: 10 POWDER, FOR SOLUTION ORAL at 08:16

## 2017-10-11 RX ADMIN — GABAPENTIN 100 MG: 100 CAPSULE ORAL at 21:54

## 2017-10-11 RX ADMIN — LACTULOSE 30 G: 10 POWDER, FOR SOLUTION ORAL at 13:49

## 2017-10-11 RX ADMIN — RIFAXIMIN 550 MG: 550 TABLET ORAL at 08:18

## 2017-10-11 RX ADMIN — RISPERIDONE 2 MG: 2 TABLET ORAL at 21:53

## 2017-10-11 RX ADMIN — FOLIC ACID 1 MG: 1 TABLET ORAL at 08:17

## 2017-10-11 RX ADMIN — GABAPENTIN 100 MG: 100 CAPSULE ORAL at 08:17

## 2017-10-11 RX ADMIN — GABAPENTIN 100 MG: 100 CAPSULE ORAL at 13:49

## 2017-10-11 RX ADMIN — Medication 20 MG: at 08:18

## 2017-10-11 RX ADMIN — LACTULOSE 30 G: 10 POWDER, FOR SOLUTION ORAL at 21:54

## 2017-10-11 RX ADMIN — FERROUS GLUCONATE 324 MG: 324 TABLET ORAL at 08:18

## 2017-10-11 RX ADMIN — SPIRONOLACTONE 50 MG: 50 TABLET ORAL at 08:17

## 2017-10-11 RX ADMIN — RIFAXIMIN 550 MG: 550 TABLET ORAL at 21:54

## 2017-10-11 RX ADMIN — HALOPERIDOL 2.5 MG: 1 TABLET ORAL at 21:54

## 2017-10-11 RX ADMIN — HALOPERIDOL 2.5 MG: 1 TABLET ORAL at 08:17

## 2017-10-11 ASSESSMENT — ACTIVITIES OF DAILY LIVING (ADL)
GROOMING: INDEPENDENT
GROOMING: PROMPTS;INDEPENDENT
LAUNDRY: UNABLE TO COMPLETE
ORAL_HYGIENE: INDEPENDENT
DRESS: SCRUBS (BEHAVIORAL HEALTH)
DRESS: INDEPENDENT
LAUNDRY: UNABLE TO COMPLETE
ORAL_HYGIENE: INDEPENDENT

## 2017-10-11 NOTE — PROGRESS NOTES
"  [facility], [place]   Psychiatric Progress Note        Interim History:     The patient's care was discussed with the treatment team during the daily team meeting and/or staff's chart notes were reviewed.  Staff report patient has been, overall, calmer, though, still has exhibited emotional lability. She talked very little when I approached her today, said that she was tired from being here and would like to be discharged. She was clearly annoyed after I told her that she was committed, could not go back home because she showed she could not function in community. She said that her  would help, then, acknowledged that he didn't come to visit her for a while. Later on I heard patient shouting in the corridor that she had to be discharged and that staff had to: \"get the f...ck out of this place because this building belongs to me\". This is not a first time patient made statements that she and not [place] owned this building. She also said that someone came to her room. There was no one present in her room.     Discussed medications and care plan.        Medications:       gabapentin  100 mg Oral TID     ferrous gluconate  324 mg Oral Daily with breakfast     lactulose  30 g Oral TID     haloperidol  2.5 mg Oral BID     risperiDONE  1 mg Oral QAM     risperiDONE  2 mg Oral At Bedtime     pantoprazole  40 mg Oral Daily     multivitamin, therapeutic with minerals  1 tablet Oral Daily     folic acid  1 mg Oral Daily     rifaximin  550 mg Oral BID     spironolactone  50 mg Oral Daily     furosemide  20 mg Oral Daily          Allergies:     Allergies   Allergen Reactions     Acetaminophen      Ambien [Zolpidem Tartrate] Other (See Comments)     Sleep walks and eats     Ciprofloxacin Other (See Comments)     Seizure.     Citalopram Nausea and Vomiting          Labs:     No results found for this or any previous visit (from the past 24 hour(s)).       Psychiatric Examination:     Vitals: "    10/09/17 0958 10/09/17 1600 10/10/17 1624 10/11/17 0853   BP: 115/72 112/68 99/56 119/69   BP Location:    Right arm   Pulse: 75 70 72 86   Resp: 16 16 16 16   Temp: 98.5  F (36.9  C) 97.9  F (36.6  C) 98.5  F (36.9  C) 95.7  F (35.4  C)   TempSrc: Oral Oral Oral Oral   SpO2:       Weight:                 Lying Orthostatic BP: 103/51         Sitting Orthostatic BP: 112/68         Standing Orthostatic BP:  (Refused)   Weight is 210 lbs 0 oz  Body mass index is 33.89 kg/(m^2).    Appearance: awake, alert, adequately groomed, appeared older than stated age and no apparent distress  Attitude:  cooperative and guarded  Eye Contact:  good  Mood:  anxious  Affect:  intensity is blunted  Speech:  clear, coherent and slow  Psychomotor Behavior:  no evidence of tardive dyskinesia, dystonia, or tics  Throught Process:  goal oriented  Associations:  no loose associations  Thought Content:  no evidence of suicidal ideation or homicidal ideation, no auditory hallucinations present, Visual hallucinations are present, delusions are present.   Insight:  limited  Judgement:  limited  Oriented to:  time, person, and place  Attention Span and Concentration:  fair  Recent and Remote Memory:  fair         Precautions:     Behavioral Orders   Procedures     Code 2     Elopement precautions     Routine Programming     As clinically indicated     Seizure precautions     Status 15     Every 15 minutes.     Withdrawal precautions          Diagnoses:     Cognitive Disorder NOS.   Encephalopathy probably multifactorial such as chronic hepatic encephalopathy, organic brain damage due to encephalopathy, alcohol drinking, possibly underlying psychotic illness; possibly head injury contributes to patient's symptoms.   Alcohol use disorder.   amphetamine dependence vs abuse.          Plan:     No medication changes today.    Legal Status and Disposition:  --  Under full MICD commitment.   -- on waiting list for [facility]. Referred to several NH and  memory care unit, was rejected by number of them. History of elopement.

## 2017-10-11 NOTE — PLAN OF CARE
Pt came to the  and told staff someone came to her room and told her she will not be discharging.  Pt was angry with staff.  Staff told pt nobody went to her room.  Staff did redirect pt to her room.  Pt is calm and is in her room.  Will continue to monitor pt.

## 2017-10-11 NOTE — PROGRESS NOTES
The pt was calm and pleasant in the milieu.  She came out for meals, snacks and medications without incident.  She was polite and appropriate upon approach.  This writer is impressed with the level of mentall capacity she has regained in the last few weeks.     10/10/17 9922   Behavioral Health   Hallucinations denies / not responding to hallucinations   Thinking distractable   Orientation person: oriented;place: oriented;date: oriented;time: oriented   Memory baseline memory   Insight poor   Judgement impaired   Eye Contact at examiner   Affect full range affect   Mood mood is calm   Physical Appearance/Attire attire appropriate to age and situation   Hygiene well groomed   Suicidality other (see comments)  (pt denies)   Self Injury other (see comment)  (pt denies)   Elopement (no elopement behavior witnessed)   Activity other (see comment)  (visible, pleasant)   Speech clear;coherent   Medication Sensitivity no stated side effects;no observed side effects   Psychomotor / Gait steady;balanced   Substance Withdrawal Interventions   Social and Therapeutic Interventions (Substance Withdrawal) encourage effective boundaries with peers;encourage socialization with peers;encourage participation in therapeutic groups and milieu activities   Safety   Suicidality status 15   Elopement status 15   Coping/Psychosocial   Verbalized Emotional State hopefulness   Supportive Measures active listening utilized;self-care encouraged;positive reinforcement provided   Psycho Education   Type of Intervention 1:1 intervention   Response participates, initiates socially appropriate   Hours 0.5   Treatment Detail check-in   Group Therapy Session   Group Attendance refused to attend group session   Activities of Daily Living   Hygiene/Grooming independent;shower   Oral Hygiene independent   Dress scrubs (behavioral health)   Room Organization independent   Activity   Activity Assistance Provided independent   Behavioral Health  Interventions   Psychotic Symptoms maintain safety precautions;maintain safe secure environment;monitor need to revise level of observation;reality orientation;simple, clear language;encourage nutrition and hydration

## 2017-10-11 NOTE — PROGRESS NOTES
Mildred had apparently told evening RN that she was experiencing right sided flank pain late in the evening shift 10/10.  When asked, patient advised that she no longer had her appendix or gall bladder.  She declined any prn meds but did accept a warm pack for the discomfort.  Now appears to be sleeping soundly in 202.  Will continue to monitor and support patient.

## 2017-10-11 NOTE — PROGRESS NOTES
Pt took all medications without issue, and presented as calm and pleasant on the milieu. She did not display signs of confusion, and made no delusional statements.    No additional concerns at this time. Will continue to monitor and assess.

## 2017-10-11 NOTE — PROGRESS NOTES
10/11/17 1100   Behavioral Health   Hallucinations denies / not responding to hallucinations   Thinking poor concentration   Orientation time: oriented;date: oriented;place: oriented;person: oriented   Memory baseline memory   Insight insight appropriate to situation   Judgement impaired   Eye Contact at examiner   Affect blunted, flat   Mood mood is calm   Physical Appearance/Attire untidy   Hygiene other (see comment)  (Pt plans to take a shower sometime today.)   Suicidality other (see comments)  (Pt denies.)   Self Injury other (see comment)  (Pt denies.)   Activity isolative;withdrawn   Speech clear;coherent   Medication Sensitivity no observed side effects;no stated side effects   Psychomotor / Gait unsteady   Psycho Education   Type of Intervention 1:1 intervention   Response participates, initiates socially appropriate   Hours 0.5   Treatment Detail (check in.)   Activities of Daily Living   Hygiene/Grooming independent   Oral Hygiene independent   Dress independent   Laundry unable to complete   Room Organization independent   Behavioral Health Interventions   Psychotic Symptoms maintain safety precautions   Social and Therapeutic Interventions (Psychotic Symptoms) encourage socialization with peers;encourage participation in therapeutic groups and milieu activities     Pt isolative to room most of this shift, not attending or participating in groups. Pt oriented to date, time and location, denies hearing/responding to auditory/visual hallucinations. Pt appears to  be having a good day, she came out and asked to take a shower later today. Pt reports voiding x 5 and 1 BM. Denies SI/SIB and all mental health symptoms. No nutritional concerns or complaints.

## 2017-10-12 PROCEDURE — 25000132 ZZH RX MED GY IP 250 OP 250 PS 637: Performed by: EMERGENCY MEDICINE

## 2017-10-12 PROCEDURE — 25000132 ZZH RX MED GY IP 250 OP 250 PS 637: Performed by: PSYCHIATRY & NEUROLOGY

## 2017-10-12 PROCEDURE — 99232 SBSQ HOSP IP/OBS MODERATE 35: CPT | Performed by: PSYCHIATRY & NEUROLOGY

## 2017-10-12 PROCEDURE — 25000132 ZZH RX MED GY IP 250 OP 250 PS 637: Performed by: HOSPITALIST

## 2017-10-12 PROCEDURE — 25000132 ZZH RX MED GY IP 250 OP 250 PS 637: Performed by: NURSE PRACTITIONER

## 2017-10-12 PROCEDURE — 12400007 ZZH R&B MH INTERMEDIATE UMMC

## 2017-10-12 RX ADMIN — RISPERIDONE 2 MG: 2 TABLET ORAL at 21:40

## 2017-10-12 RX ADMIN — FERROUS GLUCONATE 324 MG: 324 TABLET ORAL at 08:35

## 2017-10-12 RX ADMIN — LACTULOSE 30 G: 10 POWDER, FOR SOLUTION ORAL at 21:40

## 2017-10-12 RX ADMIN — RISPERIDONE 1 MG: 1 TABLET ORAL at 08:35

## 2017-10-12 RX ADMIN — HALOPERIDOL 2.5 MG: 1 TABLET ORAL at 08:35

## 2017-10-12 RX ADMIN — GABAPENTIN 100 MG: 100 CAPSULE ORAL at 21:40

## 2017-10-12 RX ADMIN — MULTIPLE VITAMINS W/ MINERALS TAB 1 TABLET: TAB at 08:35

## 2017-10-12 RX ADMIN — HALOPERIDOL 2.5 MG: 1 TABLET ORAL at 21:40

## 2017-10-12 RX ADMIN — RIFAXIMIN 550 MG: 550 TABLET ORAL at 21:40

## 2017-10-12 RX ADMIN — PANTOPRAZOLE SODIUM 40 MG: 40 TABLET, DELAYED RELEASE ORAL at 08:35

## 2017-10-12 RX ADMIN — GABAPENTIN 100 MG: 100 CAPSULE ORAL at 14:33

## 2017-10-12 RX ADMIN — Medication 20 MG: at 08:35

## 2017-10-12 RX ADMIN — RIFAXIMIN 550 MG: 550 TABLET ORAL at 08:35

## 2017-10-12 RX ADMIN — LACTULOSE 30 G: 10 POWDER, FOR SOLUTION ORAL at 14:33

## 2017-10-12 RX ADMIN — GABAPENTIN 100 MG: 100 CAPSULE ORAL at 08:35

## 2017-10-12 RX ADMIN — SPIRONOLACTONE 50 MG: 50 TABLET ORAL at 08:35

## 2017-10-12 RX ADMIN — FOLIC ACID 1 MG: 1 TABLET ORAL at 08:35

## 2017-10-12 ASSESSMENT — ACTIVITIES OF DAILY LIVING (ADL)
ORAL_HYGIENE: INDEPENDENT
DRESS: INDEPENDENT
ORAL_HYGIENE: INDEPENDENT
GROOMING: PROMPTS
LAUNDRY: WITH SUPERVISION
DRESS: SCRUBS (BEHAVIORAL HEALTH);INDEPENDENT
GROOMING: INDEPENDENT

## 2017-10-12 NOTE — PROGRESS NOTES
"  [facility], [place]   Psychiatric Progress Note        Interim History:     The patient's care was discussed with the treatment team during the daily team meeting and/or staff's chart notes were reviewed.  Staff report patient has been, overall, calmer, though, still has exhibited emotional lability. She talked very little when I approached her today, said that she was tired from being here and would like to be discharged. She was listening to music sounding like rap and joked that she was young enough to listen to this kind of music. She, however, quickly became irritable when I asked her to explain yesterday verbal outburst: \"I am tired of being here. I own this building, but no one and you don't believe me\". When I asked her if she had a proof of ownership of this building, she said she did, but gave it to one of staff members. Staff reports that she still has episodes of responding to internal stimuli, possibly, both Auditory hallucinations and Visual hallucinations.   Per Psych associate's note: \"Pt was isolative and withdrawn to her room throughout the shift. Pt refused check in with writer this shift. Pt appeared upset because she believed she would be discharged today and learned we had no alert of that occurring. Pt was calm otherwise, although anxious to be discharged. Pt was observed loitering around the exit doors during meal times.    Discussed medications and care plan.        Medications:       gabapentin  100 mg Oral TID     ferrous gluconate  324 mg Oral Daily with breakfast     lactulose  30 g Oral TID     haloperidol  2.5 mg Oral BID     risperiDONE  1 mg Oral QAM     risperiDONE  2 mg Oral At Bedtime     pantoprazole  40 mg Oral Daily     multivitamin, therapeutic with minerals  1 tablet Oral Daily     folic acid  1 mg Oral Daily     rifaximin  550 mg Oral BID     spironolactone  50 mg Oral Daily     furosemide  20 mg Oral Daily          Allergies:     Allergies "   Allergen Reactions     Acetaminophen      Ambien [Zolpidem Tartrate] Other (See Comments)     Sleep walks and eats     Ciprofloxacin Other (See Comments)     Seizure.     Citalopram Nausea and Vomiting          Labs:     No results found for this or any previous visit (from the past 24 hour(s)).       Psychiatric Examination:     Vitals:    10/09/17 0958 10/09/17 1600 10/10/17 1624 10/11/17 0853   BP: 115/72 112/68 99/56 119/69   BP Location:    Right arm   Pulse: 75 70 72 86   Resp: 16 16 16 16   Temp: 98.5  F (36.9  C) 97.9  F (36.6  C) 98.5  F (36.9  C) 95.7  F (35.4  C)   TempSrc: Oral Oral Oral Oral   SpO2:       Weight:                 Lying Orthostatic BP: 103/51         Sitting Orthostatic BP: 112/68         Standing Orthostatic BP:  (Refused)       Appearance: awake, alert, adequately groomed, appeared older than stated age and no apparent distress  Attitude:  cooperative and guarded  Eye Contact:  good  Mood:  anxious  Affect:  intensity is blunted  Speech:  clear, coherent and slow  Psychomotor Behavior:  no evidence of tardive dyskinesia, dystonia, or tics  Throught Process:  goal oriented  Associations:  no loose associations  Thought Content:  no evidence of suicidal ideation or homicidal ideation, possibly, auditory hallucinations present, Visual hallucinations are present, delusions are present.   Insight:  limited  Judgement:  limited  Oriented to:  time, person, and place  Attention Span and Concentration:  fair  Recent and Remote Memory:  fair         Precautions:     Behavioral Orders   Procedures     Code 2     Elopement precautions     Routine Programming     As clinically indicated     Seizure precautions     Status 15     Every 15 minutes.     Withdrawal precautions          Diagnoses:     Cognitive Disorder NOS.   Encephalopathy probably multifactorial such as chronic hepatic encephalopathy, organic brain damage due to encephalopathy, alcohol drinking, possibly underlying psychotic  illness; possibly head injury contributes to patient's symptoms.   Alcohol use disorder.   amphetamine dependence vs abuse.          Plan:     No medication changes today.    Legal Status and Disposition:  --  Under full MICD commitment.   -- on waiting list for [facility]. Referred to several NH and memory care unit, was rejected by number of them. Per [facility], [facility] is still reviewing her. History of elopement.

## 2017-10-12 NOTE — PROGRESS NOTES
Case Management Note  Contacted Rosalia @ [facility]: [phone] says they are still reviewing the patient for admission

## 2017-10-12 NOTE — PROGRESS NOTES
Pt was isolative and withdrawn to her room throughout the shift. Pt refused check in with writer this shift. Pt appeared upset because she believed she would be discharged today and learned we had no alert of that occurring. Pt was calm otherwise, although anxious to be discharged. Pt was observed loitering around the exit doors during meal times.      10/12/17 1151   Behavioral Health   Hallucinations denies / not responding to hallucinations   Thinking poor concentration;distractable   Orientation place: oriented;person: oriented;date: oriented   Memory baseline memory   Insight poor   Judgement impaired   Eye Contact at examiner   Affect blunted, flat;tense   Mood anxious   Physical Appearance/Attire untidy   Hygiene neglected grooming - unclean body, hair, teeth   Elopement Loitering near exit doors   Activity isolative;withdrawn   Speech coherent   Medication Sensitivity no observed side effects   Psychomotor / Gait unsteady   Psycho Education   Type of Intervention 1:1 intervention   Response refuses   Activities of Daily Living   Hygiene/Grooming prompts   Oral Hygiene independent   Dress scrubs (behavioral health);independent   Room Organization independent   Activity   Activity Assistance Provided independent

## 2017-10-12 NOTE — PROGRESS NOTES
The pt had a hard shift today.  She seems to be not only hearing/seeing things, but is responding again.  Severel times the pt was seen loitering by the doors, not actively trying to elope, but staring as if seeing someone outside the doors.  She was calm and polite, despite having a report from days that she had a tough time.  She reports that her knees are really bothering her, but was otherwise pleasant and appropriate.     10/11/17 1911   Behavioral Health   Hallucinations appears responding;auditory;visual   Thinking poor concentration;distractable;delusional   Orientation person: oriented;place: oriented;date: oriented;time: oriented   Memory baseline memory   Insight poor   Judgement impaired   Eye Contact at examiner   Affect blunted, flat;incongruent;tense   Mood anxious;mood is calm   Physical Appearance/Attire untidy   Hygiene neglected grooming - unclean body, hair, teeth   Suicidality other (see comments)  (pt denies)   Self Injury other (see comment)  (pt denies)   Elopement Hallucinations directing behavior;Loitering near exit doors   Activity other (see comment)  (out for meals)   Speech coherent   Medication Sensitivity no stated side effects   Psychomotor / Gait unsteady  (walker)   Substance Withdrawal Interventions   Social and Therapeutic Interventions (Substance Withdrawal) encourage socialization with peers;encourage effective boundaries with peers;encourage participation in therapeutic groups and milieu activities   Safety   Suicidality status 15   Elopement status 15   Coping/Psychosocial   Verbalized Emotional State sadness   Supportive Measures active listening utilized;relaxation techniques promoted   Psycho Education   Type of Intervention 1:1 intervention   Response participates, initiates socially appropriate   Hours 0.5   Treatment Detail check-in   Group Therapy Session   Group Attendance refused to attend group session   Activities of Daily Living   Hygiene/Grooming  prompts;independent   Oral Hygiene independent   Dress scrubs (behavioral health)   Laundry unable to complete   Room Organization independent   Activity   Activity Assistance Provided independent   Behavioral Health Interventions   Psychotic Symptoms maintain safety precautions;monitor need to revise level of observation;maintain safe secure environment;simple, clear language;reality orientation;decrease environmental stimulation;redirection of intrusive behaviors;redirection of aggressive behaviors;encourage nutrition and hydration;encourage participation / independence with adls;provide emotional support;establish therapeutic relationship   Social and Therapeutic Interventions (Psychotic Symptoms) encourage socialization with peers;encourage effective boundaries with peers;encourage participation in therapeutic groups and milieu activities

## 2017-10-13 PROCEDURE — 99231 SBSQ HOSP IP/OBS SF/LOW 25: CPT | Performed by: PSYCHIATRY & NEUROLOGY

## 2017-10-13 PROCEDURE — 12400007 ZZH R&B MH INTERMEDIATE UMMC

## 2017-10-13 PROCEDURE — 25000132 ZZH RX MED GY IP 250 OP 250 PS 637: Performed by: NURSE PRACTITIONER

## 2017-10-13 PROCEDURE — 25000132 ZZH RX MED GY IP 250 OP 250 PS 637: Performed by: PSYCHIATRY & NEUROLOGY

## 2017-10-13 PROCEDURE — 25000132 ZZH RX MED GY IP 250 OP 250 PS 637: Performed by: HOSPITALIST

## 2017-10-13 PROCEDURE — 25000132 ZZH RX MED GY IP 250 OP 250 PS 637: Performed by: EMERGENCY MEDICINE

## 2017-10-13 RX ADMIN — LACTULOSE 30 G: 10 POWDER, FOR SOLUTION ORAL at 09:29

## 2017-10-13 RX ADMIN — LACTULOSE 30 G: 10 POWDER, FOR SOLUTION ORAL at 14:29

## 2017-10-13 RX ADMIN — RISPERIDONE 1 MG: 1 TABLET ORAL at 09:29

## 2017-10-13 RX ADMIN — GABAPENTIN 100 MG: 100 CAPSULE ORAL at 14:30

## 2017-10-13 RX ADMIN — FERROUS GLUCONATE 324 MG: 324 TABLET ORAL at 09:30

## 2017-10-13 RX ADMIN — HALOPERIDOL 2.5 MG: 1 TABLET ORAL at 09:30

## 2017-10-13 RX ADMIN — Medication 20 MG: at 09:29

## 2017-10-13 RX ADMIN — RISPERIDONE 2 MG: 2 TABLET ORAL at 21:31

## 2017-10-13 RX ADMIN — LACTULOSE 30 G: 10 POWDER, FOR SOLUTION ORAL at 20:51

## 2017-10-13 RX ADMIN — SPIRONOLACTONE 50 MG: 50 TABLET ORAL at 09:30

## 2017-10-13 RX ADMIN — HALOPERIDOL 2.5 MG: 1 TABLET ORAL at 20:51

## 2017-10-13 RX ADMIN — GABAPENTIN 100 MG: 100 CAPSULE ORAL at 09:29

## 2017-10-13 RX ADMIN — MULTIPLE VITAMINS W/ MINERALS TAB 1 TABLET: TAB at 09:30

## 2017-10-13 RX ADMIN — PANTOPRAZOLE SODIUM 40 MG: 40 TABLET, DELAYED RELEASE ORAL at 09:29

## 2017-10-13 RX ADMIN — RIFAXIMIN 550 MG: 550 TABLET ORAL at 09:30

## 2017-10-13 RX ADMIN — GABAPENTIN 100 MG: 100 CAPSULE ORAL at 20:52

## 2017-10-13 RX ADMIN — FOLIC ACID 1 MG: 1 TABLET ORAL at 09:30

## 2017-10-13 RX ADMIN — RIFAXIMIN 550 MG: 550 TABLET ORAL at 20:51

## 2017-10-13 ASSESSMENT — ACTIVITIES OF DAILY LIVING (ADL)
GROOMING: INDEPENDENT
LAUNDRY: WITH SUPERVISION
DRESS: SCRUBS (BEHAVIORAL HEALTH);INDEPENDENT
DRESS: INDEPENDENT
ORAL_HYGIENE: INDEPENDENT
LAUNDRY: WITH SUPERVISION
ORAL_HYGIENE: INDEPENDENT
GROOMING: SHOWER;INDEPENDENT

## 2017-10-13 NOTE — PROGRESS NOTES
[facility], [place]   Psychiatric Progress Note        Interim History:     The patient's care was discussed with the treatment team during the daily team meeting and/or staff's chart notes were reviewed.  Staff report patient has been, overall, calmer, though, still has exhibited emotional lability. She admits to being tired from being hospitalized here for too long. Hopes to be discharged soon. She seemed to be more receptive when I told her today that her discharged was dependent on finding a bed at the nursing home.     Discussed medications and care plan.        Medications:       gabapentin  100 mg Oral TID     ferrous gluconate  324 mg Oral Daily with breakfast     lactulose  30 g Oral TID     haloperidol  2.5 mg Oral BID     risperiDONE  1 mg Oral QAM     risperiDONE  2 mg Oral At Bedtime     pantoprazole  40 mg Oral Daily     multivitamin, therapeutic with minerals  1 tablet Oral Daily     folic acid  1 mg Oral Daily     rifaximin  550 mg Oral BID     spironolactone  50 mg Oral Daily     furosemide  20 mg Oral Daily          Allergies:     Allergies   Allergen Reactions     Acetaminophen      Ambien [Zolpidem Tartrate] Other (See Comments)     Sleep walks and eats     Ciprofloxacin Other (See Comments)     Seizure.     Citalopram Nausea and Vomiting          Labs:     No results found for this or any previous visit (from the past 24 hour(s)).       Psychiatric Examination:     Vitals:    10/09/17 1600 10/10/17 1624 10/11/17 0853 10/13/17 1031   BP: 112/68 99/56 119/69 113/70   BP Location:   Right arm    Pulse: 70 72 86 69   Resp: 16 16 16    Temp: 97.9  F (36.6  C) 98.5  F (36.9  C) 95.7  F (35.4  C) 98  F (36.7  C)   TempSrc: Oral Oral Oral Oral   SpO2:       Weight:                   Lying Orthostatic BP: 103/51         Sitting Orthostatic BP: 112/68         Standing Orthostatic BP:  (refused per to pain)       Appearance: awake, alert, adequately groomed, appeared older  than stated age and no apparent distress  Attitude:  cooperative and guarded  Eye Contact:  good  Mood:  anxious and sad  Affect:  intensity is blunted  Speech:  clear, coherent and slow  Psychomotor Behavior:  no evidence of tardive dyskinesia, dystonia, or tics  Throught Process:  goal oriented  Associations:  no loose associations  Thought Content:  no evidence of suicidal ideation or homicidal ideation, possibly, auditory hallucinations present, Visual hallucinations are present off and on, delusions are present.   Insight:  limited  Judgement:  limited  Oriented to:  time, person, and place  Attention Span and Concentration:  fair  Recent and Remote Memory:  fair         Precautions:     Behavioral Orders   Procedures     Code 2     Elopement precautions     Routine Programming     As clinically indicated     Seizure precautions     Status 15     Every 15 minutes.     Withdrawal precautions          Diagnoses:     Cognitive Disorder NOS.   Encephalopathy probably multifactorial such as chronic hepatic encephalopathy, organic brain damage due to encephalopathy, alcohol drinking, possibly underlying psychotic illness; possibly head injury contributes to patient's symptoms.   Alcohol use disorder.   amphetamine dependence vs abuse.          Plan:     No medication changes today.    Legal Status and Disposition:  --  Under full MICD commitment.   -- on waiting list for [facility]. Referred to several NH and memory care unit, was rejected by number of them. Per [facility], [facility] is still reviewing her. History of elopement.

## 2017-10-13 NOTE — PLAN OF CARE
Problem: Psychotic Symptoms  Goal: Psychotic Symptoms  Signs and symptoms of listed problems will be absent or manageable.   Outcome: Improving  48 Hour Nursing Assessment:  Day 65 of hospitalization.  Mildred denies SI/SIB.  She has not attended groups today but did go to some yesterday.  She is fully oriented today.   She showered on her own today.  She eats all meals.  So far she has taken her lactulose today but missed some doses yesterday.

## 2017-10-14 PROCEDURE — 12400007 ZZH R&B MH INTERMEDIATE UMMC

## 2017-10-14 PROCEDURE — 25000132 ZZH RX MED GY IP 250 OP 250 PS 637: Performed by: PSYCHIATRY & NEUROLOGY

## 2017-10-14 PROCEDURE — 25000132 ZZH RX MED GY IP 250 OP 250 PS 637: Performed by: HOSPITALIST

## 2017-10-14 PROCEDURE — 25000132 ZZH RX MED GY IP 250 OP 250 PS 637: Performed by: NURSE PRACTITIONER

## 2017-10-14 PROCEDURE — 25000132 ZZH RX MED GY IP 250 OP 250 PS 637: Performed by: EMERGENCY MEDICINE

## 2017-10-14 RX ADMIN — FOLIC ACID 1 MG: 1 TABLET ORAL at 10:06

## 2017-10-14 RX ADMIN — GABAPENTIN 100 MG: 100 CAPSULE ORAL at 20:21

## 2017-10-14 RX ADMIN — HALOPERIDOL 2.5 MG: 1 TABLET ORAL at 10:07

## 2017-10-14 RX ADMIN — MULTIPLE VITAMINS W/ MINERALS TAB 1 TABLET: TAB at 10:06

## 2017-10-14 RX ADMIN — RIFAXIMIN 550 MG: 550 TABLET ORAL at 10:06

## 2017-10-14 RX ADMIN — PANTOPRAZOLE SODIUM 40 MG: 40 TABLET, DELAYED RELEASE ORAL at 10:06

## 2017-10-14 RX ADMIN — FERROUS GLUCONATE 324 MG: 324 TABLET ORAL at 10:07

## 2017-10-14 RX ADMIN — GABAPENTIN 100 MG: 100 CAPSULE ORAL at 10:07

## 2017-10-14 RX ADMIN — LACTULOSE 30 G: 10 POWDER, FOR SOLUTION ORAL at 10:07

## 2017-10-14 RX ADMIN — RISPERIDONE 2 MG: 2 TABLET ORAL at 21:11

## 2017-10-14 RX ADMIN — MULTIPLE VITAMINS W/ MINERALS TAB 1 TABLET: TAB at 10:05

## 2017-10-14 RX ADMIN — Medication 20 MG: at 10:06

## 2017-10-14 RX ADMIN — HALOPERIDOL 2.5 MG: 1 TABLET ORAL at 20:21

## 2017-10-14 RX ADMIN — LACTULOSE 30 G: 10 POWDER, FOR SOLUTION ORAL at 20:21

## 2017-10-14 RX ADMIN — RISPERIDONE 1 MG: 1 TABLET ORAL at 10:06

## 2017-10-14 RX ADMIN — GABAPENTIN 100 MG: 100 CAPSULE ORAL at 14:07

## 2017-10-14 RX ADMIN — RIFAXIMIN 550 MG: 550 TABLET ORAL at 20:21

## 2017-10-14 RX ADMIN — LACTULOSE 30 G: 10 POWDER, FOR SOLUTION ORAL at 14:07

## 2017-10-14 RX ADMIN — SPIRONOLACTONE 50 MG: 50 TABLET ORAL at 10:06

## 2017-10-14 ASSESSMENT — ACTIVITIES OF DAILY LIVING (ADL)
ORAL_HYGIENE: INDEPENDENT
GROOMING: INDEPENDENT
GROOMING: INDEPENDENT;PROMPTS
DRESS: INDEPENDENT
DRESS: SCRUBS (BEHAVIORAL HEALTH);INDEPENDENT
ORAL_HYGIENE: INDEPENDENT
LAUNDRY: WITH SUPERVISION
LAUNDRY: WITH SUPERVISION

## 2017-10-14 NOTE — PROGRESS NOTES
Pt more visible in the milieu than usual, watching movies and solving puzzles. Pt complaints of knee pain. On two occasions, pt was observed loitering near the front entrance door. Expressed why she's still in the hospital. Denied SI/SIB and hallucinations but appears to be responding to internal stimuli at times.        10/14/17 1421   Behavioral Health   Hallucinations appears responding   Thinking distractable   Orientation person: oriented;place: oriented   Memory baseline memory   Insight poor   Judgement impaired   Eye Contact at examiner   Affect blunted, flat;sad   Mood depressed   Physical Appearance/Attire appears stated age   Hygiene neglected grooming - unclean body, hair, teeth   Suicidality other (see comments)  (denies)   Self Injury other (see comment)  (denies )   Elopement Loitering near exit doors;Statements about wanting to leave   Activity withdrawn   Speech clear;coherent   Medication Sensitivity no stated side effects   Overt Agression (WDL) (assisted with a walker )   Psycho Education   Type of Intervention 1:1 intervention   Response participates with cues/redirection   Hours 0.5   Treatment Detail (check in )   Activities of Daily Living   Hygiene/Grooming independent   Oral Hygiene independent   Dress independent   Laundry with supervision   Room Organization independent   Groups   Details isolative   Behavioral Health Interventions   Psychotic Symptoms maintain safety precautions;encourage nutrition and hydration;encourage participation / independence with adls;provide emotional support;establish therapeutic relationship;assist with developing & utilizing healthy coping strategies;build upon strengths   Social and Therapeutic Interventions (Psychotic Symptoms) encourage socialization with peers;encourage participation in therapeutic groups and milieu activities

## 2017-10-14 NOTE — PROGRESS NOTES
"   10/13/17 2200   Behavioral Health   Hallucinations denies / not responding to hallucinations   Thinking distractable   Orientation person: oriented;place: oriented   Memory baseline memory   Insight poor   Judgement impaired   Eye Contact at examiner   Affect blunted, flat   Mood mood is calm   Physical Appearance/Attire neat   Hygiene neglected grooming - unclean body, hair, teeth   Suicidality other (see comments)  (denies )   Self Injury other (see comment)  (denies )   Activity other (see comment)  (socialized with others )   Activities of Daily Living   Hygiene/Grooming independent   Oral Hygiene independent   Dress independent   Laundry with supervision   Room Organization independent       Pt denied SI and SIB.  Pt reported feeling sad (5) and anxious (10) \" because I'm not at home and I'm anxious because I'm not at home.\"  At the beginning of the shift pt stood in front of the exit door crying stated that she wants to go home.  Towards the middle of the shift pt was calm, visible in the milieu watching tv and socialized with others.  Pt wants visitors.    "

## 2017-10-15 PROCEDURE — 25000132 ZZH RX MED GY IP 250 OP 250 PS 637: Performed by: NURSE PRACTITIONER

## 2017-10-15 PROCEDURE — 12400007 ZZH R&B MH INTERMEDIATE UMMC

## 2017-10-15 PROCEDURE — 25000132 ZZH RX MED GY IP 250 OP 250 PS 637: Performed by: HOSPITALIST

## 2017-10-15 PROCEDURE — 25000132 ZZH RX MED GY IP 250 OP 250 PS 637: Performed by: EMERGENCY MEDICINE

## 2017-10-15 PROCEDURE — 25000132 ZZH RX MED GY IP 250 OP 250 PS 637: Performed by: PSYCHIATRY & NEUROLOGY

## 2017-10-15 RX ADMIN — MULTIPLE VITAMINS W/ MINERALS TAB 1 TABLET: TAB at 09:06

## 2017-10-15 RX ADMIN — RIFAXIMIN 550 MG: 550 TABLET ORAL at 09:06

## 2017-10-15 RX ADMIN — PANTOPRAZOLE SODIUM 40 MG: 40 TABLET, DELAYED RELEASE ORAL at 09:06

## 2017-10-15 RX ADMIN — RIFAXIMIN 550 MG: 550 TABLET ORAL at 20:33

## 2017-10-15 RX ADMIN — FERROUS GLUCONATE 324 MG: 324 TABLET ORAL at 09:06

## 2017-10-15 RX ADMIN — GABAPENTIN 100 MG: 100 CAPSULE ORAL at 20:33

## 2017-10-15 RX ADMIN — Medication 20 MG: at 09:06

## 2017-10-15 RX ADMIN — HALOPERIDOL 2.5 MG: 1 TABLET ORAL at 09:06

## 2017-10-15 RX ADMIN — RISPERIDONE 1 MG: 1 TABLET ORAL at 09:06

## 2017-10-15 RX ADMIN — HALOPERIDOL 2.5 MG: 1 TABLET ORAL at 20:33

## 2017-10-15 RX ADMIN — SPIRONOLACTONE 50 MG: 50 TABLET ORAL at 09:06

## 2017-10-15 RX ADMIN — RISPERIDONE 2 MG: 2 TABLET ORAL at 21:43

## 2017-10-15 RX ADMIN — FOLIC ACID 1 MG: 1 TABLET ORAL at 09:06

## 2017-10-15 RX ADMIN — GABAPENTIN 100 MG: 100 CAPSULE ORAL at 09:06

## 2017-10-15 RX ADMIN — LACTULOSE 30 G: 10 POWDER, FOR SOLUTION ORAL at 09:05

## 2017-10-15 RX ADMIN — LACTULOSE 30 G: 10 POWDER, FOR SOLUTION ORAL at 20:32

## 2017-10-15 ASSESSMENT — ACTIVITIES OF DAILY LIVING (ADL)
HYGIENE/GROOMING: INDEPENDENT
ORAL_HYGIENE: INDEPENDENT
HYGIENE/GROOMING: INDEPENDENT
ORAL_HYGIENE: INDEPENDENT
DRESS: SCRUBS (BEHAVIORAL HEALTH);INDEPENDENT
LAUNDRY: WITH SUPERVISION
DRESS: SCRUBS (BEHAVIORAL HEALTH);INDEPENDENT

## 2017-10-15 NOTE — PLAN OF CARE
"Problem: Psychotic Symptoms  Goal: Psychotic Symptoms  Signs and symptoms of listed problems will be absent or manageable.   Outcome: Improving  48 Hour Assessment    Pt was visible in the milieu for the majority of the shift. She attending Community meeting, ate dinner in the lounge, and requested a movie to watch in the lounge. Pt was withdrawn socially, but brightened upon approach. She was open to checking in with writer, and spoke about traveling to [place] when she was younger to visit her aunt. Pt was able to converse appropriately to situation, and did not make confused or delusional statements.    Writer offered pt [employer] puzzles, and pt was grateful for this.    Pt stated that her knees were still uncomfortable, but said \"I'll be alright.\" She ate dinner without issue.        SI/SIB: Pt denies     Auditory/Visual Hallucinations: Writer did not witness pt making any delusional statements or responding to internal stimuli. Pt denies AH and VH. Pt was oriented x4.    Took all scheduled medications without issue.    No additional concerns at this time. Will continue to monitor and assess.      "

## 2017-10-15 NOTE — PROGRESS NOTES
10/15/17 1445   Behavioral Health   Hallucinations denies / not responding to hallucinations   Thinking intact   Orientation person: oriented;place, disoriented   Memory baseline memory   Insight poor   Judgement impaired   Eye Contact at examiner   Affect full range affect   Mood mood is calm   Physical Appearance/Attire attire appropriate to age and situation   Hygiene well groomed   Suicidality other (see comments)  (Denies)   Self Injury other (see comment)  (Denies)   Elopement (None observed )   Activity isolative   Speech clear;coherent   Medication Sensitivity no observed side effects   Psychomotor / Gait slow;unsteady   Psycho Education   Type of Intervention 1:1 intervention   Response participates with encouragement   Hours 0.5   Activities of Daily Living   Hygiene/Grooming independent   Oral Hygiene independent   Dress scrubs (behavioral health);independent   Laundry with supervision   Room Organization prompts   Behavioral Health Interventions   Psychotic Symptoms monitor need to revise level of observation;maintain safe secure environment;encourage participation / independence with adls;provide emotional support;establish therapeutic relationship;build upon strengths   Social and Therapeutic Interventions (Psychotic Symptoms) encourage participation in therapeutic groups and milieu activities;encourage socialization with peers     Pt was sleeping in her room for the majority of the shift. Pt expressed feeling tired and stated that she did not sleep well last night. Pt came out for meals. Pt did not loiter near the exit door like she normally does and was calm and cooperative for all of the shift. Pt denies any thoughts of SI and SIB and states that she is not feeling depressed.

## 2017-10-16 PROCEDURE — 99232 SBSQ HOSP IP/OBS MODERATE 35: CPT | Performed by: PSYCHIATRY & NEUROLOGY

## 2017-10-16 PROCEDURE — 25000132 ZZH RX MED GY IP 250 OP 250 PS 637: Performed by: HOSPITALIST

## 2017-10-16 PROCEDURE — 25000132 ZZH RX MED GY IP 250 OP 250 PS 637: Performed by: PSYCHIATRY & NEUROLOGY

## 2017-10-16 PROCEDURE — 25000132 ZZH RX MED GY IP 250 OP 250 PS 637: Performed by: NURSE PRACTITIONER

## 2017-10-16 PROCEDURE — 25000132 ZZH RX MED GY IP 250 OP 250 PS 637: Performed by: EMERGENCY MEDICINE

## 2017-10-16 PROCEDURE — 12400007 ZZH R&B MH INTERMEDIATE UMMC

## 2017-10-16 RX ADMIN — Medication 20 MG: at 09:52

## 2017-10-16 RX ADMIN — RISPERIDONE 2 MG: 2 TABLET ORAL at 21:26

## 2017-10-16 RX ADMIN — RISPERIDONE 1 MG: 1 TABLET ORAL at 09:51

## 2017-10-16 RX ADMIN — RIFAXIMIN 550 MG: 550 TABLET ORAL at 20:09

## 2017-10-16 RX ADMIN — HALOPERIDOL 2.5 MG: 1 TABLET ORAL at 20:09

## 2017-10-16 RX ADMIN — SPIRONOLACTONE 50 MG: 50 TABLET ORAL at 09:52

## 2017-10-16 RX ADMIN — HALOPERIDOL 2.5 MG: 1 TABLET ORAL at 09:51

## 2017-10-16 RX ADMIN — PANTOPRAZOLE SODIUM 40 MG: 40 TABLET, DELAYED RELEASE ORAL at 09:51

## 2017-10-16 RX ADMIN — GABAPENTIN 100 MG: 100 CAPSULE ORAL at 09:52

## 2017-10-16 RX ADMIN — FERROUS GLUCONATE 324 MG: 324 TABLET ORAL at 09:52

## 2017-10-16 RX ADMIN — LACTULOSE 30 G: 10 POWDER, FOR SOLUTION ORAL at 20:09

## 2017-10-16 RX ADMIN — GABAPENTIN 100 MG: 100 CAPSULE ORAL at 14:30

## 2017-10-16 RX ADMIN — FOLIC ACID 1 MG: 1 TABLET ORAL at 09:51

## 2017-10-16 RX ADMIN — RIFAXIMIN 550 MG: 550 TABLET ORAL at 09:51

## 2017-10-16 RX ADMIN — MULTIPLE VITAMINS W/ MINERALS TAB 1 TABLET: TAB at 09:51

## 2017-10-16 RX ADMIN — GABAPENTIN 100 MG: 100 CAPSULE ORAL at 20:09

## 2017-10-16 ASSESSMENT — ACTIVITIES OF DAILY LIVING (ADL)
DRESS: INDEPENDENT;SCRUBS (BEHAVIORAL HEALTH)
GROOMING: PROMPTS
ORAL_HYGIENE: PROMPTS
ORAL_HYGIENE: INDEPENDENT
DRESS: INDEPENDENT
LAUNDRY: WITH SUPERVISION
HYGIENE/GROOMING: PROMPTS

## 2017-10-16 NOTE — PROGRESS NOTES
10/16/17 1350   Behavioral Health   Hallucinations denies / not responding to hallucinations   Thinking confused;poor concentration   Orientation situation, disoriented   Memory baseline memory   Insight poor   Judgement impaired   Eye Contact at examiner   Affect blunted, flat   Mood labile   Physical Appearance/Attire untidy   Hygiene neglected grooming - unclean body, hair, teeth   Suicidality (denies)   Self Injury (denies)   Elopement Statements about wanting to leave   Activity isolative;withdrawn   Speech clear;coherent   Medication Sensitivity no stated side effects   Psychomotor / Gait (using walker)   Psycho Education   Type of Intervention 1:1 intervention   Response participates, initiates socially appropriate   Hours 0.5   Activities of Daily Living   Hygiene/Grooming prompts   Oral Hygiene independent   Dress independent   Laundry with supervision   Room Organization prompts   pt isolative in room. Pt labile on approach. Pt walking out to meals with good appetite.pt denies any mental health issues.staff did not see any actions with responding to internal hallucinations. Pt did not shower. Pt continues to make statements about wanting to leave and watches doors at times.

## 2017-10-16 NOTE — PROGRESS NOTES
"Writer heard back from [facility]. They can not accept pt into their program citing inability to \"meet her needs.\"    Writer did fax information to [facility] ([phone]) for admission consideration. Writer called [place] in [place], however after verbally describing the situation the staff person decided pt would not be appropriate and they could not 'meet her needs.\"    [facility] called back after receiving progress notes stating they are unable to safely meet her needs.  "

## 2017-10-16 NOTE — PROGRESS NOTES
Pt was isolative and withdrawn this shift. Only came out during snack and dinner time. Otherwise, she was appropriate in her interactions with staff and peers. Retired to bed soon after dinner after trying to watch a movie. Pt denying AH/VH/SI/SIB. No observed loitering near exit doors or responding to AH/VH. Calm and pleasant.       10/15/17 2137   Behavioral Health   Hallucinations denies / not responding to hallucinations;other (see comment)  (none observed this shift)   Thinking distractable   Orientation place: oriented;person: oriented;date: oriented;time: oriented   Memory baseline memory   Insight poor   Judgement impaired   Eye Contact at examiner   Affect blunted, flat   Mood mood is calm   Physical Appearance/Attire appears stated age;attire appropriate to age and situation;neat   Hygiene other (see comment)  (adequate)   Suicidality other (see comments)  (denies)   Self Injury other (see comment)  (denies)   Elopement (none)   Activity withdrawn;isolative   Speech coherent;clear   Medication Sensitivity no observed side effects;no stated side effects   Psychomotor / Gait steady;slow   Activities of Daily Living   Hygiene/Grooming independent   Oral Hygiene independent   Dress scrubs (behavioral health);independent   Room Organization prompts

## 2017-10-16 NOTE — PROGRESS NOTES
"    [facility], [place]   Psychiatric Progress Note        Interim History:     The patient's care was discussed with the treatment team during the daily team meeting and/or staff's chart notes were reviewed.  Staff report patient has spent more time inside of her room. She comes out of room only to eat and take her meds. At times refuses Lactulose. She appears to be despondent and very irritable. She had minimal interaction with me today, said that was tired and angry about us keeping her here. I reminded her that her discharge was delayed by placement difficulties. She was not receptive to my explanation.   Per CTC: \"Writer heard back from [facility]. They can not accept pt into their program citing inability to \"meet her needs.\"     Writer did fax information to [facility] ([phone]) for admission consideration. Writer called [place] in [place], however after verbally describing the situation the staff person decided pt would not be appropriate and they could not 'meet her needs.\"    Discussed medications and care plan.        Medications:       gabapentin  100 mg Oral TID     ferrous gluconate  324 mg Oral Daily with breakfast     lactulose  30 g Oral TID     haloperidol  2.5 mg Oral BID     risperiDONE  1 mg Oral QAM     risperiDONE  2 mg Oral At Bedtime     pantoprazole  40 mg Oral Daily     multivitamin, therapeutic with minerals  1 tablet Oral Daily     folic acid  1 mg Oral Daily     rifaximin  550 mg Oral BID     spironolactone  50 mg Oral Daily     furosemide  20 mg Oral Daily          Allergies:     Allergies   Allergen Reactions     Acetaminophen      Ambien [Zolpidem Tartrate] Other (See Comments)     Sleep walks and eats     Ciprofloxacin Other (See Comments)     Seizure.     Citalopram Nausea and Vomiting          Labs:     No results found for this or any previous visit (from the past 24 hour(s)).       Psychiatric Examination:     Vitals:    10/13/17 1031 " 10/14/17 0900 10/15/17 0846 10/16/17 0823   BP: 113/70 100/74     BP Location:       Pulse: 69 87     Resp:  16 16 16   Temp: 98  F (36.7  C) 98.1  F (36.7  C) 97.9  F (36.6  C) 97.2  F (36.2  C)   TempSrc: Oral Oral Oral Oral   SpO2:       Weight:                   Lying Orthostatic BP: 103/51         Sitting Orthostatic BP: 103/59         Standing Orthostatic BP:  (refused per to pain)       Appearance: awake, alert, adequately groomed, appeared older than stated age and no apparent distress  Attitude: somewhat cooperative and guarded  Eye Contact:  good  Mood:  anxious and sad  Affect:  intensity is blunted  Speech:  clear, coherent and slow  Psychomotor Behavior:  no evidence of tardive dyskinesia, dystonia, or tics  Throught Process:  goal oriented  Associations:  no loose associations  Thought Content:  no evidence of suicidal ideation or homicidal ideation, possibly, auditory hallucinations present, Visual hallucinations are present off and on, delusions are present.   Insight:  limited  Judgement:  limited  Oriented to:  time, person, and place  Attention Span and Concentration:  fair  Recent and Remote Memory:  fair         Precautions:     Behavioral Orders   Procedures     Code 2     Elopement precautions     Routine Programming     As clinically indicated     Seizure precautions     Status 15     Every 15 minutes.     Withdrawal precautions          Diagnoses:     Cognitive Disorder NOS.   Encephalopathy probably multifactorial such as chronic hepatic encephalopathy, organic brain damage due to encephalopathy, alcohol drinking, possibly underlying psychotic illness; possibly head injury contributes to patient's symptoms.   Alcohol use disorder.   amphetamine dependence vs abuse.          Plan:     No medication changes today.    Legal Status and Disposition:  --  Under full MICD commitment.   -- on waiting list for [facility]. Referred to several NH and memory care unit, was rejected by number of them. Per  [facility] NO and [facility] in [place] refused to accept patient. Will continue to provide support and structure.

## 2017-10-16 NOTE — PROGRESS NOTES
Pt took all of her scheduled medications without issue. She was oriented x4, and writer did not observe any paranoid or delusional behaviors.    No additional concerns at this time. Will continue to monitor and assess.

## 2017-10-17 PROCEDURE — 25000132 ZZH RX MED GY IP 250 OP 250 PS 637: Performed by: PSYCHIATRY & NEUROLOGY

## 2017-10-17 PROCEDURE — 25000132 ZZH RX MED GY IP 250 OP 250 PS 637: Performed by: EMERGENCY MEDICINE

## 2017-10-17 PROCEDURE — 25000132 ZZH RX MED GY IP 250 OP 250 PS 637: Performed by: NURSE PRACTITIONER

## 2017-10-17 PROCEDURE — 12400007 ZZH R&B MH INTERMEDIATE UMMC

## 2017-10-17 PROCEDURE — 25000132 ZZH RX MED GY IP 250 OP 250 PS 637: Performed by: HOSPITALIST

## 2017-10-17 RX ADMIN — GABAPENTIN 100 MG: 100 CAPSULE ORAL at 18:11

## 2017-10-17 RX ADMIN — HALOPERIDOL 2.5 MG: 1 TABLET ORAL at 18:12

## 2017-10-17 RX ADMIN — SPIRONOLACTONE 50 MG: 50 TABLET ORAL at 09:03

## 2017-10-17 RX ADMIN — PANTOPRAZOLE SODIUM 40 MG: 40 TABLET, DELAYED RELEASE ORAL at 09:03

## 2017-10-17 RX ADMIN — RISPERIDONE 1 MG: 1 TABLET ORAL at 09:03

## 2017-10-17 RX ADMIN — RIFAXIMIN 550 MG: 550 TABLET ORAL at 18:11

## 2017-10-17 RX ADMIN — FOLIC ACID 1 MG: 1 TABLET ORAL at 09:03

## 2017-10-17 RX ADMIN — FERROUS GLUCONATE 324 MG: 324 TABLET ORAL at 09:03

## 2017-10-17 RX ADMIN — GABAPENTIN 100 MG: 100 CAPSULE ORAL at 09:03

## 2017-10-17 RX ADMIN — LACTULOSE 30 G: 10 POWDER, FOR SOLUTION ORAL at 14:07

## 2017-10-17 RX ADMIN — LACTULOSE 30 G: 10 POWDER, FOR SOLUTION ORAL at 18:10

## 2017-10-17 RX ADMIN — LACTULOSE 30 G: 10 POWDER, FOR SOLUTION ORAL at 09:04

## 2017-10-17 RX ADMIN — RISPERIDONE 2 MG: 2 TABLET ORAL at 18:11

## 2017-10-17 RX ADMIN — MULTIPLE VITAMINS W/ MINERALS TAB 1 TABLET: TAB at 09:03

## 2017-10-17 RX ADMIN — Medication 20 MG: at 09:04

## 2017-10-17 RX ADMIN — RIFAXIMIN 550 MG: 550 TABLET ORAL at 09:03

## 2017-10-17 RX ADMIN — GABAPENTIN 100 MG: 100 CAPSULE ORAL at 14:07

## 2017-10-17 RX ADMIN — HALOPERIDOL 2.5 MG: 1 TABLET ORAL at 09:03

## 2017-10-17 ASSESSMENT — ACTIVITIES OF DAILY LIVING (ADL)
ORAL_HYGIENE: INDEPENDENT
GROOMING: INDEPENDENT
DRESS: SCRUBS (BEHAVIORAL HEALTH);INDEPENDENT

## 2017-10-17 NOTE — PROGRESS NOTES
Pt calm and pleasant this shift. Still staying in room for most of shift, only coming out for snacks and food. Still wanting to leave and not aware of situation or d/c plan at all. When asked if pt sees or hears her  or other people in her room anymore, she says no. But a bit sad since she says she likes talking to  in here.  Denies AH/VH and says that she has slept great lately too. Says knee still hurts and can't wait to get out of here to do something about it. Otherwise, mood lifts during check-ins but otherwise, mostly flat and blunted this shift while being isolative and withdrawn.        10/16/17 2203   Behavioral Health   Hallucinations denies / not responding to hallucinations   Thinking distractable;poor concentration   Orientation person: oriented;place: oriented   Memory baseline memory   Insight poor   Judgement impaired   Eye Contact at examiner   Affect full range affect   Mood mood is calm   Physical Appearance/Attire untidy   Hygiene neglected grooming - unclean body, hair, teeth;body odor   Suicidality other (see comments)  (none stated/observed)   Self Injury other (see comment)  (none stated/observed)   Elopement Statements about wanting to leave   Activity withdrawn;isolative   Speech clear;coherent   Medication Sensitivity no stated side effects;no observed side effects   Psychomotor / Gait slow   Activities of Daily Living   Hygiene/Grooming prompts   Oral Hygiene prompts   Dress independent;scrubs (behavioral health)   Room Organization prompts

## 2017-10-18 PROCEDURE — 25000132 ZZH RX MED GY IP 250 OP 250 PS 637: Performed by: NURSE PRACTITIONER

## 2017-10-18 PROCEDURE — 25000132 ZZH RX MED GY IP 250 OP 250 PS 637: Performed by: EMERGENCY MEDICINE

## 2017-10-18 PROCEDURE — 12400007 ZZH R&B MH INTERMEDIATE UMMC

## 2017-10-18 PROCEDURE — 99232 SBSQ HOSP IP/OBS MODERATE 35: CPT | Performed by: PSYCHIATRY & NEUROLOGY

## 2017-10-18 PROCEDURE — 25000132 ZZH RX MED GY IP 250 OP 250 PS 637: Performed by: PSYCHIATRY & NEUROLOGY

## 2017-10-18 PROCEDURE — 25000132 ZZH RX MED GY IP 250 OP 250 PS 637: Performed by: HOSPITALIST

## 2017-10-18 RX ADMIN — RISPERIDONE 1 MG: 1 TABLET ORAL at 08:55

## 2017-10-18 RX ADMIN — LACTULOSE 30 G: 10 POWDER, FOR SOLUTION ORAL at 08:55

## 2017-10-18 RX ADMIN — HALOPERIDOL 2.5 MG: 1 TABLET ORAL at 08:55

## 2017-10-18 RX ADMIN — SPIRONOLACTONE 50 MG: 50 TABLET ORAL at 08:55

## 2017-10-18 RX ADMIN — GABAPENTIN 100 MG: 100 CAPSULE ORAL at 14:07

## 2017-10-18 RX ADMIN — PANTOPRAZOLE SODIUM 40 MG: 40 TABLET, DELAYED RELEASE ORAL at 08:56

## 2017-10-18 RX ADMIN — LACTULOSE 30 G: 10 POWDER, FOR SOLUTION ORAL at 14:07

## 2017-10-18 RX ADMIN — RIFAXIMIN 550 MG: 550 TABLET ORAL at 22:28

## 2017-10-18 RX ADMIN — HALOPERIDOL 2.5 MG: 1 TABLET ORAL at 22:27

## 2017-10-18 RX ADMIN — RISPERIDONE 2 MG: 2 TABLET ORAL at 22:27

## 2017-10-18 RX ADMIN — LACTULOSE 30 G: 10 POWDER, FOR SOLUTION ORAL at 22:27

## 2017-10-18 RX ADMIN — GABAPENTIN 100 MG: 100 CAPSULE ORAL at 08:56

## 2017-10-18 RX ADMIN — RIFAXIMIN 550 MG: 550 TABLET ORAL at 08:55

## 2017-10-18 RX ADMIN — MULTIPLE VITAMINS W/ MINERALS TAB 1 TABLET: TAB at 08:56

## 2017-10-18 RX ADMIN — FOLIC ACID 1 MG: 1 TABLET ORAL at 08:55

## 2017-10-18 RX ADMIN — GABAPENTIN 100 MG: 100 CAPSULE ORAL at 22:27

## 2017-10-18 RX ADMIN — Medication 20 MG: at 08:55

## 2017-10-18 RX ADMIN — FERROUS GLUCONATE 324 MG: 324 TABLET ORAL at 08:55

## 2017-10-18 ASSESSMENT — ACTIVITIES OF DAILY LIVING (ADL)
LAUNDRY: WITH SUPERVISION
HYGIENE/GROOMING: INDEPENDENT
ORAL_HYGIENE: INDEPENDENT
DRESS: INDEPENDENT
GROOMING: INDEPENDENT
ORAL_HYGIENE: INDEPENDENT
DRESS: INDEPENDENT

## 2017-10-18 NOTE — PROGRESS NOTES
Writer spoke with Raya Alexis bed manager at [facility]. Writer explained the difficulty in placing pt in the community as no facility will accept her. Raya emailed some possible placement options that writer will pursue.    Writer also spoke with SEBAS Silva regarding a possible CADI waiver for an adult group home. Writer asked Shira to see if CADI people have any idea where pt might go if CADI is approved.

## 2017-10-18 NOTE — PROGRESS NOTES
"   10/18/17 1214   Behavioral Health   Hallucinations appears responding   Thinking confused;poor concentration   Orientation situation, disoriented   Memory baseline memory   Insight poor   Judgement impaired   Eye Contact at examiner   Affect full range affect   Mood labile   Physical Appearance/Attire attire appropriate to age and situation   Hygiene (showered)   Suicidality (denies)   Self Injury (denies)   Elopement Statements about wanting to leave   Activity isolative;withdrawn   Speech coherent   Medication Sensitivity no stated side effects   Psychomotor / Gait balanced   Psycho Education   Type of Intervention 1:1 intervention   Response participates, initiates socially appropriate   Hours 0.5   Activities of Daily Living   Hygiene/Grooming independent   Oral Hygiene independent   Dress independent   Laundry with supervision   Room Organization independent   pt isolative in room most of shift. Pt did come out for meals. Pt showered. Pt has labile affect. Pt observed responding to hallucinations in her room as pt would yell things like \" get off my leg\", \"stop doing that \", \"get out of here\", when no one was in her room.   "

## 2017-10-18 NOTE — PROGRESS NOTES
"    [facility], [place]   Psychiatric Progress Note        Interim History:     The patient's care was discussed with the treatment team during the daily team meeting and/or staff's chart notes were reviewed.  Staff report patient has spent more time inside of her room. She comes out of room only to eat and take her meds. At times refuses Lactulose. She appears to be more irritable and sarcastic, focused on discharge and clearly disappointed that she is still hospitalized. Today she was oriented to self, place, time (was 2 days off). Was overheard by staff yelling at her room: \"Get out of here, go off me\". She always denies presence of Auditory hallucinations and Visual hallucinations. She typically compliant with most of her meds, but at times refuses to take Lactulose. Per CTC Mildred was rejected by a number of nursing homes, including [facility].    Per CTC: \"Writer spoke with Raya Alexis bed manager at [facility]. Writer explained the difficulty in placing pt in the community as no facility will accept her. Raya emailed some possible placement options that writer will pursue.     Writer also spoke with SEBAS Silva regarding a possible CADI waiver for an adult group home. Writer asked Shira to see if CADI people have any idea where pt might go if CADI is approved.    Discussed medications and care plan.        Medications:       gabapentin  100 mg Oral TID     ferrous gluconate  324 mg Oral Daily with breakfast     lactulose  30 g Oral TID     haloperidol  2.5 mg Oral BID     risperiDONE  1 mg Oral QAM     risperiDONE  2 mg Oral At Bedtime     pantoprazole  40 mg Oral Daily     multivitamin, therapeutic with minerals  1 tablet Oral Daily     folic acid  1 mg Oral Daily     rifaximin  550 mg Oral BID     spironolactone  50 mg Oral Daily     furosemide  20 mg Oral Daily          Allergies:     Allergies   Allergen Reactions     Acetaminophen      Ambien [Zolpidem Tartrate] Other (See " Comments)     Sleep walks and eats     Ciprofloxacin Other (See Comments)     Seizure.     Citalopram Nausea and Vomiting          Labs:     No results found for this or any previous visit (from the past 24 hour(s)).       Psychiatric Examination:     Vitals:    10/15/17 0846 10/16/17 0823 10/17/17 0855 10/18/17 1100   BP:   102/63    BP Location:       Pulse:   75    Resp: 16 16 16    Temp: 97.9  F (36.6  C) 97.2  F (36.2  C) 97.4  F (36.3  C)    TempSrc: Oral Oral Oral    SpO2:       Weight:    95.3 kg (210 lb 1.6 oz)                     Lying Orthostatic BP: 103/51         Sitting Orthostatic BP: 102/63         Standing Orthostatic BP:  (refused)       Appearance: awake, alert, adequately groomed, appeared older than stated age and no apparent distress  Attitude: somewhat cooperative and guarded  Eye Contact:  good  Mood:  sad  Affect:  intensity is blunted  Speech:  clear, coherent and slow  Psychomotor Behavior:  no evidence of tardive dyskinesia, dystonia, or tics  Throught Process:  goal oriented  Associations:  no loose associations  Thought Content:  no evidence of suicidal ideation or homicidal ideation, possibly, auditory hallucinations present, Visual hallucinations are present off and on, delusions are present.   Insight:  limited  Judgement:  limited  Oriented to:  time, person, and place  Attention Span and Concentration:  fair  Recent and Remote Memory:  fair         Precautions:     Behavioral Orders   Procedures     Code 2     Elopement precautions     Routine Programming     As clinically indicated     Seizure precautions     Status 15     Every 15 minutes.     Withdrawal precautions          Diagnoses:     Cognitive Disorder NOS.   Encephalopathy probably multifactorial such as chronic hepatic encephalopathy, organic brain damage due to encephalopathy, alcohol drinking, possibly underlying psychotic illness; possibly head injury contributes to patient's symptoms.   Alcohol use disorder.    amphetamine dependence vs abuse.          Plan:     No medication changes today.    Legal Status and Disposition:  --  Under full MICD commitment.   -- the patient appears to be at her current baseline, she is on waiting list for [facility]. Referred to several NH and memory care unit, was rejected by number of them. Per CTC, [facility] and [facility] in [place] refused to accept patient. See discussion above. Will continue to provide support and structure.

## 2017-10-18 NOTE — PLAN OF CARE
Problem: Psychotic Symptoms  Goal: Psychotic Symptoms  Signs and symptoms of listed problems will be absent or manageable.   Outcome: Therapy, progress toward functional goals as expected  48 Hour Assessment     Spent most of shift in bed. Did get up for supper and ate well. Continues to use walker for ambulation. Affect blunted. Mood is calm. Orientated times 3. Denies SI/SIB/AH/HI. Medication compliant.

## 2017-10-19 PROCEDURE — 25000132 ZZH RX MED GY IP 250 OP 250 PS 637: Performed by: EMERGENCY MEDICINE

## 2017-10-19 PROCEDURE — 25000132 ZZH RX MED GY IP 250 OP 250 PS 637: Performed by: HOSPITALIST

## 2017-10-19 PROCEDURE — 25000132 ZZH RX MED GY IP 250 OP 250 PS 637: Performed by: NURSE PRACTITIONER

## 2017-10-19 PROCEDURE — 12400007 ZZH R&B MH INTERMEDIATE UMMC

## 2017-10-19 PROCEDURE — 25000132 ZZH RX MED GY IP 250 OP 250 PS 637: Performed by: PSYCHIATRY & NEUROLOGY

## 2017-10-19 PROCEDURE — 99232 SBSQ HOSP IP/OBS MODERATE 35: CPT | Performed by: PSYCHIATRY & NEUROLOGY

## 2017-10-19 RX ADMIN — RIFAXIMIN 550 MG: 550 TABLET ORAL at 20:03

## 2017-10-19 RX ADMIN — PANTOPRAZOLE SODIUM 40 MG: 40 TABLET, DELAYED RELEASE ORAL at 08:36

## 2017-10-19 RX ADMIN — MULTIPLE VITAMINS W/ MINERALS TAB 1 TABLET: TAB at 08:36

## 2017-10-19 RX ADMIN — FERROUS GLUCONATE 324 MG: 324 TABLET ORAL at 08:36

## 2017-10-19 RX ADMIN — GABAPENTIN 100 MG: 100 CAPSULE ORAL at 08:36

## 2017-10-19 RX ADMIN — Medication 20 MG: at 08:36

## 2017-10-19 RX ADMIN — SPIRONOLACTONE 50 MG: 50 TABLET ORAL at 08:36

## 2017-10-19 RX ADMIN — RISPERIDONE 1 MG: 1 TABLET ORAL at 08:36

## 2017-10-19 RX ADMIN — LACTULOSE 30 G: 10 POWDER, FOR SOLUTION ORAL at 08:36

## 2017-10-19 RX ADMIN — GABAPENTIN 100 MG: 100 CAPSULE ORAL at 14:02

## 2017-10-19 RX ADMIN — RIFAXIMIN 550 MG: 550 TABLET ORAL at 08:36

## 2017-10-19 RX ADMIN — HALOPERIDOL 2.5 MG: 1 TABLET ORAL at 20:04

## 2017-10-19 RX ADMIN — LACTULOSE 30 G: 10 POWDER, FOR SOLUTION ORAL at 14:02

## 2017-10-19 RX ADMIN — RISPERIDONE 2 MG: 2 TABLET ORAL at 21:05

## 2017-10-19 RX ADMIN — HALOPERIDOL 2.5 MG: 1 TABLET ORAL at 08:35

## 2017-10-19 RX ADMIN — LACTULOSE 30 G: 10 POWDER, FOR SOLUTION ORAL at 20:03

## 2017-10-19 RX ADMIN — FOLIC ACID 1 MG: 1 TABLET ORAL at 08:36

## 2017-10-19 RX ADMIN — GABAPENTIN 100 MG: 100 CAPSULE ORAL at 20:04

## 2017-10-19 ASSESSMENT — ACTIVITIES OF DAILY LIVING (ADL)
HYGIENE/GROOMING: INDEPENDENT
GROOMING: INDEPENDENT
DRESS: INDEPENDENT
LAUNDRY: WITH SUPERVISION
ORAL_HYGIENE: INDEPENDENT
DRESS: INDEPENDENT;SCRUBS (BEHAVIORAL HEALTH)
ORAL_HYGIENE: INDEPENDENT

## 2017-10-19 NOTE — PROGRESS NOTES
[facility], [place]   Psychiatric Progress Note        Interim History:     The patient's care was discussed with the treatment team during the daily team meeting and/or staff's chart notes were reviewed.  Staff report patient has spent more time inside of her room. She appeared to be despondent and sad. Was heard by staff talking to herself, later on she approached nursing station and requested to be discharged. Was told that it could not happen and responded quietly to redirection. Ambulates with help of walker.   She didn't have any questions during today's interview. Was reported to eat well.       Medications:       gabapentin  100 mg Oral TID     ferrous gluconate  324 mg Oral Daily with breakfast     lactulose  30 g Oral TID     haloperidol  2.5 mg Oral BID     risperiDONE  1 mg Oral QAM     risperiDONE  2 mg Oral At Bedtime     pantoprazole  40 mg Oral Daily     multivitamin, therapeutic with minerals  1 tablet Oral Daily     folic acid  1 mg Oral Daily     rifaximin  550 mg Oral BID     spironolactone  50 mg Oral Daily     furosemide  20 mg Oral Daily          Allergies:     Allergies   Allergen Reactions     Acetaminophen      Ambien [Zolpidem Tartrate] Other (See Comments)     Sleep walks and eats     Ciprofloxacin Other (See Comments)     Seizure.     Citalopram Nausea and Vomiting          Labs:     No results found for this or any previous visit (from the past 24 hour(s)).       Psychiatric Examination:     Vitals:    10/16/17 0823 10/17/17 0855 10/18/17 1100 10/19/17 0852   BP:  102/63     BP Location:       Pulse:  75     Resp: 16 16 16   Temp: 97.2  F (36.2  C) 97.4  F (36.3  C)  97.5  F (36.4  C)   TempSrc: Oral Oral  Oral   SpO2:       Weight:   95.3 kg (210 lb 1.6 oz) 99.8 kg (220 lb)                 Lying Orthostatic BP: 103/51         Sitting Orthostatic BP: 119/65         Standing Orthostatic BP:  (Ref)       Appearance: awake, alert, adequately groomed,  appeared older than stated age and no apparent distress  Attitude: somewhat cooperative and guarded  Eye Contact:  limited  Mood:  sad  Affect:  intensity is blunted  Speech:  clear, coherent and slow  Psychomotor Behavior:  no evidence of tardive dyskinesia, dystonia, or tics  Throught Process:  goal oriented  Associations:  no loose associations  Thought Content:  no evidence of suicidal ideation or homicidal ideation, possibly, auditory hallucinations present, Visual hallucinations are present off and on, delusions are present.   Insight:  limited  Judgement:  limited  Oriented to:  time, person, and place  Attention Span and Concentration:  fair  Recent and Remote Memory:  fair         Precautions:     Behavioral Orders   Procedures     Code 2     Elopement precautions     Routine Programming     As clinically indicated     Seizure precautions     Status 15     Every 15 minutes.     Withdrawal precautions          Diagnoses:     Cognitive Disorder NOS.   Encephalopathy probably multifactorial such as chronic hepatic encephalopathy, organic brain damage due to encephalopathy, alcohol drinking, possibly underlying psychotic illness; possibly head injury contributes to patient's symptoms.   Alcohol use disorder.   amphetamine dependence vs abuse.          Plan:     No medication changes today.    Legal Status and Disposition:  --  Under full MICD commitment.   -- the patient appears to be at her current baseline, she is on waiting list for [facility]. Referred to several NH and memory care unit, was rejected by number of them. Per CTC, CM promised to call [employer], works on CADI waiver. See discussion above. Will continue to provide support and structure.

## 2017-10-19 NOTE — PROGRESS NOTES
Was isolative and withdrawn this evening. When out was pleasant with staff and other patients. Seemed situationally oriented this evening more so then previous days. Slept and napped most of the evening.

## 2017-10-19 NOTE — PROGRESS NOTES
10/19/17 1352   Behavioral Health   Hallucinations appears responding   Thinking confused;poor concentration   Orientation person: oriented   Memory baseline memory   Insight poor   Judgement impaired   Eye Contact at examiner   Affect blunted, flat   Mood mood is calm   Physical Appearance/Attire appears stated age   Hygiene neglected grooming - unclean body, hair, teeth   Suicidality (denies)   Self Injury (denies)   Elopement (n/a)   Activity isolative;withdrawn   Speech clear   Medication Sensitivity no stated side effects   Psychomotor / Gait (uses walker)   Psycho Education   Type of Intervention 1:1 intervention   Response participates, initiates socially appropriate   Hours 0.5   Activities of Daily Living   Hygiene/Grooming independent   Oral Hygiene independent   Dress independent   Laundry with supervision   Room Organization independent   pt isolative and withdrawn in room most of shift. Pt only out for meals. Pt flat affect and not social. Pt observed talking to self in her room a couple times and also approached the desk asking about discharge pt responded well and just walked back to her room. Pt denies any mental health issues.

## 2017-10-20 PROCEDURE — 25000132 ZZH RX MED GY IP 250 OP 250 PS 637: Performed by: EMERGENCY MEDICINE

## 2017-10-20 PROCEDURE — 25000132 ZZH RX MED GY IP 250 OP 250 PS 637: Performed by: PSYCHIATRY & NEUROLOGY

## 2017-10-20 PROCEDURE — 25000132 ZZH RX MED GY IP 250 OP 250 PS 637: Performed by: HOSPITALIST

## 2017-10-20 PROCEDURE — 12400007 ZZH R&B MH INTERMEDIATE UMMC

## 2017-10-20 PROCEDURE — 99232 SBSQ HOSP IP/OBS MODERATE 35: CPT | Performed by: PSYCHIATRY & NEUROLOGY

## 2017-10-20 PROCEDURE — 25000132 ZZH RX MED GY IP 250 OP 250 PS 637: Performed by: NURSE PRACTITIONER

## 2017-10-20 RX ADMIN — GABAPENTIN 100 MG: 100 CAPSULE ORAL at 14:17

## 2017-10-20 RX ADMIN — GABAPENTIN 100 MG: 100 CAPSULE ORAL at 21:58

## 2017-10-20 RX ADMIN — GABAPENTIN 100 MG: 100 CAPSULE ORAL at 09:15

## 2017-10-20 RX ADMIN — RIFAXIMIN 550 MG: 550 TABLET ORAL at 21:58

## 2017-10-20 RX ADMIN — LACTULOSE 30 G: 10 POWDER, FOR SOLUTION ORAL at 09:15

## 2017-10-20 RX ADMIN — RISPERIDONE 1 MG: 1 TABLET ORAL at 09:20

## 2017-10-20 RX ADMIN — FERROUS GLUCONATE 324 MG: 324 TABLET ORAL at 09:15

## 2017-10-20 RX ADMIN — PANTOPRAZOLE SODIUM 40 MG: 40 TABLET, DELAYED RELEASE ORAL at 09:20

## 2017-10-20 RX ADMIN — MULTIPLE VITAMINS W/ MINERALS TAB 1 TABLET: TAB at 09:15

## 2017-10-20 RX ADMIN — HALOPERIDOL 2.5 MG: 1 TABLET ORAL at 21:58

## 2017-10-20 RX ADMIN — Medication 20 MG: at 09:15

## 2017-10-20 RX ADMIN — RISPERIDONE 2 MG: 2 TABLET ORAL at 21:59

## 2017-10-20 RX ADMIN — FOLIC ACID 1 MG: 1 TABLET ORAL at 09:15

## 2017-10-20 RX ADMIN — HALOPERIDOL 2.5 MG: 1 TABLET ORAL at 09:15

## 2017-10-20 RX ADMIN — SPIRONOLACTONE 50 MG: 50 TABLET ORAL at 09:20

## 2017-10-20 RX ADMIN — RIFAXIMIN 550 MG: 550 TABLET ORAL at 09:20

## 2017-10-20 ASSESSMENT — ACTIVITIES OF DAILY LIVING (ADL)
DRESS: INDEPENDENT
ORAL_HYGIENE: INDEPENDENT
GROOMING: INDEPENDENT

## 2017-10-20 NOTE — PROGRESS NOTES
Pt spent most of time in room, only coming out for food. Saw and heard pt conversing in room. Pt calm and pleasant upon approach but still isolative and withdrawn. Still no insight into the situation and plans for d/c-ing.        10/19/17 4994   Behavioral Health   Hallucinations auditory;visual;appears responding   Thinking distractable;poor concentration   Orientation place: oriented   Memory baseline memory   Insight poor   Judgement impaired   Eye Contact at examiner   Affect blunted, flat   Mood mood is calm   Physical Appearance/Attire appears stated age   Hygiene neglected grooming - unclean body, hair, teeth   Suicidality other (see comments)  (none stated/observed)   Self Injury other (see comment)  (none stated/observed)   Elopement Loitering near exit doors   Activity withdrawn;isolative   Speech coherent;clear   Medication Sensitivity no stated side effects;no observed side effects   Psychomotor / Gait slow;steady   Activities of Daily Living   Hygiene/Grooming independent   Oral Hygiene independent   Dress independent;scrubs (behavioral health)   Room Organization independent

## 2017-10-20 NOTE — PLAN OF CARE
Problem: General Plan of Care (Inpatient Behavioral)  Goal: Team Discussion  Team Plan:   BEHAVIORAL TEAM DISCUSSION     Participants: MARYBEL Garvin RN Bien  Progress: pt appears to be at baseline  Continued Stay Criteria/Rationale: pt is committed  Medical/Physical: liver disease  Precautions:   Behavioral Orders   Procedures     Code 2     Elopement precautions     Routine Programming       As clinically indicated     Seizure precautions     Status 15       Every 15 minutes.     Withdrawal precautions     Plan: CTC to work with out pt CM for housing  Rationale for change in precautions or plan: no Change        Problem: Patient Care Overview  Goal: Team Discussion  Team Plan:   BEHAVIORAL TEAM DISCUSSION     Participants: MARYBEL Garvin RN Bien  Progress: pt appears to be at baseline  Continued Stay Criteria/Rationale: pt is committed  Medical/Physical: liver disease  Precautions:   Behavioral Orders   Procedures     Code 2     Elopement precautions     Routine Programming       As clinically indicated     Seizure precautions     Status 15       Every 15 minutes.     Withdrawal precautions     Plan: CTC to work with out pt CM for housing  Rationale for change in precautions or plan: no Change

## 2017-10-20 NOTE — PROGRESS NOTES
"    [facility], [place]   Psychiatric Progress Note        Interim History:     The patient's care was discussed with the treatment team during the daily team meeting and/or staff's chart notes were reviewed.  Staff report patient has spent more time inside of her room. She appeared to be despondent and sad. Was heard by staff talking to herself, but again denied presence of hallucinations. Oriented x 3. Ambulates with the help of walker. Declined offer of antidepressant: \"I am not depressed\".   She didn't have any questions during today's interview. Was reported to eat well.       Medications:       gabapentin  100 mg Oral TID     ferrous gluconate  324 mg Oral Daily with breakfast     lactulose  30 g Oral TID     haloperidol  2.5 mg Oral BID     risperiDONE  1 mg Oral QAM     risperiDONE  2 mg Oral At Bedtime     pantoprazole  40 mg Oral Daily     multivitamin, therapeutic with minerals  1 tablet Oral Daily     folic acid  1 mg Oral Daily     rifaximin  550 mg Oral BID     spironolactone  50 mg Oral Daily     furosemide  20 mg Oral Daily          Allergies:     Allergies   Allergen Reactions     Acetaminophen      Ambien [Zolpidem Tartrate] Other (See Comments)     Sleep walks and eats     Ciprofloxacin Other (See Comments)     Seizure.     Citalopram Nausea and Vomiting          Labs:     No results found for this or any previous visit (from the past 24 hour(s)).       Psychiatric Examination:     Vitals:    10/17/17 0855 10/18/17 1100 10/19/17 0852 10/20/17 0800   BP: 102/63   116/63   BP Location:    Left arm   Pulse: 75   75   Resp: 16  16 16   Temp: 97.4  F (36.3  C)  97.5  F (36.4  C) 97.6  F (36.4  C)   TempSrc: Oral  Oral Oral   SpO2:       Weight:  95.3 kg (210 lb 1.6 oz) 99.8 kg (220 lb)                        Lying Orthostatic BP: 103/51         Sitting Orthostatic BP: 116/63         Standing Orthostatic BP:  (Refused )       Appearance: awake, alert, adequately groomed, " appeared older than stated age and no apparent distress  Attitude: somewhat cooperative and guarded  Eye Contact:  limited  Mood:  sad  Affect:  intensity is blunted  Speech:  clear, coherent and slow  Psychomotor Behavior:  no evidence of tardive dyskinesia, dystonia, or tics  Throught Process:  goal oriented  Associations:  no loose associations  Thought Content:  no evidence of suicidal ideation or homicidal ideation, possibly, auditory hallucinations present, Visual hallucinations are present off and on, delusions are present.   Insight:  limited  Judgement:  limited  Oriented to:  time, person, and place  Attention Span and Concentration:  fair  Recent and Remote Memory:  fair         Precautions:     Behavioral Orders   Procedures     Code 2     Elopement precautions     Routine Programming     As clinically indicated     Seizure precautions     Status 15     Every 15 minutes.     Withdrawal precautions          Diagnoses:     Cognitive Disorder NOS.   Encephalopathy probably multifactorial such as chronic hepatic encephalopathy, organic brain damage due to encephalopathy, alcohol drinking, possibly underlying psychotic illness; possibly head injury contributes to patient's symptoms.   Alcohol use disorder.   amphetamine dependence vs abuse.          Plan:     No medication changes today.    Legal Status and Disposition:  --  Under full MICD commitment.   -- the patient appears to be at her current baseline, she is on waiting list for [facility]. Referred to several NH and memory care unit, was rejected by number of them. Per CTC, [facility] promised to call [employer] and works on CAD[employer] waiver. See discussion above. Will continue to provide support and structure.

## 2017-10-20 NOTE — PLAN OF CARE
Problem: Psychotic Symptoms  Goal: Psychotic Symptoms  Signs and symptoms of listed problems will be absent or manageable.      Pt alert and oriented x 3. Spent majority of the shift bed resting/isolative to her room, except for meal time. Pt denied voices, SI/SIB. Presented with depressed mood and blunted affect. Pt declined unit groups and socialization. Pt neglected her grooming; pt ate in the lounge; reported her appetite and sleep as adequate. Was medication compliant and ambulated using a walking without any issue. Made her needs known. Refused her afternoon Lactulose. No prn given.   Plan: Status 15s; Build trust with pt. Continue to build on strengths. Encourage healthy coping.         .

## 2017-10-21 PROCEDURE — 25000132 ZZH RX MED GY IP 250 OP 250 PS 637: Performed by: EMERGENCY MEDICINE

## 2017-10-21 PROCEDURE — 25000132 ZZH RX MED GY IP 250 OP 250 PS 637: Performed by: PSYCHIATRY & NEUROLOGY

## 2017-10-21 PROCEDURE — 12400007 ZZH R&B MH INTERMEDIATE UMMC

## 2017-10-21 PROCEDURE — 25000132 ZZH RX MED GY IP 250 OP 250 PS 637: Performed by: NURSE PRACTITIONER

## 2017-10-21 RX ADMIN — HALOPERIDOL 2.5 MG: 1 TABLET ORAL at 22:19

## 2017-10-21 RX ADMIN — MULTIPLE VITAMINS W/ MINERALS TAB 1 TABLET: TAB at 09:08

## 2017-10-21 RX ADMIN — GABAPENTIN 100 MG: 100 CAPSULE ORAL at 15:01

## 2017-10-21 RX ADMIN — FERROUS GLUCONATE 324 MG: 324 TABLET ORAL at 09:08

## 2017-10-21 RX ADMIN — GABAPENTIN 100 MG: 100 CAPSULE ORAL at 09:08

## 2017-10-21 RX ADMIN — RIFAXIMIN 550 MG: 550 TABLET ORAL at 09:08

## 2017-10-21 RX ADMIN — FOLIC ACID 1 MG: 1 TABLET ORAL at 09:08

## 2017-10-21 RX ADMIN — Medication 20 MG: at 09:08

## 2017-10-21 RX ADMIN — GABAPENTIN 100 MG: 100 CAPSULE ORAL at 22:18

## 2017-10-21 RX ADMIN — SPIRONOLACTONE 50 MG: 50 TABLET ORAL at 09:08

## 2017-10-21 RX ADMIN — RIFAXIMIN 550 MG: 550 TABLET ORAL at 22:19

## 2017-10-21 RX ADMIN — RISPERIDONE 2 MG: 2 TABLET ORAL at 22:19

## 2017-10-21 RX ADMIN — HALOPERIDOL 2.5 MG: 1 TABLET ORAL at 09:07

## 2017-10-21 RX ADMIN — RISPERIDONE 1 MG: 1 TABLET ORAL at 09:08

## 2017-10-21 RX ADMIN — PANTOPRAZOLE SODIUM 40 MG: 40 TABLET, DELAYED RELEASE ORAL at 09:08

## 2017-10-21 ASSESSMENT — ACTIVITIES OF DAILY LIVING (ADL)
DRESS: INDEPENDENT
ORAL_HYGIENE: PROMPTS
GROOMING: INDEPENDENT
ORAL_HYGIENE: INDEPENDENT
HYGIENE/GROOMING: PROMPTS
DRESS: SCRUBS (BEHAVIORAL HEALTH);PROMPTS

## 2017-10-21 NOTE — PROGRESS NOTES
10/20/17 2200   Behavioral Health   Hallucinations denies / not responding to hallucinations   Thinking distractable   Orientation person: oriented;place: oriented;date: oriented   Memory baseline memory   Insight poor   Judgement impaired   Eye Contact at examiner   Affect blunted, flat   Mood other (see comments)   Physical Appearance/Attire attire appropriate to age and situation   Hygiene neglected grooming - unclean body, hair, teeth   Suicidality other (see comments)   Self Injury other (see comment)   Speech clear;coherent   Pt social with peers, attended group and watched TV. Pt denies depression and SI.

## 2017-10-21 NOTE — PROGRESS NOTES
"   10/21/17 1428   Behavioral Health   Hallucinations denies / not responding to hallucinations   Thinking poor concentration;distractable   Orientation person: oriented;place: oriented   Memory baseline memory   Insight poor   Judgement impaired   Eye Contact at examiner   Affect tense   Mood mood is calm   Physical Appearance/Attire attire appropriate to age and situation   Hygiene well groomed   Suicidality other (see comments)  (Denies)   Self Injury other (see comment)  (Denies)   Elopement (nothing to report)   Activity isolative;withdrawn   Speech clear;coherent   Psychomotor / Gait balanced;steady;slow   Psycho Education   Type of Intervention 1:1 intervention   Response participates, initiates socially appropriate   Hours 0.5   Treatment Detail (Check In)   Activities of Daily Living   Hygiene/Grooming independent   Oral Hygiene independent   Dress independent   Room Organization independent   Behavioral Health Interventions   Psychotic Symptoms provide emotional support;assist with developing & utilizing healthy coping strategies   Social and Therapeutic Interventions (Psychotic Symptoms) encourage socialization with peers   The patient was in her room sleeping most of the shift, but did come down for both meals.  The patient remained in the dinning area during the meals and was social with peers most than past shift.  The [facility] [place] [facility] late in the shift.  The patient reports feeling \"okay\", but reports really wanting to be discharged.  She does understand we are waiting for a placement, but is having a hard time staying patient.  Patient denies Si and SiB.  "

## 2017-10-22 PROCEDURE — 12400007 ZZH R&B MH INTERMEDIATE UMMC

## 2017-10-22 PROCEDURE — 25000132 ZZH RX MED GY IP 250 OP 250 PS 637: Performed by: PSYCHIATRY & NEUROLOGY

## 2017-10-22 PROCEDURE — 25000132 ZZH RX MED GY IP 250 OP 250 PS 637: Performed by: EMERGENCY MEDICINE

## 2017-10-22 PROCEDURE — 25000132 ZZH RX MED GY IP 250 OP 250 PS 637: Performed by: NURSE PRACTITIONER

## 2017-10-22 RX ADMIN — RISPERIDONE 2 MG: 2 TABLET ORAL at 20:59

## 2017-10-22 RX ADMIN — RISPERIDONE 1 MG: 1 TABLET ORAL at 09:01

## 2017-10-22 RX ADMIN — FERROUS GLUCONATE 324 MG: 324 TABLET ORAL at 09:00

## 2017-10-22 RX ADMIN — HALOPERIDOL 2.5 MG: 1 TABLET ORAL at 20:58

## 2017-10-22 RX ADMIN — GABAPENTIN 100 MG: 100 CAPSULE ORAL at 20:59

## 2017-10-22 RX ADMIN — FOLIC ACID 1 MG: 1 TABLET ORAL at 09:01

## 2017-10-22 RX ADMIN — PANTOPRAZOLE SODIUM 40 MG: 40 TABLET, DELAYED RELEASE ORAL at 09:01

## 2017-10-22 RX ADMIN — MULTIPLE VITAMINS W/ MINERALS TAB 1 TABLET: TAB at 09:01

## 2017-10-22 RX ADMIN — GABAPENTIN 100 MG: 100 CAPSULE ORAL at 15:10

## 2017-10-22 RX ADMIN — GABAPENTIN 100 MG: 100 CAPSULE ORAL at 09:01

## 2017-10-22 RX ADMIN — HALOPERIDOL 2.5 MG: 1 TABLET ORAL at 09:01

## 2017-10-22 RX ADMIN — Medication 20 MG: at 09:01

## 2017-10-22 RX ADMIN — RIFAXIMIN 550 MG: 550 TABLET ORAL at 09:01

## 2017-10-22 RX ADMIN — SPIRONOLACTONE 50 MG: 50 TABLET ORAL at 09:01

## 2017-10-22 RX ADMIN — RIFAXIMIN 550 MG: 550 TABLET ORAL at 20:58

## 2017-10-22 ASSESSMENT — ACTIVITIES OF DAILY LIVING (ADL)
ORAL_HYGIENE: INDEPENDENT
ORAL_HYGIENE: INDEPENDENT
LAUNDRY: WITH SUPERVISION
LAUNDRY: WITH SUPERVISION
GROOMING: INDEPENDENT
DRESS: INDEPENDENT
HYGIENE/GROOMING: INDEPENDENT
DRESS: INDEPENDENT

## 2017-10-22 NOTE — PROGRESS NOTES
Pt calm and pleasant this shift. She was actually out in milieu watching movies and able to converse with peers her opinions about the movie selection. Otherwise, pt retired to bed after dinner. Pt polite and alert upon approach and conversation. Still hearing instances of pt talking in room. Pt did agree to shower tomorrow morning because she knows she needs it. Pt also admitted to spilling tea on her floor hence the stickiness and also agreeing to clean it up tomorrow. Otherwise, no observed instances of pt loitering at doors.        10/21/17 2201   Behavioral Health   Hallucinations appears responding;auditory   Thinking distractable   Orientation person: oriented;place: oriented   Memory baseline memory   Insight poor   Judgement impaired   Eye Contact at examiner   Affect full range affect   Mood mood is calm   Physical Appearance/Attire disheveled   Hygiene neglected grooming - unclean body, hair, teeth   Suicidality other (see comments)  (none stated/observed)   Self Injury other (see comment)  (none stated/observed)   Elopement (none this shift)   Activity other (see comment)  (present and social in milieu first half of shift)   Speech clear;coherent   Medication Sensitivity no observed side effects;no stated side effects   Psychomotor / Gait slow;steady   Psycho Education   Type of Intervention structured groups   Response participates with encouragement   Hours 0.5   Treatment Detail ways to cope   Activities of Daily Living   Hygiene/Grooming prompts   Oral Hygiene prompts   Dress scrubs (behavioral health);prompts   Room Organization prompts

## 2017-10-23 PROCEDURE — 25000132 ZZH RX MED GY IP 250 OP 250 PS 637: Performed by: NURSE PRACTITIONER

## 2017-10-23 PROCEDURE — 12400007 ZZH R&B MH INTERMEDIATE UMMC

## 2017-10-23 PROCEDURE — 25000132 ZZH RX MED GY IP 250 OP 250 PS 637: Performed by: HOSPITALIST

## 2017-10-23 PROCEDURE — 25000132 ZZH RX MED GY IP 250 OP 250 PS 637: Performed by: PSYCHIATRY & NEUROLOGY

## 2017-10-23 PROCEDURE — 99232 SBSQ HOSP IP/OBS MODERATE 35: CPT | Performed by: PSYCHIATRY & NEUROLOGY

## 2017-10-23 PROCEDURE — 99207 ZZC CDG-MDM COMPONENT: MEETS MODERATE - UP CODED: CPT | Performed by: PSYCHIATRY & NEUROLOGY

## 2017-10-23 PROCEDURE — 25000132 ZZH RX MED GY IP 250 OP 250 PS 637: Performed by: EMERGENCY MEDICINE

## 2017-10-23 RX ADMIN — FERROUS GLUCONATE 324 MG: 324 TABLET ORAL at 09:05

## 2017-10-23 RX ADMIN — FOLIC ACID 1 MG: 1 TABLET ORAL at 09:05

## 2017-10-23 RX ADMIN — RIFAXIMIN 550 MG: 550 TABLET ORAL at 20:48

## 2017-10-23 RX ADMIN — GABAPENTIN 100 MG: 100 CAPSULE ORAL at 20:48

## 2017-10-23 RX ADMIN — MULTIPLE VITAMINS W/ MINERALS TAB 1 TABLET: TAB at 09:05

## 2017-10-23 RX ADMIN — GABAPENTIN 100 MG: 100 CAPSULE ORAL at 14:11

## 2017-10-23 RX ADMIN — GABAPENTIN 100 MG: 100 CAPSULE ORAL at 09:05

## 2017-10-23 RX ADMIN — PANTOPRAZOLE SODIUM 40 MG: 40 TABLET, DELAYED RELEASE ORAL at 09:05

## 2017-10-23 RX ADMIN — SPIRONOLACTONE 50 MG: 50 TABLET ORAL at 09:05

## 2017-10-23 RX ADMIN — RISPERIDONE 2 MG: 2 TABLET ORAL at 20:48

## 2017-10-23 RX ADMIN — HALOPERIDOL 2.5 MG: 1 TABLET ORAL at 20:47

## 2017-10-23 RX ADMIN — HALOPERIDOL 2.5 MG: 1 TABLET ORAL at 09:05

## 2017-10-23 RX ADMIN — RISPERIDONE 1 MG: 1 TABLET ORAL at 09:05

## 2017-10-23 RX ADMIN — RIFAXIMIN 550 MG: 550 TABLET ORAL at 09:05

## 2017-10-23 RX ADMIN — Medication 20 MG: at 09:05

## 2017-10-23 ASSESSMENT — ACTIVITIES OF DAILY LIVING (ADL)
GROOMING: INDEPENDENT
DRESS: INDEPENDENT
ORAL_HYGIENE: INDEPENDENT
DRESS: SCRUBS (BEHAVIORAL HEALTH);INDEPENDENT
LAUNDRY: WITH SUPERVISION
ORAL_HYGIENE: INDEPENDENT;PROMPTS
LAUNDRY: WITH SUPERVISION
GROOMING: INDEPENDENT;PROMPTS

## 2017-10-23 NOTE — PLAN OF CARE
Problem: Psychotic Symptoms  Goal: Psychotic Symptoms  Signs and symptoms of listed problems will be absent or manageable.   Outcome: No Change  48 hour nursing assessment:  Pt evaluation continues. Assessed mood, anxiety, thoughts, and behavior. Is progressing towards goals. Encourage participation in groups and developing healthy coping skills. Pt denies auditory or visual  hallucinations. Refer to daily team meeting notes for individualized plan of care. Will continue to assess.  Patient presents with blunted affect, mood seems mostly calm and patient denies feeling anxious or depressed. Patient states she is in pain, declines warm packs and instead chooses to rest for most of the day. Patient is eating well at meal times. Patient denies SI/SIB. Patient is to be monitored for symptoms of hepatic encephalopathy, and pt does not show any signs of this today. Patient is alert and oriented x3 and interacts with writer appropriately. Patient has refused lactulose today, and is compliant with all other medications. Patient denies all mental health symptoms, states her only concern is her pain. No other concerns at this time, will continue to monitor.

## 2017-10-23 NOTE — PROGRESS NOTES
10/22/17 2100   Behavioral Health   Hallucinations denies / not responding to hallucinations   Thinking distractable   Orientation person: oriented;place: oriented;date: oriented;time: oriented   Memory baseline memory   Insight poor   Judgement impaired   Eye Contact at examiner   Affect blunted, flat   Mood depressed   ADL Assessment (WDL) WDL   Suicidality (WDL) WDL   Self Injury (WDL) WDL   Elopement (WDL) WDL   Activity isolative;withdrawn   Speech (WDL) WDL   Medication Sensitivity (WDL) WDL   Psychomotor / Gait unsteady   Overt Agression (WDL) WDL   Psycho Education   Type of Intervention 1:1 intervention   Response participates, initiates socially appropriate   Hours 0.5   Activities of Daily Living   Hygiene/Grooming independent   Oral Hygiene independent   Dress independent   Laundry with supervision   Room Organization independent   patient spent the early part of the shift in the lounge with peers, but retired early for bed. No SI or SIB noted this shift. patient denies any auditory hallucinations, but still appears responding. Still shows little to no insight regarding situation here.

## 2017-10-24 PROCEDURE — 25000132 ZZH RX MED GY IP 250 OP 250 PS 637: Performed by: PSYCHIATRY & NEUROLOGY

## 2017-10-24 PROCEDURE — 25000132 ZZH RX MED GY IP 250 OP 250 PS 637: Performed by: HOSPITALIST

## 2017-10-24 PROCEDURE — 12400007 ZZH R&B MH INTERMEDIATE UMMC

## 2017-10-24 PROCEDURE — 25000132 ZZH RX MED GY IP 250 OP 250 PS 637: Performed by: EMERGENCY MEDICINE

## 2017-10-24 PROCEDURE — 25000132 ZZH RX MED GY IP 250 OP 250 PS 637: Performed by: NURSE PRACTITIONER

## 2017-10-24 RX ADMIN — RISPERIDONE 2 MG: 2 TABLET ORAL at 21:09

## 2017-10-24 RX ADMIN — HALOPERIDOL 2.5 MG: 1 TABLET ORAL at 20:20

## 2017-10-24 RX ADMIN — GABAPENTIN 100 MG: 100 CAPSULE ORAL at 20:20

## 2017-10-24 RX ADMIN — RISPERIDONE 1 MG: 1 TABLET ORAL at 09:11

## 2017-10-24 RX ADMIN — FERROUS GLUCONATE 324 MG: 324 TABLET ORAL at 09:09

## 2017-10-24 RX ADMIN — SPIRONOLACTONE 50 MG: 50 TABLET ORAL at 09:11

## 2017-10-24 RX ADMIN — LACTULOSE 30 G: 10 POWDER, FOR SOLUTION ORAL at 20:20

## 2017-10-24 RX ADMIN — RIFAXIMIN 550 MG: 550 TABLET ORAL at 09:11

## 2017-10-24 RX ADMIN — MULTIPLE VITAMINS W/ MINERALS TAB 1 TABLET: TAB at 09:11

## 2017-10-24 RX ADMIN — HALOPERIDOL 2.5 MG: 1 TABLET ORAL at 09:10

## 2017-10-24 RX ADMIN — RIFAXIMIN 550 MG: 550 TABLET ORAL at 20:20

## 2017-10-24 RX ADMIN — Medication 20 MG: at 09:09

## 2017-10-24 RX ADMIN — GABAPENTIN 100 MG: 100 CAPSULE ORAL at 09:10

## 2017-10-24 RX ADMIN — PANTOPRAZOLE SODIUM 40 MG: 40 TABLET, DELAYED RELEASE ORAL at 09:11

## 2017-10-24 RX ADMIN — FOLIC ACID 1 MG: 1 TABLET ORAL at 09:09

## 2017-10-24 RX ADMIN — GABAPENTIN 100 MG: 100 CAPSULE ORAL at 14:22

## 2017-10-24 ASSESSMENT — ACTIVITIES OF DAILY LIVING (ADL)
GROOMING: PROMPTS
DRESS: INDEPENDENT
ORAL_HYGIENE: INDEPENDENT
DRESS: SCRUBS (BEHAVIORAL HEALTH)
LAUNDRY: WITH SUPERVISION
HYGIENE/GROOMING: PROMPTS
LAUNDRY: WITH SUPERVISION
ORAL_HYGIENE: INDEPENDENT

## 2017-10-24 NOTE — PROGRESS NOTES
Pt's mood was calm but had flat affect. Pt was out in the lounge and dinning are for a while. Pt was social with others. Pt ambulated with walker assist. Pt denied hearing voice. Pt denied having SI or SIB. Pt was pleasant when approached. Neuro grossly intact. Conversation with this writer was appropriate.

## 2017-10-24 NOTE — PROGRESS NOTES
"   10/24/17 1441   Behavioral Health   Hallucinations denies / not responding to hallucinations   Thinking intact   Orientation place: oriented;person: oriented;date: oriented   Memory baseline memory   Insight poor   Judgement impaired   Eye Contact into space   Affect full range affect;sad   Mood mood is calm   Physical Appearance/Attire disheveled   Hygiene neglected grooming - unclean body, hair, teeth   Suicidality other (see comments)  (Denied)   Self Injury other (see comment)  (Denies)   Elopement (None observed)   Activity withdrawn;isolative   Speech clear;coherent   Medication Sensitivity no observed side effects   Psychomotor / Gait balanced;steady   Psycho Education   Type of Intervention 1:1 intervention   Response participates with encouragement   Hours 0.5   Activities of Daily Living   Hygiene/Grooming prompts   Oral Hygiene independent   Dress independent   Laundry with supervision   Room Organization prompts   Behavioral Health Interventions   Psychotic Symptoms provide emotional support;encourage participation / independence with adls;build upon strengths;establish therapeutic relationship   Social and Therapeutic Interventions (Psychotic Symptoms) encourage participation in therapeutic groups and milieu activities     Pt was isolative in her room for the majority of the shift. Pt only came out of her room for meals. When this writer checked in with the pt, pt was crying in her room. When asked what is wrong, pt remind silent. Pt added that she will be \"alright.\" Pt denied any SI and SIB thoughts.   "

## 2017-10-24 NOTE — PROGRESS NOTES
"\"I'm not drinking that, it makes me so gassy\". Patient drank approximately 40cc's out of 240cc's of the Chronulac to swallow her pills with at 0800 and 1400. This writer started to tell patient it is to keep her ammonia level down and patient said \"I know, I'm not taking it\".   "

## 2017-10-24 NOTE — PLAN OF CARE
"Problem: Psychotic Symptoms  Goal: Psychotic Symptoms  Signs and symptoms of listed problems will be absent or manageable.   Outcome: Improving  48 Hour Assessment    BP 92/55 (BP Location: Left arm)  Pulse 66  Temp 98.2  F (36.8  C) (Oral)  Resp 20  Wt 99.8 kg (220 lb)  SpO2 99%  BMI 35.51 kg/m2    Pt was visible in the milieu on and off throughout the evening. She was social with other patients, and conversed appropriately to the situation. Pt watched a portion of a movie and ate dinner in the lounge. She was pleasant with writer, calm, and did not display aggressive behaviors. Pt states that the only pain she is experiencing is her knees, which she states \"Are always painful.\"     SI/SIB: Pt denies     Auditory/Visual Hallucinations: Pt denies. Pt does occasionally stare off into space, as if looking at something. However, writer did not witness behaviors that indicate a response to internal stimuli.      Quality of sleep: States she has been sleeping well    No additional concerns at this time. Will continue to monitor and assess.          "

## 2017-10-25 PROCEDURE — 25000132 ZZH RX MED GY IP 250 OP 250 PS 637: Performed by: PSYCHIATRY & NEUROLOGY

## 2017-10-25 PROCEDURE — 25000132 ZZH RX MED GY IP 250 OP 250 PS 637: Performed by: NURSE PRACTITIONER

## 2017-10-25 PROCEDURE — 25000132 ZZH RX MED GY IP 250 OP 250 PS 637: Performed by: EMERGENCY MEDICINE

## 2017-10-25 PROCEDURE — 25000132 ZZH RX MED GY IP 250 OP 250 PS 637: Performed by: HOSPITALIST

## 2017-10-25 PROCEDURE — 99231 SBSQ HOSP IP/OBS SF/LOW 25: CPT | Performed by: PSYCHIATRY & NEUROLOGY

## 2017-10-25 PROCEDURE — 12400007 ZZH R&B MH INTERMEDIATE UMMC

## 2017-10-25 RX ADMIN — GABAPENTIN 100 MG: 100 CAPSULE ORAL at 14:09

## 2017-10-25 RX ADMIN — RISPERIDONE 1 MG: 1 TABLET ORAL at 08:29

## 2017-10-25 RX ADMIN — HALOPERIDOL 2.5 MG: 1 TABLET ORAL at 20:12

## 2017-10-25 RX ADMIN — LACTULOSE 30 G: 10 POWDER, FOR SOLUTION ORAL at 08:29

## 2017-10-25 RX ADMIN — PANTOPRAZOLE SODIUM 40 MG: 40 TABLET, DELAYED RELEASE ORAL at 08:29

## 2017-10-25 RX ADMIN — SPIRONOLACTONE 50 MG: 50 TABLET ORAL at 08:29

## 2017-10-25 RX ADMIN — Medication 20 MG: at 08:29

## 2017-10-25 RX ADMIN — RIFAXIMIN 550 MG: 550 TABLET ORAL at 20:12

## 2017-10-25 RX ADMIN — GABAPENTIN 100 MG: 100 CAPSULE ORAL at 20:12

## 2017-10-25 RX ADMIN — GABAPENTIN 100 MG: 100 CAPSULE ORAL at 08:29

## 2017-10-25 RX ADMIN — RIFAXIMIN 550 MG: 550 TABLET ORAL at 08:29

## 2017-10-25 RX ADMIN — LACTULOSE 30 G: 10 POWDER, FOR SOLUTION ORAL at 14:09

## 2017-10-25 RX ADMIN — HALOPERIDOL 2.5 MG: 1 TABLET ORAL at 08:29

## 2017-10-25 RX ADMIN — RISPERIDONE 2 MG: 2 TABLET ORAL at 20:12

## 2017-10-25 RX ADMIN — FERROUS GLUCONATE 324 MG: 324 TABLET ORAL at 08:29

## 2017-10-25 RX ADMIN — FOLIC ACID 1 MG: 1 TABLET ORAL at 08:29

## 2017-10-25 RX ADMIN — LACTULOSE 30 G: 10 POWDER, FOR SOLUTION ORAL at 20:12

## 2017-10-25 RX ADMIN — MULTIPLE VITAMINS W/ MINERALS TAB 1 TABLET: TAB at 08:29

## 2017-10-25 ASSESSMENT — ACTIVITIES OF DAILY LIVING (ADL)
GROOMING: INDEPENDENT
DRESS: INDEPENDENT
ORAL_HYGIENE: INDEPENDENT
DRESS: SCRUBS (BEHAVIORAL HEALTH)
LAUNDRY: WITH SUPERVISION
ORAL_HYGIENE: INDEPENDENT
GROOMING: INDEPENDENT

## 2017-10-25 NOTE — PROGRESS NOTES
"The pt once again was witnessed responding to stimuli.   She was overheard saying \"go away, leave me alone\" while alone in her room.  She later came out and asked me why she wasn't being let go, \"because I'm supposed to be going home today.\"  She came out for dinner and snacks, but was otherwise isolative to her room.  She was pleasant and appropriate otherwise, but did not shower or do laundry on this shift.     10/24/17 2220   Behavioral Health   Hallucinations appears responding;auditory;visual   Thinking delusional;confused   Orientation person: oriented;place: oriented;time: oriented   Insight poor   Judgement impaired   Eye Contact into space   Affect blunted, flat   Mood depressed;mood is calm   Physical Appearance/Attire disheveled   Hygiene neglected grooming - unclean body, hair, teeth   Suicidality other (see comments)  (denied)   Self Injury other (see comment)  (denies)   Elopement (none observed)   Activity other (see comment);isolative  (pacing)   Speech flight of ideas;tangential   Medication Sensitivity no stated side effects;no observed side effects   Psychomotor / Gait slow   Substance Withdrawal Interventions   Social and Therapeutic Interventions (Substance Withdrawal) encourage socialization with peers;encourage effective boundaries with peers;encourage participation in therapeutic groups and milieu activities   Safety   Suicidality status 15   Elopement status 15   Coping/Psychosocial   Verbalized Emotional State disbelief;depression;frustration   Supportive Measures active listening utilized;relaxation techniques promoted   Psycho Education   Type of Intervention 1:1 intervention   Response participates with encouragement   Hours 0.5   Treatment Detail check-in   Group Therapy Session   Group Attendance refused to attend group session   Activities of Daily Living   Hygiene/Grooming prompts   Oral Hygiene independent   Dress scrubs (behavioral health)   Laundry with supervision   Room " Organization independent   Behavioral Health Interventions   Psychotic Symptoms maintain safety precautions;monitor need to revise level of observation;maintain safe secure environment;reality orientation;simple, clear language;decrease environmental stimulation;redirection of intrusive behaviors;redirection of aggressive behaviors;encourage nutrition and hydration;encourage participation / independence with adls;provide emotional support;establish therapeutic relationship   Social and Therapeutic Interventions (Psychotic Symptoms) encourage socialization with peers;encourage effective boundaries with peers;encourage participation in therapeutic groups and milieu activities

## 2017-10-25 NOTE — PROGRESS NOTES
10/25/17 1406   Behavioral Health   Hallucinations denies / not responding to hallucinations   Thinking distractable;delusional   Orientation time: oriented;date: oriented;place: oriented;person: oriented   Memory baseline memory   Insight poor   Judgement impaired   Eye Contact into space   Mood mood is calm;depressed   Hygiene well groomed   Suicidality other (see comments)  (Denies)   Self Injury other (see comment)  (Denies)   Elopement Statements about wanting to leave   Activity isolative;withdrawn   Speech flight of ideas;tangential   Medication Sensitivity no observed side effects   Psychomotor / Gait slow   Safety   Suicidality status 15   Psycho Education   Type of Intervention 1:1 intervention   Response participates with encouragement   Hours 0.5   Activities of Daily Living   Hygiene/Grooming independent   Oral Hygiene independent   Dress scrubs (behavioral health)   Laundry with supervision   Room Organization independent   Activity   Activity Assistance Provided independent   Behavioral Health Interventions   Psychotic Symptoms maintain safety precautions;maintain safe secure environment;establish therapeutic relationship;assist patient in following safety plan;assist patient in developing safety plan   Social and Therapeutic Interventions (Psychotic Symptoms) encourage socialization with peers;encourage participation in therapeutic groups and milieu activities   Pt spent most of her time in the room sleeping. She was talking to self this morning from her room. She had both meals. She has been isolative and denies suicidal ideations this moment.

## 2017-10-25 NOTE — PROGRESS NOTES
"    [facility], [place]   Psychiatric Progress Note        Interim History:     The patient's care was discussed with the treatment team during the daily team meeting and/or staff's chart notes were reviewed.  Staff report patient has spent more time inside of her room. She appeared to be despondent and sad. Was heard by staff talking to herself, but again denied presence of hallucinations. She at times refuses Lactulose on the grounds that it makes her gassy, but takes the rest of her meds.    See Psych associate's note: \"The pt once again was witnessed responding to stimuli. She was overheard saying \"go away, leave me alone\" while alone in her room.  She later came out and asked me why she wasn't being let go, \"because I'm supposed to be going home today.\"  She came out for dinner and snacks, but was otherwise isolative to her room.  She was pleasant and appropriate otherwise, but did not shower or do laundry on this shift.\"    Oriented x 3. Ambulates with the help of walker. Declined offer of antidepressant: \"I am not depressed\".   She didn't have any questions during today's interview. Was reported to eat well.       Medications:       gabapentin  100 mg Oral TID     ferrous gluconate  324 mg Oral Daily with breakfast     lactulose  30 g Oral TID     haloperidol  2.5 mg Oral BID     risperiDONE  1 mg Oral QAM     risperiDONE  2 mg Oral At Bedtime     pantoprazole  40 mg Oral Daily     multivitamin, therapeutic with minerals  1 tablet Oral Daily     folic acid  1 mg Oral Daily     rifaximin  550 mg Oral BID     spironolactone  50 mg Oral Daily     furosemide  20 mg Oral Daily          Allergies:     Allergies   Allergen Reactions     Acetaminophen      Ambien [Zolpidem Tartrate] Other (See Comments)     Sleep walks and eats     Ciprofloxacin Other (See Comments)     Seizure.     Citalopram Nausea and Vomiting          Labs:     No results found for this or any previous visit (from the " past 24 hour(s)).       Psychiatric Examination:     Vitals:    10/22/17 0832 10/23/17 0900 10/23/17 1500 10/23/17 1644   BP: 106/73 110/69  92/55   BP Location: Right arm Left arm  Left arm   Pulse: 71 80  66   Resp: 14 16  20   Temp: 97.2  F (36.2  C) 97.6  F (36.4  C)  98.2  F (36.8  C)   TempSrc: Oral Oral  Oral   SpO2:   99%    Weight:                                   Lying Orthostatic BP: 97/53         Sitting Orthostatic BP: 117/75         Standing Orthostatic BP:  (Refused )       Appearance: awake, alert, adequately groomed, appeared older than stated age and no apparent distress  Attitude: somewhat cooperative and guarded  Eye Contact:  limited  Mood:  sad  Affect:  intensity is blunted  Speech:  clear, coherent and slow  Psychomotor Behavior:  no evidence of tardive dyskinesia, dystonia, or tics  Throught Process:  goal oriented  Associations:  no loose associations  Thought Content:  no evidence of suicidal ideation or homicidal ideation, auditory hallucinations are present, Visual hallucinations are present off and on, delusions are present.   Insight:  limited  Judgement:  limited  Oriented to:  time, person, and place  Attention Span and Concentration:  fair  Recent and Remote Memory:  fair         Precautions:     Behavioral Orders   Procedures     Code 2     Elopement precautions     Routine Programming     As clinically indicated     Seizure precautions     Status 15     Every 15 minutes.     Withdrawal precautions          Diagnoses:     Cognitive Disorder NOS.   Encephalopathy probably multifactorial such as chronic hepatic encephalopathy, organic brain damage due to encephalopathy, alcohol drinking, possibly underlying psychotic illness; possibly head injury contributes to patient's symptoms.   Alcohol use disorder.   amphetamine dependence vs abuse.          Plan:     No medication changes today.    Legal Status and Disposition:  --  Under full MICD commitment.   -- the patient appears to be  at her current baseline, she is on waiting list for [facility]. Referred to several NH and memory care unit, was rejected by number of them. Per CTC, CM works on CADI waiver. See discussion above. Will continue to provide support and structure.

## 2017-10-26 PROCEDURE — 99232 SBSQ HOSP IP/OBS MODERATE 35: CPT | Performed by: PSYCHIATRY & NEUROLOGY

## 2017-10-26 PROCEDURE — 25000132 ZZH RX MED GY IP 250 OP 250 PS 637: Performed by: HOSPITALIST

## 2017-10-26 PROCEDURE — 25000132 ZZH RX MED GY IP 250 OP 250 PS 637: Performed by: PSYCHIATRY & NEUROLOGY

## 2017-10-26 PROCEDURE — 12400007 ZZH R&B MH INTERMEDIATE UMMC

## 2017-10-26 PROCEDURE — 25000132 ZZH RX MED GY IP 250 OP 250 PS 637: Performed by: NURSE PRACTITIONER

## 2017-10-26 PROCEDURE — 25000132 ZZH RX MED GY IP 250 OP 250 PS 637: Performed by: EMERGENCY MEDICINE

## 2017-10-26 RX ADMIN — Medication 20 MG: at 10:17

## 2017-10-26 RX ADMIN — RIFAXIMIN 550 MG: 550 TABLET ORAL at 20:27

## 2017-10-26 RX ADMIN — GABAPENTIN 100 MG: 100 CAPSULE ORAL at 20:27

## 2017-10-26 RX ADMIN — GABAPENTIN 100 MG: 100 CAPSULE ORAL at 10:17

## 2017-10-26 RX ADMIN — FERROUS GLUCONATE 324 MG: 324 TABLET ORAL at 10:17

## 2017-10-26 RX ADMIN — HALOPERIDOL 2.5 MG: 1 TABLET ORAL at 20:27

## 2017-10-26 RX ADMIN — PANTOPRAZOLE SODIUM 40 MG: 40 TABLET, DELAYED RELEASE ORAL at 10:17

## 2017-10-26 RX ADMIN — RIFAXIMIN 550 MG: 550 TABLET ORAL at 10:17

## 2017-10-26 RX ADMIN — GABAPENTIN 100 MG: 100 CAPSULE ORAL at 14:19

## 2017-10-26 RX ADMIN — SPIRONOLACTONE 50 MG: 50 TABLET ORAL at 10:17

## 2017-10-26 RX ADMIN — HALOPERIDOL 2.5 MG: 1 TABLET ORAL at 10:17

## 2017-10-26 RX ADMIN — RISPERIDONE 2 MG: 2 TABLET ORAL at 20:27

## 2017-10-26 RX ADMIN — RISPERIDONE 1 MG: 1 TABLET ORAL at 10:17

## 2017-10-26 RX ADMIN — MULTIPLE VITAMINS W/ MINERALS TAB 1 TABLET: TAB at 10:17

## 2017-10-26 RX ADMIN — FOLIC ACID 1 MG: 1 TABLET ORAL at 10:17

## 2017-10-26 ASSESSMENT — ACTIVITIES OF DAILY LIVING (ADL)
GROOMING: INDEPENDENT
ORAL_HYGIENE: INDEPENDENT
LAUNDRY: WITH SUPERVISION
DRESS: INDEPENDENT

## 2017-10-26 NOTE — PROGRESS NOTES
"Patient refused lactulose this am and again this afternoon, despite discussing with her risks of not taking this and purpose of this medication \"I'm not gonna die from skipping this dose\". She stated that medication caused SEs of gas and abdominal discomfort. Did take all other scheduled medications. Oriented to person, place, situation and month only. Affect flat, irritable. Mood depressed and \"tired\". Did eat breakfast then went back into room for bed. No labs since 10/9 and patient with multiple days of lactulose skipped last week. Did inform Dr. Payne of this. Plan for internal medicine consult to review orders for lactulose management and neuro checks.   "

## 2017-10-26 NOTE — PROGRESS NOTES
Writer received phone call from Suleiman with [facility] [phone]). He is interested in interviewing the pt but first he will gather information from the  [facility] and the last [facility] placement[facility] in [place].

## 2017-10-26 NOTE — PROGRESS NOTES
Brief Medicine Note    Discussed case with Dr. Payne. Concern for confusing orders. Per review of chart, Mildred Rascon is a 42 year old female with a pmh of hepatic cirrhosis 2/2 alcohol abuse c/b HE, asthma, polysubstance abuse, depression and anxiety admitted to [facility] on 8/10 with acute psychosis. Internal medicine initially consulted 8/24/17 due to elevated ammonia. At that time she was placed on lactulose protocol on top of her scheduled lactulose and rifaximin. She was also evaluated by neurology, who felt her persistent visual hallucinations were likely permanent sequelae of multiple attacks of HE and/or korsakoff syndrome.     Per primary team, patient is at, or near baseline mental status, with daily fluctuations. Note that she is intermittently refusing her scheduled lactulose.    BP 92/55 (BP Location: Left arm)  Pulse 66  Temp 96.8  F (36  C) (Oral)  Resp 16  Wt 99.8 kg (220 lb)  SpO2 99%  BMI 35.51 kg/m2    A/P:  Hepatic Encephalopathy. 2/2 hepatic cirrhosis due to alcohol abuse.   - Discontinue lactulose protocol  - Continue Scheduled lactulose 30 mg TID, DO NOT MISS DOSES  - If AMS or pt without 3-5 BM's per day, give additional 20 mg of lactulose prn- PO or rectal  - Continue rifaximin      Alba Gandara PA-C  Hospitalist Service  Pager [phone]

## 2017-10-26 NOTE — PROGRESS NOTES
10/26/17 1507   Behavioral Health   Hallucinations denies / not responding to hallucinations   Thinking distractable   Orientation person: oriented;place: oriented   Memory baseline memory   Insight poor   Judgement impaired   Eye Contact at examiner   Affect blunted, flat;sad   Mood depressed   Physical Appearance/Attire disheveled   Hygiene neglected grooming - unclean body, hair, teeth   Suicidality other (see comments)  (denied )   Self Injury other (see comment)  (denied )   Elopement Statements about wanting to leave   Activity isolative;withdrawn   Speech clear;coherent   Medication Sensitivity no stated side effects   Psychomotor / Gait slow;tremors;steady   Psycho Education   Type of Intervention 1:1 intervention   Response participates, initiates socially appropriate   Hours 0.5   Treatment Detail (check in)   Activities of Daily Living   Hygiene/Grooming independent   Oral Hygiene independent   Dress independent   Laundry with supervision   Room Organization independent   Groups   Details (isolative )   Behavioral Health Interventions   Psychotic Symptoms maintain safety precautions;encourage nutrition and hydration;encourage participation / independence with adls;provide emotional support;establish therapeutic relationship;assist with developing & utilizing healthy coping strategies;build upon strengths;assess patient response to medication   Social and Therapeutic Interventions (Psychotic Symptoms) encourage socialization with peers;encourage participation in therapeutic groups and milieu activities     Pt was isolative and withdrawn to her room the entire shift. Came out of her room for breakfast but requested her lunch be brought to her room cause she is  in serve pain. Complaints her knees are hurting which is causing her difficulty walking longer distances.  Stated she feels depressed, sad and wanting to go home. Denied all psychotic symptoms.

## 2017-10-26 NOTE — PROGRESS NOTES
"    [facility], [place]   Psychiatric Progress Note        Interim History:     The patient's care was discussed with the treatment team during the daily team meeting and/or staff's chart notes were reviewed.  Staff report patient has spent more time inside of her room. She appeared to be despondent and sad. She was seen today in her bed, refused to remove blanket from her face and talk to me. I informed her that there will be a staff member from [facility] to interview her. She didn't appear to be too interested. Refused Lactulose. She appeared to experience Auditory hallucinations,  said that she talked to a person called Elizabeth: \"this bitch always talks to me in my headphones\". Repeated again that this building belonged to her and her family, not to [place]. I discussed patient's care with IM service who discontinued Lactulose prn. For more details, please, see Internal Medicine note. Appreciate medical team's input.        Medications:       gabapentin  100 mg Oral TID     ferrous gluconate  324 mg Oral Daily with breakfast     lactulose  30 g Oral TID     haloperidol  2.5 mg Oral BID     risperiDONE  1 mg Oral QAM     risperiDONE  2 mg Oral At Bedtime     pantoprazole  40 mg Oral Daily     multivitamin, therapeutic with minerals  1 tablet Oral Daily     folic acid  1 mg Oral Daily     rifaximin  550 mg Oral BID     spironolactone  50 mg Oral Daily     furosemide  20 mg Oral Daily          Allergies:     Allergies   Allergen Reactions     Acetaminophen      Ambien [Zolpidem Tartrate] Other (See Comments)     Sleep walks and eats     Ciprofloxacin Other (See Comments)     Seizure.     Citalopram Nausea and Vomiting          Labs:     No results found for this or any previous visit (from the past 24 hour(s)).       Psychiatric Examination:     Vitals:    10/23/17 0900 10/23/17 1500 10/23/17 1644 10/26/17 0850   BP: 110/69  92/55    BP Location: Left arm  Left arm Right arm "   Pulse: 80  66    Resp: 16  20 16   Temp: 97.6  F (36.4  C)  98.2  F (36.8  C) 96.8  F (36  C)   TempSrc: Oral  Oral Oral   SpO2:  99%     Weight:                     Lying Orthostatic BP: 97/53         Sitting Orthostatic BP: 116/65         Standing Orthostatic BP:  (refused )       Appearance: awake, alert, adequately groomed, appeared older than stated age and no apparent distress  Attitude: somewhat cooperative and guarded  Eye Contact:  limited  Mood:  sad  Affect:  intensity is blunted  Speech:  clear, coherent and slow  Psychomotor Behavior:  no evidence of tardive dyskinesia, dystonia, or tics  Throught Process:  goal oriented  Associations:  no loose associations  Thought Content:  no evidence of suicidal ideation or homicidal ideation, auditory hallucinations are present, Visual hallucinations are present off and on, delusions are present.   Insight:  limited  Judgement:  limited  Oriented to:  time, person, and place  Attention Span and Concentration:  fair  Recent and Remote Memory:  fair         Precautions:     Behavioral Orders   Procedures     Code 2     Elopement precautions     Routine Programming     As clinically indicated     Seizure precautions     Status 15     Every 15 minutes.     Withdrawal precautions          Diagnoses:     Cognitive Disorder NOS.   Encephalopathy probably multifactorial such as chronic hepatic encephalopathy, organic brain damage due to encephalopathy, alcohol drinking, possibly underlying psychotic illness; possibly head injury contributes to patient's symptoms.   Alcohol use disorder.   amphetamine dependence vs abuse.          Plan:     No medication changes today. Patient's care was reviewed by IM, see discussion above.    Legal Status and Disposition:  --  Under full MICD commitment.   -- the patient appears to be at her current baseline, she is on waiting list for [facility]. Referred to several NH and memory care unit, was rejected by number of them. Per [facility], [facility] works  on CADI waiver. See discussion above. Will continue to provide support and structure.

## 2017-10-26 NOTE — PLAN OF CARE
"Problem: Psychotic Symptoms  Goal: Psychotic Symptoms  Signs and symptoms of listed problems will be absent or manageable.      Pt presented with subdued mood/blunted affect. Spent most of the shift bed resting in her room. Appeared to be responding to internal stimuli. Pt was labile and irritable. Was screaming in her room saying \" I need to go. I've been here for too long.\"  Pt was distractable, fair grooming and showed poor eye contact. Pt denied SI/SIB. Denied physical issues, medication compliant. No medication s/e reported or observed.   Plan: Status 15s; Build trust with pt. Continue to build on strengths. Encourage adequate hygiene and healthy coping.              "

## 2017-10-27 PROCEDURE — 99231 SBSQ HOSP IP/OBS SF/LOW 25: CPT | Performed by: PSYCHIATRY & NEUROLOGY

## 2017-10-27 PROCEDURE — 25000132 ZZH RX MED GY IP 250 OP 250 PS 637: Performed by: HOSPITALIST

## 2017-10-27 PROCEDURE — 12400007 ZZH R&B MH INTERMEDIATE UMMC

## 2017-10-27 PROCEDURE — 25000132 ZZH RX MED GY IP 250 OP 250 PS 637: Performed by: PSYCHIATRY & NEUROLOGY

## 2017-10-27 PROCEDURE — 25000132 ZZH RX MED GY IP 250 OP 250 PS 637: Performed by: NURSE PRACTITIONER

## 2017-10-27 PROCEDURE — 25000132 ZZH RX MED GY IP 250 OP 250 PS 637: Performed by: EMERGENCY MEDICINE

## 2017-10-27 RX ADMIN — GABAPENTIN 100 MG: 100 CAPSULE ORAL at 08:50

## 2017-10-27 RX ADMIN — FERROUS GLUCONATE 324 MG: 324 TABLET ORAL at 08:49

## 2017-10-27 RX ADMIN — LACTULOSE 30 G: 10 POWDER, FOR SOLUTION ORAL at 20:06

## 2017-10-27 RX ADMIN — PANTOPRAZOLE SODIUM 40 MG: 40 TABLET, DELAYED RELEASE ORAL at 08:49

## 2017-10-27 RX ADMIN — RIFAXIMIN 550 MG: 550 TABLET ORAL at 20:07

## 2017-10-27 RX ADMIN — RISPERIDONE 2 MG: 2 TABLET ORAL at 21:20

## 2017-10-27 RX ADMIN — SPIRONOLACTONE 50 MG: 50 TABLET ORAL at 08:50

## 2017-10-27 RX ADMIN — RIFAXIMIN 550 MG: 550 TABLET ORAL at 08:50

## 2017-10-27 RX ADMIN — FOLIC ACID 1 MG: 1 TABLET ORAL at 08:49

## 2017-10-27 RX ADMIN — RISPERIDONE 1 MG: 1 TABLET ORAL at 08:50

## 2017-10-27 RX ADMIN — HALOPERIDOL 2.5 MG: 1 TABLET ORAL at 08:49

## 2017-10-27 RX ADMIN — Medication 20 MG: at 08:50

## 2017-10-27 RX ADMIN — HALOPERIDOL 2.5 MG: 1 TABLET ORAL at 20:06

## 2017-10-27 RX ADMIN — MULTIPLE VITAMINS W/ MINERALS TAB 1 TABLET: TAB at 08:50

## 2017-10-27 RX ADMIN — GABAPENTIN 100 MG: 100 CAPSULE ORAL at 20:07

## 2017-10-27 ASSESSMENT — ACTIVITIES OF DAILY LIVING (ADL)
DRESS: INDEPENDENT
GROOMING: INDEPENDENT
ORAL_HYGIENE: INDEPENDENT
DRESS: INDEPENDENT
LAUNDRY: WITH SUPERVISION
LAUNDRY: WITH SUPERVISION
ORAL_HYGIENE: INDEPENDENT
GROOMING: INDEPENDENT

## 2017-10-27 NOTE — PROGRESS NOTES
Pt was isolative and withdrawn most of the shift. Patient had multiple outbursts, crying in her room stating she wants to go home. When this writer checked in with patient, she yelled and screamed at staff but later apologized for her behavior. Patient was observed loitering near the exist doors and was redirected multiple times to leave the exist doors. Pt paced kaba and was in and out of room. Denied SI/SIB and hallucinations but appeared to responding to internal stimuli.       10/27/17 5038   Behavioral Health   Hallucinations appears responding   Thinking distractable;poor concentration   Orientation person: oriented;place: oriented   Memory baseline memory   Insight poor   Judgement impaired   Eye Contact at examiner   Affect blunted, flat   Mood depressed   Physical Appearance/Attire disheveled   Hygiene neglected grooming - unclean body, hair, teeth   Suicidality other (see comments)  (denied )   Self Injury other (see comment)  (denied)   Elopement Loitering near exit doors;Statements about wanting to leave   Activity isolative;withdrawn   Speech clear;coherent   Medication Sensitivity no stated side effects   Psychomotor / Gait balanced   Psycho Education   Type of Intervention 1:1 intervention   Response participates, initiates socially appropriate   Hours 0.5   Treatment Detail (check in )   Activities of Daily Living   Hygiene/Grooming independent   Oral Hygiene independent   Dress independent   Laundry with supervision   Room Organization independent   Groups   Details (isolative )   Behavioral Health Interventions   Psychotic Symptoms maintain safety precautions;encourage nutrition and hydration;encourage participation / independence with adls;provide emotional support;establish therapeutic relationship;assist with developing & utilizing healthy coping strategies;build upon strengths   Social and Therapeutic Interventions (Psychotic Symptoms) encourage socialization with peers;encourage participation  in therapeutic groups and milieu activities

## 2017-10-27 NOTE — PROGRESS NOTES
[facility], [place]   Psychiatric Progress Note        Interim History:     The patient's care was discussed with the treatment team during the daily team meeting and/or staff's chart notes were reviewed.  Staff report patient has spent more time inside of her room. She appeared to be despondent and sad. She refused to talk to me and refused even to remove blanket from her face. She only said she didn't have questions. I informed her that she will be interviewed on Monday by staff [place] [facility] [place] [facility]. She said that she heard me, but would not say anything else.       Medications:       gabapentin  100 mg Oral TID     ferrous gluconate  324 mg Oral Daily with breakfast     lactulose  30 g Oral TID     haloperidol  2.5 mg Oral BID     risperiDONE  1 mg Oral QAM     risperiDONE  2 mg Oral At Bedtime     pantoprazole  40 mg Oral Daily     multivitamin, therapeutic with minerals  1 tablet Oral Daily     folic acid  1 mg Oral Daily     rifaximin  550 mg Oral BID     spironolactone  50 mg Oral Daily     furosemide  20 mg Oral Daily          Allergies:     Allergies   Allergen Reactions     Acetaminophen      Ambien [Zolpidem Tartrate] Other (See Comments)     Sleep walks and eats     Ciprofloxacin Other (See Comments)     Seizure.     Citalopram Nausea and Vomiting          Labs:     No results found for this or any previous visit (from the past 24 hour(s)).       Psychiatric Examination:     Vitals:    10/23/17 1500 10/23/17 1644 10/26/17 0850 10/27/17 0846   BP:  92/55     BP Location:  Left arm Right arm Left arm   Pulse:  66     Resp:  20 16 16   Temp:  98.2  F (36.8  C) 96.8  F (36  C) 97.5  F (36.4  C)   TempSrc:  Oral Oral Oral   SpO2: 99%      Weight:                       Lying Orthostatic BP: 97/53         Sitting Orthostatic BP: 103/69         Standing Orthostatic BP:  (Ref.)       Appearance: awake, alert, adequately groomed, appeared older than stated  age and no apparent distress  Attitude: minimallyt cooperative and guarded  Eye Contact:  limited  Mood:  sad  Affect:  intensity is blunted  Speech:  clear, coherent and slow  Psychomotor Behavior:  no evidence of tardive dyskinesia, dystonia, or tics  Throught Process:  goal oriented  Associations:  no loose associations  Thought Content:  no evidence of suicidal ideation or homicidal ideation, auditory hallucinations are present, Visual hallucinations are present off and on, delusions are present.   Insight:  limited  Judgement:  limited  Oriented to:  time, person, and place  Attention Span and Concentration:  fair  Recent and Remote Memory:  fair         Precautions:     Behavioral Orders   Procedures     Code 2     Elopement precautions     Routine Programming     As clinically indicated     Seizure precautions     Status 15     Every 15 minutes.     Withdrawal precautions          Diagnoses:     Cognitive Disorder NOS.   Encephalopathy probably multifactorial such as chronic hepatic encephalopathy, organic brain damage due to encephalopathy, alcohol drinking, possibly underlying psychotic illness; possibly head injury contributes to patient's symptoms.   Alcohol use disorder.   amphetamine dependence vs abuse.          Plan:     No medication changes today. Patient's care was reviewed by IM, see discussion above.    Legal Status and Disposition:  --  Under full MICD commitment.   -- the patient appears to be at her current baseline, she is on waiting list for [facility]. Referred to several NH and memory care unit, was rejected by number of them. Per CTC, CM works on CADI waiver and will be interviewed by 2 [facility] places on Monday. See discussion above. Will continue to provide support and structure.

## 2017-10-27 NOTE — PROGRESS NOTES
Writer received call from Latricia bae from [facility]. She stated that she and Suleiman from [facility] will be here on Monday afternoon to interview pt to see which facility would be best suited to care for pt.

## 2017-10-27 NOTE — PLAN OF CARE
Problem: General Plan of Care (Inpatient Behavioral)  Goal: Team Discussion  Team Plan:   BEHAVIORAL TEAM DISCUSSION     Participants: MARYBEL Gonsalez, EVELYN Jimenes  Progress: pt at baseline  Continued Stay Criteria/Rationale: pt is committed  Medical/Physical: liver disease,   Precautions:   Behavioral Orders   Procedures     Code 2     Elopement precautions     Routine Programming       As clinically indicated     Seizure precautions     Status 15       Every 15 minutes.     Withdrawal precautions     Plan: transfer to care facility when accepted  Rationale for change in precautions or plan: no change        Problem: Patient Care Overview  Goal: Team Discussion  Team Plan:   BEHAVIORAL TEAM DISCUSSION     Participants: MARYBEL Gonsalez, EVELYN Jimenes  Progress: pt at baseline  Continued Stay Criteria/Rationale: pt is committed  Medical/Physical: liver disease,   Precautions:   Behavioral Orders   Procedures     Code 2     Elopement precautions     Routine Programming       As clinically indicated     Seizure precautions     Status 15       Every 15 minutes.     Withdrawal precautions     Plan: transfer to care facility when accepted  Rationale for change in precautions or plan: no change

## 2017-10-28 PROCEDURE — 25000132 ZZH RX MED GY IP 250 OP 250 PS 637: Performed by: NURSE PRACTITIONER

## 2017-10-28 PROCEDURE — 25000132 ZZH RX MED GY IP 250 OP 250 PS 637: Performed by: PSYCHIATRY & NEUROLOGY

## 2017-10-28 PROCEDURE — 25000132 ZZH RX MED GY IP 250 OP 250 PS 637: Performed by: EMERGENCY MEDICINE

## 2017-10-28 PROCEDURE — 12400007 ZZH R&B MH INTERMEDIATE UMMC

## 2017-10-28 PROCEDURE — 25000132 ZZH RX MED GY IP 250 OP 250 PS 637: Performed by: HOSPITALIST

## 2017-10-28 RX ADMIN — SPIRONOLACTONE 50 MG: 50 TABLET ORAL at 08:30

## 2017-10-28 RX ADMIN — GABAPENTIN 100 MG: 100 CAPSULE ORAL at 14:21

## 2017-10-28 RX ADMIN — RISPERIDONE 1 MG: 1 TABLET ORAL at 08:30

## 2017-10-28 RX ADMIN — RIFAXIMIN 550 MG: 550 TABLET ORAL at 08:30

## 2017-10-28 RX ADMIN — MULTIPLE VITAMINS W/ MINERALS TAB 1 TABLET: TAB at 08:30

## 2017-10-28 RX ADMIN — FERROUS GLUCONATE 324 MG: 324 TABLET ORAL at 08:32

## 2017-10-28 RX ADMIN — RISPERIDONE 2 MG: 2 TABLET ORAL at 21:13

## 2017-10-28 RX ADMIN — LACTULOSE 30 G: 10 POWDER, FOR SOLUTION ORAL at 20:02

## 2017-10-28 RX ADMIN — FOLIC ACID 1 MG: 1 TABLET ORAL at 08:30

## 2017-10-28 RX ADMIN — LACTULOSE 30 G: 10 POWDER, FOR SOLUTION ORAL at 14:21

## 2017-10-28 RX ADMIN — RIFAXIMIN 550 MG: 550 TABLET ORAL at 20:02

## 2017-10-28 RX ADMIN — PANTOPRAZOLE SODIUM 40 MG: 40 TABLET, DELAYED RELEASE ORAL at 08:29

## 2017-10-28 RX ADMIN — Medication 20 MG: at 08:30

## 2017-10-28 RX ADMIN — GABAPENTIN 100 MG: 100 CAPSULE ORAL at 20:02

## 2017-10-28 RX ADMIN — GABAPENTIN 100 MG: 100 CAPSULE ORAL at 08:30

## 2017-10-28 RX ADMIN — LACTULOSE 30 G: 10 POWDER, FOR SOLUTION ORAL at 08:31

## 2017-10-28 RX ADMIN — HALOPERIDOL 2.5 MG: 1 TABLET ORAL at 20:02

## 2017-10-28 RX ADMIN — HALOPERIDOL 2.5 MG: 1 TABLET ORAL at 08:30

## 2017-10-28 ASSESSMENT — ACTIVITIES OF DAILY LIVING (ADL)
LAUNDRY: WITH SUPERVISION
ORAL_HYGIENE: INDEPENDENT
DRESS: INDEPENDENT
GROOMING: INDEPENDENT

## 2017-10-28 NOTE — PLAN OF CARE
Problem: Psychotic Symptoms  Goal: Psychotic Symptoms  Signs and symptoms of listed problems will be absent or manageable.   Outcome: Improving  Pt up for breakfast and lunch, is med compliant. Pt made no attempts to leave unit, did not ask about leaving. Pt had fingernails and toe nails trimmed and was very happy about that. Pt resting in afternoon, no irritability or behavioral issues, thinking intact.

## 2017-10-28 NOTE — PROGRESS NOTES
"   10/27/17 2300   Behavioral Health   Hallucinations denies / not responding to hallucinations   Thinking poor concentration   Orientation person: oriented;place: oriented   Memory baseline memory   Insight poor   Judgement impaired   Eye Contact at examiner   Affect full range affect   Mood mood is calm   Physical Appearance/Attire disheveled   Hygiene neglected grooming - unclean body, hair, teeth   Suicidality other (see comments)   Self Injury other (see comment)   Elopement (Pt is on elopment precaution )   Activity other (see comment)  (visible in the miliue )   Speech clear;coherent   Activities of Daily Living   Hygiene/Grooming independent   Oral Hygiene independent   Dress independent   Laundry with supervision   Room Organization independent       Pt denied SI and SIB.  Pt reported feeling \" good.\"  Pt seems calm, ate supper, did not attend group, but was visible in the milieu and socialized with others.  Pt daily goal \" to take a bath but it's so cold in this room any my hair will be crazy cold.\"  Pt goals after discharge \" just doing what the same thing I'm doing now nothing relaxing being with my family and with my kids.\"  Pt is independent with ADL's.  Pt reported no nutritional concerns.  Pt want visitors.    "

## 2017-10-28 NOTE — PROGRESS NOTES
"Pt was tearful at the beginning of the shift, but states that she is just frustrated with the slow process of her discharge. When asked if there is anything else this writer could do for her, pt stated, \"Let me leave.\" Writer stated that this wasn't possible, but pt did not escalate her behaviors.    Pt was pleasant on the unit, but isolated to her room, coming out for dinner only. She took all of her medications without issue. She was oriented x4, but still appears to be grabbing invisible objects out from the air.    No additional concerns at this time. Will continue to monitor and assess.  "

## 2017-10-29 PROCEDURE — 25000132 ZZH RX MED GY IP 250 OP 250 PS 637: Performed by: PSYCHIATRY & NEUROLOGY

## 2017-10-29 PROCEDURE — 25000132 ZZH RX MED GY IP 250 OP 250 PS 637: Performed by: HOSPITALIST

## 2017-10-29 PROCEDURE — 12400007 ZZH R&B MH INTERMEDIATE UMMC

## 2017-10-29 PROCEDURE — 25000132 ZZH RX MED GY IP 250 OP 250 PS 637: Performed by: NURSE PRACTITIONER

## 2017-10-29 PROCEDURE — 25000132 ZZH RX MED GY IP 250 OP 250 PS 637: Performed by: EMERGENCY MEDICINE

## 2017-10-29 RX ADMIN — FOLIC ACID 1 MG: 1 TABLET ORAL at 08:38

## 2017-10-29 RX ADMIN — LACTULOSE 30 G: 10 POWDER, FOR SOLUTION ORAL at 14:31

## 2017-10-29 RX ADMIN — HALOPERIDOL 2.5 MG: 1 TABLET ORAL at 20:13

## 2017-10-29 RX ADMIN — RIFAXIMIN 550 MG: 550 TABLET ORAL at 20:13

## 2017-10-29 RX ADMIN — RIFAXIMIN 550 MG: 550 TABLET ORAL at 08:37

## 2017-10-29 RX ADMIN — PANTOPRAZOLE SODIUM 40 MG: 40 TABLET, DELAYED RELEASE ORAL at 08:37

## 2017-10-29 RX ADMIN — SPIRONOLACTONE 50 MG: 50 TABLET ORAL at 08:38

## 2017-10-29 RX ADMIN — HALOPERIDOL 2.5 MG: 1 TABLET ORAL at 08:38

## 2017-10-29 RX ADMIN — LACTULOSE 30 G: 10 POWDER, FOR SOLUTION ORAL at 20:13

## 2017-10-29 RX ADMIN — RISPERIDONE 2 MG: 2 TABLET ORAL at 21:24

## 2017-10-29 RX ADMIN — FERROUS GLUCONATE 324 MG: 324 TABLET ORAL at 08:38

## 2017-10-29 RX ADMIN — LACTULOSE 30 G: 10 POWDER, FOR SOLUTION ORAL at 08:38

## 2017-10-29 RX ADMIN — Medication 20 MG: at 08:38

## 2017-10-29 RX ADMIN — GABAPENTIN 100 MG: 100 CAPSULE ORAL at 14:31

## 2017-10-29 RX ADMIN — GABAPENTIN 100 MG: 100 CAPSULE ORAL at 20:13

## 2017-10-29 RX ADMIN — GABAPENTIN 100 MG: 100 CAPSULE ORAL at 08:37

## 2017-10-29 RX ADMIN — RISPERIDONE 1 MG: 1 TABLET ORAL at 08:37

## 2017-10-29 RX ADMIN — MULTIPLE VITAMINS W/ MINERALS TAB 1 TABLET: TAB at 08:38

## 2017-10-29 ASSESSMENT — ACTIVITIES OF DAILY LIVING (ADL)
DRESS: SCRUBS (BEHAVIORAL HEALTH);INDEPENDENT
ORAL_HYGIENE: INDEPENDENT
DRESS: INDEPENDENT
LAUNDRY: WITH SUPERVISION
GROOMING: SHOWER;INDEPENDENT
ORAL_HYGIENE: INDEPENDENT
HYGIENE/GROOMING: PROMPTS

## 2017-10-29 NOTE — PROGRESS NOTES
"   10/28/17 2200   Behavioral Health   Hallucinations denies / not responding to hallucinations   Thinking intact   Orientation person: oriented;place: oriented   Memory baseline memory   Insight poor   Judgement impaired   Eye Contact at examiner   Affect full range affect   Mood mood is calm   Physical Appearance/Attire disheveled   Hygiene neglected grooming - unclean body, hair, teeth   Suicidality other (see comments)  (denies)   Self Injury other (see comment)  (denies )   Elopement Loitering near exit doors   Activity other (see comment)  (visible in the milieu and socialized with others)   Speech clear;coherent   Activities of Daily Living   Hygiene/Grooming independent   Oral Hygiene independent   Dress independent   Laundry with supervision   Room Organization independent       Pt denied SI and sib.  Pt reported feeling \"ok.\"  Pt seems calm, ate supper, did not attend group, but was visible in the milieu and socialized with others. Pt daily goal \" nothing.\"  Pt goal after discharge \" stay at home.\"  Pt is independent with ADL's.  Pt reported no nutritional concerns.  Pt wants visitors.    "

## 2017-10-29 NOTE — PROGRESS NOTES
Patient spent the majority of the shift in her room relaxing. Came out for meals, right after each meal patient went back to her room. Pt showered with zero prompt from staff. Patient reports feeling frustrated about being here and feeling a bit sad. States she has been in the hospital too long and hope to go home soon. Patient was appropriate, delightful and very engaged during conversations with staff. Denied SI/SIB and hallucinations.      10/29/17 1400   Behavioral Health   Hallucinations denies / not responding to hallucinations   Thinking intact   Orientation person: oriented;place: oriented;date: oriented;time: oriented   Memory baseline memory   Insight poor   Judgement impaired   Eye Contact at examiner   Affect blunted, flat;sad   Mood mood is calm   Physical Appearance/Attire neat   Hygiene well groomed   Suicidality other (see comments)  (denied )   Self Injury other (see comment)  (denied )   Elopement Statements about wanting to leave   Activity isolative;withdrawn   Speech clear;coherent   Medication Sensitivity no stated side effects   Psychomotor / Gait slow;balanced   Psycho Education   Type of Intervention 1:1 intervention   Response participates, initiates socially appropriate   Hours 0.5   Treatment Detail (check in )   Activities of Daily Living   Hygiene/Grooming shower;independent   Oral Hygiene independent   Dress independent   Laundry with supervision   Room Organization independent   Groups   Details (isolative )   Behavioral Health Interventions   Psychotic Symptoms maintain safety precautions;encourage nutrition and hydration;encourage participation / independence with adls;provide emotional support;establish therapeutic relationship;assist with developing & utilizing healthy coping strategies;build upon strengths   Social and Therapeutic Interventions (Psychotic Symptoms) encourage socialization with peers;encourage participation in therapeutic groups and milieu activities

## 2017-10-29 NOTE — PROGRESS NOTES
Pt took all HS medications without issue. She ate dinner in the milieu, and was visible in the lounge on and off throughout the shift. Pt was pleasant, at times displayed a full range of affect, and conversed appropriately to the situation. She did not show signs of confusion, or disorientation.    Pt was encouraged to spend time in the milieu, which she was agreeable to.    No additional concerns at this time. Will continue to monitor and assess.

## 2017-10-30 PROCEDURE — 12400007 ZZH R&B MH INTERMEDIATE UMMC

## 2017-10-30 PROCEDURE — 25000132 ZZH RX MED GY IP 250 OP 250 PS 637: Performed by: PSYCHIATRY & NEUROLOGY

## 2017-10-30 PROCEDURE — 25000132 ZZH RX MED GY IP 250 OP 250 PS 637: Performed by: EMERGENCY MEDICINE

## 2017-10-30 PROCEDURE — 25000132 ZZH RX MED GY IP 250 OP 250 PS 637: Performed by: NURSE PRACTITIONER

## 2017-10-30 PROCEDURE — 99207 ZZC CDG-MDM COMPONENT: MEETS LOW - DOWN CODED: CPT | Performed by: PSYCHIATRY & NEUROLOGY

## 2017-10-30 PROCEDURE — 99231 SBSQ HOSP IP/OBS SF/LOW 25: CPT | Performed by: PSYCHIATRY & NEUROLOGY

## 2017-10-30 PROCEDURE — 25000132 ZZH RX MED GY IP 250 OP 250 PS 637: Performed by: HOSPITALIST

## 2017-10-30 RX ADMIN — RISPERIDONE 1 MG: 1 TABLET ORAL at 08:33

## 2017-10-30 RX ADMIN — PANTOPRAZOLE SODIUM 40 MG: 40 TABLET, DELAYED RELEASE ORAL at 08:34

## 2017-10-30 RX ADMIN — FERROUS GLUCONATE 324 MG: 324 TABLET ORAL at 08:33

## 2017-10-30 RX ADMIN — LACTULOSE 30 G: 10 POWDER, FOR SOLUTION ORAL at 08:33

## 2017-10-30 RX ADMIN — Medication 20 MG: at 08:34

## 2017-10-30 RX ADMIN — LACTULOSE 30 G: 10 POWDER, FOR SOLUTION ORAL at 20:32

## 2017-10-30 RX ADMIN — RISPERIDONE 2 MG: 2 TABLET ORAL at 21:26

## 2017-10-30 RX ADMIN — GABAPENTIN 100 MG: 100 CAPSULE ORAL at 14:15

## 2017-10-30 RX ADMIN — RIFAXIMIN 550 MG: 550 TABLET ORAL at 08:33

## 2017-10-30 RX ADMIN — GABAPENTIN 100 MG: 100 CAPSULE ORAL at 08:33

## 2017-10-30 RX ADMIN — GABAPENTIN 100 MG: 100 CAPSULE ORAL at 20:32

## 2017-10-30 RX ADMIN — HALOPERIDOL 2.5 MG: 1 TABLET ORAL at 08:34

## 2017-10-30 RX ADMIN — LACTULOSE 30 G: 10 POWDER, FOR SOLUTION ORAL at 14:15

## 2017-10-30 RX ADMIN — SPIRONOLACTONE 50 MG: 50 TABLET ORAL at 08:33

## 2017-10-30 RX ADMIN — HALOPERIDOL 2.5 MG: 1 TABLET ORAL at 20:32

## 2017-10-30 RX ADMIN — RIFAXIMIN 550 MG: 550 TABLET ORAL at 20:32

## 2017-10-30 RX ADMIN — MULTIPLE VITAMINS W/ MINERALS TAB 1 TABLET: TAB at 08:34

## 2017-10-30 RX ADMIN — FOLIC ACID 1 MG: 1 TABLET ORAL at 08:33

## 2017-10-30 ASSESSMENT — ACTIVITIES OF DAILY LIVING (ADL)
DRESS: SCRUBS (BEHAVIORAL HEALTH);PROMPTS
ORAL_HYGIENE: PROMPTS
GROOMING: PROMPTS
HYGIENE/GROOMING: PROMPTS
ORAL_HYGIENE: PROMPTS
DRESS: SCRUBS (BEHAVIORAL HEALTH)

## 2017-10-30 NOTE — PROGRESS NOTES
Pt has mostly been isolative and withdrawn this shift. At beginning of shift, pt was loitering next to door. When trying to redirect her, pt became labile and saying she has a d/c order to leave and asking us who pays for our paychecks and if it is her . She retired to bed the rest of shift, not even getting out for dinner but asking her tray to brought to her. Pt was seen staring out into space and fixating in the air for quite a bit.          10/29/17 2040   Behavioral Health   Hallucinations appears responding;auditory;visual   Thinking distractable   Orientation place: oriented;person: oriented   Memory baseline memory   Insight poor   Judgement impaired   Eye Contact at examiner   Affect sad;blunted, flat   Mood depressed;labile   Physical Appearance/Attire appears stated age;attire appropriate to age and situation;neat   Hygiene neglected grooming - unclean body, hair, teeth   Suicidality other (see comments)  (none stated/observed)   Self Injury other (see comment)  (none)   Elopement Loitering near exit doors;Statements about wanting to leave;Hallucinations directing behavior   Activity withdrawn;isolative   Speech coherent;clear   Medication Sensitivity no stated side effects;no observed side effects   Psychomotor / Gait slow;steady   Activities of Daily Living   Hygiene/Grooming prompts   Oral Hygiene independent   Dress scrubs (behavioral health);independent   Room Organization independent

## 2017-10-30 NOTE — PROGRESS NOTES
"Pt was sitting in a chair at the front door at the beginning of the shift. She was tearful, and stating that \"I'm trying to leave. I have discharge paperwork.\" Writer shared with pt that she would be meeting with outpatient facilities on Monday, and pt responded \"Just get out of my face.\" Pt made no additional attempts to leave the unit.    Pt isolated to her room for the shift, and ate dinner in her room. When writer approached pt for HS medications, pt was visibly responding to internal stimuli, by moving her hands, and picking invisible objects out of the air. In addition, pt stated, \"We're in [place]. I need to get my knees done.\" When writer attempted to reorient pt, she stated, \"No! We are not at [place].\" Will continue to reorient pt to time and situation.    Pt took medications, but only drank 1/2 of her HS lactulose.     No additional concerns at this time. Will continue to monitor and assess.      "

## 2017-10-30 NOTE — PROGRESS NOTES
[facility], [place]   Psychiatric Progress Note        Interim History:     The patient's care was discussed with the treatment team during the daily team meeting and/or staff's chart notes were reviewed.  Staff report patient has spent more time inside of her room. She appeared to be despondent and sad. Over the weekend she was again reported responding to internal stimuli, picking things from the air, talking to someone. Those symptoms are chronic, there were no changes in them over a long period of time. She was polite during today's visit with me, said she remembered that today she was supposed to be interviewed by staff from [facility] and promised to talk to them. Sounded more positive, hoped she would be accepted to one of those places.       Medications:       gabapentin  100 mg Oral TID     ferrous gluconate  324 mg Oral Daily with breakfast     lactulose  30 g Oral TID     haloperidol  2.5 mg Oral BID     risperiDONE  1 mg Oral QAM     risperiDONE  2 mg Oral At Bedtime     pantoprazole  40 mg Oral Daily     multivitamin, therapeutic with minerals  1 tablet Oral Daily     folic acid  1 mg Oral Daily     rifaximin  550 mg Oral BID     spironolactone  50 mg Oral Daily     furosemide  20 mg Oral Daily          Allergies:     Allergies   Allergen Reactions     Acetaminophen      Ambien [Zolpidem Tartrate] Other (See Comments)     Sleep walks and eats     Ciprofloxacin Other (See Comments)     Seizure.     Citalopram Nausea and Vomiting          Labs:     No results found for this or any previous visit (from the past 24 hour(s)).       Psychiatric Examination:     Vitals:    10/27/17 2009 10/28/17 0827 10/29/17 0852 10/30/17 0834   BP: 103/66 113/70     BP Location:   Right arm    Pulse: 80 74     Resp:       Temp:  98  F (36.7  C) 97.6  F (36.4  C) (P) 97.3  F (36.3  C)   TempSrc:   Oral (P) Oral   SpO2:       Weight:   100 kg (220 lb 8 oz)                       Lying Orthostatic BP: 97/53         Sitting Orthostatic BP: (P) 112/62         Standing Orthostatic BP: (P)  (Refused)       Appearance: awake, alert, adequately groomed, appeared older than stated age and no apparent distress  Attitude: more cooperative and guarded  Eye Contact:  limited  Mood:  sad  Affect:  intensity is blunted  Speech:  clear, coherent and slow  Psychomotor Behavior:  no evidence of tardive dyskinesia, dystonia, or tics  Throught Process:  goal oriented  Associations:  no loose associations  Thought Content:  no evidence of suicidal ideation or homicidal ideation, auditory hallucinations are present, Visual hallucinations are present off and on, delusions are present.   Insight:  limited  Judgement:  limited  Oriented to:  time, person, and place  Attention Span and Concentration:  fair  Recent and Remote Memory:  fair         Precautions:     Behavioral Orders   Procedures     Code 2     Elopement precautions     Routine Programming     As clinically indicated     Seizure precautions     Status 15     Every 15 minutes.     Withdrawal precautions          Diagnoses:     Cognitive Disorder NOS.   Encephalopathy probably multifactorial such as chronic hepatic encephalopathy, organic brain damage due to encephalopathy, alcohol drinking, possibly underlying psychotic illness; possibly head injury contributes to patient's symptoms.   Alcohol use disorder.   amphetamine dependence vs abuse.          Plan:     No medication changes today  Legal Status and Disposition:  --  Under full MICD commitment.   -- the patient appears to be at her current baseline, she is on waiting list for [facility]. Referred to several NH and memory care unit, was rejected by number of them. Per CTC, [facility] works on CAD[employer] waiver and will be interviewed by 2 SOCS places on Monday. See discussion above. Will continue to provide support and structure.

## 2017-10-30 NOTE — PLAN OF CARE
"Problem: Psychotic Symptoms  Goal: Psychotic Symptoms  Signs and symptoms of listed problems will be absent or manageable.   Outcome: No Change  Mildred declined to have a 1:1 today.  However, she was fully oriented and was polite and cooperative.  She denied being suicidal.  She says she is tired which is a frequent complaint.  She drank her AM lactulose.    1430 - Mildred did not drink her afternoon lactulose.  She said she was \"having surgery and shouldn't have it.\"      "

## 2017-10-31 PROCEDURE — 25000132 ZZH RX MED GY IP 250 OP 250 PS 637: Performed by: NURSE PRACTITIONER

## 2017-10-31 PROCEDURE — 25000132 ZZH RX MED GY IP 250 OP 250 PS 637: Performed by: PSYCHIATRY & NEUROLOGY

## 2017-10-31 PROCEDURE — 25000132 ZZH RX MED GY IP 250 OP 250 PS 637: Performed by: HOSPITALIST

## 2017-10-31 PROCEDURE — 12400007 ZZH R&B MH INTERMEDIATE UMMC

## 2017-10-31 PROCEDURE — 25000132 ZZH RX MED GY IP 250 OP 250 PS 637: Performed by: EMERGENCY MEDICINE

## 2017-10-31 RX ADMIN — LACTULOSE 30 G: 10 POWDER, FOR SOLUTION ORAL at 13:53

## 2017-10-31 RX ADMIN — FERROUS GLUCONATE 324 MG: 324 TABLET ORAL at 08:49

## 2017-10-31 RX ADMIN — HALOPERIDOL 2.5 MG: 1 TABLET ORAL at 20:41

## 2017-10-31 RX ADMIN — RISPERIDONE 1 MG: 1 TABLET ORAL at 08:48

## 2017-10-31 RX ADMIN — RIFAXIMIN 550 MG: 550 TABLET ORAL at 20:39

## 2017-10-31 RX ADMIN — FOLIC ACID 1 MG: 1 TABLET ORAL at 08:48

## 2017-10-31 RX ADMIN — GABAPENTIN 100 MG: 100 CAPSULE ORAL at 20:41

## 2017-10-31 RX ADMIN — MULTIPLE VITAMINS W/ MINERALS TAB 1 TABLET: TAB at 08:48

## 2017-10-31 RX ADMIN — GABAPENTIN 100 MG: 100 CAPSULE ORAL at 08:48

## 2017-10-31 RX ADMIN — LACTULOSE 30 G: 10 POWDER, FOR SOLUTION ORAL at 08:48

## 2017-10-31 RX ADMIN — RIFAXIMIN 550 MG: 550 TABLET ORAL at 08:48

## 2017-10-31 RX ADMIN — Medication 20 MG: at 08:49

## 2017-10-31 RX ADMIN — SPIRONOLACTONE 50 MG: 50 TABLET ORAL at 08:48

## 2017-10-31 RX ADMIN — PANTOPRAZOLE SODIUM 40 MG: 40 TABLET, DELAYED RELEASE ORAL at 08:49

## 2017-10-31 RX ADMIN — GABAPENTIN 100 MG: 100 CAPSULE ORAL at 13:53

## 2017-10-31 RX ADMIN — HALOPERIDOL 2.5 MG: 1 TABLET ORAL at 08:48

## 2017-10-31 RX ADMIN — RISPERIDONE 2 MG: 2 TABLET ORAL at 20:40

## 2017-10-31 ASSESSMENT — ACTIVITIES OF DAILY LIVING (ADL)
LAUNDRY: WITH SUPERVISION
LAUNDRY: WITH SUPERVISION
GROOMING: PROMPTS
DRESS: PROMPTS
DRESS: SCRUBS (BEHAVIORAL HEALTH)
ORAL_HYGIENE: PROMPTS
ORAL_HYGIENE: PROMPTS
GROOMING: PROMPTS

## 2017-10-31 NOTE — PROGRESS NOTES
Pt more isolative and withdrawn this shift. Pt mostly spent time in room, only came out for dinner. Otherwise, observed no loitering near doors or labile outbursts.        10/30/17 3986   Behavioral Health   Hallucinations appears responding   Thinking distractable   Orientation person: oriented   Memory baseline memory   Insight poor;denial of illness   Judgement impaired   Eye Contact at examiner   Affect blunted, flat;sad   Mood depressed;mood is calm   Physical Appearance/Attire untidy;disheveled   Hygiene neglected grooming - unclean body, hair, teeth   Elopement (none this shift)   Activity withdrawn;isolative   Speech coherent;clear   Medication Sensitivity no observed side effects;no stated side effects   Psychomotor / Gait balanced;steady   Activities of Daily Living   Hygiene/Grooming prompts   Oral Hygiene prompts   Dress scrubs (behavioral health);prompts   Room Organization prompts

## 2017-10-31 NOTE — PROGRESS NOTES
10/31/17 1223   Behavioral Health   Hallucinations appears responding   Thinking poor concentration   Orientation person: oriented   Memory baseline memory   Insight denial of illness   Judgement impaired   Eye Contact at examiner   Affect blunted, flat   Mood labile   Physical Appearance/Attire untidy;disheveled   Hygiene neglected grooming - unclean body, hair, teeth   Suicidality (denies)   Self Injury (denies)   Elopement (none shown)   Activity isolative;withdrawn   Speech clear;coherent   Medication Sensitivity no stated side effects   Psychomotor / Gait balanced   Psycho Education   Type of Intervention 1:1 intervention   Response participates, initiates socially appropriate   Hours 0.5   Activities of Daily Living   Hygiene/Grooming prompts   Oral Hygiene prompts   Dress scrubs (behavioral health)   Laundry with supervision   Room Organization prompts   pt isolative and withdrawn in room all of shift. Pt only out for meals and breakfast tray was brought to her room as pt states she was in knee/leg pain. Pt responding in one word answers and didn't have much to explain/say. Pt stated the [employer] went well and she is unsure where she is going to go after the hospital or didn't know where she wanted to go. Pt labile mood and denies any mental health problems.

## 2017-10-31 NOTE — PROGRESS NOTES
Yoon Braun, MELISSA  and SEBAS Silva came to complete assessment. Yoon will have her paperwork done on Friday. Then the [facility] will work with [facility] for admission.

## 2017-11-01 PROCEDURE — 25000132 ZZH RX MED GY IP 250 OP 250 PS 637: Performed by: NURSE PRACTITIONER

## 2017-11-01 PROCEDURE — 25000132 ZZH RX MED GY IP 250 OP 250 PS 637: Performed by: PSYCHIATRY & NEUROLOGY

## 2017-11-01 PROCEDURE — 99207 ZZC CDG-MDM COMPONENT: MEETS LOW - DOWN CODED: CPT | Performed by: PSYCHIATRY & NEUROLOGY

## 2017-11-01 PROCEDURE — 25000132 ZZH RX MED GY IP 250 OP 250 PS 637: Performed by: EMERGENCY MEDICINE

## 2017-11-01 PROCEDURE — 12400007 ZZH R&B MH INTERMEDIATE UMMC

## 2017-11-01 PROCEDURE — 25000132 ZZH RX MED GY IP 250 OP 250 PS 637: Performed by: HOSPITALIST

## 2017-11-01 PROCEDURE — 99231 SBSQ HOSP IP/OBS SF/LOW 25: CPT | Performed by: PSYCHIATRY & NEUROLOGY

## 2017-11-01 RX ADMIN — HALOPERIDOL 2.5 MG: 1 TABLET ORAL at 20:26

## 2017-11-01 RX ADMIN — RIFAXIMIN 550 MG: 550 TABLET ORAL at 20:26

## 2017-11-01 RX ADMIN — GABAPENTIN 100 MG: 100 CAPSULE ORAL at 20:26

## 2017-11-01 RX ADMIN — SPIRONOLACTONE 50 MG: 50 TABLET ORAL at 08:52

## 2017-11-01 RX ADMIN — LACTULOSE 30 G: 10 POWDER, FOR SOLUTION ORAL at 20:26

## 2017-11-01 RX ADMIN — LACTULOSE 30 G: 10 POWDER, FOR SOLUTION ORAL at 08:51

## 2017-11-01 RX ADMIN — GABAPENTIN 100 MG: 100 CAPSULE ORAL at 14:05

## 2017-11-01 RX ADMIN — RISPERIDONE 2 MG: 2 TABLET ORAL at 21:07

## 2017-11-01 RX ADMIN — RIFAXIMIN 550 MG: 550 TABLET ORAL at 08:51

## 2017-11-01 RX ADMIN — RISPERIDONE 1 MG: 1 TABLET ORAL at 08:52

## 2017-11-01 RX ADMIN — HALOPERIDOL 2.5 MG: 1 TABLET ORAL at 08:51

## 2017-11-01 RX ADMIN — MULTIPLE VITAMINS W/ MINERALS TAB 1 TABLET: TAB at 08:51

## 2017-11-01 RX ADMIN — GABAPENTIN 100 MG: 100 CAPSULE ORAL at 08:51

## 2017-11-01 RX ADMIN — PANTOPRAZOLE SODIUM 40 MG: 40 TABLET, DELAYED RELEASE ORAL at 08:51

## 2017-11-01 RX ADMIN — FOLIC ACID 1 MG: 1 TABLET ORAL at 08:52

## 2017-11-01 RX ADMIN — Medication 20 MG: at 08:51

## 2017-11-01 RX ADMIN — LACTULOSE 30 G: 10 POWDER, FOR SOLUTION ORAL at 14:05

## 2017-11-01 RX ADMIN — FERROUS GLUCONATE 324 MG: 324 TABLET ORAL at 08:51

## 2017-11-01 ASSESSMENT — ACTIVITIES OF DAILY LIVING (ADL)
GROOMING: INDEPENDENT;PROMPTS
LAUNDRY: WITH SUPERVISION
DRESS: SCRUBS (BEHAVIORAL HEALTH);INDEPENDENT
ORAL_HYGIENE: INDEPENDENT;PROMPTS

## 2017-11-01 NOTE — PROGRESS NOTES
11/01/17 1300   Behavioral Health   Hallucinations appears responding   Thinking poor concentration;distractable   Orientation time: oriented;date: oriented;place: oriented;person: oriented   Memory baseline memory   Insight poor   Judgement impaired   Eye Contact at examiner   Affect blunted, flat;angry   Mood depressed;mood is calm   Physical Appearance/Attire appears stated age   Hygiene well groomed   Suicidality other (see comments)  (Denies)   Self Injury other (see comment)  (Denies)   Activity isolative;withdrawn   Speech coherent;clear   Medication Sensitivity no observed side effects   Psycho Education   Type of Intervention 1:1 intervention   Response participates, initiates socially appropriate   Hours 0.5   Activities of Daily Living   Hygiene/Grooming independent;prompts   Oral Hygiene independent;prompts   Dress scrubs (behavioral health);independent   Laundry with supervision   Room Organization independent   Behavioral Health Interventions   Psychotic Symptoms maintain safety precautions;assist patient in developing safety plan;assist patient in following safety plan;provide emotional support;build upon strengths   Social and Therapeutic Interventions (Psychotic Symptoms) encourage socialization with peers;encourage participation in therapeutic groups and milieu activities   Pt spent most of her time in her room and comes out for meals or making requests. She appears to be responding to internal stimuli. She denies suicidal ideation or hearing voices.

## 2017-11-01 NOTE — PLAN OF CARE
Problem: General Plan of Care (Inpatient Behavioral)  Goal: Individualization/Patient Specific Goal (IP Behavioral)  PSYCHOSIS CAREPLAN GOALS    1) Patient will be oriented x4  2) Patient will exhibit organized thought and speech  3) Patient will be absent of auditory and visual hallucinations  4) Verbalize reduction of fear or anxiety to a manageable level.  5) Patient will report improved sleep and feeling rested upon waking.  6) Patient will verbalize their current medication; including dosage and time it is due.  7) Demonstrate participation in unit programming  8) Patient will demonstrate daily independence with ADL s.   9) Patient will verbalize persons outside the hospital they can call if needing assistance  10) Patient will have adequate daily oral intake.   Illness Management Recovery model:  Wellness Strategies.     Patient identified the following actions/activities they can do to stay well.     12.3.

## 2017-11-01 NOTE — PLAN OF CARE
Problem: Psychotic Symptoms  Goal: Psychotic Symptoms  Signs and symptoms of listed problems will be absent or manageable.   Outcome: No Change  Pt remained in her room this pm,c/o that she could not walk and noone will give her anything for pain,she was able to walk to br and did eat her evening meal,she denies all mh sx but has appeared to be responding to internal stimuli at times,disheveled,appears depressed

## 2017-11-02 PROCEDURE — 12400007 ZZH R&B MH INTERMEDIATE UMMC

## 2017-11-02 PROCEDURE — 25000132 ZZH RX MED GY IP 250 OP 250 PS 637: Performed by: PSYCHIATRY & NEUROLOGY

## 2017-11-02 PROCEDURE — 25000132 ZZH RX MED GY IP 250 OP 250 PS 637: Performed by: HOSPITALIST

## 2017-11-02 PROCEDURE — 25000132 ZZH RX MED GY IP 250 OP 250 PS 637: Performed by: NURSE PRACTITIONER

## 2017-11-02 PROCEDURE — 99232 SBSQ HOSP IP/OBS MODERATE 35: CPT | Performed by: PSYCHIATRY & NEUROLOGY

## 2017-11-02 PROCEDURE — 25000132 ZZH RX MED GY IP 250 OP 250 PS 637: Performed by: EMERGENCY MEDICINE

## 2017-11-02 RX ADMIN — Medication 20 MG: at 09:01

## 2017-11-02 RX ADMIN — SPIRONOLACTONE 50 MG: 50 TABLET ORAL at 09:00

## 2017-11-02 RX ADMIN — GABAPENTIN 100 MG: 100 CAPSULE ORAL at 09:01

## 2017-11-02 RX ADMIN — FERROUS GLUCONATE 324 MG: 324 TABLET ORAL at 09:01

## 2017-11-02 RX ADMIN — GABAPENTIN 100 MG: 100 CAPSULE ORAL at 13:26

## 2017-11-02 RX ADMIN — HALOPERIDOL 2.5 MG: 1 TABLET ORAL at 09:01

## 2017-11-02 RX ADMIN — LACTULOSE 30 G: 10 POWDER, FOR SOLUTION ORAL at 20:26

## 2017-11-02 RX ADMIN — RISPERIDONE 2 MG: 2 TABLET ORAL at 21:11

## 2017-11-02 RX ADMIN — MULTIPLE VITAMINS W/ MINERALS TAB 1 TABLET: TAB at 09:01

## 2017-11-02 RX ADMIN — RIFAXIMIN 550 MG: 550 TABLET ORAL at 20:27

## 2017-11-02 RX ADMIN — HALOPERIDOL 2.5 MG: 1 TABLET ORAL at 20:27

## 2017-11-02 RX ADMIN — GABAPENTIN 100 MG: 100 CAPSULE ORAL at 20:27

## 2017-11-02 RX ADMIN — PANTOPRAZOLE SODIUM 40 MG: 40 TABLET, DELAYED RELEASE ORAL at 09:01

## 2017-11-02 RX ADMIN — RISPERIDONE 1 MG: 1 TABLET ORAL at 09:01

## 2017-11-02 RX ADMIN — FOLIC ACID 1 MG: 1 TABLET ORAL at 09:01

## 2017-11-02 RX ADMIN — RIFAXIMIN 550 MG: 550 TABLET ORAL at 09:00

## 2017-11-02 RX ADMIN — LACTULOSE 10 G: 10 POWDER, FOR SOLUTION ORAL at 09:00

## 2017-11-02 ASSESSMENT — ACTIVITIES OF DAILY LIVING (ADL)
ORAL_HYGIENE: PROMPTS
DRESS: SCRUBS (BEHAVIORAL HEALTH)
HYGIENE/GROOMING: PROMPTS
LAUNDRY: WITH SUPERVISION
ORAL_HYGIENE: INDEPENDENT
DRESS: SCRUBS (BEHAVIORAL HEALTH);PROMPTS
GROOMING: INDEPENDENT

## 2017-11-02 NOTE — PROGRESS NOTES
"    [facility], [place]   Psychiatric Progress Note        Interim History:     The patient's care was discussed with the treatment team during the daily team meeting and/or staff's chart notes were reviewed.  Staff report patient has spent more time inside of her room. She appeared to be sad and irritable, but continues to refuse antidepressants. Takes Lactulose off and on. Uses walker to ambulate.  Per CTC: MELISSA Becerril  and SEBAS Silva came to complete assessment. Yoon will have her paperwork done on Friday. Then the [facility] will work with [facility] for admission.\"       Medications:       gabapentin  100 mg Oral TID     ferrous gluconate  324 mg Oral Daily with breakfast     lactulose  30 g Oral TID     haloperidol  2.5 mg Oral BID     risperiDONE  1 mg Oral QAM     risperiDONE  2 mg Oral At Bedtime     pantoprazole  40 mg Oral Daily     multivitamin, therapeutic with minerals  1 tablet Oral Daily     folic acid  1 mg Oral Daily     rifaximin  550 mg Oral BID     spironolactone  50 mg Oral Daily     furosemide  20 mg Oral Daily          Allergies:     Allergies   Allergen Reactions     Acetaminophen      Ambien [Zolpidem Tartrate] Other (See Comments)     Sleep walks and eats     Ciprofloxacin Other (See Comments)     Seizure.     Citalopram Nausea and Vomiting          Labs:     No results found for this or any previous visit (from the past 24 hour(s)).       Psychiatric Examination:     Vitals:    10/29/17 0852 10/30/17 0834 10/31/17 0905 11/01/17 1500   BP:    110/64   BP Location: Right arm      Pulse:    89   Resp:   16 12   Temp: 97.6  F (36.4  C) 97.3  F (36.3  C) 96.6  F (35.9  C)    TempSrc: Oral Oral Oral    SpO2:       Weight: 100 kg (220 lb 8 oz)                          Lying Orthostatic BP: 108/64         Sitting Orthostatic BP: 112/62         Standing Orthostatic BP:  (Refused)       Appearance: awake, alert, adequately groomed, appeared about stated age and no " apparent distress  Attitude: more cooperative and guarded  Eye Contact: better  Mood:  sad  Affect:  intensity is blunted  Speech:  clear, coherent and slow  Psychomotor Behavior:  no evidence of tardive dyskinesia, dystonia, or tics  Throught Process:  goal oriented  Associations:  no loose associations  Thought Content:  no evidence of suicidal ideation or homicidal ideation, auditory hallucinations are present, Visual hallucinations are present off and on, delusions are present.   Insight:  limited  Judgement:  limited  Oriented to:  time, person, and place  Attention Span and Concentration:  fair  Recent and Remote Memory:  fair         Precautions:     Behavioral Orders   Procedures     Code 2     Elopement precautions     Routine Programming     As clinically indicated     Seizure precautions     Status 15     Every 15 minutes.     Withdrawal precautions          Diagnoses:     Cognitive Disorder NOS.   Encephalopathy probably multifactorial such as chronic hepatic encephalopathy, organic brain damage due to encephalopathy, alcohol drinking, possibly underlying psychotic illness; possibly head injury contributes to patient's symptoms.   Alcohol use disorder.   amphetamine dependence vs abuse.  She meets criteria for Major depressive disorder, moderate severity.          Plan:     No medication changes today  Legal Status and Disposition:  --  Under full MICD commitment.   -- the patient appears to be at her current baseline, she is on waiting list for [facility]. Referred to several NH and memory care unit, was rejected by number of them. Per CTC, [facility] works on [address] and was interviewed by 2 SOCS places on Monday. See discussion above. Will continue to provide support and structure.

## 2017-11-02 NOTE — PLAN OF CARE
"Problem: Psychotic Symptoms  Goal: Psychotic Symptoms  Signs and symptoms of listed problems will be absent or manageable.   Outcome: No Change    11/02/17 1033   Psychotic Symptoms   Psychotic Symptoms Assessed all   Psychotic Symptoms Present none   48 hour nursing assessment:  Patient evaluation continues. Assessed mood,anxiety,thoughts and behavior.  Encourage participation in groups and developing healthy coping skills. Will continue to assess. Pateint denies auditory or visual  Hallucinations. Refer to daily team meeting notes for individualized plan of care.  Pt took all oral medications and refused 2/3 of Lactulose. Writer attempted to encourage pt but pt became frustrated with pt and yelled \"i dont want it. I take it everyday and I dont want it today.\" Pt denies pain, anxiety, depression, si/sib/hi, and hallucinations.  Pt ate breakfast in lounge with peers and went back to bed.   Refused 2nd dose of lactulose. Pt continues to refuse. Pt is compliant with all other medications.   "

## 2017-11-02 NOTE — PLAN OF CARE
Problem: Psychotic Symptoms  Goal: Psychotic Symptoms  Signs and symptoms of listed problems will be absent or manageable.   Outcome: Improving  /64  Pulse 89  Temp 96.6  F (35.9  C) (Oral)  Resp 12  Wt 100 kg (220 lb 8 oz)  SpO2 99%  BMI 35.59 kg/m2     Pt was more visible in the milieu this evening. She ate dinner in the lounge, and picked out a movie for the unit to watch together. Pt has been pleasant and calm on the unit. Pt was socially withdrawn, but when approached interacted appropriately with staff and patients.     Pt reports being oriented to self, place and time, and denies disorientation of any kind. She did not make any delusional statements, and was able to converse appropriately to the situation. Pt did not make any attempts to leave the unit.    Pt states that she has chronic knee pain, but is not experiencing new pain of any kind. Pt reports no issues passing BM or urine.    SI/SIB: Pt denies    Hallucinations: Pt denies. However, still observed picking items out of the air and often seen staring, as opposed to making direct eye contact when speaking to others.    No additional concerns at this time. Will continue to monitor and assess.

## 2017-11-03 PROCEDURE — 25000132 ZZH RX MED GY IP 250 OP 250 PS 637: Performed by: EMERGENCY MEDICINE

## 2017-11-03 PROCEDURE — 25000132 ZZH RX MED GY IP 250 OP 250 PS 637: Performed by: PSYCHIATRY & NEUROLOGY

## 2017-11-03 PROCEDURE — 99232 SBSQ HOSP IP/OBS MODERATE 35: CPT | Performed by: PSYCHIATRY & NEUROLOGY

## 2017-11-03 PROCEDURE — 12400007 ZZH R&B MH INTERMEDIATE UMMC

## 2017-11-03 PROCEDURE — 25000132 ZZH RX MED GY IP 250 OP 250 PS 637: Performed by: NURSE PRACTITIONER

## 2017-11-03 RX ADMIN — GABAPENTIN 100 MG: 100 CAPSULE ORAL at 14:02

## 2017-11-03 RX ADMIN — FERROUS GLUCONATE 324 MG: 324 TABLET ORAL at 09:08

## 2017-11-03 RX ADMIN — HALOPERIDOL 2.5 MG: 1 TABLET ORAL at 09:08

## 2017-11-03 RX ADMIN — PANTOPRAZOLE SODIUM 40 MG: 40 TABLET, DELAYED RELEASE ORAL at 09:08

## 2017-11-03 RX ADMIN — RIFAXIMIN 550 MG: 550 TABLET ORAL at 18:12

## 2017-11-03 RX ADMIN — Medication 20 MG: at 09:10

## 2017-11-03 RX ADMIN — MULTIPLE VITAMINS W/ MINERALS TAB 1 TABLET: TAB at 09:08

## 2017-11-03 RX ADMIN — HALOPERIDOL 2.5 MG: 1 TABLET ORAL at 18:12

## 2017-11-03 RX ADMIN — RISPERIDONE 2 MG: 2 TABLET ORAL at 18:12

## 2017-11-03 RX ADMIN — FOLIC ACID 1 MG: 1 TABLET ORAL at 09:08

## 2017-11-03 RX ADMIN — GABAPENTIN 100 MG: 100 CAPSULE ORAL at 09:08

## 2017-11-03 RX ADMIN — RIFAXIMIN 550 MG: 550 TABLET ORAL at 09:08

## 2017-11-03 RX ADMIN — SPIRONOLACTONE 50 MG: 50 TABLET ORAL at 09:09

## 2017-11-03 RX ADMIN — GABAPENTIN 100 MG: 100 CAPSULE ORAL at 18:12

## 2017-11-03 RX ADMIN — RISPERIDONE 1 MG: 1 TABLET ORAL at 09:08

## 2017-11-03 ASSESSMENT — ACTIVITIES OF DAILY LIVING (ADL)
ORAL_HYGIENE: INDEPENDENT
DRESS: INDEPENDENT
GROOMING: INDEPENDENT
LAUNDRY: WITH SUPERVISION

## 2017-11-03 NOTE — PROGRESS NOTES
Writer [facility] with [facility] person Yoon via email. Suleiman mentioned that his superiors do not think pt was referred or turned down by any group homes. Suleiman has sent word to [facility] Silva to make referrals. This will slow down discharge plans.

## 2017-11-03 NOTE — PROGRESS NOTES
[facility], [place]   Psychiatric Progress Note        Interim History:     The patient's care was discussed with the treatment team during the daily team meeting and/or staff's chart notes were reviewed.  Staff report patient has spent more time inside of her room. She appeared to be sad and irritable, during today's visit complained of pain in her knees, said it prevented her from getting good sleep at night. She, however, typically sleeps during the day. She said she was aware that her outpatient team was working on [facility]. Per [facility], a requirement for placement to one of SOCs facilities is patient being rejected by 4 group homes. See CTC's note.       Medications:       gabapentin  100 mg Oral TID     ferrous gluconate  324 mg Oral Daily with breakfast     lactulose  30 g Oral TID     haloperidol  2.5 mg Oral BID     risperiDONE  1 mg Oral QAM     risperiDONE  2 mg Oral At Bedtime     pantoprazole  40 mg Oral Daily     multivitamin, therapeutic with minerals  1 tablet Oral Daily     folic acid  1 mg Oral Daily     rifaximin  550 mg Oral BID     spironolactone  50 mg Oral Daily     furosemide  20 mg Oral Daily          Allergies:     Allergies   Allergen Reactions     Acetaminophen      Ambien [Zolpidem Tartrate] Other (See Comments)     Sleep walks and eats     Ciprofloxacin Other (See Comments)     Seizure.     Citalopram Nausea and Vomiting          Labs:     No results found for this or any previous visit (from the past 24 hour(s)).       Psychiatric Examination:     Vitals:    10/31/17 0905 11/01/17 1500 11/02/17 1650 11/03/17 0853   BP:  110/64 91/50    BP Location:    Left arm   Pulse:  89 60    Resp: 16 12     Temp: 96.6  F (35.9  C)  97.8  F (36.6  C) 97.8  F (36.6  C)   TempSrc: Oral  Oral Oral   SpO2:       Weight:                                 Lying Orthostatic BP: 93/59         Sitting Orthostatic BP: 112/62         Standing Orthostatic BP:  (Refused)        Appearance: awake, alert, adequately groomed, appeared about stated age and no apparent distress  Attitude: more cooperative and guarded  Eye Contact: better  Mood:  sad  Affect:  intensity is blunted  Speech:  clear, coherent and slow  Psychomotor Behavior:  no evidence of tardive dyskinesia, dystonia, or tics  Throught Process:  goal oriented  Associations:  no loose associations  Thought Content:  no evidence of suicidal ideation or homicidal ideation, auditory hallucinations are present, Visual hallucinations are present off and on, delusions are present.   Insight:  limited  Judgement:  limited  Oriented to:  time, person, and place  Attention Span and Concentration:  fair  Recent and Remote Memory:  fair         Precautions:     Behavioral Orders   Procedures     Code 2     Elopement precautions     Routine Programming     As clinically indicated     Seizure precautions     Status 15     Every 15 minutes.     Withdrawal precautions          Diagnoses:     Cognitive Disorder NOS.   Encephalopathy probably multifactorial such as chronic hepatic encephalopathy, organic brain damage due to encephalopathy, alcohol drinking, possibly underlying psychotic illness; possibly head injury contributes to patient's symptoms.   Alcohol use disorder.   amphetamine dependence vs abuse.  She meets criteria for Major depressive disorder, moderate severity.          Plan:     No medication changes today  Legal Status and Disposition:  --  Under full MICD commitment.   -- the patient appears to be at her current baseline, she is on waiting list for [facility]. Referred to several NH and memory care unit, was rejected by number of them. Per CTC, CM works on CADI waiver and patient also needs to be rejected by 4 group homes. See discussion above. Will continue to provide support and structure.

## 2017-11-03 NOTE — PROGRESS NOTES
Pt calm and pleasant this shift. Encouraged to be out in milieu which she did after dinner. Still observed pt talking and touching at air. Pt saying she can still see and hear her . Not too happy about going to a treatment facility, would rather go home. He brother and family friend came to visit this shift which she had a bright affect when talking about them. Otherwise, no major concerns; knee is just still hurting.        11/02/17 2132   Behavioral Health   Hallucinations auditory;visual;appears responding   Thinking distractable;poor concentration   Orientation place: oriented   Memory baseline memory   Insight poor   Judgement impaired   Eye Contact at examiner   Affect blunted, flat   Mood depressed   Physical Appearance/Attire disheveled   Suicidality other (see comments)  (none)   Self Injury other (see comment)  (none)   Elopement (none this shift)   Activity withdrawn;isolative   Speech clear;coherent   Medication Sensitivity no observed side effects;no stated side effects   Psychomotor / Gait steady;balanced   Activities of Daily Living   Hygiene/Grooming prompts   Oral Hygiene prompts   Dress scrubs (behavioral health);prompts   Room Organization unable

## 2017-11-03 NOTE — PROGRESS NOTES
11/03/17 1507   Behavioral Health   Hallucinations denies / not responding to hallucinations   Thinking confused;delusional   Orientation person: oriented   Memory baseline memory   Insight poor   Judgement impaired   Eye Contact at examiner   Affect full range affect   Mood labile   Physical Appearance/Attire attire appropriate to age and situation   Hygiene neglected grooming - unclean body, hair, teeth   Suicidality (denies)   Self Injury (denies)   Elopement (none shown)   Activity isolative;withdrawn   Speech clear;coherent   Medication Sensitivity no stated side effects   Psychomotor / Gait slow  (uses walker)   Psycho Education   Type of Intervention 1:1 intervention   Response participates, initiates socially appropriate   Hours 0.5   Activities of Daily Living   Hygiene/Grooming independent   Oral Hygiene independent   Dress independent   Laundry with supervision   Room Organization independent   pt isolative and withdrawn in room most of shift. Pt stayed in room for breakfast and staff brought tray to her room. Pt did come out to eat lunch in dining room. Pt using walker to help with balance. Pt stated she is in knee/leg pain. Pt labile mood and did not attend any groups. Pt denies any mental health problems. Staff noticed limited hallucination behaviors from pt.

## 2017-11-03 NOTE — PLAN OF CARE
Problem: General Plan of Care (Inpatient Behavioral)  Goal: Team Discussion  Team Plan:   BEHAVIORAL TEAM DISCUSSION     Participants: MARYBEL Garvin RN Taylor  Progress: moderate  Continued Stay Criteria/Rationale: pt's pd was revoked  Medical/Physical: liver damage and encephalopathy   Precautions:   Behavioral Orders   Procedures     Code 2     Elopement precautions     Routine Programming       As clinically indicated     Seizure precautions     Status 15       Every 15 minutes.     Withdrawal precautions     Plan: trying to place pt in facility for long term care, CADI complete and working with [facility] however [facility] wants to see 'refusals' from group homes. Writer notified CM.  Rationale for change in precautions or plan: no change        Problem: Patient Care Overview  Goal: Team Discussion  Team Plan:   BEHAVIORAL TEAM DISCUSSION     Participants: MARYBEL Garvin RN Taylor  Progress: moderate  Continued Stay Criteria/Rationale: pt's pd was revoked  Medical/Physical: liver damage and encephalopathy   Precautions:   Behavioral Orders   Procedures     Code 2     Elopement precautions     Routine Programming       As clinically indicated     Seizure precautions     Status 15       Every 15 minutes.     Withdrawal precautions     Plan: trying to place pt in facility for long term care, CADI complete and working with [facility] however [facility] wants to see 'refusals' from group homes. Writer notified CM.  Rationale for change in precautions or plan: no change

## 2017-11-04 PROCEDURE — 25000132 ZZH RX MED GY IP 250 OP 250 PS 637: Performed by: PSYCHIATRY & NEUROLOGY

## 2017-11-04 PROCEDURE — 12400007 ZZH R&B MH INTERMEDIATE UMMC

## 2017-11-04 PROCEDURE — 25000132 ZZH RX MED GY IP 250 OP 250 PS 637: Performed by: HOSPITALIST

## 2017-11-04 PROCEDURE — 25000132 ZZH RX MED GY IP 250 OP 250 PS 637: Performed by: NURSE PRACTITIONER

## 2017-11-04 PROCEDURE — 25000132 ZZH RX MED GY IP 250 OP 250 PS 637: Performed by: EMERGENCY MEDICINE

## 2017-11-04 RX ADMIN — LACTULOSE 30 G: 10 POWDER, FOR SOLUTION ORAL at 14:08

## 2017-11-04 RX ADMIN — RISPERIDONE 2 MG: 2 TABLET ORAL at 21:08

## 2017-11-04 RX ADMIN — FERROUS GLUCONATE 324 MG: 324 TABLET ORAL at 08:32

## 2017-11-04 RX ADMIN — GABAPENTIN 100 MG: 100 CAPSULE ORAL at 21:05

## 2017-11-04 RX ADMIN — FOLIC ACID 1 MG: 1 TABLET ORAL at 08:32

## 2017-11-04 RX ADMIN — RIFAXIMIN 550 MG: 550 TABLET ORAL at 21:05

## 2017-11-04 RX ADMIN — LACTULOSE 30 G: 10 POWDER, FOR SOLUTION ORAL at 08:32

## 2017-11-04 RX ADMIN — HALOPERIDOL 2.5 MG: 1 TABLET ORAL at 08:32

## 2017-11-04 RX ADMIN — SPIRONOLACTONE 50 MG: 50 TABLET ORAL at 08:33

## 2017-11-04 RX ADMIN — RISPERIDONE 1 MG: 1 TABLET ORAL at 08:32

## 2017-11-04 RX ADMIN — PANTOPRAZOLE SODIUM 40 MG: 40 TABLET, DELAYED RELEASE ORAL at 08:32

## 2017-11-04 RX ADMIN — GABAPENTIN 100 MG: 100 CAPSULE ORAL at 14:09

## 2017-11-04 RX ADMIN — MULTIPLE VITAMINS W/ MINERALS TAB 1 TABLET: TAB at 08:32

## 2017-11-04 RX ADMIN — HALOPERIDOL 2.5 MG: 1 TABLET ORAL at 21:05

## 2017-11-04 RX ADMIN — RIFAXIMIN 550 MG: 550 TABLET ORAL at 08:32

## 2017-11-04 RX ADMIN — GABAPENTIN 100 MG: 100 CAPSULE ORAL at 08:32

## 2017-11-04 RX ADMIN — Medication 20 MG: at 08:32

## 2017-11-04 ASSESSMENT — ACTIVITIES OF DAILY LIVING (ADL)
GROOMING: INDEPENDENT
DRESS: INDEPENDENT
LAUNDRY: WITH SUPERVISION
ORAL_HYGIENE: INDEPENDENT

## 2017-11-04 NOTE — PROGRESS NOTES
11/04/17 1500   Behavioral Health   Hallucinations other (see comment)  (difficult to discern)   Thinking poor concentration   Orientation other (see comment)  (difficult to discern)   Memory baseline memory   Insight poor   Judgement impaired   Eye Contact at examiner   Affect blunted, flat;tense   Mood other (see comments)  (blunted)   Physical Appearance/Attire other (see comment)  (fair)   Hygiene other (see comment)  (fair)   Suicidality other (see comments)  (nothing stated)   Self Injury other (see comment)  (nothing observed)   Elopement (nothing observed)   Activity isolative;withdrawn   Speech clear;coherent   Medication Sensitivity no stated side effects;no observed side effects   Psychomotor / Gait balanced;steady   Activities of Daily Living   Hygiene/Grooming independent   Oral Hygiene independent   Dress independent   Laundry with supervision   Room Organization independent

## 2017-11-04 NOTE — PLAN OF CARE
Problem: Psychotic Symptoms  Goal: Psychotic Symptoms  Signs and symptoms of listed problems will be absent or manageable.   Outcome: No Change  Pt rarely comes out of her room. This RN caught her at supper out in the lounge but when her medications were discussed with her, she presented as irritable, grumpy. Pt did take her meds early and went to bed. She refuses to talk to this RN.

## 2017-11-05 PROCEDURE — 25000132 ZZH RX MED GY IP 250 OP 250 PS 637: Performed by: HOSPITALIST

## 2017-11-05 PROCEDURE — 25000132 ZZH RX MED GY IP 250 OP 250 PS 637: Performed by: NURSE PRACTITIONER

## 2017-11-05 PROCEDURE — 25000132 ZZH RX MED GY IP 250 OP 250 PS 637: Performed by: PSYCHIATRY & NEUROLOGY

## 2017-11-05 PROCEDURE — 25000132 ZZH RX MED GY IP 250 OP 250 PS 637: Performed by: EMERGENCY MEDICINE

## 2017-11-05 PROCEDURE — 12400007 ZZH R&B MH INTERMEDIATE UMMC

## 2017-11-05 RX ADMIN — MULTIPLE VITAMINS W/ MINERALS TAB 1 TABLET: TAB at 08:38

## 2017-11-05 RX ADMIN — GABAPENTIN 100 MG: 100 CAPSULE ORAL at 08:38

## 2017-11-05 RX ADMIN — GABAPENTIN 100 MG: 100 CAPSULE ORAL at 14:16

## 2017-11-05 RX ADMIN — RIFAXIMIN 550 MG: 550 TABLET ORAL at 22:21

## 2017-11-05 RX ADMIN — HALOPERIDOL 2.5 MG: 1 TABLET ORAL at 08:39

## 2017-11-05 RX ADMIN — RISPERIDONE 2 MG: 2 TABLET ORAL at 22:22

## 2017-11-05 RX ADMIN — RIFAXIMIN 550 MG: 550 TABLET ORAL at 08:38

## 2017-11-05 RX ADMIN — LACTULOSE 30 G: 10 POWDER, FOR SOLUTION ORAL at 08:38

## 2017-11-05 RX ADMIN — HALOPERIDOL 2.5 MG: 1 TABLET ORAL at 22:22

## 2017-11-05 RX ADMIN — Medication 20 MG: at 08:38

## 2017-11-05 RX ADMIN — LACTULOSE 30 G: 10 POWDER, FOR SOLUTION ORAL at 14:16

## 2017-11-05 RX ADMIN — SPIRONOLACTONE 50 MG: 50 TABLET ORAL at 08:38

## 2017-11-05 RX ADMIN — FERROUS GLUCONATE 324 MG: 324 TABLET ORAL at 08:38

## 2017-11-05 RX ADMIN — FOLIC ACID 1 MG: 1 TABLET ORAL at 08:39

## 2017-11-05 RX ADMIN — RISPERIDONE 1 MG: 1 TABLET ORAL at 08:39

## 2017-11-05 RX ADMIN — PANTOPRAZOLE SODIUM 40 MG: 40 TABLET, DELAYED RELEASE ORAL at 08:38

## 2017-11-05 RX ADMIN — GABAPENTIN 100 MG: 100 CAPSULE ORAL at 22:22

## 2017-11-05 ASSESSMENT — ACTIVITIES OF DAILY LIVING (ADL)
DRESS: SCRUBS (BEHAVIORAL HEALTH)
ORAL_HYGIENE: INDEPENDENT
HYGIENE/GROOMING: INDEPENDENT
DRESS: SCRUBS (BEHAVIORAL HEALTH);INDEPENDENT
GROOMING: INDEPENDENT
ORAL_HYGIENE: INDEPENDENT

## 2017-11-05 NOTE — PLAN OF CARE
"Problem: Psychotic Symptoms  Goal: Psychotic Symptoms  Signs and symptoms of listed problems will be absent or manageable.   Outcome: No Change  48 Hour Nursing Assessment:  Day 86 of hospitalization.  Aranza has not been coming out of her room much even asking for her tray to be brought to her.  Last night she did play Bingo but called \"Bingo\" 5 times without actually achieving Bingo.  She is eating well.  She varies on whether or not she takes all or any of her lactulose.  She did not know the date today so she was reoriented.  She denies being suicidal.  She denies hallucinations.    1430 - She drank only about half of her lactulose today.      "

## 2017-11-05 NOTE — PROGRESS NOTES
"The pt came out early for snacks, but remained in her room for the rest of the shift.  This writer brought the pt her tray for dinner.  This writer overheard the pt crying and talking to someone who wasn't there.  I believe the pt said \"Just get out, I don't want you here.  Just leave me alone.\"    There was also an incident where this pt and the pt across the kaba from her were working eachother up (seemingly).  They were both crying/moaning, There was no apparent cause, and neither came out to get staff.  I know that this occurrence, for this pt, coincided with her hallucinations.  .  She didn't attend any groups, watch movies, or go to [employer].  "

## 2017-11-06 PROCEDURE — 25000132 ZZH RX MED GY IP 250 OP 250 PS 637: Performed by: HOSPITALIST

## 2017-11-06 PROCEDURE — 25000132 ZZH RX MED GY IP 250 OP 250 PS 637: Performed by: PSYCHIATRY & NEUROLOGY

## 2017-11-06 PROCEDURE — 25000132 ZZH RX MED GY IP 250 OP 250 PS 637: Performed by: EMERGENCY MEDICINE

## 2017-11-06 PROCEDURE — 12400007 ZZH R&B MH INTERMEDIATE UMMC

## 2017-11-06 PROCEDURE — 25000132 ZZH RX MED GY IP 250 OP 250 PS 637: Performed by: NURSE PRACTITIONER

## 2017-11-06 PROCEDURE — 99231 SBSQ HOSP IP/OBS SF/LOW 25: CPT | Performed by: PSYCHIATRY & NEUROLOGY

## 2017-11-06 RX ADMIN — HALOPERIDOL 2.5 MG: 1 TABLET ORAL at 20:44

## 2017-11-06 RX ADMIN — GABAPENTIN 100 MG: 100 CAPSULE ORAL at 13:56

## 2017-11-06 RX ADMIN — LACTULOSE 30 G: 10 POWDER, FOR SOLUTION ORAL at 13:56

## 2017-11-06 RX ADMIN — RISPERIDONE 1 MG: 1 TABLET ORAL at 08:32

## 2017-11-06 RX ADMIN — LACTULOSE 30 G: 10 POWDER, FOR SOLUTION ORAL at 20:44

## 2017-11-06 RX ADMIN — RIFAXIMIN 550 MG: 550 TABLET ORAL at 08:31

## 2017-11-06 RX ADMIN — LACTULOSE 30 G: 10 POWDER, FOR SOLUTION ORAL at 08:32

## 2017-11-06 RX ADMIN — HALOPERIDOL 2.5 MG: 1 TABLET ORAL at 08:31

## 2017-11-06 RX ADMIN — SPIRONOLACTONE 50 MG: 50 TABLET ORAL at 08:32

## 2017-11-06 RX ADMIN — RIFAXIMIN 550 MG: 550 TABLET ORAL at 20:44

## 2017-11-06 RX ADMIN — FOLIC ACID 1 MG: 1 TABLET ORAL at 08:32

## 2017-11-06 RX ADMIN — PANTOPRAZOLE SODIUM 40 MG: 40 TABLET, DELAYED RELEASE ORAL at 08:32

## 2017-11-06 RX ADMIN — MULTIPLE VITAMINS W/ MINERALS TAB 1 TABLET: TAB at 08:32

## 2017-11-06 RX ADMIN — GABAPENTIN 100 MG: 100 CAPSULE ORAL at 20:45

## 2017-11-06 RX ADMIN — GABAPENTIN 100 MG: 100 CAPSULE ORAL at 08:32

## 2017-11-06 RX ADMIN — FERROUS GLUCONATE 324 MG: 324 TABLET ORAL at 08:32

## 2017-11-06 RX ADMIN — RISPERIDONE 2 MG: 2 TABLET ORAL at 20:45

## 2017-11-06 RX ADMIN — Medication 20 MG: at 08:32

## 2017-11-06 ASSESSMENT — ACTIVITIES OF DAILY LIVING (ADL)
GROOMING: INDEPENDENT
DRESS: SCRUBS (BEHAVIORAL HEALTH)
ORAL_HYGIENE: INDEPENDENT
DRESS: INDEPENDENT

## 2017-11-06 NOTE — PROGRESS NOTES
Did preform proper hygiene tonight. Took a bath. Was responding to auditory hallucinations in her room. Her dialogue was thorough in responding to her hallucinations. Slow and was out for dinner and snacks. Quiet, calm, and withdrawn this evening.

## 2017-11-06 NOTE — PROGRESS NOTES
Pt has not been awake all day. Pt did not respond to multiple attempts at check-in, and continued to sleep. Pt had meals delivered to her room by staff. Unable to assess SI/SiB/AH/VH.     11/06/17 1406   Behavioral Health   Hallucinations (unable to assess)   Thinking (unable to assess)   Orientation (unable to assess)   Memory (unable to assess)   Insight (unable to assess)   Judgement (unable to assess)   Affect (unable to assess)   Mood (unable to assess)   Physical Appearance/Attire disheveled   Hygiene neglected grooming - unclean body, hair, teeth   Suicidality (unable to assess)   Self Injury (unable to assess)   Activity isolative;withdrawn   Activities of Daily Living   Dress scrubs (behavioral health)

## 2017-11-06 NOTE — PROGRESS NOTES
"    [facility], [place]   Psychiatric Progress Note        Interim History:     The patient's care was discussed with the treatment team during the daily team meeting and/or staff's chart notes were reviewed.  Staff report patient has spent more time inside of her room. She appeared to be sad and irritable, talked very little today: \"I am sleeping\". She said she was aware that her outpatient team was working on MELISSA wavanessa. Per [facility], a requirement for placement to one of SOCs facilities is patient being rejected by 4 group homes. See [facility]'s note. Staff also reports that over weekend patient seemed to be responding to internal stimuli.        Medications:       gabapentin  100 mg Oral TID     ferrous gluconate  324 mg Oral Daily with breakfast     lactulose  30 g Oral TID     haloperidol  2.5 mg Oral BID     risperiDONE  1 mg Oral QAM     risperiDONE  2 mg Oral At Bedtime     pantoprazole  40 mg Oral Daily     multivitamin, therapeutic with minerals  1 tablet Oral Daily     folic acid  1 mg Oral Daily     rifaximin  550 mg Oral BID     spironolactone  50 mg Oral Daily     furosemide  20 mg Oral Daily          Allergies:     Allergies   Allergen Reactions     Acetaminophen      Ambien [Zolpidem Tartrate] Other (See Comments)     Sleep walks and eats     Ciprofloxacin Other (See Comments)     Seizure.     Citalopram Nausea and Vomiting          Labs:     No results found for this or any previous visit (from the past 24 hour(s)).       Psychiatric Examination:     Vitals:    11/01/17 1500 11/02/17 1650 11/03/17 0853 11/06/17 0908   BP: 110/64 91/50  100/57   BP Location:   Left arm    Pulse: 89 60  68   Resp: 12   14   Temp:  97.8  F (36.6  C) 97.8  F (36.6  C) 98  F (36.7  C)   TempSrc:  Oral Oral Oral   SpO2:       Weight:                                 Lying Orthostatic BP: 93/59         Sitting Orthostatic BP: 112/62         Standing Orthostatic BP:  (Refused)       Appearance: awake, " alert, adequately groomed, appeared about stated age and no apparent distress  Attitude: less cooperative and guarded  Eye Contact: better  Mood:  sad  Affect:  intensity is blunted  Speech:  clear, coherent and slow  Psychomotor Behavior:  no evidence of tardive dyskinesia, dystonia, or tics  Throught Process:  goal oriented  Associations:  no loose associations  Thought Content:  no evidence of suicidal ideation or homicidal ideation, auditory hallucinations are present, Visual hallucinations are present off and on, delusions are present.   Insight:  limited  Judgement:  limited  Oriented to:  time, person, and place  Attention Span and Concentration:  fair  Recent and Remote Memory:  fair         Precautions:     Behavioral Orders   Procedures     Code 2     Elopement precautions     Routine Programming     As clinically indicated     Seizure precautions     Status 15     Every 15 minutes.     Withdrawal precautions          Diagnoses:     Cognitive Disorder NOS.   Encephalopathy probably multifactorial such as chronic hepatic encephalopathy, organic brain damage due to encephalopathy, alcohol drinking, possibly underlying psychotic illness; possibly head injury contributes to patient's symptoms.   Alcohol use disorder.   amphetamine dependence vs abuse.  She meets criteria for Major depressive disorder, moderate severity.          Plan:     No medication changes today  Legal Status and Disposition:  --  Under full MICD commitment.   -- the patient appears to be at her current baseline, she is on waiting list for [facility]. Referred to several NH and memory care unit, was rejected by number of them. Per [facility], CM works on CADI waiver and patient also needs to be rejected by 4 group homes. See discussion above. Will continue to provide support and structure.

## 2017-11-07 PROCEDURE — 25000132 ZZH RX MED GY IP 250 OP 250 PS 637: Performed by: PSYCHIATRY & NEUROLOGY

## 2017-11-07 PROCEDURE — 12400007 ZZH R&B MH INTERMEDIATE UMMC

## 2017-11-07 PROCEDURE — 25000132 ZZH RX MED GY IP 250 OP 250 PS 637: Performed by: HOSPITALIST

## 2017-11-07 PROCEDURE — 25000132 ZZH RX MED GY IP 250 OP 250 PS 637: Performed by: EMERGENCY MEDICINE

## 2017-11-07 PROCEDURE — 25000132 ZZH RX MED GY IP 250 OP 250 PS 637: Performed by: NURSE PRACTITIONER

## 2017-11-07 RX ADMIN — FOLIC ACID 1 MG: 1 TABLET ORAL at 09:26

## 2017-11-07 RX ADMIN — RIFAXIMIN 550 MG: 550 TABLET ORAL at 20:04

## 2017-11-07 RX ADMIN — HALOPERIDOL 2.5 MG: 1 TABLET ORAL at 09:22

## 2017-11-07 RX ADMIN — LACTULOSE 30 G: 10 POWDER, FOR SOLUTION ORAL at 14:21

## 2017-11-07 RX ADMIN — GABAPENTIN 100 MG: 100 CAPSULE ORAL at 09:25

## 2017-11-07 RX ADMIN — GABAPENTIN 100 MG: 100 CAPSULE ORAL at 20:04

## 2017-11-07 RX ADMIN — HALOPERIDOL 2.5 MG: 1 TABLET ORAL at 20:04

## 2017-11-07 RX ADMIN — GABAPENTIN 100 MG: 100 CAPSULE ORAL at 14:21

## 2017-11-07 RX ADMIN — RIFAXIMIN 550 MG: 550 TABLET ORAL at 09:25

## 2017-11-07 RX ADMIN — LACTULOSE 30 G: 10 POWDER, FOR SOLUTION ORAL at 09:22

## 2017-11-07 RX ADMIN — MULTIPLE VITAMINS W/ MINERALS TAB 1 TABLET: TAB at 09:25

## 2017-11-07 RX ADMIN — RISPERIDONE 2 MG: 2 TABLET ORAL at 20:04

## 2017-11-07 RX ADMIN — PANTOPRAZOLE SODIUM 40 MG: 40 TABLET, DELAYED RELEASE ORAL at 09:26

## 2017-11-07 RX ADMIN — SPIRONOLACTONE 50 MG: 50 TABLET ORAL at 09:26

## 2017-11-07 RX ADMIN — FERROUS GLUCONATE 324 MG: 324 TABLET ORAL at 09:25

## 2017-11-07 RX ADMIN — Medication 20 MG: at 09:25

## 2017-11-07 RX ADMIN — RISPERIDONE 1 MG: 1 TABLET ORAL at 09:26

## 2017-11-07 RX ADMIN — LACTULOSE 30 G: 10 POWDER, FOR SOLUTION ORAL at 20:03

## 2017-11-07 ASSESSMENT — ACTIVITIES OF DAILY LIVING (ADL)
GROOMING: INDEPENDENT
ORAL_HYGIENE: INDEPENDENT
DRESS: INDEPENDENT
LAUNDRY: WITH SUPERVISION

## 2017-11-07 NOTE — PROGRESS NOTES
11/06/17 2121   Behavioral Health   Hallucinations denies / not responding to hallucinations;other (see comment)  (unable to access pt isolative)   Thinking other (see comment)  (unable to access )   Orientation person: oriented   Memory other (see comment)   Insight denial of illness   Judgement impaired   Suicidality other (see comments)  (pt denies)   Self Injury other (see comment)  (pt denies)   Activity isolative   Activities of Daily Living   Hygiene/Grooming independent   Oral Hygiene independent   Dress independent   Room Organization independent   Behavioral Health Interventions   Psychotic Symptoms maintain safety precautions;maintain safe secure environment   Social and Therapeutic Interventions (Psychotic Symptoms) encourage socialization with peers;encourage effective boundaries with peers;encourage participation in therapeutic groups and milieu activities   pt is isolative to room. Pt denies SI/SIB, voices. Pt stated she is ready to go home. Pt did come out for dinner and snack.

## 2017-11-08 PROCEDURE — 25000132 ZZH RX MED GY IP 250 OP 250 PS 637: Performed by: PSYCHIATRY & NEUROLOGY

## 2017-11-08 PROCEDURE — 25000132 ZZH RX MED GY IP 250 OP 250 PS 637: Performed by: NURSE PRACTITIONER

## 2017-11-08 PROCEDURE — 12400007 ZZH R&B MH INTERMEDIATE UMMC

## 2017-11-08 PROCEDURE — 99232 SBSQ HOSP IP/OBS MODERATE 35: CPT | Performed by: PSYCHIATRY & NEUROLOGY

## 2017-11-08 PROCEDURE — 25000132 ZZH RX MED GY IP 250 OP 250 PS 637: Performed by: HOSPITALIST

## 2017-11-08 PROCEDURE — 25000132 ZZH RX MED GY IP 250 OP 250 PS 637: Performed by: EMERGENCY MEDICINE

## 2017-11-08 RX ADMIN — GABAPENTIN 100 MG: 100 CAPSULE ORAL at 09:00

## 2017-11-08 RX ADMIN — FERROUS GLUCONATE 324 MG: 324 TABLET ORAL at 09:01

## 2017-11-08 RX ADMIN — RISPERIDONE 1 MG: 1 TABLET ORAL at 09:01

## 2017-11-08 RX ADMIN — LACTULOSE 30 G: 10 POWDER, FOR SOLUTION ORAL at 09:00

## 2017-11-08 RX ADMIN — FOLIC ACID 1 MG: 1 TABLET ORAL at 09:01

## 2017-11-08 RX ADMIN — RISPERIDONE 2 MG: 2 TABLET ORAL at 20:31

## 2017-11-08 RX ADMIN — GABAPENTIN 100 MG: 100 CAPSULE ORAL at 14:28

## 2017-11-08 RX ADMIN — RISPERIDONE 1 MG: 1 TABLET ORAL at 17:50

## 2017-11-08 RX ADMIN — LACTULOSE 30 G: 10 POWDER, FOR SOLUTION ORAL at 14:28

## 2017-11-08 RX ADMIN — LACTULOSE 30 G: 10 POWDER, FOR SOLUTION ORAL at 20:30

## 2017-11-08 RX ADMIN — SPIRONOLACTONE 50 MG: 50 TABLET ORAL at 09:00

## 2017-11-08 RX ADMIN — MULTIPLE VITAMINS W/ MINERALS TAB 1 TABLET: TAB at 09:00

## 2017-11-08 RX ADMIN — HALOPERIDOL 2.5 MG: 1 TABLET ORAL at 09:01

## 2017-11-08 RX ADMIN — PANTOPRAZOLE SODIUM 40 MG: 40 TABLET, DELAYED RELEASE ORAL at 09:00

## 2017-11-08 RX ADMIN — RIFAXIMIN 550 MG: 550 TABLET ORAL at 20:30

## 2017-11-08 RX ADMIN — Medication 20 MG: at 09:00

## 2017-11-08 RX ADMIN — HALOPERIDOL 2.5 MG: 1 TABLET ORAL at 20:30

## 2017-11-08 RX ADMIN — GABAPENTIN 100 MG: 100 CAPSULE ORAL at 20:31

## 2017-11-08 RX ADMIN — RIFAXIMIN 550 MG: 550 TABLET ORAL at 09:00

## 2017-11-08 RX ADMIN — LORAZEPAM 1 MG: 1 TABLET ORAL at 17:49

## 2017-11-08 ASSESSMENT — ACTIVITIES OF DAILY LIVING (ADL)
GROOMING: PROMPTS
GROOMING: PROMPTS
ORAL_HYGIENE: PROMPTS
LAUNDRY: UNABLE TO COMPLETE
ORAL_HYGIENE: PROMPTS
DRESS: SCRUBS (BEHAVIORAL HEALTH)
DRESS: PROMPTS

## 2017-11-08 NOTE — PROGRESS NOTES
"   11/07/17 2100   Behavioral Health   Hallucinations denies / not responding to hallucinations   Thinking intact   Orientation person: oriented;place: oriented   Memory baseline memory   Insight poor   Judgement impaired   Eye Contact at examiner   Affect full range affect   Mood mood is calm   Physical Appearance/Attire disheveled   Hygiene neglected grooming - unclean body, hair, teeth   Suicidality other (see comments)  (denies )   Self Injury other (see comment)  (denies )   Elopement (Pt is on elopment precaution )   Activity isolative;withdrawn   Speech clear;coherent   Activities of Daily Living   Hygiene/Grooming independent   Oral Hygiene independent   Dress independent   Laundry with supervision   Room Organization independent       Pt denied SI and SIB.  Pt reported \" I feel ok.\" Pt seems calm, isolative, ate supper, did not attended group, came out of her room few times watch tv, but spend most of the shift in her room.  Pt daily goal \" to be nice and relax.\"  Pt goal after discharge \" just be home with my family.\"  Pt is independent with ADL's.  Pt reported no nutritional concerns.  Pt wants visitors.    "

## 2017-11-08 NOTE — PROGRESS NOTES
[facility], [place]   Psychiatric Progress Note        Interim History:     The patient's care was discussed with the treatment team during the daily team meeting and/or staff's chart notes were reviewed.  Staff report patient spends more time inside of her room. She appeared to be sad and irritable, still refuse to take antidepressants. She also refuses to take Lactulose off and on. She is compliant with the rest of her meds. Staff reports that the patient still appears to be responding to internal stimuli off and on. She is aware that her outpatient team is working on CADI waiver. Per [facility], a requirement for placement to one of SOCs facilities is patient being rejected by 4 group homes in addition to nursing homes that have already rejected her.        Medications:       gabapentin  100 mg Oral TID     ferrous gluconate  324 mg Oral Daily with breakfast     lactulose  30 g Oral TID     haloperidol  2.5 mg Oral BID     risperiDONE  1 mg Oral QAM     risperiDONE  2 mg Oral At Bedtime     pantoprazole  40 mg Oral Daily     multivitamin, therapeutic with minerals  1 tablet Oral Daily     folic acid  1 mg Oral Daily     rifaximin  550 mg Oral BID     spironolactone  50 mg Oral Daily     furosemide  20 mg Oral Daily          Allergies:     Allergies   Allergen Reactions     Acetaminophen      Ambien [Zolpidem Tartrate] Other (See Comments)     Sleep walks and eats     Ciprofloxacin Other (See Comments)     Seizure.     Citalopram Nausea and Vomiting          Labs:     No results found for this or any previous visit (from the past 24 hour(s)).       Psychiatric Examination:     Vitals:    11/06/17 0908 11/07/17 0900 11/08/17 0922 11/08/17 0947   BP: 100/57 102/62 107/60 107/60   BP Location:   Left arm    Pulse: 68 68 86 86   Resp: 14 16     Temp: 98  F (36.7  C) 97.5  F (36.4  C) 97.4  F (36.3  C)    TempSrc: Oral Oral Oral    SpO2:       Weight: 100 kg (220 lb 7.4 oz)      Height: 1.676  "veto (5' 5.98\")                                       Lying Orthostatic BP: 102/62         Sitting Orthostatic BP: 112/62         Standing Orthostatic BP:  (refused)       Appearance: awake, alert, adequately groomed, appeared about stated age and no apparent distress  Attitude: less cooperative and guarded  Eye Contact: poor  Mood:  sad  Affect:  intensity is blunted  Speech:  clear, coherent and slow  Psychomotor Behavior:  no evidence of tardive dyskinesia, dystonia, or tics  Throught Process:  goal oriented  Associations:  no loose associations  Thought Content:  no evidence of suicidal ideation or homicidal ideation, auditory hallucinations are present, Visual hallucinations are present off and on, delusions are present.   Insight:  limited  Judgement:  limited  Oriented to:  time, person, and place  Attention Span and Concentration:  fair  Recent and Remote Memory:  fair         Precautions:     Behavioral Orders   Procedures     Code 2     Elopement precautions     Routine Programming     As clinically indicated     Seizure precautions     Status 15     Every 15 minutes.     Withdrawal precautions          Diagnoses:     Cognitive Disorder NOS.   Encephalopathy probably multifactorial such as chronic hepatic encephalopathy, organic brain damage due to encephalopathy, alcohol drinking, possibly underlying psychotic illness; possibly head injury contributes to patient's symptoms.   Alcohol use disorder.   amphetamine dependence vs abuse.  She meets criteria for Major depressive disorder, moderate severity.          Plan:     No medication changes today  Legal Status and Disposition:  --  Under full MICD commitment.   -- the patient appears to be at her current baseline, she is on waiting list for [facility]. Referred to several NH and memory care unit, was rejected by number of them. Per [facility], CM works on CADI waiver and patient also needs to be rejected by 4 group homes. See discussion above. Will continue to provide " support and structure.

## 2017-11-08 NOTE — PROGRESS NOTES
"   11/08/17 1418   Behavioral Health   Hallucinations denies / not responding to hallucinations   Thinking delusional   Orientation situation, disoriented   Memory confabulation   Insight poor   Judgement impaired   Eye Contact at examiner;out of corner of eyes;into space   Affect blunted, flat   Mood depressed;hopeless   Physical Appearance/Attire disheveled   Hygiene neglected grooming - unclean body, hair, teeth   Suicidality other (see comments)  (Denies)   Self Injury other (see comment)  (Denies)   Elopement (Nothing to report)   Activity isolative;withdrawn   Speech clear;coherent   Medication Sensitivity no observed side effects   Psychomotor / Gait (stayed in room this shfit)   Psycho Education   Type of Intervention 1:1 intervention   Response participates, initiates socially appropriate   Hours 0.5   Treatment Detail ([facility])   Activities of Daily Living   Hygiene/Grooming prompts   Oral Hygiene prompts   Dress prompts   Room Organization prompts   Behavioral Health Interventions   Psychotic Symptoms reality orientation;simple, clear language;provide emotional support   Social and Therapeutic Interventions (Psychotic Symptoms) encourage socialization with peers   Patient was in her room and sleeping most of the shift.  Patient was concerned about leg/knee pain and did not want to come to the dinning area for lunch or breakfast.  Both meals were brought to her room and she eat both meals.  The patient is often stubborn with taking some of her medication and the patients nurse asked this writer to help with establishing rapport to see if she would cooperate with taking her medications.  With prompting and reality reorientation the patient took all her medication.  The patient was under the belief she was having \"surgery\" today and her doctor told her to not take any medications.  It is unsure if this was a ploy to get out of taking her medication or if she truly believed she was having surgery today.  The " patient was appreciative with staff when assistance was provided to her and she was pleasant this shift.  The patient denies SI and SiB.  The patient does neglect her ADL overall, but does shower 1-2 a week and no specific odors were detected.  The patient was promoted to not neglect her ADL's.

## 2017-11-09 PROCEDURE — 25000132 ZZH RX MED GY IP 250 OP 250 PS 637: Performed by: PSYCHIATRY & NEUROLOGY

## 2017-11-09 PROCEDURE — 99232 SBSQ HOSP IP/OBS MODERATE 35: CPT | Performed by: PSYCHIATRY & NEUROLOGY

## 2017-11-09 PROCEDURE — 25000132 ZZH RX MED GY IP 250 OP 250 PS 637: Performed by: NURSE PRACTITIONER

## 2017-11-09 PROCEDURE — 25000132 ZZH RX MED GY IP 250 OP 250 PS 637: Performed by: HOSPITALIST

## 2017-11-09 PROCEDURE — 99207 ZZC CDG-MDM COMPONENT: MEETS MODERATE - UP CODED: CPT | Performed by: PSYCHIATRY & NEUROLOGY

## 2017-11-09 PROCEDURE — 25000132 ZZH RX MED GY IP 250 OP 250 PS 637: Performed by: EMERGENCY MEDICINE

## 2017-11-09 PROCEDURE — 12400007 ZZH R&B MH INTERMEDIATE UMMC

## 2017-11-09 RX ADMIN — HALOPERIDOL 2.5 MG: 1 TABLET ORAL at 08:50

## 2017-11-09 RX ADMIN — GABAPENTIN 100 MG: 100 CAPSULE ORAL at 08:50

## 2017-11-09 RX ADMIN — SPIRONOLACTONE 50 MG: 50 TABLET ORAL at 08:50

## 2017-11-09 RX ADMIN — GABAPENTIN 100 MG: 100 CAPSULE ORAL at 20:39

## 2017-11-09 RX ADMIN — PANTOPRAZOLE SODIUM 40 MG: 40 TABLET, DELAYED RELEASE ORAL at 08:50

## 2017-11-09 RX ADMIN — FERROUS GLUCONATE 324 MG: 324 TABLET ORAL at 08:50

## 2017-11-09 RX ADMIN — MULTIPLE VITAMINS W/ MINERALS TAB 1 TABLET: TAB at 08:50

## 2017-11-09 RX ADMIN — RISPERIDONE 1 MG: 1 TABLET ORAL at 08:50

## 2017-11-09 RX ADMIN — RISPERIDONE 2 MG: 2 TABLET ORAL at 20:39

## 2017-11-09 RX ADMIN — HALOPERIDOL 2.5 MG: 1 TABLET ORAL at 20:39

## 2017-11-09 RX ADMIN — RIFAXIMIN 550 MG: 550 TABLET ORAL at 08:50

## 2017-11-09 RX ADMIN — RIFAXIMIN 550 MG: 550 TABLET ORAL at 20:39

## 2017-11-09 RX ADMIN — GABAPENTIN 100 MG: 100 CAPSULE ORAL at 13:58

## 2017-11-09 RX ADMIN — FOLIC ACID 1 MG: 1 TABLET ORAL at 08:50

## 2017-11-09 RX ADMIN — Medication 20 MG: at 08:50

## 2017-11-09 ASSESSMENT — ACTIVITIES OF DAILY LIVING (ADL)
ORAL_HYGIENE: INDEPENDENT
DRESS: INDEPENDENT
GROOMING: INDEPENDENT
LAUNDRY: WITH SUPERVISION

## 2017-11-09 NOTE — PLAN OF CARE
"Problem: Psychotic Symptoms  Goal: Social and Therapeutic (Psychotic Symptoms)  Signs and symptoms of listed problems will be absent or manageable.   Outcome: Therapy, progress toward functional goals is gradual           Patient has spent majority of the shift in her room. Patient early in the shift was in her room talking to herself and yelling out.Patient was given prn Risperdal and Ativan. Patient denies suicidal ideation and self injurious thoughts. Denies auditory and visual hallucinations. Ate dinner, did not attend community meeting. Denies depression and anxiety. Patient needed prompting to take her lactulose medication after initially refusing.      Poor ADL's, appears distracted med compliant and cooperative on unit.      Patient evaluation continues. Assessed mood,anxiety,thoughts and behavior.      Patient gradually progressing towards goals.     Patient is encouraged to participate in groups and assisted to develop healthy coping skills.      VS reviewed: /60  Pulse 86  Temp 97.4  F (36.3  C) (Oral)  Resp 16  Ht 1.676 m (5' 5.98\")  Wt 100 kg (220 lb 7.4 oz)  SpO2 99%  BMI 35.6 kg/m2     Length of stay: 90     Refer to daily team meeting notes for individualized plan of care. Nursing will continue to assess.             "

## 2017-11-09 NOTE — PROGRESS NOTES
"   11/09/17 1400   Behavioral Health   Hallucinations denies / not responding to hallucinations   Thinking poor concentration   Orientation person: oriented;place: oriented   Memory baseline memory   Insight poor   Judgement impaired   Eye Contact at examiner   Affect blunted, flat   Mood mood is calm   Physical Appearance/Attire disheveled   Hygiene neglected grooming - unclean body, hair, teeth   Suicidality other (see comments)  (denies )   Self Injury other (see comment)  (denies )   Elopement (Pt is on elopment precaution )   Activity isolative;withdrawn   Speech clear;coherent   Activities of Daily Living   Hygiene/Grooming independent   Oral Hygiene independent   Dress independent   Laundry with supervision   Room Organization independent       Pt denied SI and SIB.  Pt reported \" I feel ok tired.\"  Pt seems calm, isolative, withdrawn, ate meals, did not attend group, came out of her room for lunch and spend most of the shift in bed sleeping.  Pt daily goal \" nothing.\"  Pt goal after discharge \" just spend time with my family.\"  Pt reported \" my legs hurts\" \"both of my knees hurts (10).\"  Pt is independent with ADL's.  Pt reported no nutritional concerns.  Pt wants visitors.    "

## 2017-11-09 NOTE — PROGRESS NOTES
[facility], [place]   Psychiatric Progress Note        Interim History:     The patient's care was discussed with the treatment team during the daily team meeting and/or staff's chart notes were reviewed.  There was not too much change in the patient's state. Staff report patient spends more time inside of her room. She appeared to be sad and irritable, still refuse to take antidepressants. She also refuses to take Lactulose off and on. She is compliant with the rest of her meds. She reports poor sleep at night, sleeps during the day. Staff reports that the patient still appears to be responding to internal stimuli off and on. She is aware that her outpatient team is working on [facility] [employer]. Per [facility], a requirement for placement to one of SOCs facilities is patient being rejected by 4 group homes in addition to nursing homes that have already rejected her.        Medications:       gabapentin  100 mg Oral TID     ferrous gluconate  324 mg Oral Daily with breakfast     lactulose  30 g Oral TID     haloperidol  2.5 mg Oral BID     risperiDONE  1 mg Oral QAM     risperiDONE  2 mg Oral At Bedtime     pantoprazole  40 mg Oral Daily     multivitamin, therapeutic with minerals  1 tablet Oral Daily     folic acid  1 mg Oral Daily     rifaximin  550 mg Oral BID     spironolactone  50 mg Oral Daily     furosemide  20 mg Oral Daily          Allergies:     Allergies   Allergen Reactions     Acetaminophen      Ambien [Zolpidem Tartrate] Other (See Comments)     Sleep walks and eats     Ciprofloxacin Other (See Comments)     Seizure.     Citalopram Nausea and Vomiting          Labs:     No results found for this or any previous visit (from the past 24 hour(s)).       Psychiatric Examination:     Vitals:    11/07/17 0900 11/08/17 0922 11/08/17 0947 11/09/17 0700   BP: 102/62 107/60 107/60 97/63   BP Location:  Left arm  Left leg   Pulse: 68 86 86 73   Resp: 16   16   Temp: 97.5  F (36.4  C) 97.4  F  (36.3  C)  98.1  F (36.7  C)   TempSrc: Oral Oral  Oral   SpO2:       Weight:       Height:                                 Lying Orthostatic BP: 102/62         Sitting Orthostatic BP: 97/63         Standing Orthostatic BP:  (Refused )       Appearance: awake, alert, adequately groomed, appeared about stated age and no apparent distress  Attitude: less cooperative and guarded  Eye Contact: poor  Mood:  sad  Affect:  intensity is blunted  Speech:  clear, coherent and slow  Psychomotor Behavior:  no evidence of tardive dyskinesia, dystonia, or tics  Throught Process:  goal oriented  Associations:  no loose associations  Thought Content:  no evidence of suicidal ideation or homicidal ideation, auditory hallucinations are present, Visual hallucinations are present off and on, delusions are present.   Insight:  limited  Judgement:  limited  Oriented to:  time, person, and place  Attention Span and Concentration:  fair  Recent and Remote Memory:  fair         Precautions:     Behavioral Orders   Procedures     Code 2     Elopement precautions     Routine Programming     As clinically indicated     Seizure precautions     Status 15     Every 15 minutes.     Withdrawal precautions          Diagnoses:     Cognitive Disorder NOS.   Encephalopathy probably multifactorial such as chronic hepatic encephalopathy, organic brain damage due to encephalopathy, alcohol drinking, possibly underlying psychotic illness; possibly head injury contributes to patient's symptoms.   Alcohol use disorder.   amphetamine dependence vs abuse.  She meets criteria for Major depressive disorder, moderate severity.          Plan:     No medication changes today  Legal Status and Disposition:  --  Under full MICD commitment.   -- the patient appears to be at her current baseline, she is on waiting list for [facility]. Referred to several NH and memory care unit, was rejected by number of them. Per [facility], CM works on CADI waiver and patient also needs to be  rejected by 4 group homes. See discussion above. Will continue to provide support and structure.

## 2017-11-10 PROCEDURE — 25000132 ZZH RX MED GY IP 250 OP 250 PS 637: Performed by: NURSE PRACTITIONER

## 2017-11-10 PROCEDURE — 25000132 ZZH RX MED GY IP 250 OP 250 PS 637: Performed by: HOSPITALIST

## 2017-11-10 PROCEDURE — 25000132 ZZH RX MED GY IP 250 OP 250 PS 637: Performed by: EMERGENCY MEDICINE

## 2017-11-10 PROCEDURE — 12400007 ZZH R&B MH INTERMEDIATE UMMC

## 2017-11-10 PROCEDURE — 25000132 ZZH RX MED GY IP 250 OP 250 PS 637: Performed by: PSYCHIATRY & NEUROLOGY

## 2017-11-10 RX ADMIN — FERROUS GLUCONATE 324 MG: 324 TABLET ORAL at 09:04

## 2017-11-10 RX ADMIN — GABAPENTIN 100 MG: 100 CAPSULE ORAL at 09:04

## 2017-11-10 RX ADMIN — GABAPENTIN 100 MG: 100 CAPSULE ORAL at 14:07

## 2017-11-10 RX ADMIN — SPIRONOLACTONE 50 MG: 50 TABLET ORAL at 09:04

## 2017-11-10 RX ADMIN — LACTULOSE 30 G: 10 POWDER, FOR SOLUTION ORAL at 14:07

## 2017-11-10 RX ADMIN — GABAPENTIN 100 MG: 100 CAPSULE ORAL at 21:32

## 2017-11-10 RX ADMIN — RIFAXIMIN 550 MG: 550 TABLET ORAL at 21:32

## 2017-11-10 RX ADMIN — FOLIC ACID 1 MG: 1 TABLET ORAL at 09:04

## 2017-11-10 RX ADMIN — PANTOPRAZOLE SODIUM 40 MG: 40 TABLET, DELAYED RELEASE ORAL at 09:04

## 2017-11-10 RX ADMIN — RIFAXIMIN 550 MG: 550 TABLET ORAL at 09:04

## 2017-11-10 RX ADMIN — HALOPERIDOL 2.5 MG: 1 TABLET ORAL at 09:04

## 2017-11-10 RX ADMIN — RISPERIDONE 2 MG: 2 TABLET ORAL at 21:32

## 2017-11-10 RX ADMIN — HALOPERIDOL 2.5 MG: 1 TABLET ORAL at 21:32

## 2017-11-10 RX ADMIN — RISPERIDONE 1 MG: 1 TABLET ORAL at 09:04

## 2017-11-10 RX ADMIN — Medication 20 MG: at 09:04

## 2017-11-10 RX ADMIN — MULTIPLE VITAMINS W/ MINERALS TAB 1 TABLET: TAB at 09:04

## 2017-11-10 ASSESSMENT — ACTIVITIES OF DAILY LIVING (ADL)
ORAL_HYGIENE: INDEPENDENT;PROMPTS
GROOMING: INDEPENDENT;PROMPTS
GROOMING: INDEPENDENT
LAUNDRY: WITH SUPERVISION
ORAL_HYGIENE: INDEPENDENT
DRESS: SCRUBS (BEHAVIORAL HEALTH);INDEPENDENT
DRESS: INDEPENDENT

## 2017-11-10 NOTE — PROGRESS NOTES
Patient was isolative and withdrawn in her room the majority of the shift. Refused breakfast, wouldn't want to leave her room even offered to bring her tray to her room still refused. Patient was observed loitering near the front  entrance door of the unit. Patient was redirected to leave hanging around the door. Patient replied saying she wants to leave and that her DRLuis Has approved her discharge. Denied SI/SIB and hallucinations.     11/10/17 0501   Behavioral Health   Hallucinations denies / not responding to hallucinations   Thinking confused;delusional   Orientation person: oriented;place: oriented   Memory baseline memory   Insight poor   Judgement impaired   Eye Contact at examiner   Affect blunted, flat   Mood depressed   Physical Appearance/Attire untidy;disheveled   Hygiene neglected grooming - unclean body, hair, teeth   Suicidality other (see comments)  (denied )   Self Injury other (see comment)  (denied)   Elopement Loitering near exit doors;Statements about wanting to leave   Activity isolative;withdrawn   Speech clear;coherent   Medication Sensitivity no stated side effects   Psychomotor / Gait foot dragging;slow   Psycho Education   Type of Intervention 1:1 intervention   Response participates, initiates socially appropriate   Hours 0.5   Treatment Detail (check in )   Activities of Daily Living   Hygiene/Grooming independent;prompts   Oral Hygiene independent;prompts   Dress independent   Laundry with supervision   Room Organization prompts   Groups   Details (isolative )   Behavioral Health Interventions   Psychotic Symptoms maintain safety precautions;encourage nutrition and hydration;encourage participation / independence with adls;provide emotional support;establish therapeutic relationship;assist with developing & utilizing healthy coping strategies   Social and Therapeutic Interventions (Psychotic Symptoms) encourage socialization with peers;encourage participation in therapeutic groups and  milieu activities

## 2017-11-10 NOTE — PLAN OF CARE
Problem: Psychotic Symptoms  Goal: Psychotic Symptoms  Signs and symptoms of listed problems will be absent or manageable.   Outcome: No Change  48 Hour Nursing Assessment:  Day 61 of hospitalization.  Aranza denies SI, SIB, and hallucinations.  She seems to be responding to internal stimuli.  She is spending less time out on the unit.  She refused breakfast this AM but did eat lunch.  She has been refusing most of her lactulose but does take her other medications.  She spent some time hanging around the doors this morning as she thought she was going to be discharged.  Mildred remains an elopement risk.

## 2017-11-11 PROCEDURE — 25000132 ZZH RX MED GY IP 250 OP 250 PS 637: Performed by: NURSE PRACTITIONER

## 2017-11-11 PROCEDURE — 25000132 ZZH RX MED GY IP 250 OP 250 PS 637: Performed by: PSYCHIATRY & NEUROLOGY

## 2017-11-11 PROCEDURE — 25000132 ZZH RX MED GY IP 250 OP 250 PS 637: Performed by: HOSPITALIST

## 2017-11-11 PROCEDURE — 25000132 ZZH RX MED GY IP 250 OP 250 PS 637: Performed by: EMERGENCY MEDICINE

## 2017-11-11 PROCEDURE — 12400007 ZZH R&B MH INTERMEDIATE UMMC

## 2017-11-11 RX ADMIN — HALOPERIDOL 2.5 MG: 1 TABLET ORAL at 08:51

## 2017-11-11 RX ADMIN — RISPERIDONE 1 MG: 1 TABLET ORAL at 08:51

## 2017-11-11 RX ADMIN — GABAPENTIN 100 MG: 100 CAPSULE ORAL at 08:51

## 2017-11-11 RX ADMIN — Medication 20 MG: at 08:51

## 2017-11-11 RX ADMIN — FOLIC ACID 1 MG: 1 TABLET ORAL at 08:51

## 2017-11-11 RX ADMIN — MULTIPLE VITAMINS W/ MINERALS TAB 1 TABLET: TAB at 08:57

## 2017-11-11 RX ADMIN — LACTULOSE 30 G: 10 POWDER, FOR SOLUTION ORAL at 21:21

## 2017-11-11 RX ADMIN — FERROUS GLUCONATE 324 MG: 324 TABLET ORAL at 08:50

## 2017-11-11 RX ADMIN — RIFAXIMIN 550 MG: 550 TABLET ORAL at 21:21

## 2017-11-11 RX ADMIN — LACTULOSE 30 G: 10 POWDER, FOR SOLUTION ORAL at 08:50

## 2017-11-11 RX ADMIN — RISPERIDONE 2 MG: 2 TABLET ORAL at 21:19

## 2017-11-11 RX ADMIN — GABAPENTIN 100 MG: 100 CAPSULE ORAL at 21:20

## 2017-11-11 RX ADMIN — SPIRONOLACTONE 50 MG: 50 TABLET ORAL at 08:51

## 2017-11-11 RX ADMIN — PANTOPRAZOLE SODIUM 40 MG: 40 TABLET, DELAYED RELEASE ORAL at 08:51

## 2017-11-11 RX ADMIN — RIFAXIMIN 550 MG: 550 TABLET ORAL at 08:51

## 2017-11-11 RX ADMIN — HALOPERIDOL 2.5 MG: 1 TABLET ORAL at 21:20

## 2017-11-11 RX ADMIN — GABAPENTIN 100 MG: 100 CAPSULE ORAL at 14:44

## 2017-11-11 ASSESSMENT — ACTIVITIES OF DAILY LIVING (ADL)
GROOMING: PROMPTS
DRESS: INDEPENDENT
ORAL_HYGIENE: PROMPTS

## 2017-11-11 NOTE — PROGRESS NOTES
11/11/17 1400   Behavioral Health   Hallucinations other (see comment)  (denies)   Thinking confused   Orientation person: oriented;place: oriented;date: oriented   Insight poor   Judgement impaired   Eye Contact at examiner   Affect blunted, flat   Mood depressed   Physical Appearance/Attire untidy;disheveled   Hygiene neglected grooming - unclean body, hair, teeth   Suicidality other (see comments)  (denies)   Self Injury other (see comment)  (denies)   Pt isolated in room up for meals. Pt complained of knee pain and denies all  Other mental health issues.

## 2017-11-12 PROCEDURE — 25000132 ZZH RX MED GY IP 250 OP 250 PS 637: Performed by: PSYCHIATRY & NEUROLOGY

## 2017-11-12 PROCEDURE — 25000132 ZZH RX MED GY IP 250 OP 250 PS 637: Performed by: NURSE PRACTITIONER

## 2017-11-12 PROCEDURE — 12400007 ZZH R&B MH INTERMEDIATE UMMC

## 2017-11-12 PROCEDURE — 25000132 ZZH RX MED GY IP 250 OP 250 PS 637: Performed by: HOSPITALIST

## 2017-11-12 PROCEDURE — 25000132 ZZH RX MED GY IP 250 OP 250 PS 637: Performed by: EMERGENCY MEDICINE

## 2017-11-12 RX ADMIN — RISPERIDONE 1 MG: 1 TABLET ORAL at 08:48

## 2017-11-12 RX ADMIN — PANTOPRAZOLE SODIUM 40 MG: 40 TABLET, DELAYED RELEASE ORAL at 08:00

## 2017-11-12 RX ADMIN — RISPERIDONE 2 MG: 2 TABLET ORAL at 19:52

## 2017-11-12 RX ADMIN — Medication 20 MG: at 08:48

## 2017-11-12 RX ADMIN — HALOPERIDOL 2.5 MG: 1 TABLET ORAL at 19:52

## 2017-11-12 RX ADMIN — HALOPERIDOL 2.5 MG: 1 TABLET ORAL at 08:48

## 2017-11-12 RX ADMIN — RIFAXIMIN 550 MG: 550 TABLET ORAL at 19:52

## 2017-11-12 RX ADMIN — LACTULOSE 30 G: 10 POWDER, FOR SOLUTION ORAL at 08:49

## 2017-11-12 RX ADMIN — MULTIPLE VITAMINS W/ MINERALS TAB 1 TABLET: TAB at 08:48

## 2017-11-12 RX ADMIN — GABAPENTIN 100 MG: 100 CAPSULE ORAL at 08:49

## 2017-11-12 RX ADMIN — FOLIC ACID 1 MG: 1 TABLET ORAL at 08:48

## 2017-11-12 RX ADMIN — GABAPENTIN 100 MG: 100 CAPSULE ORAL at 19:52

## 2017-11-12 RX ADMIN — LACTULOSE 30 G: 10 POWDER, FOR SOLUTION ORAL at 14:48

## 2017-11-12 RX ADMIN — FERROUS GLUCONATE 324 MG: 324 TABLET ORAL at 08:48

## 2017-11-12 RX ADMIN — SPIRONOLACTONE 50 MG: 50 TABLET ORAL at 08:48

## 2017-11-12 RX ADMIN — LACTULOSE 30 G: 10 POWDER, FOR SOLUTION ORAL at 19:51

## 2017-11-12 RX ADMIN — GABAPENTIN 100 MG: 100 CAPSULE ORAL at 14:48

## 2017-11-12 RX ADMIN — RIFAXIMIN 550 MG: 550 TABLET ORAL at 08:48

## 2017-11-12 ASSESSMENT — ACTIVITIES OF DAILY LIVING (ADL)
ORAL_HYGIENE: INDEPENDENT
ORAL_HYGIENE: PROMPTS
LAUNDRY: WITH SUPERVISION
DRESS: INDEPENDENT
GROOMING: INDEPENDENT
DRESS: INDEPENDENT
GROOMING: PROMPTS
LAUNDRY: WITH SUPERVISION

## 2017-11-12 NOTE — PROGRESS NOTES
"Patient was isolative and withdrawn the majority of the shift. Refused breakfast unless her tray be brought to her room. Meals was brought to patient's room upon request, she ate all of her meal. Complaints of general body aches. Patient exhibits auditory hallucination on this shift. Patient was screaming, crying and talking to herself in her room. For example, \"leave me alone why are you like this?\" When this writer check in with her, she stated that she was fine and dismissed staff outside of her room. Denied SI/SIB.     11/12/17 1204   Behavioral Health   Hallucinations auditory;appears responding   Thinking confused;distractable;poor concentration   Orientation person: oriented;place: oriented   Memory baseline memory   Insight poor   Judgement impaired   Eye Contact at examiner   Affect blunted, flat;sad   Mood depressed   Physical Appearance/Attire untidy;disheveled   Hygiene neglected grooming - unclean body, hair, teeth   Suicidality other (see comments)  (denied )   Self Injury other (see comment)  (denied )   Elopement Statements about wanting to leave   Activity isolative;withdrawn   Speech clear;coherent   Medication Sensitivity no stated side effects   Psychomotor / Gait balanced   Psycho Education   Type of Intervention 1:1 intervention   Response participates, initiates socially appropriate   Hours 0.5   Treatment Detail (check in)   Activities of Daily Living   Hygiene/Grooming prompts   Oral Hygiene prompts   Dress independent   Laundry with supervision   Room Organization unable   Groups   Details (isolative)   Behavioral Health Interventions   Psychotic Symptoms maintain safety precautions;encourage nutrition and hydration;encourage participation / independence with adls;provide emotional support;establish therapeutic relationship;assist with developing & utilizing healthy coping strategies;build upon strengths   Social and Therapeutic Interventions (Psychotic Symptoms) encourage socialization " with peers;encourage participation in therapeutic groups and milieu activities

## 2017-11-13 PROCEDURE — 12400007 ZZH R&B MH INTERMEDIATE UMMC

## 2017-11-13 PROCEDURE — 25000132 ZZH RX MED GY IP 250 OP 250 PS 637: Performed by: NURSE PRACTITIONER

## 2017-11-13 PROCEDURE — 25000132 ZZH RX MED GY IP 250 OP 250 PS 637: Performed by: PSYCHIATRY & NEUROLOGY

## 2017-11-13 PROCEDURE — 25000132 ZZH RX MED GY IP 250 OP 250 PS 637: Performed by: EMERGENCY MEDICINE

## 2017-11-13 PROCEDURE — 99232 SBSQ HOSP IP/OBS MODERATE 35: CPT | Performed by: PSYCHIATRY & NEUROLOGY

## 2017-11-13 PROCEDURE — 25000132 ZZH RX MED GY IP 250 OP 250 PS 637: Performed by: HOSPITALIST

## 2017-11-13 RX ADMIN — LACTULOSE 30 G: 10 POWDER, FOR SOLUTION ORAL at 14:11

## 2017-11-13 RX ADMIN — FERROUS GLUCONATE 324 MG: 324 TABLET ORAL at 08:32

## 2017-11-13 RX ADMIN — HALOPERIDOL 2.5 MG: 1 TABLET ORAL at 08:32

## 2017-11-13 RX ADMIN — GABAPENTIN 100 MG: 100 CAPSULE ORAL at 08:32

## 2017-11-13 RX ADMIN — RISPERIDONE 2 MG: 2 TABLET ORAL at 19:57

## 2017-11-13 RX ADMIN — HALOPERIDOL 2.5 MG: 1 TABLET ORAL at 19:57

## 2017-11-13 RX ADMIN — FOLIC ACID 1 MG: 1 TABLET ORAL at 08:32

## 2017-11-13 RX ADMIN — GABAPENTIN 100 MG: 100 CAPSULE ORAL at 14:11

## 2017-11-13 RX ADMIN — RIFAXIMIN 550 MG: 550 TABLET ORAL at 19:57

## 2017-11-13 RX ADMIN — Medication 20 MG: at 08:32

## 2017-11-13 RX ADMIN — PANTOPRAZOLE SODIUM 40 MG: 40 TABLET, DELAYED RELEASE ORAL at 08:32

## 2017-11-13 RX ADMIN — RIFAXIMIN 550 MG: 550 TABLET ORAL at 08:32

## 2017-11-13 RX ADMIN — MULTIPLE VITAMINS W/ MINERALS TAB 1 TABLET: TAB at 08:32

## 2017-11-13 RX ADMIN — RISPERIDONE 1 MG: 1 TABLET ORAL at 08:32

## 2017-11-13 RX ADMIN — LACTULOSE 30 G: 10 POWDER, FOR SOLUTION ORAL at 08:32

## 2017-11-13 RX ADMIN — LACTULOSE 30 G: 10 POWDER, FOR SOLUTION ORAL at 19:57

## 2017-11-13 RX ADMIN — GABAPENTIN 100 MG: 100 CAPSULE ORAL at 19:57

## 2017-11-13 RX ADMIN — SPIRONOLACTONE 50 MG: 50 TABLET ORAL at 08:32

## 2017-11-13 ASSESSMENT — ACTIVITIES OF DAILY LIVING (ADL)
LAUNDRY: WITH SUPERVISION
DRESS: SCRUBS (BEHAVIORAL HEALTH);INDEPENDENT
GROOMING: INDEPENDENT
ORAL_HYGIENE: INDEPENDENT
DRESS: INDEPENDENT
LAUNDRY: WITH SUPERVISION
ORAL_HYGIENE: INDEPENDENT
GROOMING: INDEPENDENT

## 2017-11-13 NOTE — PROGRESS NOTES
"   11/13/17 3120   Behavioral Health   Hallucinations appears responding   Thinking poor concentration   Orientation person: oriented   Memory baseline memory   Insight poor   Judgement impaired   Eye Contact at examiner   Affect blunted, flat   Mood mood is calm   Physical Appearance/Attire disheveled   Hygiene (showered)   Suicidality (denies)   Self Injury (denies)   Elopement Statements about wanting to leave   Activity isolative;withdrawn   Speech clear;coherent   Medication Sensitivity no stated side effects   Psychomotor / Gait (uses walker)   Psycho Education   Type of Intervention 1:1 intervention   Response participates, initiates socially appropriate   Hours 0.5   Activities of Daily Living   Hygiene/Grooming independent   Oral Hygiene independent   Dress independent   Laundry with supervision   Room Organization independent   pt isolative and withdrawn in room. Pt overheard talking to self in her room. Pt was found standing in front of the main doors around breakfast time asking to leave as she stated \" partner 24 left me a message that I have an appointment and I need to leave\". Staff just mentioned talking with nurse and patient was ok with that answer.  Pt calm throughout shift and took a shower. Pt denies any mental health problems.   "

## 2017-11-13 NOTE — PROGRESS NOTES
Message was left with patient's SEBAS Villagran today requesting an update on placement. Awaiting a call back.

## 2017-11-13 NOTE — PROGRESS NOTES
11/12/17 2200   Behavioral Health   Hallucinations denies / not responding to hallucinations   Thinking poor concentration   Orientation person: oriented;place: oriented   Memory baseline memory   Insight poor   Judgement impaired   Eye Contact at examiner   Affect blunted, flat   Mood mood is calm   Physical Appearance/Attire disheveled   Hygiene neglected grooming - unclean body, hair, teeth   Suicidality other (see comments)   Self Injury other (see comment)   Elopement (Pt is on elopment precaution)   Activity isolative;withdrawn   Speech clear;coherent   Activities of Daily Living   Hygiene/Grooming independent   Oral Hygiene independent   Dress independent   Laundry with supervision   Room Organization independent       Pt seems calm, blunted affect, disheveled, ate supper and spend most of the shift in bed.

## 2017-11-13 NOTE — PROGRESS NOTES
[facility], [place]   Psychiatric Progress Note        Interim History:     The patient's care was discussed with the treatment team during the daily team meeting and/or staff's chart notes were reviewed.  There was not too much change in the patient's state. Staff report patient spends more time inside of her room. She appeared to be sad and irritable, frequently complains of pain in her knees. She uses walker to ambulate. Said that she was told that she had severe arthritis of knees and would need to have total knee replacement. She is compliant with the most of her meds, but periodically refuses to take Lactulose. At times she gets angry and demands to be discharged from the hospital. Most of the time she agrees that she would need to stay at there hospital until there is a bed for her available.       Medications:       gabapentin  100 mg Oral TID     ferrous gluconate  324 mg Oral Daily with breakfast     lactulose  30 g Oral TID     haloperidol  2.5 mg Oral BID     risperiDONE  1 mg Oral QAM     risperiDONE  2 mg Oral At Bedtime     pantoprazole  40 mg Oral Daily     multivitamin, therapeutic with minerals  1 tablet Oral Daily     folic acid  1 mg Oral Daily     rifaximin  550 mg Oral BID     spironolactone  50 mg Oral Daily     furosemide  20 mg Oral Daily          Allergies:     Allergies   Allergen Reactions     Acetaminophen      Ambien [Zolpidem Tartrate] Other (See Comments)     Sleep walks and eats     Ciprofloxacin Other (See Comments)     Seizure.     Citalopram Nausea and Vomiting          Labs:     No results found for this or any previous visit (from the past 24 hour(s)).       Psychiatric Examination:     Vitals:    11/10/17 0900 11/11/17 1054 11/12/17 0900 11/13/17 1607   BP:  (!) 86/50  122/76   BP Location:  Right arm     Pulse:  55  76   Resp:  17     Temp:  96.1  F (35.6  C) 97.5  F (36.4  C)    TempSrc: Oral Oral Oral    SpO2:       Weight:       Height:                      Lying Orthostatic BP:  (Refused)         Sitting Orthostatic BP:  (Refused)         Standing Orthostatic BP:  (Refused)     Appearance: awake, alert, adequately groomed, appeared about stated age and no apparent distress  Attitude: less cooperative and guarded  Eye Contact: poor  Mood:  sad  Affect:  intensity is blunted  Speech:  clear, coherent and slow  Psychomotor Behavior:  no evidence of tardive dyskinesia, dystonia, or tics  Throught Process:  goal oriented  Associations:  no loose associations  Thought Content:  no evidence of suicidal ideation or homicidal ideation, auditory hallucinations are present, Visual hallucinations are present off and on, delusions are present.   Insight:  limited  Judgement:  limited  Oriented to:  time, person, and place  Attention Span and Concentration:  fair  Recent and Remote Memory:  fair         Precautions:     Behavioral Orders   Procedures     Code 2     Elopement precautions     Routine Programming     As clinically indicated     Seizure precautions     Status 15     Every 15 minutes.     Withdrawal precautions          Diagnoses:     Cognitive Disorder NOS.   Encephalopathy probably multifactorial such as chronic hepatic encephalopathy, organic brain damage due to encephalopathy, alcohol drinking, possibly underlying psychotic illness; possibly head injury contributes to patient's symptoms.   Alcohol use disorder.   amphetamine dependence vs abuse.  She meets criteria for Major depressive disorder, moderate severity.          Plan:     No medication changes today. Pain consult was ordered to address bilateral knees pain.  Legal Status and Disposition:  --  Under full MICD commitment.   -- the patient appears to be at her current baseline, she is on waiting list for [facility]. Referred to several NH and memory care unit, was rejected by number of them. Per [facility], CM works on CADI waiver and patient also needs to be rejected by 4 group homes. See discussion  above. Will continue to provide support and structure.

## 2017-11-14 PROCEDURE — 99222 1ST HOSP IP/OBS MODERATE 55: CPT | Performed by: PHYSICIAN ASSISTANT

## 2017-11-14 PROCEDURE — 25000132 ZZH RX MED GY IP 250 OP 250 PS 637: Performed by: NURSE PRACTITIONER

## 2017-11-14 PROCEDURE — 25000132 ZZH RX MED GY IP 250 OP 250 PS 637: Performed by: EMERGENCY MEDICINE

## 2017-11-14 PROCEDURE — 12400007 ZZH R&B MH INTERMEDIATE UMMC

## 2017-11-14 PROCEDURE — 25000132 ZZH RX MED GY IP 250 OP 250 PS 637: Performed by: PSYCHIATRY & NEUROLOGY

## 2017-11-14 PROCEDURE — 25000132 ZZH RX MED GY IP 250 OP 250 PS 637: Performed by: HOSPITALIST

## 2017-11-14 PROCEDURE — 99207 ZZC CONSULT E&M CHANGED TO INITIAL LEVEL: CPT | Performed by: PHYSICIAN ASSISTANT

## 2017-11-14 RX ADMIN — GABAPENTIN 100 MG: 100 CAPSULE ORAL at 20:46

## 2017-11-14 RX ADMIN — RISPERIDONE 1 MG: 1 TABLET ORAL at 08:24

## 2017-11-14 RX ADMIN — FOLIC ACID 1 MG: 1 TABLET ORAL at 08:24

## 2017-11-14 RX ADMIN — Medication 20 MG: at 08:24

## 2017-11-14 RX ADMIN — FERROUS GLUCONATE 324 MG: 324 TABLET ORAL at 08:24

## 2017-11-14 RX ADMIN — LACTULOSE 30 G: 10 POWDER, FOR SOLUTION ORAL at 20:46

## 2017-11-14 RX ADMIN — SPIRONOLACTONE 50 MG: 50 TABLET ORAL at 08:24

## 2017-11-14 RX ADMIN — GABAPENTIN 100 MG: 100 CAPSULE ORAL at 14:00

## 2017-11-14 RX ADMIN — HALOPERIDOL 2.5 MG: 1 TABLET ORAL at 20:46

## 2017-11-14 RX ADMIN — LACTULOSE 30 G: 10 POWDER, FOR SOLUTION ORAL at 08:24

## 2017-11-14 RX ADMIN — RIFAXIMIN 550 MG: 550 TABLET ORAL at 08:24

## 2017-11-14 RX ADMIN — RIFAXIMIN 550 MG: 550 TABLET ORAL at 20:46

## 2017-11-14 RX ADMIN — LACTULOSE 30 G: 10 POWDER, FOR SOLUTION ORAL at 14:00

## 2017-11-14 RX ADMIN — HALOPERIDOL 2.5 MG: 1 TABLET ORAL at 08:24

## 2017-11-14 RX ADMIN — RISPERIDONE 2 MG: 2 TABLET ORAL at 20:47

## 2017-11-14 RX ADMIN — PANTOPRAZOLE SODIUM 40 MG: 40 TABLET, DELAYED RELEASE ORAL at 08:24

## 2017-11-14 RX ADMIN — GABAPENTIN 100 MG: 100 CAPSULE ORAL at 08:24

## 2017-11-14 RX ADMIN — MULTIPLE VITAMINS W/ MINERALS TAB 1 TABLET: TAB at 08:24

## 2017-11-14 ASSESSMENT — ACTIVITIES OF DAILY LIVING (ADL)
GROOMING: INDEPENDENT
GROOMING: INDEPENDENT;PROMPTS
ORAL_HYGIENE: INDEPENDENT
DRESS: SCRUBS (BEHAVIORAL HEALTH)
LAUNDRY: UNABLE TO COMPLETE
ORAL_HYGIENE: INDEPENDENT
DRESS: SCRUBS (BEHAVIORAL HEALTH);INDEPENDENT

## 2017-11-14 NOTE — PLAN OF CARE
"Problem: Psychotic Symptoms  Goal: Psychotic Symptoms  Signs and symptoms of listed problems will be absent or manageable.   Outcome: Improving  48 Hour Assessment     /76  Pulse 76  Temp 97.5  F (36.4  C) (Oral)  Resp 17  Ht 1.676 m (5' 5.98\")  Wt 100 kg (220 lb 7.4 oz)  SpO2 99%  BMI 35.6 kg/m2     Pt visible in milieu off and on throughout the evening.         SI/SIB: Pt denies. Does not display signs of self injurious or suicidal behaviors.     Auditory/Visual Hallucinations: Denies. However, still appears to be responding by yelling at people who are not present in her room.     Quality of sleep: Pt states that her knee pain will often wake her up in the middle of the night.          "

## 2017-11-14 NOTE — PROGRESS NOTES
Writer spoke with Suleiman from [facility] facility. They are still holding a bed for pt and understands the lengthy process.  Writer did mention that pt has been denied by three foster home and we are waiting for one more.  Writer told Suleiman as information comes in, it will be passed along.

## 2017-11-14 NOTE — PROGRESS NOTES
Pt appeared in the milieu during meal times otherwise isolative and withdrawn to her room. Pt became irritable with staff during the shift and began yelling at staff to get her clothes and that she wanted to get her stuff and leave. Pt refused to check in with staff this shift and appeared irritable and tense when staff attempted to communicate with her throughout the shift.      11/14/17 1323   Behavioral Health   Hallucinations appears responding   Thinking distractable;poor concentration   Orientation person: oriented;place: oriented   Memory baseline memory   Insight poor   Judgement impaired   Eye Contact at examiner   Affect blunted, flat;irritable   Mood irritable   Physical Appearance/Attire untidy   Hygiene neglected grooming - unclean body, hair, teeth   Suicidality other (see comments)  (JOSE)   Self Injury other (see comment)  (JOSE)   Elopement Statements about wanting to leave   Activity isolative;withdrawn   Speech coherent;clear   Medication Sensitivity no stated side effects;no observed side effects   Psychomotor / Gait unsteady;slow   Psycho Education   Type of Intervention 1:1 intervention   Response refuses   Activities of Daily Living   Hygiene/Grooming independent   Oral Hygiene independent   Dress scrubs (behavioral health);independent   Room Organization prompts
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
## 2017-11-15 PROCEDURE — 25000132 ZZH RX MED GY IP 250 OP 250 PS 637: Performed by: EMERGENCY MEDICINE

## 2017-11-15 PROCEDURE — 25000132 ZZH RX MED GY IP 250 OP 250 PS 637: Performed by: PSYCHIATRY & NEUROLOGY

## 2017-11-15 PROCEDURE — 25000132 ZZH RX MED GY IP 250 OP 250 PS 637: Performed by: NURSE PRACTITIONER

## 2017-11-15 PROCEDURE — 99232 SBSQ HOSP IP/OBS MODERATE 35: CPT | Performed by: PSYCHIATRY & NEUROLOGY

## 2017-11-15 PROCEDURE — 12400007 ZZH R&B MH INTERMEDIATE UMMC

## 2017-11-15 PROCEDURE — 25000132 ZZH RX MED GY IP 250 OP 250 PS 637: Performed by: HOSPITALIST

## 2017-11-15 RX ADMIN — FOLIC ACID 1 MG: 1 TABLET ORAL at 08:38

## 2017-11-15 RX ADMIN — RIFAXIMIN 550 MG: 550 TABLET ORAL at 20:51

## 2017-11-15 RX ADMIN — PANTOPRAZOLE SODIUM 40 MG: 40 TABLET, DELAYED RELEASE ORAL at 08:38

## 2017-11-15 RX ADMIN — LACTULOSE 30 G: 10 POWDER, FOR SOLUTION ORAL at 08:38

## 2017-11-15 RX ADMIN — MULTIPLE VITAMINS W/ MINERALS TAB 1 TABLET: TAB at 08:38

## 2017-11-15 RX ADMIN — LACTULOSE 30 G: 10 POWDER, FOR SOLUTION ORAL at 13:54

## 2017-11-15 RX ADMIN — GABAPENTIN 100 MG: 100 CAPSULE ORAL at 08:38

## 2017-11-15 RX ADMIN — GABAPENTIN 100 MG: 100 CAPSULE ORAL at 13:54

## 2017-11-15 RX ADMIN — SPIRONOLACTONE 50 MG: 50 TABLET ORAL at 08:39

## 2017-11-15 RX ADMIN — LACTULOSE 30 G: 10 POWDER, FOR SOLUTION ORAL at 20:51

## 2017-11-15 RX ADMIN — Medication 20 MG: at 08:38

## 2017-11-15 RX ADMIN — RIFAXIMIN 550 MG: 550 TABLET ORAL at 08:38

## 2017-11-15 RX ADMIN — HALOPERIDOL 2.5 MG: 1 TABLET ORAL at 08:38

## 2017-11-15 RX ADMIN — RISPERIDONE 2 MG: 2 TABLET ORAL at 20:52

## 2017-11-15 RX ADMIN — GABAPENTIN 100 MG: 100 CAPSULE ORAL at 20:52

## 2017-11-15 RX ADMIN — RISPERIDONE 1 MG: 1 TABLET ORAL at 08:39

## 2017-11-15 RX ADMIN — HALOPERIDOL 2.5 MG: 1 TABLET ORAL at 20:52

## 2017-11-15 RX ADMIN — FERROUS GLUCONATE 324 MG: 324 TABLET ORAL at 08:38

## 2017-11-15 ASSESSMENT — ACTIVITIES OF DAILY LIVING (ADL)
DRESS: INDEPENDENT
HYGIENE/GROOMING: PROMPTS
LAUNDRY: WITH SUPERVISION
ORAL_HYGIENE: INDEPENDENT
GROOMING: PROMPTS
DRESS: INDEPENDENT
LAUNDRY: WITH SUPERVISION
ORAL_HYGIENE: PROMPTS

## 2017-11-15 NOTE — PROGRESS NOTES
"The pt was only out in the milieu for dinner and snacks.    Early on in the shift, the pt could be heard crying and screaming at someone who was \"invisible and watching me from the wall.\"    On previous occasions the pt has reported to this writer that this \"person\" is named \"Elizabeth\".  Pt herself will not admit to having auditory or visual hallucinations, but they are frequent and distressing to the pt.  This writer has learned that after these incident occur, the pt can be calmed by bringing her some Ginger Ale and offering assistance if needed.  The pt was not irritable to staff this shift, but refused to go to groups.       11/14/17 2104   Behavioral Health   Hallucinations appears responding;auditory;visual   Thinking distractable;confused;poor concentration;delusional   Orientation person: oriented;place: oriented   Memory baseline memory   Insight poor   Judgement impaired   Eye Contact at examiner   Affect tense;sad;blunted, flat   Mood depressed;hopeless;irritable   Physical Appearance/Attire untidy   Hygiene neglected grooming - unclean body, hair, teeth   Suicidality other (see comments)  (unable to assess)   Self Injury other (see comment)  (none observed or reported)   Elopement (no attemptst elopement)   Activity other (see comment)  (out for meals and snacks)   Speech clear   Medication Sensitivity no observed side effects;no stated side effects   Psychomotor / Gait slow  (walker)   Substance Withdrawal Interventions   Social and Therapeutic Interventions (Substance Withdrawal) encourage socialization with peers;encourage effective boundaries with peers;encourage participation in therapeutic groups and milieu activities   Safety   Suicidality status 15   Elopement status 15   Coping/Psychosocial   Verbalized Emotional State frustration;hopelessness;loneliness;powerlessness;sadness   Supportive Measures self-care encouraged;verbalization of feelings encouraged;active listening utilized   Psycho " Education   Type of Intervention 1:1 intervention   Response participates with encouragement   Hours 0.5   Treatment Detail check-in   Group Therapy Session   Group Attendance refused to attend group session   Activities of Daily Living   Hygiene/Grooming independent;prompts   Oral Hygiene independent   Dress scrubs (behavioral health)   Laundry unable to complete   Room Organization prompts   Activity   Activity Assistance Provided independent   Behavioral Health Interventions   Psychotic Symptoms maintain safety precautions;monitor need to revise level of observation;maintain safe secure environment;reality orientation;simple, clear language;decrease environmental stimulation;redirection of intrusive behaviors;redirection of aggressive behaviors;encourage nutrition and hydration;encourage participation / independence with adls;provide emotional support;establish therapeutic relationship   Social and Therapeutic Interventions (Psychotic Symptoms) encourage socialization with peers;encourage effective boundaries with peers;encourage participation in therapeutic groups and milieu activities

## 2017-11-15 NOTE — PROGRESS NOTES
11/15/17 9738   Behavioral Health   Hallucinations appears responding   Thinking delusional;paranoid;confused   Orientation person: oriented;place, disoriented;situation, disoriented   Memory confabulation   Insight poor   Judgement impaired   Affect irritable;angry   Mood labile   Physical Appearance/Attire disheveled   Hygiene neglected grooming - unclean body, hair, teeth   Suicidality other (see comments)  (None observed)   Self Injury other (see comment)  (None Observed)   Elopement Loitering near exit doors;Trying handles of doors;Statements about wanting to leave   Activity isolative;hyperactive (agitated, impulsive)   Speech clear   Medication Sensitivity no observed side effects   Psychomotor / Gait balanced;steady  (Uses a walker)   Psycho Education   Type of Intervention 1:1 intervention   Response participates, initiates socially appropriate   Hours 0.5   Activities of Daily Living   Hygiene/Grooming prompts   Oral Hygiene independent   Dress independent   Laundry with supervision   Room Organization prompts   Behavioral Health Interventions   Psychotic Symptoms provide emotional support;encourage participation / independence with adls;establish therapeutic relationship;assist with developing & utilizing healthy coping strategies;build upon strengths   Social and Therapeutic Interventions (Psychotic Symptoms) encourage effective boundaries with peers     Pt was isolative in her room for the majority of the shift. Pt refused to eat breakfast, which was unusually. Pt stated that she feels too tire to eat. Pt added that she could not sleep well last night because of her knee pain. However, pt ate lunch. Pt was heard crying and cussing in her room. Pt repeatedly expressed her desire to leave. Pt loitered over the exit doors. Pt tried to push the doors. Pt was told few times to get away from the doors, but came right back to it each time.

## 2017-11-15 NOTE — PROGRESS NOTES
[facility], [place]   Psychiatric Progress Note        Interim History:     The patient's care was discussed with the treatment team during the daily team meeting and/or staff's chart notes were reviewed.  She was seen today in presence of two nursing students. There was not too much change in the patient's state. Staff report patient spends most of time inside of her room. She appeared to be sad and irritable, frequently complains of pain in her knees. She uses walker to ambulate. Said that she was told that she had severe arthritis of knees and would need to have total knee replacement. She is compliant with the most of her meds, but periodically refuses to take Lactulose. At times she gets angry and demands to be discharged from the hospital. Most of the time she agrees that she would need to stay at there hospital until there is a bed for her available. Per [facility], she needs to be rejected by one more group home before she can be discharged to [facility] facility, she already has a bed for her at such facility in [facility].        Medications:       gabapentin  100 mg Oral TID     ferrous gluconate  324 mg Oral Daily with breakfast     lactulose  30 g Oral TID     haloperidol  2.5 mg Oral BID     risperiDONE  1 mg Oral QAM     risperiDONE  2 mg Oral At Bedtime     pantoprazole  40 mg Oral Daily     multivitamin, therapeutic with minerals  1 tablet Oral Daily     folic acid  1 mg Oral Daily     rifaximin  550 mg Oral BID     spironolactone  50 mg Oral Daily     furosemide  20 mg Oral Daily          Allergies:     Allergies   Allergen Reactions     Acetaminophen      Ambien [Zolpidem Tartrate] Other (See Comments)     Sleep walks and eats     Ciprofloxacin Other (See Comments)     Seizure.     Citalopram Nausea and Vomiting          Labs:     No results found for this or any previous visit (from the past 24 hour(s)).       Psychiatric Examination:     Vitals:    11/12/17 0900 11/13/17 1607  11/14/17 1629 11/15/17 0922   BP:  122/76 104/65 91/44   BP Location:    Left arm   Pulse:  76 67 61   Resp:       Temp: 97.5  F (36.4  C)  98.2  F (36.8  C) 98.1  F (36.7  C)   TempSrc: Oral  Oral Oral   SpO2:       Height:                     Lying Orthostatic BP:  (Refused)         Sitting Orthostatic BP:  (Refused)         Standing Orthostatic BP:  (Refused)     Appearance: awake, alert, adequately groomed, appeared about stated age and no apparent distress  Attitude: less cooperative and guarded  Eye Contact: poor  Mood:  sad  Affect:  intensity is blunted  Speech:  clear, coherent and slow  Psychomotor Behavior:  no evidence of tardive dyskinesia, dystonia, or tics  Throught Process:  goal oriented  Associations:  no loose associations  Thought Content:  no evidence of suicidal ideation or homicidal ideation, auditory hallucinations are present, Visual hallucinations are present off and on, delusions are present.   Insight:  limited  Judgement:  limited  Oriented to:  time, person, and place  Attention Span and Concentration:  fair  Recent and Remote Memory:  fair         Precautions:     Behavioral Orders   Procedures     Code 2     Elopement precautions     Routine Programming     As clinically indicated     Seizure precautions     Status 15     Every 15 minutes.     Withdrawal precautions          Diagnoses:     Cognitive Disorder NOS.   Encephalopathy probably multifactorial such as chronic hepatic encephalopathy, organic brain damage due to encephalopathy, alcohol drinking, possibly underlying psychotic illness; possibly head injury contributes to patient's symptoms.   Alcohol use disorder.   amphetamine dependence vs abuse.  She meets criteria for Major depressive disorder, moderate severity.          Plan:     No medication changes today. Pain consult was ordered to address bilateral knees pain.  Legal Status and Disposition:  --  Under full MICD commitment.   -- the patient appears to be at her current  baseline, she is on waiting list for [facility]. Referred to several NH and memory care unit, was rejected by number of them. Per [facility], CM works on CADI waiver and patient also needs to be rejected by 4 group homes. See discussion above. Will continue to provide support and structure.

## 2017-11-16 PROCEDURE — 25000132 ZZH RX MED GY IP 250 OP 250 PS 637: Performed by: NURSE PRACTITIONER

## 2017-11-16 PROCEDURE — 99232 SBSQ HOSP IP/OBS MODERATE 35: CPT | Performed by: PSYCHIATRY & NEUROLOGY

## 2017-11-16 PROCEDURE — 25000132 ZZH RX MED GY IP 250 OP 250 PS 637: Performed by: PSYCHIATRY & NEUROLOGY

## 2017-11-16 PROCEDURE — 25000132 ZZH RX MED GY IP 250 OP 250 PS 637: Performed by: HOSPITALIST

## 2017-11-16 PROCEDURE — 12400007 ZZH R&B MH INTERMEDIATE UMMC

## 2017-11-16 PROCEDURE — 25000132 ZZH RX MED GY IP 250 OP 250 PS 637: Performed by: EMERGENCY MEDICINE

## 2017-11-16 RX ORDER — GABAPENTIN 100 MG/1
200 CAPSULE ORAL 3 TIMES DAILY
Status: DISCONTINUED | OUTPATIENT
Start: 2017-11-16 | End: 2018-01-02 | Stop reason: HOSPADM

## 2017-11-16 RX ADMIN — RISPERIDONE 1 MG: 1 TABLET ORAL at 08:20

## 2017-11-16 RX ADMIN — HALOPERIDOL 2.5 MG: 1 TABLET ORAL at 19:46

## 2017-11-16 RX ADMIN — RIFAXIMIN 550 MG: 550 TABLET ORAL at 08:20

## 2017-11-16 RX ADMIN — FOLIC ACID 1 MG: 1 TABLET ORAL at 08:19

## 2017-11-16 RX ADMIN — RIFAXIMIN 550 MG: 550 TABLET ORAL at 19:45

## 2017-11-16 RX ADMIN — GABAPENTIN 100 MG: 100 CAPSULE ORAL at 08:19

## 2017-11-16 RX ADMIN — HALOPERIDOL 2.5 MG: 1 TABLET ORAL at 08:19

## 2017-11-16 RX ADMIN — SPIRONOLACTONE 50 MG: 50 TABLET ORAL at 08:20

## 2017-11-16 RX ADMIN — FERROUS GLUCONATE 324 MG: 324 TABLET ORAL at 08:20

## 2017-11-16 RX ADMIN — LACTULOSE 30 G: 10 POWDER, FOR SOLUTION ORAL at 13:34

## 2017-11-16 RX ADMIN — Medication: at 13:52

## 2017-11-16 RX ADMIN — LACTULOSE 30 G: 10 POWDER, FOR SOLUTION ORAL at 08:19

## 2017-11-16 RX ADMIN — Medication 20 MG: at 08:20

## 2017-11-16 RX ADMIN — PANTOPRAZOLE SODIUM 40 MG: 40 TABLET, DELAYED RELEASE ORAL at 08:20

## 2017-11-16 RX ADMIN — LACTULOSE 30 G: 10 POWDER, FOR SOLUTION ORAL at 19:45

## 2017-11-16 RX ADMIN — Medication: at 19:27

## 2017-11-16 RX ADMIN — RISPERIDONE 2 MG: 2 TABLET ORAL at 19:46

## 2017-11-16 RX ADMIN — GABAPENTIN 200 MG: 100 CAPSULE ORAL at 13:34

## 2017-11-16 RX ADMIN — GABAPENTIN 200 MG: 100 CAPSULE ORAL at 19:45

## 2017-11-16 RX ADMIN — MULTIPLE VITAMINS W/ MINERALS TAB 1 TABLET: TAB at 08:20

## 2017-11-16 ASSESSMENT — ACTIVITIES OF DAILY LIVING (ADL)
DRESS: SCRUBS (BEHAVIORAL HEALTH)
GROOMING: INDEPENDENT
ORAL_HYGIENE: INDEPENDENT
ORAL_HYGIENE: PROMPTS
HYGIENE/GROOMING: PROMPTS
LAUNDRY: WITH SUPERVISION
DRESS: SCRUBS (BEHAVIORAL HEALTH);PROMPTS

## 2017-11-16 NOTE — PROGRESS NOTES
[facility], [place]   Psychiatric Progress Note        Interim History:     The patient's care was discussed with the treatment team during the daily team meeting and/or staff's chart notes were reviewed.  She was irritable and provided minimal info during today's visit, complained of bilateral knee pain. She was seen by pain medicine who recommended to increase Gabapentin and use cream of Gabapentin and Lidocaine for both knees. Patient is cooperative with care most of the time. At times she gets angry and demands to be discharged from the hospital. Most of the time she agrees that she would need to stay at there hospital until there is a bed for her available. Per [facility], she needs to be rejected by one more group home before she can be discharged to [facility] facility, she already has a bed for her at such facility in [facility]. [facility] also believes that patient doesn't have any home anymore as her  is homeless.        Medications:       gabapentin  200 mg Oral TID     ketamine (KETALAR) 5%-gabapentin (NEURONTIN) 8%-lidocaine 2.5%   Topical 4x Daily     ferrous gluconate  324 mg Oral Daily with breakfast     lactulose  30 g Oral TID     haloperidol  2.5 mg Oral BID     risperiDONE  1 mg Oral QAM     risperiDONE  2 mg Oral At Bedtime     pantoprazole  40 mg Oral Daily     multivitamin, therapeutic with minerals  1 tablet Oral Daily     folic acid  1 mg Oral Daily     rifaximin  550 mg Oral BID     spironolactone  50 mg Oral Daily     furosemide  20 mg Oral Daily          Allergies:     Allergies   Allergen Reactions     Acetaminophen      Ambien [Zolpidem Tartrate] Other (See Comments)     Sleep walks and eats     Ciprofloxacin Other (See Comments)     Seizure.     Citalopram Nausea and Vomiting          Labs:     No results found for this or any previous visit (from the past 24 hour(s)).       Psychiatric Examination:     Vitals:    11/13/17 1607 11/14/17 1629 11/15/17 0922 11/16/17 0900    BP: 122/76 104/65 91/44    BP Location:   Left arm Left arm   Pulse: 76 67 61    Resp:    18   Temp:  98.2  F (36.8  C) 98.1  F (36.7  C) 97.9  F (36.6  C)   TempSrc:  Oral Oral Oral   SpO2:       Height:                       Lying Orthostatic BP: 101/52         Sitting Orthostatic BP:  (Refused)         Standing Orthostatic BP:  (Refused)     Appearance: awake, alert, adequately groomed, appeared about stated age and no apparent distress  Attitude: less cooperative and guarded  Eye Contact: poor  Mood:  Sad, irritable  Affect:  intensity is blunted  Speech:  clear, coherent and slow  Psychomotor Behavior:  no evidence of tardive dyskinesia, dystonia, or tics  Throught Process:  goal oriented  Associations:  no loose associations  Thought Content:  no evidence of suicidal ideation or homicidal ideation, auditory hallucinations are present, Visual hallucinations are present off and on, delusions are present.   Insight:  limited  Judgement:  limited  Oriented to:  time, person, and place  Attention Span and Concentration:  fair  Recent and Remote Memory:  fair         Precautions:     Behavioral Orders   Procedures     Code 2     Elopement precautions     Routine Programming     As clinically indicated     Seizure precautions     Status 15     Every 15 minutes.     Withdrawal precautions          Diagnoses:     Cognitive Disorder NOS.   Encephalopathy probably multifactorial such as chronic hepatic encephalopathy, organic brain damage due to encephalopathy, alcohol drinking, possibly underlying psychotic illness; possibly head injury contributes to patient's symptoms.   Alcohol use disorder.   amphetamine dependence vs abuse.  She meets criteria for Major depressive disorder, moderate severity.          Plan:     Per pain medicine recommendation will order Gabapentin/Lidocaine cream and increase Gabapentin dose. For more details, please, see Pain Medicine note. Appreciate medical team's input.   Legal Status and  Disposition:  --  Under full MICD commitment.   -- the patient appears to be at her current baseline, she is on waiting list for [facility]. Referred to several NH and memory care unit, was rejected by number of them. Per [facility], CM works on CADI waiver and patient also needs to be rejected by 4 group homes. See discussion above. Will continue to provide support and structure.

## 2017-11-16 NOTE — PLAN OF CARE
"Problem: Psychotic Symptoms  Goal: Psychotic Symptoms  Signs and symptoms of listed problems will be absent or manageable.   Outcome: No Change  48 Hour Assessment    Pt isolated to her room for the majority of the evening, coming out to the lounge for dinner and snacks. Pt displayed a blunted affect, but was pleasant upon approach and did not have any behavioral issues. No attempts at elopement made.    She took her medications without issue, but drank only about 3/4 of her Lactulose.     Rated her knee pain at a 10 out of 10, but states that this is baseline for her.     SI/SIB: Pt denies    Auditory/Visual Hallucinations: Pt denies. However, she made delusional comments, such as \"She keeps harassing me! Get her out of my room\" and there was no one present in patient's room. In addition, pt stated, \"I can't sleep with all of this shit all over me.     No additional concerns at this time. Will continue to monitor and assess.          "

## 2017-11-16 NOTE — PLAN OF CARE
Problem: General Plan of Care (Inpatient Behavioral)  Goal: Team Discussion  Team Plan:    BEHAVIORAL TEAM DISCUSSION    Participants: MARYBEL Garvin RN Asha  Progress: pt at baseline  Continued Stay Criteria/Rationale: pt is committed and awaiting placement  Medical/Physical: chronic pain-knees  Precautions:   Behavioral Orders   Procedures     Code 2     Elopement precautions     Routine Programming     As clinically indicated     Seizure precautions     Status 15     Every 15 minutes.     Withdrawal precautions     Plan: [facility] is holding a bed until funding is secured  Rationale for change in precautions or plan: no change      Problem: Patient Care Overview  Goal: Team Discussion  Team Plan:    BEHAVIORAL TEAM DISCUSSION    Participants: MARYBEL Garvin RN Asha  Progress: pt at baseline  Continued Stay Criteria/Rationale: pt is committed and awaiting placement  Medical/Physical: chronic pain-knees  Precautions:   Behavioral Orders   Procedures     Code 2     Elopement precautions     Routine Programming     As clinically indicated     Seizure precautions     Status 15     Every 15 minutes.     Withdrawal precautions     Plan: [facility] is holding a bed until funding is secured  Rationale for change in precautions or plan: no change

## 2017-11-16 NOTE — PROGRESS NOTES
11/16/17 1405   Behavioral Health   Hallucinations appears responding   Thinking delusional   Orientation time: oriented;date: oriented;place: oriented;person: oriented   Memory baseline memory   Insight poor   Judgement impaired   Eye Contact at examiner   Affect irritable;blunted, flat   Mood grandiose;irritable   Physical Appearance/Attire disheveled   Hygiene body odor   Suicidality other (see comments)  (Denies)   1. Wish to be Dead No   2. Non-Specific Active Suicidal Thoughts  No   3. Active Sucidal Ideation with any Methods (Not Plan) Without Intent to Act  No   Elopement Statements about wanting to leave;Hallucinations directing behavior   Activity withdrawn;isolative   Speech coherent;clear   Medication Sensitivity no stated side effects   Psychomotor / Gait unsteady;slow   Psycho Education   Type of Intervention 1:1 intervention   Response participates with encouragement   Hours 0.5   Activities of Daily Living   Hygiene/Grooming independent   Oral Hygiene independent   Dress scrubs (behavioral health)   Laundry with supervision   Room Organization independent   Activity   Activity Assistance Provided independent   Behavioral Health Interventions   Psychotic Symptoms maintain safety precautions;assist patient in developing safety plan;assist patient in following safety plan;assist with developing & utilizing healthy coping strategies   Social and Therapeutic Interventions (Psychotic Symptoms) encourage socialization with peers;encourage participation in therapeutic groups and milieu activities   Pt spent most of her time in room sleeping and she was yelling from her room or saying let me go home or crying. She appears to be responding to internal stimuli. She comes out for meals or making request for her clothes in that she can go home. She denies suicidal ideation or visual hallucination.

## 2017-11-17 PROCEDURE — 25000132 ZZH RX MED GY IP 250 OP 250 PS 637: Performed by: NURSE PRACTITIONER

## 2017-11-17 PROCEDURE — 25000132 ZZH RX MED GY IP 250 OP 250 PS 637: Performed by: HOSPITALIST

## 2017-11-17 PROCEDURE — 99207 ZZC CDG-MDM COMPONENT: MEETS MODERATE - UP CODED: CPT | Performed by: PSYCHIATRY & NEUROLOGY

## 2017-11-17 PROCEDURE — 25000132 ZZH RX MED GY IP 250 OP 250 PS 637: Performed by: EMERGENCY MEDICINE

## 2017-11-17 PROCEDURE — 12400007 ZZH R&B MH INTERMEDIATE UMMC

## 2017-11-17 PROCEDURE — 25000132 ZZH RX MED GY IP 250 OP 250 PS 637: Performed by: PSYCHIATRY & NEUROLOGY

## 2017-11-17 PROCEDURE — 99232 SBSQ HOSP IP/OBS MODERATE 35: CPT | Performed by: PSYCHIATRY & NEUROLOGY

## 2017-11-17 RX ADMIN — RISPERIDONE 1 MG: 1 TABLET ORAL at 09:25

## 2017-11-17 RX ADMIN — LACTULOSE 30 G: 10 POWDER, FOR SOLUTION ORAL at 20:21

## 2017-11-17 RX ADMIN — Medication 20 MG: at 09:21

## 2017-11-17 RX ADMIN — LACTULOSE 30 G: 10 POWDER, FOR SOLUTION ORAL at 15:03

## 2017-11-17 RX ADMIN — MULTIPLE VITAMINS W/ MINERALS TAB 1 TABLET: TAB at 09:22

## 2017-11-17 RX ADMIN — LACTULOSE 30 G: 10 POWDER, FOR SOLUTION ORAL at 09:23

## 2017-11-17 RX ADMIN — GABAPENTIN 200 MG: 100 CAPSULE ORAL at 09:21

## 2017-11-17 RX ADMIN — RIFAXIMIN 550 MG: 550 TABLET ORAL at 20:22

## 2017-11-17 RX ADMIN — RIFAXIMIN 550 MG: 550 TABLET ORAL at 09:21

## 2017-11-17 RX ADMIN — FOLIC ACID 1 MG: 1 TABLET ORAL at 09:23

## 2017-11-17 RX ADMIN — SPIRONOLACTONE 50 MG: 50 TABLET ORAL at 09:22

## 2017-11-17 RX ADMIN — PANTOPRAZOLE SODIUM 40 MG: 40 TABLET, DELAYED RELEASE ORAL at 09:24

## 2017-11-17 RX ADMIN — FERROUS GLUCONATE 324 MG: 324 TABLET ORAL at 09:21

## 2017-11-17 RX ADMIN — GABAPENTIN 200 MG: 100 CAPSULE ORAL at 14:47

## 2017-11-17 RX ADMIN — GABAPENTIN 200 MG: 100 CAPSULE ORAL at 20:22

## 2017-11-17 RX ADMIN — HALOPERIDOL 2.5 MG: 1 TABLET ORAL at 20:22

## 2017-11-17 RX ADMIN — HALOPERIDOL 2.5 MG: 1 TABLET ORAL at 09:22

## 2017-11-17 RX ADMIN — RISPERIDONE 2 MG: 2 TABLET ORAL at 20:22

## 2017-11-17 RX ADMIN — Medication: at 16:48

## 2017-11-17 ASSESSMENT — ACTIVITIES OF DAILY LIVING (ADL)
ORAL_HYGIENE: PROMPTS
ORAL_HYGIENE: PROMPTS
DRESS: SCRUBS (BEHAVIORAL HEALTH);PROMPTS
GROOMING: PROMPTS
LAUNDRY: UNABLE TO COMPLETE
LAUNDRY: UNABLE TO COMPLETE
DRESS: SCRUBS (BEHAVIORAL HEALTH)
GROOMING: PROMPTS

## 2017-11-17 NOTE — PROGRESS NOTES
[facility], [place]   Psychiatric Progress Note        Interim History:     The patient's care was discussed with the treatment team during the daily team meeting and/or staff's chart notes were reviewed.  She was irritable and provided minimal info during today's visit, then, started yelling and I had to stop interview. She still has episodes of hallucinating, talking to self and appearing to be responding to internal stimuli. This appears to be her present baseline and there is no significant change. Per [facility], she needs to be rejected by one more group home before she can be discharged to [facility] facility, she already has a bed for her at such facility in [facility]. [facility] also believes that patient doesn't have any home anymore as her  is homeless.        Medications:       gabapentin  200 mg Oral TID     ketamine (KETALAR) 5%-gabapentin (NEURONTIN) 8%-lidocaine 2.5%   Topical 4x Daily     ferrous gluconate  324 mg Oral Daily with breakfast     lactulose  30 g Oral TID     haloperidol  2.5 mg Oral BID     risperiDONE  1 mg Oral QAM     risperiDONE  2 mg Oral At Bedtime     pantoprazole  40 mg Oral Daily     multivitamin, therapeutic with minerals  1 tablet Oral Daily     folic acid  1 mg Oral Daily     rifaximin  550 mg Oral BID     spironolactone  50 mg Oral Daily     furosemide  20 mg Oral Daily          Allergies:     Allergies   Allergen Reactions     Acetaminophen      Ambien [Zolpidem Tartrate] Other (See Comments)     Sleep walks and eats     Ciprofloxacin Other (See Comments)     Seizure.     Citalopram Nausea and Vomiting          Labs:     No results found for this or any previous visit (from the past 24 hour(s)).       Psychiatric Examination:     Vitals:    11/14/17 1629 11/15/17 0922 11/16/17 0900 11/17/17 0916   BP: 104/65 91/44     BP Location:  Left arm Left arm Left arm   Pulse: 67 61     Resp:   18 18   Temp: 98.2  F (36.8  C) 98.1  F (36.7  C) 97.9  F (36.6  C) 98   F (36.7  C)   TempSrc: Oral Oral Oral Oral   SpO2:       Height:                 Lying Orthostatic BP: 110/68         Sitting Orthostatic BP:  (refused )         Standing Orthostatic BP:  (refused )     Appearance: awake, alert, adequately groomed, appeared about stated age and no apparent distress  Attitude: less cooperative and guarded  Eye Contact: poor  Mood:  Sad, irritable  Affect:  intensity is blunted  Speech:  clear, coherent and slow  Psychomotor Behavior:  no evidence of tardive dyskinesia, dystonia, or tics  Throught Process:  goal oriented  Associations:  no loose associations  Thought Content:  no evidence of suicidal ideation or homicidal ideation, auditory hallucinations are present, Visual hallucinations are present off and on, delusions are present.   Insight:  limited  Judgement:  limited  Oriented to:  time, person, and place  Attention Span and Concentration:  fair  Recent and Remote Memory:  fair         Precautions:     Behavioral Orders   Procedures     Code 2     Elopement precautions     Routine Programming     As clinically indicated     Seizure precautions     Status 15     Every 15 minutes.     Withdrawal precautions          Diagnoses:     Cognitive Disorder NOS.   Encephalopathy probably multifactorial such as chronic hepatic encephalopathy, organic brain damage due to encephalopathy, alcohol drinking, possibly underlying psychotic illness; possibly head injury contributes to patient's symptoms.   Alcohol use disorder.   amphetamine dependence vs abuse.  She meets criteria for Major depressive disorder, moderate severity.          Plan:     Per pain medicine recommendation will order Gabapentin/Lidocaine cream and increase Gabapentin dose. For more details, please, see Pain Medicine note. Appreciate medical team's input.   Legal Status and Disposition:  --  Under full MICD commitment.   -- the patient appears to be at her current baseline, she is on waiting list for [facility]. Referred to  several NH and memory care unit, was rejected by number of them. Per [facility], CM works on CADI waiver and patient also needs to be rejected by 1 more group home. See discussion above. Will continue to provide support and structure.

## 2017-11-17 NOTE — PROGRESS NOTES
11/17/17 1331   Behavioral Health   Hallucinations appears responding   Thinking confused;distractable;delusional   Orientation person: oriented   Memory short term   Insight poor   Judgement impaired   Eye Contact at examiner   Affect full range affect   Mood labile   Physical Appearance/Attire disheveled   Hygiene neglected grooming - unclean body, hair, teeth   Suicidality (denies)   Self Injury (denies)   Elopement Statements about wanting to leave   Activity isolative;withdrawn   Speech clear;coherent   Medication Sensitivity no stated side effects   Psychomotor / Gait (uses walker)   Psycho Education   Type of Intervention 1:1 intervention   Response participates, initiates socially appropriate   Hours 0.5   Activities of Daily Living   Hygiene/Grooming prompts   Oral Hygiene prompts   Dress scrubs (behavioral health)   Laundry unable to complete   Room Organization prompts   pt isolative in room most of shift. Pt had food trays brought to her in her room due to knee pain. Pt observed talking to self in room and starring at her wall yelling at it. Pt got upset after psychiatrist visited pt in her room. Pt denies any mental health problems and makes statements about wanting to leave. Pt neglected hygiene cares. Pt ate and drank well. Pt has labile mood.

## 2017-11-17 NOTE — PROGRESS NOTES
"Pt withdrawn and isolated this shift. Had an instance of thinking she would leave but was told no and pt became labile and sad. She only came out for dinner and was pleasant afterwards. Pt claimed that she was sad due to the \"miscommunication.\" Denies seeing or hearing things in rooms, says people she sees are real. Denies anyone bothering her today. Otherwise, pt mostly kept to room this shift.       11/16/17 2234   Behavioral Health   Hallucinations appears responding   Thinking distractable;delusional   Orientation place: oriented   Memory short term   Insight poor   Judgement impaired   Eye Contact at examiner   Affect sad;blunted, flat   Mood depressed;grandiose   Physical Appearance/Attire disheveled   Hygiene neglected grooming - unclean body, hair, teeth   Suicidality other (see comments)  (none)   Elopement Statements about wanting to leave   Activity withdrawn;isolative   Speech coherent;clear   Medication Sensitivity no observed side effects;no stated side effects   Psychomotor / Gait slow   Activities of Daily Living   Hygiene/Grooming prompts   Oral Hygiene prompts   Dress scrubs (behavioral health);prompts   Room Organization prompts     "

## 2017-11-18 PROCEDURE — 25000132 ZZH RX MED GY IP 250 OP 250 PS 637: Performed by: HOSPITALIST

## 2017-11-18 PROCEDURE — 25000132 ZZH RX MED GY IP 250 OP 250 PS 637: Performed by: PSYCHIATRY & NEUROLOGY

## 2017-11-18 PROCEDURE — 12400007 ZZH R&B MH INTERMEDIATE UMMC

## 2017-11-18 PROCEDURE — 25000132 ZZH RX MED GY IP 250 OP 250 PS 637: Performed by: NURSE PRACTITIONER

## 2017-11-18 PROCEDURE — 25000132 ZZH RX MED GY IP 250 OP 250 PS 637: Performed by: EMERGENCY MEDICINE

## 2017-11-18 RX ADMIN — GABAPENTIN 200 MG: 100 CAPSULE ORAL at 09:12

## 2017-11-18 RX ADMIN — FOLIC ACID 1 MG: 1 TABLET ORAL at 09:12

## 2017-11-18 RX ADMIN — RIFAXIMIN 550 MG: 550 TABLET ORAL at 20:44

## 2017-11-18 RX ADMIN — HALOPERIDOL 2.5 MG: 1 TABLET ORAL at 09:12

## 2017-11-18 RX ADMIN — Medication 20 MG: at 09:12

## 2017-11-18 RX ADMIN — LACTULOSE 30 G: 10 POWDER, FOR SOLUTION ORAL at 09:11

## 2017-11-18 RX ADMIN — RIFAXIMIN 550 MG: 550 TABLET ORAL at 09:12

## 2017-11-18 RX ADMIN — RISPERIDONE 2 MG: 2 TABLET ORAL at 20:44

## 2017-11-18 RX ADMIN — FERROUS GLUCONATE 324 MG: 324 TABLET ORAL at 09:12

## 2017-11-18 RX ADMIN — GABAPENTIN 200 MG: 100 CAPSULE ORAL at 14:03

## 2017-11-18 RX ADMIN — SPIRONOLACTONE 50 MG: 50 TABLET ORAL at 09:12

## 2017-11-18 RX ADMIN — PANTOPRAZOLE SODIUM 40 MG: 40 TABLET, DELAYED RELEASE ORAL at 09:12

## 2017-11-18 RX ADMIN — MULTIPLE VITAMINS W/ MINERALS TAB 1 TABLET: TAB at 09:12

## 2017-11-18 RX ADMIN — HALOPERIDOL 2.5 MG: 1 TABLET ORAL at 20:44

## 2017-11-18 RX ADMIN — Medication: at 20:51

## 2017-11-18 RX ADMIN — LACTULOSE 30 G: 10 POWDER, FOR SOLUTION ORAL at 20:44

## 2017-11-18 RX ADMIN — GABAPENTIN 200 MG: 100 CAPSULE ORAL at 20:44

## 2017-11-18 RX ADMIN — LACTULOSE 30 G: 10 POWDER, FOR SOLUTION ORAL at 14:03

## 2017-11-18 RX ADMIN — RISPERIDONE 1 MG: 1 TABLET ORAL at 09:12

## 2017-11-18 ASSESSMENT — ACTIVITIES OF DAILY LIVING (ADL)
GROOMING: PROMPTS
LAUNDRY: UNABLE TO COMPLETE
DRESS: SCRUBS (BEHAVIORAL HEALTH)
ORAL_HYGIENE: PROMPTS
DRESS: PROMPTS
GROOMING: PROMPTS
ORAL_HYGIENE: PROMPTS

## 2017-11-18 NOTE — PROGRESS NOTES
11/18/17 1417   Behavioral Health   Hallucinations denies / not responding to hallucinations   Thinking poor concentration;distractable;confused   Orientation situation, disoriented   Memory baseline memory   Insight poor   Judgement impaired   Eye Contact (head under covers)   Affect irritable   Mood depressed;hopeless   Physical Appearance/Attire attire appropriate to age and situation   Hygiene neglected grooming - unclean body, hair, teeth   Suicidality (Denies)   1. Wish to be Dead No   2. Non-Specific Active Suicidal Thoughts  No   3. Active Sucidal Ideation with any Methods (Not Plan) Without Intent to Act  No   4. Active Suicidal Ideation with Some Intent to Act, Without Specific Plan  No   5. Active Suicidal Ideation with Specific Plan and Intent  No   Change in Protective Factors? No   Enviromental Risk Factors None   Self Injury other (see comment)  (Denies)   Elopement Statements about wanting to leave   Activity isolative;withdrawn   Speech clear   Medication Sensitivity no observed side effects   Psychomotor / Gait steady  (walker)   Psycho Education   Type of Intervention 1:1 intervention   Response participates, initiates socially appropriate   Hours 0.5   Treatment Detail (Check In)   Activities of Daily Living   Hygiene/Grooming prompts   Oral Hygiene prompts   Dress prompts   Room Organization prompts   Behavioral Health Interventions   Psychotic Symptoms provide emotional support;encourage participation / independence with adls   Social and Therapeutic Interventions (Psychotic Symptoms) encourage socialization with peers   Patient was in her room sleeping most of the day, but did walked down the hallway several times before returning to her room to sleep.  The patient did stay in the dinning area for lunch.  The patient was making statements to staff about wanting to leave and was saying that she was being allowed to leave, which is not accurate.  The patient denies SI and SiB.  The patient  was irritable when redirected about not being able to leave the unit.

## 2017-11-18 NOTE — PLAN OF CARE
Problem: Psychotic Symptoms  Goal: Psychotic Symptoms  Signs and symptoms of listed problems will be absent or manageable.   Outcome: Improving  48 Hour Assessment    Pt isolated to her room for the majority of the evening. She ate dinner in the milieu, but did not attending groups or socialize with fellow patients. Pt was pleasant with writer, displaying a full range of affect when approached. She was also able to converse accurately to the situation, and answer questions appropriately.    Linens in patient room were changed, and room picked up. Pt needs assistance with this.      SI/SIB: Currently denies. Has displayed no behaviors or made comments that lead this writer to believe pt is suicidal or self injurious.    Auditory/Visual Hallucinations: Currently denies. However, pt frequently makes gestures of pulling objects out of the air.     Quality of sleep: States that she is able to fall asleep, but knee pain will occasionally wake her in the night.    Pt took medications without issue. No additional concerns at this time. Will continue to monitor and assess.

## 2017-11-19 PROCEDURE — 12400007 ZZH R&B MH INTERMEDIATE UMMC

## 2017-11-19 PROCEDURE — 25000132 ZZH RX MED GY IP 250 OP 250 PS 637: Performed by: NURSE PRACTITIONER

## 2017-11-19 PROCEDURE — 25000132 ZZH RX MED GY IP 250 OP 250 PS 637: Performed by: HOSPITALIST

## 2017-11-19 PROCEDURE — 25000132 ZZH RX MED GY IP 250 OP 250 PS 637: Performed by: PSYCHIATRY & NEUROLOGY

## 2017-11-19 PROCEDURE — 25000132 ZZH RX MED GY IP 250 OP 250 PS 637: Performed by: EMERGENCY MEDICINE

## 2017-11-19 RX ADMIN — RIFAXIMIN 550 MG: 550 TABLET ORAL at 08:54

## 2017-11-19 RX ADMIN — HALOPERIDOL 2.5 MG: 1 TABLET ORAL at 20:11

## 2017-11-19 RX ADMIN — RIFAXIMIN 550 MG: 550 TABLET ORAL at 20:12

## 2017-11-19 RX ADMIN — SPIRONOLACTONE 50 MG: 50 TABLET ORAL at 08:54

## 2017-11-19 RX ADMIN — PANTOPRAZOLE SODIUM 40 MG: 40 TABLET, DELAYED RELEASE ORAL at 08:54

## 2017-11-19 RX ADMIN — LACTULOSE 30 G: 10 POWDER, FOR SOLUTION ORAL at 20:11

## 2017-11-19 RX ADMIN — RISPERIDONE 1 MG: 1 TABLET ORAL at 08:55

## 2017-11-19 RX ADMIN — FERROUS GLUCONATE 324 MG: 324 TABLET ORAL at 08:54

## 2017-11-19 RX ADMIN — GABAPENTIN 200 MG: 100 CAPSULE ORAL at 08:54

## 2017-11-19 RX ADMIN — Medication: at 21:32

## 2017-11-19 RX ADMIN — LACTULOSE 30 G: 10 POWDER, FOR SOLUTION ORAL at 08:54

## 2017-11-19 RX ADMIN — Medication 20 MG: at 08:55

## 2017-11-19 RX ADMIN — RISPERIDONE 2 MG: 2 TABLET ORAL at 20:11

## 2017-11-19 RX ADMIN — HALOPERIDOL 2.5 MG: 1 TABLET ORAL at 08:54

## 2017-11-19 RX ADMIN — FOLIC ACID 1 MG: 1 TABLET ORAL at 08:54

## 2017-11-19 RX ADMIN — MULTIPLE VITAMINS W/ MINERALS TAB 1 TABLET: TAB at 08:54

## 2017-11-19 RX ADMIN — GABAPENTIN 200 MG: 100 CAPSULE ORAL at 20:11

## 2017-11-19 ASSESSMENT — ACTIVITIES OF DAILY LIVING (ADL)
DRESS: SCRUBS (BEHAVIORAL HEALTH)
HYGIENE/GROOMING: BATH;INDEPENDENT
LAUNDRY: UNABLE TO COMPLETE
DRESS: SCRUBS (BEHAVIORAL HEALTH);INDEPENDENT
ORAL_HYGIENE: PROMPTS
GROOMING: PROMPTS
ORAL_HYGIENE: PROMPTS

## 2017-11-19 NOTE — PROGRESS NOTES
"The pt was lucid and appropriate for most of the shift.  She came out for meals and snacks, watched TV in the OT room, and was generally pleasant upon approach.    Towards the end of the shift, this writer went into her room to check in with her, and she was disoriented to situation, time and reality. She told this writer that \"I (this writer) have to get out, she owns the building and shes giving me the night off, but I have to take her with me.\"    The pt became distressed when this writer tried reorienting her, but agreed to have snack and lie back down.    She didn't attend any meetings, but was visible for some of the shift.       11/18/17 2057   Behavioral Health   Hallucinations appears responding   Thinking poor concentration;paranoid;delusional   Orientation person: oriented;place: oriented;situation, disoriented   Memory baseline memory   Insight poor   Judgement impaired   Eye Contact at examiner;out of corner of eyes;into space   Affect blunted, flat;tense;full range affect   Mood depressed;labile;mood is calm   Physical Appearance/Attire attire appropriate to age and situation   Hygiene neglected grooming - unclean body, hair, teeth   Suicidality other (see comments)  (pt denies)   Self Injury other (see comment)  (denies/none observed)   Elopement (no attempts at elopement)   Activity other (see comment)  (came out for meals and snacks.  Watched TV in OT)   Speech clear   Medication Sensitivity no stated side effects;no observed side effects   Psychomotor / Gait balanced;steady   Substance Withdrawal Interventions   Social and Therapeutic Interventions (Substance Withdrawal) encourage socialization with peers;encourage effective boundaries with peers;encourage participation in therapeutic groups and milieu activities   Safety   Suicidality Status 15   Elopement status 15   Coping/Psychosocial   Verbalized Emotional State frustration;sadness;powerlessness;loneliness   Supportive Measures active " listening utilized;self-care encouraged;verbalization of feelings encouraged   Psycho Education   Type of Intervention 1:1 intervention   Response participates, initiates socially appropriate   Hours 0.5   Treatment Detail check-in   Group Therapy Session   Group Attendance refused to attend group session   Activities of Daily Living   Hygiene/Grooming prompts   Oral Hygiene prompts   Dress scrubs (behavioral health)   Laundry unable to complete   Room Organization prompts   Activity   Activity Assistance Provided independent   Behavioral Health Interventions   Psychotic Symptoms maintain safety precautions;monitor need to revise level of observation;maintain safe secure environment;reality orientation;simple, clear language;decrease environmental stimulation;redirection of intrusive behaviors;redirection of aggressive behaviors;encourage nutrition and hydration;encourage participation / independence with adls;provide emotional support   Social and Therapeutic Interventions (Psychotic Symptoms) encourage socialization with peers;encourage effective boundaries with peers;encourage participation in therapeutic groups and milieu activities

## 2017-11-19 NOTE — PLAN OF CARE
Problem: Psychotic Symptoms  Goal: Psychotic Symptoms  Signs and symptoms of listed problems will be absent or manageable.   Outcome: No Change  48 Hour Nursing Assessment:  Day 70 of hospitalization.  Aranza's hygiene is lacking and she has been encouraged to shower and change her clothes.  Her has has become greasy and her scrubs are dirty.  She asked this writer to help her leave today.  She drank about half of her lactulose today and a bit more yesterday.  She continues to spend the majority of her time in bed.  She does not always come out for meals.  At those times she is usually in pain and staff will take her tray to her.  She has been refusing her pain cream.

## 2017-11-20 PROCEDURE — 99231 SBSQ HOSP IP/OBS SF/LOW 25: CPT | Performed by: PSYCHIATRY & NEUROLOGY

## 2017-11-20 PROCEDURE — 25000132 ZZH RX MED GY IP 250 OP 250 PS 637: Performed by: NURSE PRACTITIONER

## 2017-11-20 PROCEDURE — 25000132 ZZH RX MED GY IP 250 OP 250 PS 637: Performed by: EMERGENCY MEDICINE

## 2017-11-20 PROCEDURE — 12400007 ZZH R&B MH INTERMEDIATE UMMC

## 2017-11-20 PROCEDURE — 25000132 ZZH RX MED GY IP 250 OP 250 PS 637: Performed by: HOSPITALIST

## 2017-11-20 PROCEDURE — 25000132 ZZH RX MED GY IP 250 OP 250 PS 637: Performed by: PSYCHIATRY & NEUROLOGY

## 2017-11-20 RX ADMIN — HALOPERIDOL 2.5 MG: 1 TABLET ORAL at 20:32

## 2017-11-20 RX ADMIN — RISPERIDONE 2 MG: 2 TABLET ORAL at 20:32

## 2017-11-20 RX ADMIN — PANTOPRAZOLE SODIUM 40 MG: 40 TABLET, DELAYED RELEASE ORAL at 09:53

## 2017-11-20 RX ADMIN — Medication 20 MG: at 09:52

## 2017-11-20 RX ADMIN — FOLIC ACID 1 MG: 1 TABLET ORAL at 09:53

## 2017-11-20 RX ADMIN — RIFAXIMIN 550 MG: 550 TABLET ORAL at 09:52

## 2017-11-20 RX ADMIN — HALOPERIDOL 2.5 MG: 1 TABLET ORAL at 09:51

## 2017-11-20 RX ADMIN — RIFAXIMIN 550 MG: 550 TABLET ORAL at 20:32

## 2017-11-20 RX ADMIN — FERROUS GLUCONATE 324 MG: 324 TABLET ORAL at 09:53

## 2017-11-20 RX ADMIN — LACTULOSE 30 G: 10 POWDER, FOR SOLUTION ORAL at 13:41

## 2017-11-20 RX ADMIN — SPIRONOLACTONE 50 MG: 50 TABLET ORAL at 09:52

## 2017-11-20 RX ADMIN — RISPERIDONE 1 MG: 1 TABLET ORAL at 09:52

## 2017-11-20 RX ADMIN — GABAPENTIN 200 MG: 100 CAPSULE ORAL at 09:52

## 2017-11-20 RX ADMIN — Medication: at 16:34

## 2017-11-20 RX ADMIN — LACTULOSE 30 G: 10 POWDER, FOR SOLUTION ORAL at 09:51

## 2017-11-20 RX ADMIN — GABAPENTIN 200 MG: 100 CAPSULE ORAL at 13:42

## 2017-11-20 RX ADMIN — GABAPENTIN 200 MG: 100 CAPSULE ORAL at 20:32

## 2017-11-20 RX ADMIN — MULTIPLE VITAMINS W/ MINERALS TAB 1 TABLET: TAB at 09:52

## 2017-11-20 RX ADMIN — LACTULOSE 30 G: 10 POWDER, FOR SOLUTION ORAL at 20:32

## 2017-11-20 ASSESSMENT — ACTIVITIES OF DAILY LIVING (ADL)
LAUNDRY: UNABLE TO COMPLETE
LAUNDRY: UNABLE TO COMPLETE
ORAL_HYGIENE: PROMPTS
ORAL_HYGIENE: PROMPTS
GROOMING: PROMPTS
DRESS: SCRUBS (BEHAVIORAL HEALTH)
DRESS: PROMPTS
HYGIENE/GROOMING: PROMPTS

## 2017-11-20 NOTE — PROGRESS NOTES
11/20/17 1338   Behavioral Health   Hallucinations appears responding   Thinking poor concentration   Orientation person: oriented   Memory baseline memory   Insight poor   Judgement impaired   Eye Contact at examiner   Affect blunted, flat   Mood labile   Physical Appearance/Attire untidy   Hygiene neglected grooming - unclean body, hair, teeth   Suicidality (denies)   1. Wish to be Dead No   Wish to be Dead Description no   2. Non-Specific Active Suicidal Thoughts  No   Non-Specific Active Suicidal Thought Description no   3. Active Sucidal Ideation with any Methods (Not Plan) Without Intent to Act  No   Active Suicidal Ideation with any Methods (Not Plan) Description  no   4. Active Suicidal Ideation with Some Intent to Act, Without Specific Plan  No   Active Suicidal Ideation with Some Intent to Act, Without Specific Plan Description  no   5. Active Suicidal Ideation with Specific Plan and Intent  No   Active Suicidal Ideation with Specific Plan and Intent Description  no   Change in Protective Factors? No   Enviromental Risk Factors None   Self Injury (denies)   Elopement Statements about wanting to leave   Activity isolative;withdrawn   Speech clear;coherent   Medication Sensitivity no stated side effects   Psychomotor / Gait (uses walker)   Psycho Education   Type of Intervention 1:1 intervention   Response participates, initiates socially appropriate   Hours 0.5   Activities of Daily Living   Hygiene/Grooming prompts   Oral Hygiene prompts   Dress prompts   Laundry unable to complete   Room Organization unable   pt isolative in room most of shift. Pts breakfast tray was brought to her room from her knee pain, but pt did come out for lunch. Pt not attending groups. Pt observed picking into the air and mumbling to herself in her room. Pt calm and labile mood. Pt denies any mental health problems and SI and SIB.

## 2017-11-20 NOTE — PROGRESS NOTES
Pt was isolative and withdrawn this shift; only came out for dinner and after much prompting, a bath. Pt heard crying in room but was able to calm. She complained of a headache and that her knees still hurt. Otherwise, was not as social but pleasant and compliant this shift.        11/19/17 2225   Behavioral Health   Hallucinations appears responding   Thinking distractable;poor concentration   Orientation person: oriented   Memory baseline memory   Insight poor   Judgement impaired   Eye Contact at examiner   Affect blunted, flat;sad   Mood depressed;labile   Physical Appearance/Attire appears stated age;attire appropriate to age and situation;neat   Hygiene well groomed   Suicidality other (see comments)  (none)   1. Wish to be Dead No   2. Non-Specific Active Suicidal Thoughts  No   3. Active Sucidal Ideation with any Methods (Not Plan) Without Intent to Act  No   4. Active Suicidal Ideation with Some Intent to Act, Without Specific Plan  No   5. Active Suicidal Ideation with Specific Plan and Intent  No   Enviromental Risk Factors None   Self Injury other (see comment)  (none)   Elopement Statements about wanting to leave   Activity withdrawn;isolative   Speech clear;coherent   Medication Sensitivity no observed side effects;no stated side effects   Psychomotor / Gait balanced;steady   Activities of Daily Living   Hygiene/Grooming bath;independent   Oral Hygiene prompts   Dress scrubs (behavioral health);independent   Room Organization unable

## 2017-11-20 NOTE — PROGRESS NOTES
[facility], [place]   Psychiatric Progress Note        Interim History:     The patient's care was discussed with the treatment team during the daily team meeting and/or staff's chart notes were reviewed.  She had uneventful weekend. Reported that Ketamine-Gabapentin-Lidocaine cream and increase in Gabapentin dose was somewhat helpful for her knees pain. Was seen picking from the air and talking to self, but, again denied experiencing Auditory hallucinations and Visual hallucinations. No changes in symptoms, overall.        Medications:       gabapentin  200 mg Oral TID     ketamine (KETALAR) 5%-gabapentin (NEURONTIN) 8%-lidocaine 2.5%   Topical 4x Daily     ferrous gluconate  324 mg Oral Daily with breakfast     lactulose  30 g Oral TID     haloperidol  2.5 mg Oral BID     risperiDONE  1 mg Oral QAM     risperiDONE  2 mg Oral At Bedtime     pantoprazole  40 mg Oral Daily     multivitamin, therapeutic with minerals  1 tablet Oral Daily     folic acid  1 mg Oral Daily     rifaximin  550 mg Oral BID     spironolactone  50 mg Oral Daily     furosemide  20 mg Oral Daily          Allergies:     Allergies   Allergen Reactions     Acetaminophen      Ambien [Zolpidem Tartrate] Other (See Comments)     Sleep walks and eats     Ciprofloxacin Other (See Comments)     Seizure.     Citalopram Nausea and Vomiting          Labs:     No results found for this or any previous visit (from the past 24 hour(s)).       Psychiatric Examination:     Vitals:    11/17/17 0916 11/18/17 1500 11/19/17 1754 11/20/17 0959   BP:  102/55 130/69 111/71   BP Location: Left arm Left arm     Pulse:  67 76 88   Resp: 18 16 17   Temp:  97.9  F (36.6  C) 98.3  F (36.8  C) 97.8  F (36.6  C)   TempSrc: Oral Oral Oral Oral   SpO2:       Height:                   Lying Orthostatic BP: 110/68         Sitting Orthostatic BP: 130/69         Standing Orthostatic BP:  (Refused)     Appearance: awake, alert, adequately groomed,  appeared about stated age and no apparent distress  Attitude: less cooperative and guarded  Eye Contact: poor  Mood:  Sad, irritable  Affect:  intensity is blunted  Speech:  clear, coherent and slow  Psychomotor Behavior:  no evidence of tardive dyskinesia, dystonia, or tics  Throught Process:  goal oriented  Associations:  no loose associations  Thought Content:  no evidence of suicidal ideation or homicidal ideation, auditory hallucinations are present, Visual hallucinations are present off and on, delusions are present.   Insight:  limited  Judgement:  limited  Oriented to:  time, person, and place  Attention Span and Concentration:  fair  Recent and Remote Memory:  fair         Precautions:     Behavioral Orders   Procedures     Code 2     Elopement precautions     Routine Programming     As clinically indicated     Seizure precautions     Status 15     Every 15 minutes.     Withdrawal precautions          Diagnoses:     Cognitive Disorder NOS.   Encephalopathy probably multifactorial such as chronic hepatic encephalopathy, organic brain damage due to encephalopathy, alcohol drinking, possibly underlying psychotic illness; possibly head injury contributes to patient's symptoms.   Alcohol use disorder.   amphetamine dependence vs abuse.  She meets criteria for Major depressive disorder, moderate severity.          Plan:     No medication changes today.   Legal Status and Disposition:  --  Under full MICD commitment.   -- the patient appears to be at her current baseline, she is on waiting list for [facility]. Referred to several NH and memory care unit, was rejected by number of them. Per CTC, CM works on CADI waiver and patient also needs to be rejected by 1 more group home. See discussion above. Will continue to provide support and structure.

## 2017-11-21 PROCEDURE — 12400007 ZZH R&B MH INTERMEDIATE UMMC

## 2017-11-21 PROCEDURE — 25000132 ZZH RX MED GY IP 250 OP 250 PS 637: Performed by: EMERGENCY MEDICINE

## 2017-11-21 PROCEDURE — 25000132 ZZH RX MED GY IP 250 OP 250 PS 637: Performed by: NURSE PRACTITIONER

## 2017-11-21 PROCEDURE — 25000132 ZZH RX MED GY IP 250 OP 250 PS 637: Performed by: PSYCHIATRY & NEUROLOGY

## 2017-11-21 PROCEDURE — 25000132 ZZH RX MED GY IP 250 OP 250 PS 637: Performed by: HOSPITALIST

## 2017-11-21 RX ADMIN — RISPERIDONE 1 MG: 1 TABLET ORAL at 08:54

## 2017-11-21 RX ADMIN — SPIRONOLACTONE 50 MG: 50 TABLET ORAL at 08:54

## 2017-11-21 RX ADMIN — Medication: at 20:16

## 2017-11-21 RX ADMIN — LACTULOSE 30 G: 10 POWDER, FOR SOLUTION ORAL at 20:13

## 2017-11-21 RX ADMIN — FERROUS GLUCONATE 324 MG: 324 TABLET ORAL at 08:50

## 2017-11-21 RX ADMIN — RISPERIDONE 2 MG: 2 TABLET ORAL at 20:13

## 2017-11-21 RX ADMIN — RIFAXIMIN 550 MG: 550 TABLET ORAL at 08:55

## 2017-11-21 RX ADMIN — FOLIC ACID 1 MG: 1 TABLET ORAL at 08:53

## 2017-11-21 RX ADMIN — GABAPENTIN 200 MG: 100 CAPSULE ORAL at 14:36

## 2017-11-21 RX ADMIN — LACTULOSE 30 G: 10 POWDER, FOR SOLUTION ORAL at 14:36

## 2017-11-21 RX ADMIN — Medication: at 16:56

## 2017-11-21 RX ADMIN — HALOPERIDOL 2.5 MG: 1 TABLET ORAL at 20:14

## 2017-11-21 RX ADMIN — RIFAXIMIN 550 MG: 550 TABLET ORAL at 20:13

## 2017-11-21 RX ADMIN — PANTOPRAZOLE SODIUM 40 MG: 40 TABLET, DELAYED RELEASE ORAL at 08:50

## 2017-11-21 RX ADMIN — LACTULOSE 30 G: 10 POWDER, FOR SOLUTION ORAL at 08:53

## 2017-11-21 RX ADMIN — HALOPERIDOL 2.5 MG: 1 TABLET ORAL at 08:54

## 2017-11-21 RX ADMIN — MULTIPLE VITAMINS W/ MINERALS TAB 1 TABLET: TAB at 08:50

## 2017-11-21 RX ADMIN — GABAPENTIN 200 MG: 100 CAPSULE ORAL at 20:14

## 2017-11-21 RX ADMIN — Medication 20 MG: at 08:50

## 2017-11-21 RX ADMIN — GABAPENTIN 200 MG: 100 CAPSULE ORAL at 08:54

## 2017-11-21 ASSESSMENT — ACTIVITIES OF DAILY LIVING (ADL)
GROOMING: INDEPENDENT;PROMPTS
ORAL_HYGIENE: INDEPENDENT;PROMPTS
DRESS: SCRUBS (BEHAVIORAL HEALTH);INDEPENDENT
LAUNDRY: WITH SUPERVISION
DRESS: SCRUBS (BEHAVIORAL HEALTH)
GROOMING: INDEPENDENT
ORAL_HYGIENE: INDEPENDENT

## 2017-11-21 NOTE — PROGRESS NOTES
"Patient reports \"okay\" mood, level for the past few days. Patient states she feels somewhat better due to bathing last night. Patient denies SI/SIB or psychotic symptoms, and complains only of knee pain. Patient isolated to her room almost all shift but was pleasant and cooperative with staff upon approach.     11/20/17 2000   Behavioral Health   Hallucinations appears responding;denies / not responding to hallucinations   Thinking poor concentration   Orientation person: oriented   Memory baseline memory   Insight poor   Judgement impaired   Eye Contact at examiner   Affect blunted, flat   Mood mood is calm   Physical Appearance/Attire appears stated age;attire appropriate to age and situation   Hygiene neglected grooming - unclean body, hair, teeth   Suicidality other (see comments)  (Denies)   1. Wish to be Dead No   2. Non-Specific Active Suicidal Thoughts  No   3. Active Sucidal Ideation with any Methods (Not Plan) Without Intent to Act  No   4. Active Suicidal Ideation with Some Intent to Act, Without Specific Plan  No   5. Active Suicidal Ideation with Specific Plan and Intent  No   Change in Protective Factors? No   Enviromental Risk Factors None   Self Injury other (see comment)  (Denies)   Elopement Statements about wanting to leave   Activity isolative;withdrawn   Speech clear;coherent   Medication Sensitivity no stated side effects;no observed side effects   Psychomotor / Gait balanced;steady   Activities of Daily Living   Hygiene/Grooming prompts   Oral Hygiene prompts   Dress scrubs (behavioral health)   Laundry unable to complete   Room Organization prompts     "

## 2017-11-21 NOTE — PROGRESS NOTES
Pt states that the ketamine cream for her knees takes her pain from a 10 out of 10 to about a 5 out of 10. In addition, pt took medications without issue.     No additional concerns at this time. Will continue to assess and offer support.

## 2017-11-21 NOTE — PLAN OF CARE
Problem: Psychotic Symptoms  Goal: Psychotic Symptoms  Signs and symptoms of listed problems will be absent or manageable.   Outcome: No Change  Patient isolative to self. Little interaction with others. Ambulates outs for meals. Appetite good. Slept much of the afternoon. Med compliant except for pain cream. Observed standing near door, no attempts to leave. Placing blanket over her head.

## 2017-11-22 PROCEDURE — 99231 SBSQ HOSP IP/OBS SF/LOW 25: CPT | Performed by: PSYCHIATRY & NEUROLOGY

## 2017-11-22 PROCEDURE — 25000132 ZZH RX MED GY IP 250 OP 250 PS 637: Performed by: NURSE PRACTITIONER

## 2017-11-22 PROCEDURE — 12400007 ZZH R&B MH INTERMEDIATE UMMC

## 2017-11-22 PROCEDURE — 25000132 ZZH RX MED GY IP 250 OP 250 PS 637: Performed by: HOSPITALIST

## 2017-11-22 PROCEDURE — 25000132 ZZH RX MED GY IP 250 OP 250 PS 637: Performed by: PSYCHIATRY & NEUROLOGY

## 2017-11-22 PROCEDURE — 25000132 ZZH RX MED GY IP 250 OP 250 PS 637: Performed by: EMERGENCY MEDICINE

## 2017-11-22 RX ADMIN — HALOPERIDOL 2.5 MG: 1 TABLET ORAL at 20:06

## 2017-11-22 RX ADMIN — MULTIPLE VITAMINS W/ MINERALS TAB 1 TABLET: TAB at 08:37

## 2017-11-22 RX ADMIN — LACTULOSE 30 G: 10 POWDER, FOR SOLUTION ORAL at 08:39

## 2017-11-22 RX ADMIN — Medication: at 20:14

## 2017-11-22 RX ADMIN — FOLIC ACID 1 MG: 1 TABLET ORAL at 08:38

## 2017-11-22 RX ADMIN — HALOPERIDOL 2.5 MG: 1 TABLET ORAL at 08:37

## 2017-11-22 RX ADMIN — LACTULOSE 30 G: 10 POWDER, FOR SOLUTION ORAL at 20:05

## 2017-11-22 RX ADMIN — RIFAXIMIN 550 MG: 550 TABLET ORAL at 20:05

## 2017-11-22 RX ADMIN — GABAPENTIN 200 MG: 100 CAPSULE ORAL at 14:46

## 2017-11-22 RX ADMIN — RISPERIDONE 2 MG: 2 TABLET ORAL at 20:05

## 2017-11-22 RX ADMIN — GABAPENTIN 200 MG: 100 CAPSULE ORAL at 08:37

## 2017-11-22 RX ADMIN — Medication 20 MG: at 08:37

## 2017-11-22 RX ADMIN — RISPERIDONE 1 MG: 1 TABLET ORAL at 17:29

## 2017-11-22 RX ADMIN — LACTULOSE 30 G: 10 POWDER, FOR SOLUTION ORAL at 14:46

## 2017-11-22 RX ADMIN — LORAZEPAM 1 MG: 1 TABLET ORAL at 17:29

## 2017-11-22 RX ADMIN — RISPERIDONE 1 MG: 1 TABLET ORAL at 08:38

## 2017-11-22 RX ADMIN — Medication: at 17:13

## 2017-11-22 RX ADMIN — GABAPENTIN 200 MG: 100 CAPSULE ORAL at 20:05

## 2017-11-22 RX ADMIN — SPIRONOLACTONE 50 MG: 50 TABLET ORAL at 08:37

## 2017-11-22 RX ADMIN — RIFAXIMIN 550 MG: 550 TABLET ORAL at 08:37

## 2017-11-22 RX ADMIN — Medication: at 09:27

## 2017-11-22 RX ADMIN — FERROUS GLUCONATE 324 MG: 324 TABLET ORAL at 08:39

## 2017-11-22 RX ADMIN — PANTOPRAZOLE SODIUM 40 MG: 40 TABLET, DELAYED RELEASE ORAL at 08:37

## 2017-11-22 ASSESSMENT — ACTIVITIES OF DAILY LIVING (ADL)
HYGIENE/GROOMING: PROMPTS
DRESS: SCRUBS (BEHAVIORAL HEALTH);PROMPTS
DRESS: SCRUBS (BEHAVIORAL HEALTH)
GROOMING: INDEPENDENT
LAUNDRY: WITH SUPERVISION
ORAL_HYGIENE: INDEPENDENT
ORAL_HYGIENE: INDEPENDENT

## 2017-11-22 NOTE — PROGRESS NOTES
[facility], [place]   Psychiatric Progress Note        Interim History:     The patient's care was discussed with the treatment team during the daily team meeting and/or staff's chart notes were reviewed.   Reported that Ketamine-Gabapentin-Lidocaine cream and increase in Gabapentin dose was somewhat helpful for her knees pain. Was seen picking from the air and talking to self, but, again denied experiencing Auditory hallucinations and Visual hallucinations. Still at times makes delusional statements about this building being owned by her family. she can easily be redirected. No changes in symptoms, overall.        Medications:       gabapentin  200 mg Oral TID     ketamine (KETALAR) 5%-gabapentin (NEURONTIN) 8%-lidocaine 2.5%   Topical 4x Daily     ferrous gluconate  324 mg Oral Daily with breakfast     lactulose  30 g Oral TID     haloperidol  2.5 mg Oral BID     risperiDONE  1 mg Oral QAM     risperiDONE  2 mg Oral At Bedtime     pantoprazole  40 mg Oral Daily     multivitamin, therapeutic with minerals  1 tablet Oral Daily     folic acid  1 mg Oral Daily     rifaximin  550 mg Oral BID     spironolactone  50 mg Oral Daily     furosemide  20 mg Oral Daily          Allergies:     Allergies   Allergen Reactions     Acetaminophen      Ambien [Zolpidem Tartrate] Other (See Comments)     Sleep walks and eats     Ciprofloxacin Other (See Comments)     Seizure.     Citalopram Nausea and Vomiting          Labs:     No results found for this or any previous visit (from the past 24 hour(s)).       Psychiatric Examination:     Vitals:    11/19/17 1754 11/20/17 0959 11/21/17 1725 11/22/17 0950   BP: 130/69 111/71 97/62    BP Location:       Pulse: 76 88 67    Resp:  17 18 16   Temp: 98.3  F (36.8  C) 97.8  F (36.6  C) 98.4  F (36.9  C) 97.7  F (36.5  C)   TempSrc: Oral Oral Oral Oral   SpO2:       Height:                   Lying Orthostatic BP: 104/69         Sitting Orthostatic BP: 130/69          Standing Orthostatic BP:  (Refused)     Appearance: awake, alert, adequately groomed, appeared about stated age and no apparent distress  Attitude: less cooperative and guarded  Eye Contact: poor  Mood:  Sad, irritable  Affect:  intensity is blunted  Speech:  clear, coherent and slow  Psychomotor Behavior:  no evidence of tardive dyskinesia, dystonia, or tics  Throught Process:  goal oriented  Associations:  no loose associations  Thought Content:  no evidence of suicidal ideation or homicidal ideation, auditory hallucinations are present, Visual hallucinations are present off and on, delusions are present.   Insight:  limited  Judgement:  limited  Oriented to:  time, person, and place  Attention Span and Concentration:  fair  Recent and Remote Memory:  fair         Precautions:     Behavioral Orders   Procedures     Code 2     Elopement precautions     Routine Programming     As clinically indicated     Seizure precautions     Status 15     Every 15 minutes.     Withdrawal precautions          Diagnoses:     Cognitive Disorder NOS.   Encephalopathy probably multifactorial such as chronic hepatic encephalopathy, organic brain damage due to encephalopathy, alcohol drinking, possibly underlying psychotic illness; possibly head injury contributes to patient's symptoms.   Alcohol use disorder.   amphetamine dependence vs abuse.  She meets criteria for Major depressive disorder, moderate severity.          Plan:     No medication changes today.   Legal Status and Disposition:  --  Under full MICD commitment.   -- the patient appears to be at her current baseline, she is on waiting list for [facility]. Referred to several NH and memory care unit, was rejected by number of them. Per [facility], CM works on CADI waiver and patient also needs to be rejected by 1 more group home. See discussion above. Will continue to provide support and structure.

## 2017-11-22 NOTE — PLAN OF CARE
"Problem: Psychotic Symptoms  Goal: Psychotic Symptoms  Signs and symptoms of listed problems will be absent or manageable.   Outcome: No Change  48 Hour Assessment    BP 97/62  Pulse 67  Temp 98.4  F (36.9  C) (Oral)  Resp 18  Ht 1.676 m (5' 5.98\")  Wt 100 kg (220 lb 7.4 oz)  SpO2 99%  BMI 35.6 kg/m2    Pt isolated to her room for the majority of the evening. She was pleasant upon approach, and no aggressive behaviors displayed. Pt stated being aware of where and who she is. However, she made delusional statements such as \"I'm just wondering when all of the people are going to leave the hospital. They obviously don't know who owns the hospital.\" Writer reoriented patient, and will continue to do so.     SI/SIB: Currently denies. Has not made gestures or comments that lead this writer to believe she is having these thoughts or urges.     Auditory/Visual Hallucinations: Pt denies. However, is often seen making gestures as if she is picking items out of the air.     Eating and drinking without a problem. Denies issues with urine output or passing BM. Pt took all HS medications without issue.    No additional concerns at this time. Will continue to offer support and structure.        "

## 2017-11-22 NOTE — PROGRESS NOTES
11/22/17 1336   Behavioral Health   Hallucinations denies / not responding to hallucinations   Thinking distractable   Orientation time: oriented;date: oriented;place: oriented;person: oriented   Memory baseline memory   Insight insight appropriate to situation   Judgement intact   Eye Contact at examiner   Affect full range affect   Mood depressed;mood is calm   Physical Appearance/Attire untidy   Hygiene neglected grooming - unclean body, hair, teeth   Suicidality other (see comments)  (ZDenies)   1. Wish to be Dead No   Wish to be Dead Description no   2. Non-Specific Active Suicidal Thoughts  No   Non-Specific Active Suicidal Thought Description no   3. Active Sucidal Ideation with any Methods (Not Plan) Without Intent to Act  No   Active Suicidal Ideation with any Methods (Not Plan) Description  no   4. Active Suicidal Ideation with Some Intent to Act, Without Specific Plan  No   Active Suicidal Ideation with Some Intent to Act, Without Specific Plan Description  no   5. Active Suicidal Ideation with Specific Plan and Intent  No   Active Suicidal Ideation with Specific Plan and Intent Description  no   Self Injury other (see comment)  (Denies)   Activity isolative;withdrawn   Speech clear;coherent   Medication Sensitivity no stated side effects   Psychomotor / Gait balanced;steady   Safety   Suicidality Status 15   Elopement status 15   Psycho Education   Type of Intervention 1:1 intervention   Response participates, initiates socially appropriate   Hours 0.5   Activities of Daily Living   Hygiene/Grooming independent   Oral Hygiene independent   Dress scrubs (behavioral health)   Laundry with supervision   Room Organization independent   Activity   Activity Assistance Provided independent   Behavioral Health Interventions   Psychotic Symptoms maintain safety precautions;assist patient in developing safety plan;assist patient in following safety plan;build upon strengths   Social and Therapeutic  Interventions (Psychotic Symptoms) encourage socialization with peers;encourage participation in therapeutic groups and milieu activities   Pt spent most of her time in the room and she ate in her room. She denies suicidal ideation or hearing voice but appears responding to internal stimuli. She appears isolated to her room.

## 2017-11-23 PROCEDURE — 25000132 ZZH RX MED GY IP 250 OP 250 PS 637: Performed by: HOSPITALIST

## 2017-11-23 PROCEDURE — 25000132 ZZH RX MED GY IP 250 OP 250 PS 637: Performed by: PSYCHIATRY & NEUROLOGY

## 2017-11-23 PROCEDURE — 25000132 ZZH RX MED GY IP 250 OP 250 PS 637: Performed by: PHYSICIAN ASSISTANT

## 2017-11-23 PROCEDURE — 12400007 ZZH R&B MH INTERMEDIATE UMMC

## 2017-11-23 PROCEDURE — 25000132 ZZH RX MED GY IP 250 OP 250 PS 637: Performed by: EMERGENCY MEDICINE

## 2017-11-23 PROCEDURE — 25000132 ZZH RX MED GY IP 250 OP 250 PS 637: Performed by: NURSE PRACTITIONER

## 2017-11-23 RX ORDER — LACTULOSE 10 G/15ML
20 SOLUTION ORAL 3 TIMES DAILY PRN
Status: DISCONTINUED | OUTPATIENT
Start: 2017-11-23 | End: 2018-01-02 | Stop reason: HOSPADM

## 2017-11-23 RX ADMIN — LACTULOSE 30 G: 10 POWDER, FOR SOLUTION ORAL at 12:00

## 2017-11-23 RX ADMIN — GABAPENTIN 200 MG: 100 CAPSULE ORAL at 13:32

## 2017-11-23 RX ADMIN — RIFAXIMIN 550 MG: 550 TABLET ORAL at 20:27

## 2017-11-23 RX ADMIN — LACTULOSE 30 G: 10 POWDER, FOR SOLUTION ORAL at 08:24

## 2017-11-23 RX ADMIN — FOLIC ACID 1 MG: 1 TABLET ORAL at 08:24

## 2017-11-23 RX ADMIN — RISPERIDONE 1 MG: 1 TABLET ORAL at 08:25

## 2017-11-23 RX ADMIN — Medication 20 MG: at 08:25

## 2017-11-23 RX ADMIN — PANTOPRAZOLE SODIUM 40 MG: 40 TABLET, DELAYED RELEASE ORAL at 08:25

## 2017-11-23 RX ADMIN — Medication: at 08:27

## 2017-11-23 RX ADMIN — HALOPERIDOL 2.5 MG: 1 TABLET ORAL at 20:27

## 2017-11-23 RX ADMIN — FERROUS GLUCONATE 324 MG: 324 TABLET ORAL at 08:26

## 2017-11-23 RX ADMIN — LACTULOSE 30 G: 10 POWDER, FOR SOLUTION ORAL at 13:33

## 2017-11-23 RX ADMIN — RIFAXIMIN 550 MG: 550 TABLET ORAL at 08:25

## 2017-11-23 RX ADMIN — GABAPENTIN 200 MG: 100 CAPSULE ORAL at 08:24

## 2017-11-23 RX ADMIN — HALOPERIDOL 2.5 MG: 1 TABLET ORAL at 08:24

## 2017-11-23 RX ADMIN — MULTIPLE VITAMINS W/ MINERALS TAB 1 TABLET: TAB at 08:26

## 2017-11-23 RX ADMIN — SPIRONOLACTONE 50 MG: 50 TABLET ORAL at 08:24

## 2017-11-23 RX ADMIN — GABAPENTIN 200 MG: 100 CAPSULE ORAL at 20:26

## 2017-11-23 RX ADMIN — RISPERIDONE 2 MG: 2 TABLET ORAL at 20:25

## 2017-11-23 NOTE — PROGRESS NOTES
Pt was observed standing by the door. When directed to move away from the door pt became angry demanding to leave as she has discharge papers. Pt then went to the desk, swore at the RN and went to her room.

## 2017-11-23 NOTE — PROGRESS NOTES
Pt isolative and withdrawn this shift. Only mostly coming out for dinner. However, pt was observed coming out and asking about discharge. After telling her that there was no order, pt went to room crying. Later she was seen peering at double doors and sitting at table near doors and looking out at it. Otherwise, pt upset and sad; says she should be home. No observed direct responding to internal stimuli but did hear pt cry out in room. She still denies AH/VH. Encouraged to be out but pt did not want to; able to calm pt down when crying with redirection.        11/22/17 2126   Behavioral Health   Hallucinations appears responding;denies / not responding to hallucinations   Thinking distractable   Orientation person: oriented   Memory baseline memory   Insight poor   Judgement impaired   Eye Contact at examiner   Affect sad;blunted, flat   Mood depressed   Physical Appearance/Attire untidy   Hygiene neglected grooming - unclean body, hair, teeth   Suicidality other (see comments)  (none stated/observed)   1. Wish to be Dead No   2. Non-Specific Active Suicidal Thoughts  No   3. Active Sucidal Ideation with any Methods (Not Plan) Without Intent to Act  No   4. Active Suicidal Ideation with Some Intent to Act, Without Specific Plan  No   5. Active Suicidal Ideation with Specific Plan and Intent  No   Change in Protective Factors? No   Enviromental Risk Factors None   Self Injury other (see comment)  (none stated/observed)   Elopement Loitering near exit doors   Activity isolative;withdrawn   Speech clear;coherent   Medication Sensitivity no observed side effects;no stated side effects   Psychomotor / Gait slow;steady   Activities of Daily Living   Hygiene/Grooming prompts   Oral Hygiene independent   Dress scrubs (behavioral health);prompts   Room Organization independent

## 2017-11-23 NOTE — PROGRESS NOTES
"Brief Medicine Note    Contacted by nursing regarding increased confusion.    Mildred Rascon is a 42 year old female with a pmh of hepatic cirrhosis 2/2 alcohol abuse c/b HE, asthma, polysubstance abuse, depression and anxiety admitted to [facility] on 8/10 with acute psychosis. Internal medicine initially consulted 8/24/17 due to elevated ammonia. At that time she was placed on lactulose protocol on top of her scheduled lactulose and rifaximin. She was also evaluated by neurology, who felt her persistent visual hallucinations were likely permanent sequelae of multiple attacks of HE and/or korsakoff syndrome.     Her HE is currently managed with scheduled lactulose TID, although nursing staff concerned that she is not taking full dose.     Today's vital signs, medications, and nursing notes were reviewed.     BP 97/62  Pulse 67  Temp 97.7  F (36.5  C) (Oral)  Resp 18  Ht 1.676 m (5' 5.98\")  Wt 100 kg (220 lb 7.4 oz)  SpO2 99%  BMI 35.6 kg/m2      A/P:  Hepatic Encephalopathy. 2/2 hepatic cirrhosis due to alcohol abuse.   - Continue Scheduled lactulose 30 mg TID, DO NOT MISS DOSES  - Give additional 30g lactulose PO now  - If AMS or pt without 3-5 BM's per day, give additional 20 mg of lactulose prn- PO or rectal all available  - Continue rifaximin      Alba Gandara PA-C  Hospitalist Service  Pager [phone]      "

## 2017-11-23 NOTE — PROGRESS NOTES
Up for a short period, early  In the night. Looking for headphones. Using walker to ambulate to desk.

## 2017-11-23 NOTE — PROGRESS NOTES
PT acting more confused than usual, appears to be hallucinating seeing things that aren't there, looking in garbage for her shoes, had stated she is being picked up today to go to a . Pt refused breakfast usually eats 100% of meals. Pt did take her meds and lactulose this am. Per lab last ammonia level Oct 9 was 90. MD notified of pt change in behavior.

## 2017-11-23 NOTE — PLAN OF CARE
"Problem: Psychotic Symptoms  Goal: Psychotic Symptoms  Signs and symptoms of listed problems will be absent or manageable.   Outcome: Declining  PT presented more delusional with hallucinations today, repeating she was leaving, said she was getting picked up. Pt unaware of day or time.  Pt sat in dining area staring at door, was argumentative calling me by wrong name, stating \"you told me I was leaving today\". Pt gets irritable when redirected. Pt given prn dose of Lactulose at noon and it appeared to help was calmer and stated she felt better when given afternoon dose of lactulose pt did take all three doses of lactulose today. Pt reports she has had two stools today, should be having 5 stools a day per medical provider NP. Will monitor pt BMs.       "

## 2017-11-24 PROCEDURE — 25000132 ZZH RX MED GY IP 250 OP 250 PS 637: Performed by: PHYSICIAN ASSISTANT

## 2017-11-24 PROCEDURE — 25000132 ZZH RX MED GY IP 250 OP 250 PS 637: Performed by: NURSE PRACTITIONER

## 2017-11-24 PROCEDURE — 12400007 ZZH R&B MH INTERMEDIATE UMMC

## 2017-11-24 PROCEDURE — 99232 SBSQ HOSP IP/OBS MODERATE 35: CPT | Performed by: PSYCHIATRY & NEUROLOGY

## 2017-11-24 PROCEDURE — 25000132 ZZH RX MED GY IP 250 OP 250 PS 637: Performed by: EMERGENCY MEDICINE

## 2017-11-24 PROCEDURE — 25000132 ZZH RX MED GY IP 250 OP 250 PS 637: Performed by: PSYCHIATRY & NEUROLOGY

## 2017-11-24 RX ADMIN — Medication: at 16:39

## 2017-11-24 RX ADMIN — PANTOPRAZOLE SODIUM 40 MG: 40 TABLET, DELAYED RELEASE ORAL at 09:52

## 2017-11-24 RX ADMIN — GABAPENTIN 200 MG: 100 CAPSULE ORAL at 09:52

## 2017-11-24 RX ADMIN — LACTULOSE 30 G: 10 POWDER, FOR SOLUTION ORAL at 14:04

## 2017-11-24 RX ADMIN — RISPERIDONE 2 MG: 2 TABLET ORAL at 21:01

## 2017-11-24 RX ADMIN — FERROUS GLUCONATE 324 MG: 324 TABLET ORAL at 09:51

## 2017-11-24 RX ADMIN — Medication: at 10:23

## 2017-11-24 RX ADMIN — RISPERIDONE 1 MG: 1 TABLET ORAL at 09:52

## 2017-11-24 RX ADMIN — MULTIPLE VITAMINS W/ MINERALS TAB 1 TABLET: TAB at 09:51

## 2017-11-24 RX ADMIN — Medication 20 MG: at 09:51

## 2017-11-24 RX ADMIN — LACTULOSE 30 G: 10 POWDER, FOR SOLUTION ORAL at 21:03

## 2017-11-24 RX ADMIN — LACTULOSE 30 G: 10 POWDER, FOR SOLUTION ORAL at 09:52

## 2017-11-24 RX ADMIN — RIFAXIMIN 550 MG: 550 TABLET ORAL at 21:01

## 2017-11-24 RX ADMIN — SPIRONOLACTONE 50 MG: 50 TABLET ORAL at 09:51

## 2017-11-24 RX ADMIN — GABAPENTIN 200 MG: 100 CAPSULE ORAL at 21:02

## 2017-11-24 RX ADMIN — FOLIC ACID 1 MG: 1 TABLET ORAL at 09:51

## 2017-11-24 RX ADMIN — GABAPENTIN 200 MG: 100 CAPSULE ORAL at 14:04

## 2017-11-24 RX ADMIN — HALOPERIDOL 2.5 MG: 1 TABLET ORAL at 21:02

## 2017-11-24 RX ADMIN — HALOPERIDOL 2.5 MG: 1 TABLET ORAL at 09:51

## 2017-11-24 RX ADMIN — RIFAXIMIN 550 MG: 550 TABLET ORAL at 09:50

## 2017-11-24 ASSESSMENT — ACTIVITIES OF DAILY LIVING (ADL)
DRESS: INDEPENDENT
ORAL_HYGIENE: PROMPTS
LAUNDRY: WITH SUPERVISION
GROOMING: PROMPTS

## 2017-11-24 NOTE — PROGRESS NOTES
"    [facility], [place]   Psychiatric Progress Note        Interim History:     The patient's care was discussed with the treatment team during the daily team meeting and/or staff's chart notes were reviewed.   Reported that Ketamine-Gabapentin-Lidocaine cream and increase in Gabapentin dose was somewhat helpful for her knees pain. When asked how her Thanksgiving was, said: \"  Terrible, I was not at home\". She recognized me, sounded irritable, but, overall, polite. Yesterday admitted for the first time hearing Auditory hallucinations, asked for headphones to muffle voices. Also seemed to be more confused, demanded to leave, called RN wrong name. IM consulted, recommended to continue with lactulose protocol. For more details, please, see Internal Medicine note. Appreciate medical team's input.        Medications:       gabapentin  200 mg Oral TID     ketamine (KETALAR) 5%-gabapentin (NEURONTIN) 8%-lidocaine 2.5%   Topical 4x Daily     ferrous gluconate  324 mg Oral Daily with breakfast     lactulose  30 g Oral TID     haloperidol  2.5 mg Oral BID     risperiDONE  1 mg Oral QAM     risperiDONE  2 mg Oral At Bedtime     pantoprazole  40 mg Oral Daily     multivitamin, therapeutic with minerals  1 tablet Oral Daily     folic acid  1 mg Oral Daily     rifaximin  550 mg Oral BID     spironolactone  50 mg Oral Daily     furosemide  20 mg Oral Daily          Allergies:     Allergies   Allergen Reactions     Acetaminophen      Ambien [Zolpidem Tartrate] Other (See Comments)     Sleep walks and eats     Ciprofloxacin Other (See Comments)     Seizure.     Citalopram Nausea and Vomiting          Labs:     No results found for this or any previous visit (from the past 24 hour(s)).       Psychiatric Examination:     Vitals:    11/20/17 0959 11/21/17 1725 11/22/17 0950 11/23/17 0817   BP: 111/71 97/62     BP Location:    Left arm   Pulse: 88 67     Resp: 17 18 16 18   Temp: 97.8  F (36.6  C) 98.4  F " (36.9  C) 97.7  F (36.5  C) 97.7  F (36.5  C)   TempSrc: Oral Oral Oral Oral   SpO2:       Height:                   Lying Orthostatic BP: 104/69         Sitting Orthostatic BP: 123/76         Standing Orthostatic BP: 106/71     Appearance: awake, alert, adequately groomed, appeared about stated age and no apparent distress  Attitude: less cooperative and guarded  Eye Contact: poor  Mood:  Sad, irritable  Affect:  intensity is blunted  Speech:  clear, coherent and slow  Psychomotor Behavior:  no evidence of tardive dyskinesia, dystonia, or tics  Throught Process:  goal oriented  Associations:  no loose associations  Thought Content:  no evidence of suicidal ideation or homicidal ideation, auditory hallucinations are present, Visual hallucinations are present off and on, delusions are present.   Insight:  limited  Judgement:  limited  Oriented to:  time, person, and place  Attention Span and Concentration:  fair  Recent and Remote Memory:  fair         Precautions:     Behavioral Orders   Procedures     Code 2     Elopement precautions     Routine Programming     As clinically indicated     Seizure precautions     Status 15     Every 15 minutes.     Withdrawal precautions          Diagnoses:     Cognitive Disorder NOS.   Encephalopathy probably multifactorial such as chronic hepatic encephalopathy, organic brain damage due to encephalopathy, alcohol drinking, possibly underlying psychotic illness; possibly head injury contributes to patient's symptoms.   Alcohol use disorder.   amphetamine dependence vs abuse.  She meets criteria for Major depressive disorder, moderate severity.          Plan:     No medication changes today.   Legal Status and Disposition:  --  Under full MICD commitment.   -- the patient appears to be at her current baseline, she is on waiting list for [facility]. Referred to several NH and memory care unit, was rejected by number of them. Per CTC, CM works on CADI waiver and patient also needs to be  rejected by 1 more group home. See discussion above. Will continue to provide support and structure.

## 2017-11-24 NOTE — PROGRESS NOTES
Patient was isolative and withdrawn for the majority of the shift. Patient refused breakfast even after offering to bring her tray to her room but came out and ate 100% of her lunch. Patient exhibits paranoid and delusional thoughts; asking staff to open entrance door to let her out the unit because she thought she was discharged. Stated that [place] aunt[place]. Mentioned that she paid her nurse 1200 to let her out the unit but the key pads are not working every time she tries to leave.       17 0500   Behavioral Health   Hallucinations auditory;visual;appears responding   Thinking confused;distractable;delusional;paranoid   Orientation person: oriented;place: oriented   Memory new learning, recall loss   Insight poor   Judgement impaired   Eye Contact at examiner   Affect blunted, flat;sad   Mood depressed;irritable   Physical Appearance/Attire untidy   Hygiene neglected grooming - unclean body, hair, teeth   Suicidality other (see comments)  (denied )   1. Wish to be Dead No   Wish to be Dead Description no   2. Non-Specific Active Suicidal Thoughts  No   Non-Specific Active Suicidal Thought Description no   3. Active Sucidal Ideation with any Methods (Not Plan) Without Intent to Act  No   Active Suicidal Ideation with any Methods (Not Plan) Description  no   4. Active Suicidal Ideation with Some Intent to Act, Without Specific Plan  No   Active Suicidal Ideation with Some Intent to Act, Without Specific Plan Description  no   5. Active Suicidal Ideation with Specific Plan and Intent  No   Active Suicidal Ideation with Specific Plan and Intent Description  no   Change in Protective Factors? No   Enviromental Risk Factors None   Self Injury other (see comment)  (denies)   Elopement Statements about wanting to leave   Activity isolative;withdrawn   Speech clear;coherent   Medication Sensitivity no stated side effects   Psychomotor / Gait balanced  (uses a walker for  assistant )   Psycho Education   Type of Intervention 1:1 intervention   Response participates, initiates socially appropriate   Hours 0.5   Treatment Detail (check in)   Activities of Daily Living   Hygiene/Grooming prompts   Oral Hygiene prompts   Dress independent   Laundry with supervision   Room Organization unable   Groups   Details isolative    Behavioral Health Interventions   Psychotic Symptoms encourage nutrition and hydration;encourage participation / independence with adls;provide emotional support;establish therapeutic relationship;assist with developing & utilizing healthy coping strategies;build upon strengths   Social and Therapeutic Interventions (Psychotic Symptoms) encourage socialization with peers;encourage participation in therapeutic groups and milieu activities

## 2017-11-24 NOTE — PROGRESS NOTES
"Pt's mood was calm but flat affect. Pt was out in the dining area for most of the shift. Pt participated in playing [email or web address] ([employer] Pt ambulated with walker assist. Pt states that she has no SI or SIB but has auditory hallucination. Pt requests headphone to ignore the voice. At the beginning of the shift, pt told staff that she was leaving. \"The nurse gives me a key to go out\". Pt appeared delusional but pleasant when approached  "

## 2017-11-25 PROCEDURE — 25000132 ZZH RX MED GY IP 250 OP 250 PS 637: Performed by: EMERGENCY MEDICINE

## 2017-11-25 PROCEDURE — 25000132 ZZH RX MED GY IP 250 OP 250 PS 637: Performed by: NURSE PRACTITIONER

## 2017-11-25 PROCEDURE — 25000132 ZZH RX MED GY IP 250 OP 250 PS 637: Performed by: PSYCHIATRY & NEUROLOGY

## 2017-11-25 PROCEDURE — 25000132 ZZH RX MED GY IP 250 OP 250 PS 637: Performed by: PHYSICIAN ASSISTANT

## 2017-11-25 PROCEDURE — 12400007 ZZH R&B MH INTERMEDIATE UMMC

## 2017-11-25 RX ADMIN — HALOPERIDOL 2.5 MG: 1 TABLET ORAL at 20:10

## 2017-11-25 RX ADMIN — FOLIC ACID 1 MG: 1 TABLET ORAL at 09:52

## 2017-11-25 RX ADMIN — MULTIPLE VITAMINS W/ MINERALS TAB 1 TABLET: TAB at 09:50

## 2017-11-25 RX ADMIN — RISPERIDONE 1 MG: 1 TABLET ORAL at 09:51

## 2017-11-25 RX ADMIN — SPIRONOLACTONE 50 MG: 50 TABLET ORAL at 09:51

## 2017-11-25 RX ADMIN — LACTULOSE 30 G: 10 POWDER, FOR SOLUTION ORAL at 09:52

## 2017-11-25 RX ADMIN — RIFAXIMIN 550 MG: 550 TABLET ORAL at 20:10

## 2017-11-25 RX ADMIN — GABAPENTIN 200 MG: 100 CAPSULE ORAL at 14:08

## 2017-11-25 RX ADMIN — GABAPENTIN 200 MG: 100 CAPSULE ORAL at 20:10

## 2017-11-25 RX ADMIN — HALOPERIDOL 2.5 MG: 1 TABLET ORAL at 09:51

## 2017-11-25 RX ADMIN — GABAPENTIN 200 MG: 100 CAPSULE ORAL at 09:53

## 2017-11-25 RX ADMIN — RISPERIDONE 2 MG: 2 TABLET ORAL at 20:10

## 2017-11-25 RX ADMIN — Medication 20 MG: at 09:51

## 2017-11-25 RX ADMIN — RIFAXIMIN 550 MG: 550 TABLET ORAL at 09:50

## 2017-11-25 RX ADMIN — PANTOPRAZOLE SODIUM 40 MG: 40 TABLET, DELAYED RELEASE ORAL at 09:51

## 2017-11-25 RX ADMIN — FERROUS GLUCONATE 324 MG: 324 TABLET ORAL at 09:52

## 2017-11-25 RX ADMIN — LACTULOSE 30 G: 10 POWDER, FOR SOLUTION ORAL at 20:09

## 2017-11-25 ASSESSMENT — ACTIVITIES OF DAILY LIVING (ADL)
ORAL_HYGIENE: INDEPENDENT
GROOMING: PROMPTS
DRESS: INDEPENDENT

## 2017-11-25 NOTE — PLAN OF CARE
Problem: Psychotic Symptoms  Goal: Psychotic Symptoms  Signs and symptoms of listed problems will be absent or manageable.   Outcome: No Change  PT refused breakfast this am, did eat lunch but would not come out to DR to eat so tray was brought to her. Pt refused her Ketamine cream x 2 today and the 1400 dose of lactulose.   Pt up to BR Independently, irritable and depressed mood,making statements of wanting to leave.

## 2017-11-25 NOTE — PROGRESS NOTES
Pt denies SI or SIB. States that she feels better this evening. Out in the dinning area part of the evening. Came out for supper. Took all scheduled medication but refused the ketamine cream at bed time. Will monitor.

## 2017-11-26 PROCEDURE — 25000132 ZZH RX MED GY IP 250 OP 250 PS 637: Performed by: PHYSICIAN ASSISTANT

## 2017-11-26 PROCEDURE — 25000132 ZZH RX MED GY IP 250 OP 250 PS 637: Performed by: NURSE PRACTITIONER

## 2017-11-26 PROCEDURE — 25000132 ZZH RX MED GY IP 250 OP 250 PS 637: Performed by: PSYCHIATRY & NEUROLOGY

## 2017-11-26 PROCEDURE — 12400007 ZZH R&B MH INTERMEDIATE UMMC

## 2017-11-26 PROCEDURE — 25000132 ZZH RX MED GY IP 250 OP 250 PS 637: Performed by: EMERGENCY MEDICINE

## 2017-11-26 RX ADMIN — FERROUS GLUCONATE 324 MG: 324 TABLET ORAL at 09:21

## 2017-11-26 RX ADMIN — PANTOPRAZOLE SODIUM 40 MG: 40 TABLET, DELAYED RELEASE ORAL at 09:25

## 2017-11-26 RX ADMIN — Medication 20 MG: at 09:22

## 2017-11-26 RX ADMIN — HALOPERIDOL 2.5 MG: 1 TABLET ORAL at 20:06

## 2017-11-26 RX ADMIN — GABAPENTIN 200 MG: 100 CAPSULE ORAL at 20:06

## 2017-11-26 RX ADMIN — GABAPENTIN 200 MG: 100 CAPSULE ORAL at 09:22

## 2017-11-26 RX ADMIN — GABAPENTIN 200 MG: 100 CAPSULE ORAL at 14:20

## 2017-11-26 RX ADMIN — HALOPERIDOL 2.5 MG: 1 TABLET ORAL at 09:24

## 2017-11-26 RX ADMIN — RIFAXIMIN 550 MG: 550 TABLET ORAL at 09:23

## 2017-11-26 RX ADMIN — MULTIPLE VITAMINS W/ MINERALS TAB 1 TABLET: TAB at 09:26

## 2017-11-26 RX ADMIN — SPIRONOLACTONE 50 MG: 50 TABLET ORAL at 09:24

## 2017-11-26 RX ADMIN — FOLIC ACID 1 MG: 1 TABLET ORAL at 09:24

## 2017-11-26 RX ADMIN — LACTULOSE 30 G: 10 POWDER, FOR SOLUTION ORAL at 20:06

## 2017-11-26 RX ADMIN — RIFAXIMIN 550 MG: 550 TABLET ORAL at 20:07

## 2017-11-26 RX ADMIN — RISPERIDONE 1 MG: 1 TABLET ORAL at 09:23

## 2017-11-26 RX ADMIN — RISPERIDONE 2 MG: 2 TABLET ORAL at 20:07

## 2017-11-26 ASSESSMENT — ACTIVITIES OF DAILY LIVING (ADL)
DRESS: INDEPENDENT
ORAL_HYGIENE: INDEPENDENT
DRESS: INDEPENDENT
GROOMING: INDEPENDENT
GROOMING: INDEPENDENT
ORAL_HYGIENE: INDEPENDENT

## 2017-11-26 NOTE — PROGRESS NOTES
Pt s mood was calm and had flat affect. Pt was isolative and withdrawn this shift. Pt was out for dinner. Pt went back to her room and stayed in bed. Pt told staff that she heard voice and quiet. Pt was given headphone to distract the voice. Pt took her medications. Pt denied any SI or SIB. Pt was pleasant when approached and communicated with this writer.

## 2017-11-26 NOTE — PROGRESS NOTES
"Pt refused her 1400 dose of lactulose. Pt strongly encouraged to take it, she declines. Pt does state she will take it this ghazal.\" I promise.\".  "

## 2017-11-26 NOTE — PLAN OF CARE
Problem: General Plan of Care (Inpatient Behavioral)  Goal: Individualization/Patient Specific Goal (IP Behavioral)  PSYCHOSIS CAREPLAN GOALS    1) Patient will be oriented x4  2) Patient will exhibit organized thought and speech  3) Patient will be absent of auditory and visual hallucinations  4) Verbalize reduction of fear or anxiety to a manageable level.  5) Patient will report improved sleep and feeling rested upon waking.  6) Patient will verbalize their current medication; including dosage and time it is due.  7) Demonstrate participation in unit programming  8) Patient will demonstrate daily independence with ADL s.   9) Patient will verbalize persons outside the hospital they can call if needing assistance  10) Patient will have adequate daily oral intake.    Outcome: Improving  Illness Management Recovery model:  Self-Reflection & Planning.    Assessed patient's progress completing forms related to Illness Management Recovery (including Personal Plan of Care, Adult Coping Plan, and My Support and Coping Plan) and assisted as needed.    Encouraged patient to continue to consider triggers, wellness strategies, early warning signs, feedback from others, actions to take to prevent relapse, and coping strategies as part of a plan to remain well after leaving the hospital. Pt this day denies having hallucinations, Pt using headphones.

## 2017-11-26 NOTE — PROGRESS NOTES
Pt isolative to room this AM. Declined breakfast in the dining room,ate breakfast in her room. Pt this AM Pt declined to take her Lactulose. Attempted to educate Pt regarding medical benefits, Pt continued to decline. This afternoon less isolative, out in the dining room for lunch. Ate well. Pt when asked about hallucinations and thoughts of self harm, Pt denies any hallucinations at this time. Pt also reports no thoughts of self injury or suicide. Icreased involvement is supported.

## 2017-11-27 PROCEDURE — 25000132 ZZH RX MED GY IP 250 OP 250 PS 637: Performed by: EMERGENCY MEDICINE

## 2017-11-27 PROCEDURE — 25000132 ZZH RX MED GY IP 250 OP 250 PS 637: Performed by: PSYCHIATRY & NEUROLOGY

## 2017-11-27 PROCEDURE — 99231 SBSQ HOSP IP/OBS SF/LOW 25: CPT | Performed by: PSYCHIATRY & NEUROLOGY

## 2017-11-27 PROCEDURE — 12400007 ZZH R&B MH INTERMEDIATE UMMC

## 2017-11-27 PROCEDURE — 25000132 ZZH RX MED GY IP 250 OP 250 PS 637: Performed by: NURSE PRACTITIONER

## 2017-11-27 PROCEDURE — 25000132 ZZH RX MED GY IP 250 OP 250 PS 637: Performed by: PHYSICIAN ASSISTANT

## 2017-11-27 RX ADMIN — PANTOPRAZOLE SODIUM 40 MG: 40 TABLET, DELAYED RELEASE ORAL at 08:49

## 2017-11-27 RX ADMIN — RIFAXIMIN 550 MG: 550 TABLET ORAL at 08:50

## 2017-11-27 RX ADMIN — RISPERIDONE 1 MG: 1 TABLET ORAL at 08:49

## 2017-11-27 RX ADMIN — GABAPENTIN 200 MG: 100 CAPSULE ORAL at 20:04

## 2017-11-27 RX ADMIN — MULTIPLE VITAMINS W/ MINERALS TAB 1 TABLET: TAB at 08:50

## 2017-11-27 RX ADMIN — FERROUS GLUCONATE 324 MG: 324 TABLET ORAL at 08:49

## 2017-11-27 RX ADMIN — Medication 20 MG: at 08:49

## 2017-11-27 RX ADMIN — LACTULOSE 30 G: 10 POWDER, FOR SOLUTION ORAL at 13:54

## 2017-11-27 RX ADMIN — LACTULOSE 30 G: 10 POWDER, FOR SOLUTION ORAL at 08:49

## 2017-11-27 RX ADMIN — LACTULOSE 15 G: 10 POWDER, FOR SOLUTION ORAL at 20:03

## 2017-11-27 RX ADMIN — Medication: at 18:59

## 2017-11-27 RX ADMIN — RIFAXIMIN 550 MG: 550 TABLET ORAL at 20:04

## 2017-11-27 RX ADMIN — GABAPENTIN 200 MG: 100 CAPSULE ORAL at 13:53

## 2017-11-27 RX ADMIN — GABAPENTIN 200 MG: 100 CAPSULE ORAL at 08:49

## 2017-11-27 RX ADMIN — FOLIC ACID 1 MG: 1 TABLET ORAL at 08:50

## 2017-11-27 RX ADMIN — SPIRONOLACTONE 50 MG: 50 TABLET ORAL at 08:50

## 2017-11-27 RX ADMIN — RISPERIDONE 2 MG: 2 TABLET ORAL at 20:04

## 2017-11-27 RX ADMIN — HALOPERIDOL 2.5 MG: 1 TABLET ORAL at 20:04

## 2017-11-27 RX ADMIN — HALOPERIDOL 2.5 MG: 1 TABLET ORAL at 08:49

## 2017-11-27 ASSESSMENT — ACTIVITIES OF DAILY LIVING (ADL)
GROOMING: SHOWER;INDEPENDENT
HYGIENE/GROOMING: PROMPTS
ORAL_HYGIENE: INDEPENDENT
DRESS: SCRUBS (BEHAVIORAL HEALTH);INDEPENDENT
ORAL_HYGIENE: INDEPENDENT
DRESS: SCRUBS (BEHAVIORAL HEALTH)
LAUNDRY: UNABLE TO COMPLETE

## 2017-11-27 NOTE — PROGRESS NOTES
Pt had depressed mood and flat affect. Pt was isolative and withdrawn in her room in bed. She appeared responding to visual hallucination and yelling out intermittently. Pt was using headphone to help her voice. Pt was out for dinner. Pt told this writer that she had no auditory hallucination when giving her the hs medications. Pt was pleasant when approached.

## 2017-11-27 NOTE — PROGRESS NOTES
"    [facility], [place]   Psychiatric Progress Note        Interim History:     The patient's care was discussed with the treatment team during the daily team meeting and/or staff's chart notes were reviewed.   Reported that Ketamine-Gabapentin-Lidocaine cream and increase in Gabapentin dose was somewhat helpful for her knees pain. She, at the same time, was reported at times refusing it. When asked why, she responded: \"I don't know, I will use it in the future\". There were no changes in the patient's mood or behavior. She still has periods of confusion, yelling, can make delusional statements, see picking from the air. She is more alert and oriented most of the time. She is compliant with most of her meds with the exception of Lactulose. Takes it off and on with no explanation.        Medications:       gabapentin  200 mg Oral TID     ferrous gluconate  324 mg Oral Daily with breakfast     lactulose  30 g Oral TID     haloperidol  2.5 mg Oral BID     risperiDONE  1 mg Oral QAM     risperiDONE  2 mg Oral At Bedtime     pantoprazole  40 mg Oral Daily     multivitamin, therapeutic with minerals  1 tablet Oral Daily     folic acid  1 mg Oral Daily     rifaximin  550 mg Oral BID     spironolactone  50 mg Oral Daily     furosemide  20 mg Oral Daily          Allergies:     Allergies   Allergen Reactions     Acetaminophen      Ambien [Zolpidem Tartrate] Other (See Comments)     Sleep walks and eats     Ciprofloxacin Other (See Comments)     Seizure.     Citalopram Nausea and Vomiting          Labs:     No results found for this or any previous visit (from the past 24 hour(s)).       Psychiatric Examination:     Vitals:    11/24/17 1956 11/25/17 2047 11/26/17 0858 11/26/17 1900   BP: 94/61 107/71 112/67 98/59   BP Location: Left arm  Left arm    Pulse: 73 90 96 75   Resp: 18  20    Temp:  97.8  F (36.6  C) 97.3  F (36.3  C) 98  F (36.7  C)   TempSrc:  Oral Oral Oral   SpO2: 97%      Height:     "                    Lying Orthostatic BP: 104/69         Sitting Orthostatic BP:  (Refused)         Standing Orthostatic BP:  (Refused)     Appearance: awake, alert, adequately groomed, appeared about stated age and no apparent distress  Attitude: less cooperative and guarded  Eye Contact: poor  Mood:  Sad, irritable  Affect:  intensity is blunted  Speech:  clear, coherent and slow  Psychomotor Behavior:  no evidence of tardive dyskinesia, dystonia, or tics  Throught Process:  goal oriented  Associations:  no loose associations  Thought Content:  no evidence of suicidal ideation or homicidal ideation, auditory hallucinations are present, Visual hallucinations are present off and on, delusions are present.   Insight:  limited  Judgement:  limited  Oriented to:  time, person, and place  Attention Span and Concentration:  fair  Recent and Remote Memory:  fair         Precautions:     Behavioral Orders   Procedures     Code 2     Elopement precautions     Routine Programming     As clinically indicated     Seizure precautions     Status 15     Every 15 minutes.     Withdrawal precautions          Diagnoses:     Cognitive Disorder NOS.   Encephalopathy probably multifactorial such as chronic hepatic encephalopathy, organic brain damage due to encephalopathy, alcohol drinking, possibly underlying psychotic illness; possibly head injury contributes to patient's symptoms.   Alcohol use disorder.   amphetamine dependence vs abuse.  She meets criteria for Major depressive disorder, moderate severity.          Plan:     No medication changes today.   Legal Status and Disposition:  --  Under full MICD commitment.   -- the patient appears to be at her current baseline, she is on waiting list for [facility]. Referred to several NH and memory care unit, was rejected by number of them. Per CTC, CM works on CADI waiver and patient also needs to be rejected by 1 more group home. See discussion above. Will continue to provide support and  structure.  CTC emailed CM for update.

## 2017-11-27 NOTE — PLAN OF CARE
"Problem: Psychotic Symptoms  Goal: Psychotic Symptoms  Signs and symptoms of listed problems will be absent or manageable.   Outcome: No Change  48 Hour Nursing Assessment:  Day 78 of hospitalization.  Mildred denies SI and denies hallucinations.   She was oriented today except for the date.  She took a shower and staff changed her bed linens.  She said the shower \"felt fantastic\".  She had to be assisted in the shower as her legs became bent and she couldn't get up without assistance.  She was not injured and she did not fall.  She was pleasant in conversation.  Aranza is mainly  Isolative to her room.      "

## 2017-11-28 PROCEDURE — 12400007 ZZH R&B MH INTERMEDIATE UMMC

## 2017-11-28 PROCEDURE — 25000132 ZZH RX MED GY IP 250 OP 250 PS 637: Performed by: PHYSICIAN ASSISTANT

## 2017-11-28 PROCEDURE — 25000132 ZZH RX MED GY IP 250 OP 250 PS 637: Performed by: NURSE PRACTITIONER

## 2017-11-28 PROCEDURE — 25000132 ZZH RX MED GY IP 250 OP 250 PS 637: Performed by: EMERGENCY MEDICINE

## 2017-11-28 PROCEDURE — 25000132 ZZH RX MED GY IP 250 OP 250 PS 637: Performed by: PSYCHIATRY & NEUROLOGY

## 2017-11-28 RX ADMIN — Medication 20 MG: at 09:24

## 2017-11-28 RX ADMIN — LACTULOSE 30 G: 10 POWDER, FOR SOLUTION ORAL at 11:58

## 2017-11-28 RX ADMIN — GABAPENTIN 200 MG: 100 CAPSULE ORAL at 11:58

## 2017-11-28 RX ADMIN — LACTULOSE 30 G: 10 POWDER, FOR SOLUTION ORAL at 09:22

## 2017-11-28 RX ADMIN — FOLIC ACID 1 MG: 1 TABLET ORAL at 09:23

## 2017-11-28 RX ADMIN — SPIRONOLACTONE 50 MG: 50 TABLET ORAL at 09:23

## 2017-11-28 RX ADMIN — PANTOPRAZOLE SODIUM 40 MG: 40 TABLET, DELAYED RELEASE ORAL at 09:24

## 2017-11-28 RX ADMIN — RISPERIDONE 2 MG: 2 TABLET ORAL at 19:56

## 2017-11-28 RX ADMIN — GABAPENTIN 200 MG: 100 CAPSULE ORAL at 09:24

## 2017-11-28 RX ADMIN — RISPERIDONE 1 MG: 1 TABLET ORAL at 09:24

## 2017-11-28 RX ADMIN — RIFAXIMIN 550 MG: 550 TABLET ORAL at 19:54

## 2017-11-28 RX ADMIN — FERROUS GLUCONATE 324 MG: 324 TABLET ORAL at 09:24

## 2017-11-28 RX ADMIN — GABAPENTIN 200 MG: 100 CAPSULE ORAL at 19:56

## 2017-11-28 RX ADMIN — HALOPERIDOL 2.5 MG: 1 TABLET ORAL at 19:55

## 2017-11-28 RX ADMIN — MULTIPLE VITAMINS W/ MINERALS TAB 1 TABLET: TAB at 09:24

## 2017-11-28 RX ADMIN — RIFAXIMIN 550 MG: 550 TABLET ORAL at 09:24

## 2017-11-28 RX ADMIN — HALOPERIDOL 2.5 MG: 1 TABLET ORAL at 09:23

## 2017-11-28 ASSESSMENT — ACTIVITIES OF DAILY LIVING (ADL)
DRESS: INDEPENDENT
ORAL_HYGIENE: INDEPENDENT
GROOMING: INDEPENDENT
LAUNDRY: WITH SUPERVISION

## 2017-11-28 NOTE — PROGRESS NOTES
Pt escorted to court at 1300 with walker and tennis shoes with strings. Escort is aware she is an Elopement risk.   No overt psychotic statements prior to leaving.   Pt was compliant with 1400 dosing lactulose and gabapentin, c/o of nausea and was given ginger ale.

## 2017-11-28 NOTE — PROGRESS NOTES
The pt came back from court at approximately 5pm.      She didn't want to change out of her clothes or shoes, and was angry that we weren't going to let her leave. The pt began to yell and refuse to allow a search or to take off any of her items.      This writer attempted to deescalate the situation by allowing the pt to wear her clothing - after a thorough search by this writer - as long as she agreed to take of the shoes and stop yelling.    This seemed to diffuse the situation, but this writer was quick to inform the pt that the day shift would want her to change into the scrubs in the morning pending word from the court findings.

## 2017-11-28 NOTE — PROGRESS NOTES
Patient reports frustration at the length of her stay, and at people bothering her. Patient appeared to respond to hallucinations, yelling for people to get out of her room and stop touching her when she was alone in the room. Patient also yelled at times that she wanted to get out of the hospital. Patient's mood was labile, as she was calm at other times and was pleasant and cooperative in her interactions with staff. Patient isolated to her room almost all shift with mostly flat affect.     11/27/17 2100   Behavioral Health   Hallucinations auditory;visual;appears responding   Thinking distractable;poor concentration   Orientation person: oriented;situation, disoriented   Memory baseline memory   Insight denial of illness;poor   Judgement impaired   Eye Contact at examiner   Affect blunted, flat   Mood depressed;labile   Physical Appearance/Attire disheveled   Hygiene neglected grooming - unclean body, hair, teeth   Suicidality other (see comments)  (Denies)   1. Wish to be Dead No   2. Non-Specific Active Suicidal Thoughts  No   3. Active Sucidal Ideation with any Methods (Not Plan) Without Intent to Act  No   4. Active Suicidal Ideation with Some Intent to Act, Without Specific Plan  No   5. Active Suicidal Ideation with Specific Plan and Intent  No   Change in Protective Factors? No   Enviromental Risk Factors None   Self Injury other (see comment)  (Denies)   Elopement Statements about wanting to leave;Hallucinations directing behavior   Activity isolative;withdrawn   Speech clear   Medication Sensitivity no stated side effects;no observed side effects   Psychomotor / Gait balanced;steady   Activities of Daily Living   Hygiene/Grooming prompts   Oral Hygiene independent   Dress scrubs (behavioral health)   Laundry unable to complete   Room Organization independent

## 2017-11-28 NOTE — PROGRESS NOTES
11/28/17 1408   Behavioral Health   Hallucinations appears responding   Thinking poor concentration   Orientation person: oriented   Memory baseline memory   Insight denial of illness   Judgement impaired   Eye Contact at examiner   Affect blunted, flat   Mood mood is calm   Physical Appearance/Attire untidy   Hygiene (adequate)   Suicidality (denies)   1. Wish to be Dead No   2. Non-Specific Active Suicidal Thoughts  No   3. Active Sucidal Ideation with any Methods (Not Plan) Without Intent to Act  No   4. Active Suicidal Ideation with Some Intent to Act, Without Specific Plan  No   5. Active Suicidal Ideation with Specific Plan and Intent  No   Change in Protective Factors? No   Enviromental Risk Factors None   Self Injury (denies)   Elopement Statements about wanting to leave   Activity isolative;withdrawn   Speech clear   Medication Sensitivity no stated side effects   Psychomotor / Gait balanced   Psycho Education   Type of Intervention 1:1 intervention   Response participates, initiates socially appropriate   Hours 0.5   Activities of Daily Living   Hygiene/Grooming independent   Oral Hygiene independent   Dress independent   Laundry with supervision   Room Organization independent   pt isolative in room most of shift. Pt refused breakfast but ate all of lunch. Pt left for court around 1pm. Pt verbalized she understood she was coming back to the unit after court and was compliant with this. Pt denies any mental health illness.pt had flat mood and calm. Pt left unit with her own clothes and shoes and hospital walker. Pt refused to shower before court but hygiene was adequate.

## 2017-11-28 NOTE — PROGRESS NOTES
"Yelling out earlier in the shift, stating she has \"been here too long\" and that she wanted to leave. Stated she wasn't \"sad, I'm angry\". Declined prn medication when offered. At 1800 requested cream to her knees for pain, which was given. Calmer, listening to headphones. Pt drank 1/2 of lactulose mixture this evening, refusing the rest.   "

## 2017-11-29 PROCEDURE — 25000132 ZZH RX MED GY IP 250 OP 250 PS 637: Performed by: EMERGENCY MEDICINE

## 2017-11-29 PROCEDURE — 25000132 ZZH RX MED GY IP 250 OP 250 PS 637: Performed by: NURSE PRACTITIONER

## 2017-11-29 PROCEDURE — 25000132 ZZH RX MED GY IP 250 OP 250 PS 637: Performed by: PHYSICIAN ASSISTANT

## 2017-11-29 PROCEDURE — 99231 SBSQ HOSP IP/OBS SF/LOW 25: CPT | Performed by: PSYCHIATRY & NEUROLOGY

## 2017-11-29 PROCEDURE — 99207 ZZC CDG-MDM COMPONENT: MEETS MODERATE - DOWN CODED: CPT | Performed by: PSYCHIATRY & NEUROLOGY

## 2017-11-29 PROCEDURE — 25000132 ZZH RX MED GY IP 250 OP 250 PS 637: Performed by: PSYCHIATRY & NEUROLOGY

## 2017-11-29 PROCEDURE — 12400007 ZZH R&B MH INTERMEDIATE UMMC

## 2017-11-29 RX ADMIN — GABAPENTIN 200 MG: 100 CAPSULE ORAL at 08:40

## 2017-11-29 RX ADMIN — LACTULOSE 30 G: 10 POWDER, FOR SOLUTION ORAL at 20:59

## 2017-11-29 RX ADMIN — HALOPERIDOL 2.5 MG: 1 TABLET ORAL at 08:40

## 2017-11-29 RX ADMIN — MULTIPLE VITAMINS W/ MINERALS TAB 1 TABLET: TAB at 08:40

## 2017-11-29 RX ADMIN — LACTULOSE 30 G: 10 POWDER, FOR SOLUTION ORAL at 14:08

## 2017-11-29 RX ADMIN — Medication 20 MG: at 08:40

## 2017-11-29 RX ADMIN — HALOPERIDOL 2.5 MG: 1 TABLET ORAL at 20:59

## 2017-11-29 RX ADMIN — RISPERIDONE 2 MG: 2 TABLET ORAL at 21:00

## 2017-11-29 RX ADMIN — FERROUS GLUCONATE 324 MG: 324 TABLET ORAL at 08:40

## 2017-11-29 RX ADMIN — GABAPENTIN 200 MG: 100 CAPSULE ORAL at 20:59

## 2017-11-29 RX ADMIN — LACTULOSE 30 G: 10 POWDER, FOR SOLUTION ORAL at 08:40

## 2017-11-29 RX ADMIN — GABAPENTIN 200 MG: 100 CAPSULE ORAL at 14:08

## 2017-11-29 RX ADMIN — RIFAXIMIN 550 MG: 550 TABLET ORAL at 21:00

## 2017-11-29 RX ADMIN — RISPERIDONE 1 MG: 1 TABLET ORAL at 08:40

## 2017-11-29 RX ADMIN — PANTOPRAZOLE SODIUM 40 MG: 40 TABLET, DELAYED RELEASE ORAL at 08:40

## 2017-11-29 RX ADMIN — FOLIC ACID 1 MG: 1 TABLET ORAL at 08:40

## 2017-11-29 RX ADMIN — RIFAXIMIN 550 MG: 550 TABLET ORAL at 08:40

## 2017-11-29 RX ADMIN — SPIRONOLACTONE 50 MG: 50 TABLET ORAL at 08:40

## 2017-11-29 ASSESSMENT — ACTIVITIES OF DAILY LIVING (ADL)
GROOMING: INDEPENDENT;PROMPTS
ORAL_HYGIENE: INDEPENDENT
GROOMING: INDEPENDENT
LAUNDRY: WITH SUPERVISION
ORAL_HYGIENE: INDEPENDENT
DRESS: STREET CLOTHES;INDEPENDENT
DRESS: INDEPENDENT

## 2017-11-29 NOTE — PROGRESS NOTES
"    [facility], [place]   Psychiatric Progress Note        Interim History:     The patient's care was discussed with the treatment team during the daily team meeting and/or staff's chart notes were reviewed. She had recommitment hearing yesterday, case was taken under advisement. She reported having symptoms of cold, running nose, mild cough, declined offered meds. No other changes or complaints. Took lactulose today. Was pleasant and polite during today's meeting.   Per RN's note: \"48 Hour Nursing Assessment:  Day 80 of hospitalization.  Mildred had court yesterday regarding re commitment hearing.  The court has taken the issue under advisement.  She was reluctant to give her clothes back on return and only returned her tennis shoes.  She has been keeping these clothes close to her all day.  We would prefer her to be in scrubs because she is an elopement risk.  However, if we were to take her clothes it would most likely cause an incident which is not necessary.  She is fully oriented today.  Last showered 2 days ago.  Drank all of her lactulose this morning and came out for her tray.  Denies SI and hallucinations but appears to be responding to internal stimuli\".       Medications:       gabapentin  200 mg Oral TID     ferrous gluconate  324 mg Oral Daily with breakfast     lactulose  30 g Oral TID     haloperidol  2.5 mg Oral BID     risperiDONE  1 mg Oral QAM     risperiDONE  2 mg Oral At Bedtime     pantoprazole  40 mg Oral Daily     multivitamin, therapeutic with minerals  1 tablet Oral Daily     folic acid  1 mg Oral Daily     rifaximin  550 mg Oral BID     spironolactone  50 mg Oral Daily     furosemide  20 mg Oral Daily          Allergies:     Allergies   Allergen Reactions     Acetaminophen      Ambien [Zolpidem Tartrate] Other (See Comments)     Sleep walks and eats     Ciprofloxacin Other (See Comments)     Seizure.     Citalopram Nausea and Vomiting          Labs:     No " "results found for this or any previous visit (from the past 24 hour(s)).       Psychiatric Examination:     Vitals:    11/25/17 2047 11/26/17 0858 11/26/17 1900 11/28/17 1031   BP: 107/71 112/67 98/59    BP Location:  Left arm     Pulse: 90 96 75    Resp:  20  18   Temp:  97.3  F (36.3  C) 98  F (36.7  C) 97.6  F (36.4  C)   TempSrc:  Oral Oral Oral   SpO2:       Height:                             Lying Orthostatic BP: 101/57         Sitting Orthostatic BP:  (Refused)         Standing Orthostatic BP:  (Refused)     Appearance: awake, alert, adequately groomed, appeared about stated age and no apparent distress  Attitude: less cooperative and guarded  Eye Contact: poor  Mood:  \"better\"  Affect:  intensity is blunted  Speech:  clear, coherent and slow  Psychomotor Behavior:  no evidence of tardive dyskinesia, dystonia, or tics  Throught Process:  goal oriented  Associations:  no loose associations  Thought Content:  no evidence of suicidal ideation or homicidal ideation, auditory hallucinations are present, Visual hallucinations are present off and on, delusions are present.   Insight:  limited  Judgement:  limited  Oriented to:  time, person, and place  Attention Span and Concentration:  fair  Recent and Remote Memory:  fair         Precautions:     Behavioral Orders   Procedures     Code 2     Elopement precautions     Routine Programming     As clinically indicated     Seizure precautions     Status 15     Every 15 minutes.     Withdrawal precautions          Diagnoses:     Cognitive Disorder NOS.   Encephalopathy probably multifactorial such as chronic hepatic encephalopathy, organic brain damage due to encephalopathy, alcohol drinking, possibly underlying psychotic illness; possibly head injury contributes to patient's symptoms.   Alcohol use disorder.   amphetamine dependence vs abuse.  She meets criteria for Major depressive disorder, moderate severity.          Plan:     No medication changes today.   Legal " Status and Disposition:  --  Under full MICD commitment.   -- the patient appears to be at her current baseline, she is on waiting list for [facility]. Referred to several NH and memory care unit, was rejected by number of them. Per [facility], CM works on CADI waiver and patient also needs to be rejected by 1 more group home. See discussion above. Will continue to provide support and structure.  [facility] emailed CM for update. Her recommitment was taken under advisement, see discussion above.

## 2017-11-29 NOTE — PROGRESS NOTES
"The pt stayed in her room for the rest of the shift.  She came out for dinner, and she ate 75% of her meal.  She was   \"calm\", but did repeatedly ask this writer if \"the orders to leave were in yet?\"    This writer could hear the pt talking to people that weren't there, but there no bouts of yelling a them like there has been in previous shifts.    Ginger Ale continues to be a great incentive/help when her hallucinations become too much for her, oir when she is overly emotional/scared.  "

## 2017-11-30 PROCEDURE — 25000132 ZZH RX MED GY IP 250 OP 250 PS 637: Performed by: NURSE PRACTITIONER

## 2017-11-30 PROCEDURE — 99232 SBSQ HOSP IP/OBS MODERATE 35: CPT | Performed by: PSYCHIATRY & NEUROLOGY

## 2017-11-30 PROCEDURE — 12400007 ZZH R&B MH INTERMEDIATE UMMC

## 2017-11-30 PROCEDURE — 25000132 ZZH RX MED GY IP 250 OP 250 PS 637: Performed by: PHYSICIAN ASSISTANT

## 2017-11-30 PROCEDURE — 25000132 ZZH RX MED GY IP 250 OP 250 PS 637: Performed by: EMERGENCY MEDICINE

## 2017-11-30 PROCEDURE — 25000132 ZZH RX MED GY IP 250 OP 250 PS 637: Performed by: PSYCHIATRY & NEUROLOGY

## 2017-11-30 RX ADMIN — GABAPENTIN 200 MG: 100 CAPSULE ORAL at 08:47

## 2017-11-30 RX ADMIN — RISPERIDONE 2 MG: 2 TABLET ORAL at 19:50

## 2017-11-30 RX ADMIN — LACTULOSE 30 G: 10 POWDER, FOR SOLUTION ORAL at 08:49

## 2017-11-30 RX ADMIN — GABAPENTIN 200 MG: 100 CAPSULE ORAL at 13:56

## 2017-11-30 RX ADMIN — RIFAXIMIN 550 MG: 550 TABLET ORAL at 08:47

## 2017-11-30 RX ADMIN — GABAPENTIN 200 MG: 100 CAPSULE ORAL at 19:50

## 2017-11-30 RX ADMIN — SPIRONOLACTONE 50 MG: 50 TABLET ORAL at 08:48

## 2017-11-30 RX ADMIN — MULTIPLE VITAMINS W/ MINERALS TAB 1 TABLET: TAB at 08:49

## 2017-11-30 RX ADMIN — Medication 20 MG: at 08:49

## 2017-11-30 RX ADMIN — HALOPERIDOL 2.5 MG: 1 TABLET ORAL at 08:48

## 2017-11-30 RX ADMIN — RISPERIDONE 1 MG: 1 TABLET ORAL at 08:48

## 2017-11-30 RX ADMIN — PANTOPRAZOLE SODIUM 40 MG: 40 TABLET, DELAYED RELEASE ORAL at 08:49

## 2017-11-30 RX ADMIN — FERROUS GLUCONATE 324 MG: 324 TABLET ORAL at 08:48

## 2017-11-30 RX ADMIN — LACTULOSE 30 G: 10 POWDER, FOR SOLUTION ORAL at 19:50

## 2017-11-30 RX ADMIN — RIFAXIMIN 550 MG: 550 TABLET ORAL at 20:13

## 2017-11-30 RX ADMIN — FOLIC ACID 1 MG: 1 TABLET ORAL at 08:48

## 2017-11-30 RX ADMIN — HALOPERIDOL 2.5 MG: 1 TABLET ORAL at 19:50

## 2017-11-30 RX ADMIN — LACTULOSE 30 G: 10 POWDER, FOR SOLUTION ORAL at 13:56

## 2017-11-30 ASSESSMENT — ACTIVITIES OF DAILY LIVING (ADL)
DRESS: INDEPENDENT
ORAL_HYGIENE: INDEPENDENT
LAUNDRY: WITH SUPERVISION
GROOMING: INDEPENDENT

## 2017-11-30 NOTE — PROGRESS NOTES
[facility], [place]   Psychiatric Progress Note        Interim History:     The patient's care was discussed with the treatment team during the daily team meeting and/or staff's chart notes were reviewed. She had recommitment hearing 2 days ago, case was taken under advisement. No changes or complaints other than sore knees (this is a chronic problem). Took lactulose today. Was pleasant and polite during today's meeting. Was fully oriented, denied Auditory hallucinations and Visual hallucinations. She is frequently seen picking things from the air, making delusional statements.       Medications:       gabapentin  200 mg Oral TID     ferrous gluconate  324 mg Oral Daily with breakfast     lactulose  30 g Oral TID     haloperidol  2.5 mg Oral BID     risperiDONE  1 mg Oral QAM     risperiDONE  2 mg Oral At Bedtime     pantoprazole  40 mg Oral Daily     multivitamin, therapeutic with minerals  1 tablet Oral Daily     folic acid  1 mg Oral Daily     rifaximin  550 mg Oral BID     spironolactone  50 mg Oral Daily     furosemide  20 mg Oral Daily          Allergies:     Allergies   Allergen Reactions     Acetaminophen      Ambien [Zolpidem Tartrate] Other (See Comments)     Sleep walks and eats     Ciprofloxacin Other (See Comments)     Seizure.     Citalopram Nausea and Vomiting          Labs:     No results found for this or any previous visit (from the past 24 hour(s)).       Psychiatric Examination:     Vitals:    11/26/17 0858 11/26/17 1900 11/28/17 1031 11/30/17 0854   BP: 112/67 98/59     BP Location: Left arm   Left arm   Pulse: 96 75     Resp: 20  18 16   Temp:   97.6  F (36.4  C) 98  F (36.7  C)   TempSrc: Oral Oral Oral Oral   SpO2:       Height:                   Lying Orthostatic BP: 101/57         Sitting Orthostatic BP: 116/69         Standing Orthostatic BP: 123/80     Appearance: awake, alert, adequately groomed, appeared about stated age and no apparent  "distress  Attitude: more cooperative and less guarded  Eye Contact: poor  Mood:  \"better\"  Affect:  intensity is blunted  Speech:  clear, coherent and slow  Psychomotor Behavior:  no evidence of tardive dyskinesia, dystonia, or tics  Throught Process:  goal oriented  Associations:  no loose associations  Thought Content:  no evidence of suicidal ideation or homicidal ideation, auditory hallucinations are present, Visual hallucinations are present off and on, delusions are present.   Insight:  limited  Judgement:  limited  Oriented to:  time, person, and place  Attention Span and Concentration:  fair  Recent and Remote Memory:  fair         Precautions:     Behavioral Orders   Procedures     Code 2     Elopement precautions     Routine Programming     As clinically indicated     Seizure precautions     Status 15     Every 15 minutes.     Withdrawal precautions          Diagnoses:     Cognitive Disorder NOS.   Encephalopathy probably multifactorial such as chronic hepatic encephalopathy, organic brain damage due to encephalopathy, alcohol drinking, possibly underlying psychotic illness; possibly head injury contributes to patient's symptoms.   Alcohol use disorder.   amphetamine dependence vs abuse.  She meets criteria for Major depressive disorder, moderate severity.          Plan:     No medication changes today.   Legal Status and Disposition:  --  Under full MICD commitment.   -- the patient appears to be at her current baseline, she is on waiting list for [facility]. Referred to several NH and memory care unit, was rejected by number of them. Per [facility], CM works on CADI waiver and patient also needs to be rejected by 1 more group home. See discussion above. Will continue to provide support and structure.  [facility] emailed CM for update. Her recommitment was taken under advisement, see discussion above.                "

## 2017-11-30 NOTE — PLAN OF CARE
Problem: Psychotic Symptoms  Goal: Psychotic Symptoms  Signs and symptoms of listed problems will be absent or manageable.     Pt spent majority of the shift in her room.  Pt denies auditory and visual hallucinations but appears to be responding to internal stimuli.  Pt took all her medication and the lactulose.  No medication side effects reported or noted.  Pt has been pleasant and compliant with medication pass.  No behavioral concern.  Pt was oriented x4.  Pt denies having any thoughts to hurt herself.  Will continue to monitor pt as ordered.

## 2017-11-30 NOTE — PLAN OF CARE
Problem: Psychotic Symptoms  Goal: Psychotic Symptoms  Signs and symptoms of listed problems will be absent or manageable.   Outcome: Improving  48 Hour Assessment    Pt isolated to her room for the majority of the evening. She did not attending Community meeting, but ate dinner in the lounge. Pt was withdrawn, but pleasant upon approach. In addition, she was able to converse appropriately to the situation, and was fully oriented. Pt did not have any episodes of confusion, and behaviors remained calm and cooperative.     SI/SIB: Pt denies. Did not make statements or gestures that displayed suicidality or self injury.    Auditory/Visual Hallucinations: Pt denies. Writer did not witness pt responding to internal stimuli, or making delusional statements this shift. However, pt often does not make eye contact with writer, and stares blankly into space.     Pt reported that her knee pain was a 4 out of 10 today. Did not requested prn Ketamine cream.     Pt took all medications without issue.     Reports no difficulty with output.     Will continue to monitor and offer support to patient.

## 2017-12-01 PROCEDURE — 25000132 ZZH RX MED GY IP 250 OP 250 PS 637: Performed by: EMERGENCY MEDICINE

## 2017-12-01 PROCEDURE — 25000132 ZZH RX MED GY IP 250 OP 250 PS 637: Performed by: PSYCHIATRY & NEUROLOGY

## 2017-12-01 PROCEDURE — 25000132 ZZH RX MED GY IP 250 OP 250 PS 637: Performed by: NURSE PRACTITIONER

## 2017-12-01 PROCEDURE — 25000132 ZZH RX MED GY IP 250 OP 250 PS 637: Performed by: PHYSICIAN ASSISTANT

## 2017-12-01 PROCEDURE — 12400007 ZZH R&B MH INTERMEDIATE UMMC

## 2017-12-01 PROCEDURE — 99232 SBSQ HOSP IP/OBS MODERATE 35: CPT | Performed by: PSYCHIATRY & NEUROLOGY

## 2017-12-01 RX ADMIN — RIFAXIMIN 550 MG: 550 TABLET ORAL at 08:23

## 2017-12-01 RX ADMIN — LACTULOSE 30 G: 10 POWDER, FOR SOLUTION ORAL at 14:08

## 2017-12-01 RX ADMIN — GABAPENTIN 200 MG: 100 CAPSULE ORAL at 08:23

## 2017-12-01 RX ADMIN — FERROUS GLUCONATE 324 MG: 324 TABLET ORAL at 08:23

## 2017-12-01 RX ADMIN — HALOPERIDOL 2.5 MG: 1 TABLET ORAL at 20:09

## 2017-12-01 RX ADMIN — RISPERIDONE 2 MG: 2 TABLET ORAL at 20:09

## 2017-12-01 RX ADMIN — LACTULOSE 30 G: 10 POWDER, FOR SOLUTION ORAL at 08:22

## 2017-12-01 RX ADMIN — LORAZEPAM 1 MG: 1 TABLET ORAL at 17:01

## 2017-12-01 RX ADMIN — RISPERIDONE 1 MG: 1 TABLET ORAL at 08:23

## 2017-12-01 RX ADMIN — GABAPENTIN 200 MG: 100 CAPSULE ORAL at 14:07

## 2017-12-01 RX ADMIN — SPIRONOLACTONE 50 MG: 50 TABLET ORAL at 08:23

## 2017-12-01 RX ADMIN — RIFAXIMIN 550 MG: 550 TABLET ORAL at 20:08

## 2017-12-01 RX ADMIN — PANTOPRAZOLE SODIUM 40 MG: 40 TABLET, DELAYED RELEASE ORAL at 08:23

## 2017-12-01 RX ADMIN — Medication 20 MG: at 08:23

## 2017-12-01 RX ADMIN — LACTULOSE 30 G: 10 POWDER, FOR SOLUTION ORAL at 20:08

## 2017-12-01 RX ADMIN — HALOPERIDOL 5 MG: 5 TABLET ORAL at 17:01

## 2017-12-01 RX ADMIN — HALOPERIDOL 2.5 MG: 1 TABLET ORAL at 08:24

## 2017-12-01 RX ADMIN — GABAPENTIN 200 MG: 100 CAPSULE ORAL at 20:08

## 2017-12-01 RX ADMIN — FOLIC ACID 1 MG: 1 TABLET ORAL at 08:23

## 2017-12-01 RX ADMIN — MULTIPLE VITAMINS W/ MINERALS TAB 1 TABLET: TAB at 08:23

## 2017-12-01 ASSESSMENT — ACTIVITIES OF DAILY LIVING (ADL)
GROOMING: INDEPENDENT
LAUNDRY: WITH SUPERVISION
ORAL_HYGIENE: INDEPENDENT
DRESS: INDEPENDENT
GROOMING: INDEPENDENT
DRESS: INDEPENDENT
ORAL_HYGIENE: INDEPENDENT
LAUNDRY: WITH SUPERVISION

## 2017-12-01 NOTE — PROGRESS NOTES
"Patient was isolative and withdrawn the entire shift. Ate breakfast in her room but came out for lunch, appetite is good. Patient consistently requested to have her shoes. Patient had multiple delusional episodes on this shift. Patient was observed crying and screaming the name \"Elizabeth\" that \"Elizabeth\" wouldn't let her be or leave her alone and that she needed to take the ear plugs from her ears but unable to do so.       12/01/17 1533   Behavioral Health   Hallucinations auditory;visual;appears responding   Thinking distractable;delusional;poor concentration   Orientation person: oriented;place: oriented   Memory baseline memory   Insight poor   Judgement impaired   Eye Contact at examiner   Affect blunted, flat;sad   Mood irritable   Physical Appearance/Attire appears stated age   Hygiene neglected grooming - unclean body, hair, teeth   Suicidality other (see comments)  (denied)   1. Wish to be Dead No   2. Non-Specific Active Suicidal Thoughts  No   Self Injury other (see comment)  (denied)   Elopement Statements about wanting to leave   Activity isolative;withdrawn   Speech clear   Medication Sensitivity no stated side effects   Psychomotor / Gait slow;unsteady   Psycho Education   Type of Intervention 1:1 intervention   Response participates, initiates socially appropriate   Hours 0.5   Treatment Detail (check in)   Activities of Daily Living   Hygiene/Grooming independent   Oral Hygiene independent   Dress independent   Laundry with supervision   Room Organization unable   Groups   Details (isolative )   Behavioral Health Interventions   Psychotic Symptoms maintain safety precautions;encourage nutrition and hydration;encourage participation / independence with adls;provide emotional support;establish therapeutic relationship;assist with developing & utilizing healthy coping strategies;build upon strengths   Social and Therapeutic Interventions (Psychotic Symptoms) encourage socialization with peers;encourage " participation in therapeutic groups and milieu activities

## 2017-12-01 NOTE — PROGRESS NOTES
"    [facility], [place]   Psychiatric Progress Note        Interim History:     The patient's care was discussed with the treatment team during the daily team meeting and/or staff's chart notes were reviewed. She had recommitment hearing 3 days ago, case was taken under advisement. No changes or complaints other than sore knees (this is a chronic problem). She was more irritable today, said she wanted to go home and when I told her that she would have to wait for a placement, got angry, said: \"I am done talking to you\". She denied Auditory hallucinations and Visual hallucinations. She is frequently seen picking things from the air, talking to self, occasionally making delusional statements.       Medications:       gabapentin  200 mg Oral TID     ferrous gluconate  324 mg Oral Daily with breakfast     lactulose  30 g Oral TID     haloperidol  2.5 mg Oral BID     risperiDONE  1 mg Oral QAM     risperiDONE  2 mg Oral At Bedtime     pantoprazole  40 mg Oral Daily     multivitamin, therapeutic with minerals  1 tablet Oral Daily     folic acid  1 mg Oral Daily     rifaximin  550 mg Oral BID     spironolactone  50 mg Oral Daily     furosemide  20 mg Oral Daily          Allergies:     Allergies   Allergen Reactions     Acetaminophen      Ambien [Zolpidem Tartrate] Other (See Comments)     Sleep walks and eats     Ciprofloxacin Other (See Comments)     Seizure.     Citalopram Nausea and Vomiting          Labs:     No results found for this or any previous visit (from the past 24 hour(s)).       Psychiatric Examination:     Vitals:    11/26/17 1900 11/28/17 1031 11/30/17 0854 12/01/17 0900   BP: 98/59      BP Location:   Left arm Left arm   Pulse: 75      Resp:  18 16 16   Temp:  97.6  F (36.4  C) 98  F (36.7  C) 97.7  F (36.5  C)   TempSrc:  Oral Oral Oral   SpO2:       Height:                   Lying Orthostatic BP: 104/68         Sitting Orthostatic BP: 116/69         Standing Orthostatic BP: " "123/80     Appearance: awake, alert, adequately groomed, appeared about stated age and no apparent distress  Attitude: more cooperative and less guarded  Eye Contact: poor  Mood:  \"fine\"  Affect:  intensity is blunted  Speech:  clear, coherent and slow  Psychomotor Behavior:  no evidence of tardive dyskinesia, dystonia, or tics  Throught Process:  goal oriented  Associations:  no loose associations  Thought Content:  no evidence of suicidal ideation or homicidal ideation, auditory hallucinations are present, Visual hallucinations are present off and on, delusions are present.   Insight:  limited  Judgement:  limited  Oriented to:  time, person, and place  Attention Span and Concentration:  fair  Recent and Remote Memory:  fair         Precautions:     Behavioral Orders   Procedures     Code 2     Elopement precautions     Routine Programming     As clinically indicated     Seizure precautions     Status 15     Every 15 minutes.     Withdrawal precautions          Diagnoses:     Cognitive Disorder NOS.   Encephalopathy probably multifactorial such as chronic hepatic encephalopathy, organic brain damage due to encephalopathy, alcohol drinking, possibly underlying psychotic illness; possibly head injury contributes to patient's symptoms.   Alcohol use disorder.   amphetamine dependence vs abuse.  She meets criteria for Major depressive disorder, moderate severity.          Plan:     No medication changes today.   Legal Status and Disposition:  --  Under full MICD commitment.   -- the patient appears to be at her current baseline, she is on waiting list for [facility]. Referred to several NH and memory care unit, was rejected by number of them. Per [facility], CM works on CADI waiver and patient also needs to be rejected by 1 more group home. See discussion above. Will continue to provide support and structure.  [facility] emailed CM for update. Her recommitment was taken under advisement, see discussion above. Will plan to transfer her " to Dr. Foss.

## 2017-12-01 NOTE — PLAN OF CARE
Problem: Psychotic Symptoms  Goal: Psychotic Symptoms  Signs and symptoms of listed problems will be absent or manageable.   Outcome: Improving  48 Hour Assessment    Pt isolated to her room for the majority of the evening. She ate dinner in the lounge, and spent a bit of time in the milieu following this. However, went back to her room shortly after this. Pt was pleasant upon approach, and was calm and cooperative throughout the shift. Pt was encouraged to join patients in the milieu, but she declined.     SI/SIB: Pt denies. Has not made statements or gestures that indicate suicidality or self injury.    Auditory/Visual Hallucinations: Pt denies. However, pt was frequently heard speaking to someone when there was no one in her room, or near her room.     No additional concerns at this time. Will continue to assess and offer support.     Pt took all medications without issue. Rates her knee pain at a 5 out of 10, and declined her ketamine cream. No issues eating, passing BM or urine.     No additional concerns at this time. Will continue to monitor and offer support.

## 2017-12-02 PROCEDURE — 25000132 ZZH RX MED GY IP 250 OP 250 PS 637: Performed by: PHYSICIAN ASSISTANT

## 2017-12-02 PROCEDURE — 25000132 ZZH RX MED GY IP 250 OP 250 PS 637: Performed by: PSYCHIATRY & NEUROLOGY

## 2017-12-02 PROCEDURE — 25000132 ZZH RX MED GY IP 250 OP 250 PS 637: Performed by: NURSE PRACTITIONER

## 2017-12-02 PROCEDURE — 12400007 ZZH R&B MH INTERMEDIATE UMMC

## 2017-12-02 PROCEDURE — 25000132 ZZH RX MED GY IP 250 OP 250 PS 637: Performed by: EMERGENCY MEDICINE

## 2017-12-02 RX ADMIN — RIFAXIMIN 550 MG: 550 TABLET ORAL at 09:37

## 2017-12-02 RX ADMIN — GABAPENTIN 200 MG: 100 CAPSULE ORAL at 09:37

## 2017-12-02 RX ADMIN — HALOPERIDOL 2.5 MG: 1 TABLET ORAL at 09:41

## 2017-12-02 RX ADMIN — FOLIC ACID 1 MG: 1 TABLET ORAL at 09:39

## 2017-12-02 RX ADMIN — PANTOPRAZOLE SODIUM 40 MG: 40 TABLET, DELAYED RELEASE ORAL at 09:37

## 2017-12-02 RX ADMIN — Medication 20 MG: at 09:37

## 2017-12-02 RX ADMIN — RIFAXIMIN 550 MG: 550 TABLET ORAL at 20:35

## 2017-12-02 RX ADMIN — SPIRONOLACTONE 50 MG: 50 TABLET ORAL at 09:38

## 2017-12-02 RX ADMIN — HYDROXYZINE HYDROCHLORIDE 50 MG: 25 TABLET ORAL at 17:06

## 2017-12-02 RX ADMIN — FERROUS GLUCONATE 324 MG: 324 TABLET ORAL at 09:37

## 2017-12-02 RX ADMIN — LACTULOSE 30 G: 10 POWDER, FOR SOLUTION ORAL at 20:34

## 2017-12-02 RX ADMIN — HALOPERIDOL 2.5 MG: 1 TABLET ORAL at 20:37

## 2017-12-02 RX ADMIN — HALOPERIDOL 5 MG: 5 TABLET ORAL at 17:06

## 2017-12-02 RX ADMIN — GABAPENTIN 200 MG: 100 CAPSULE ORAL at 14:35

## 2017-12-02 RX ADMIN — LACTULOSE 30 G: 10 POWDER, FOR SOLUTION ORAL at 14:48

## 2017-12-02 RX ADMIN — GABAPENTIN 200 MG: 100 CAPSULE ORAL at 20:35

## 2017-12-02 RX ADMIN — RISPERIDONE 2 MG: 2 TABLET ORAL at 20:35

## 2017-12-02 RX ADMIN — LACTULOSE 30 G: 10 POWDER, FOR SOLUTION ORAL at 09:43

## 2017-12-02 RX ADMIN — MULTIPLE VITAMINS W/ MINERALS TAB 1 TABLET: TAB at 09:38

## 2017-12-02 RX ADMIN — RISPERIDONE 1 MG: 1 TABLET ORAL at 09:37

## 2017-12-02 ASSESSMENT — ACTIVITIES OF DAILY LIVING (ADL)
GROOMING: INDEPENDENT
ORAL_HYGIENE: INDEPENDENT
GROOMING: INDEPENDENT
LAUNDRY: WITH SUPERVISION
DRESS: INDEPENDENT
LAUNDRY: WITH SUPERVISION
ORAL_HYGIENE: INDEPENDENT
DRESS: INDEPENDENT

## 2017-12-02 NOTE — PROGRESS NOTES
Pt.appeared to settle in well on the unit. She came out for dinner. Had a visit with her sister she stated went well. Withdrawn in her room but pleasant upon approach. Denied any suicidal and self injury thoughts this shift. Will continue to monitor.

## 2017-12-02 NOTE — PROGRESS NOTES
Pt appears isolative and irritable. Was minimal with staff and denied having any hallucinations and suicidal thoughts. Appears to speak to self and have disorganized thinking at times. Pt asked if she was going back to [facility]. Staff told her that she could speak with the Dr about that once the unit opens. The patient then asked again an hour or two after that if it was time to go to [facility], appearing to not understand the information given to her previously.        12/02/17 1401   Behavioral Health   Hallucinations denies / not responding to hallucinations;appears responding;other (see comment)  (speaks to self)   Thinking distractable;poor concentration;other (see comment)   Orientation person: oriented;place: oriented;date: oriented;time: oriented   Memory temporary;short term   Insight poor   Judgement impaired   Eye Contact at examiner   Affect blunted, flat;tense;irritable   Mood irritable   Physical Appearance/Attire other (see comment)  (fair)   Hygiene other (see comment)  (fair)   Suicidality other (see comments)  (denies)   1. Wish to be Dead No   2. Non-Specific Active Suicidal Thoughts  No   3. Active Sucidal Ideation with any Methods (Not Plan) Without Intent to Act  No   4. Active Suicidal Ideation with Some Intent to Act, Without Specific Plan  No   5. Active Suicidal Ideation with Specific Plan and Intent  No   Change in Protective Factors? No   Self Injury other (see comment)  (nothing stated or observed)   Elopement (nothing stated or observed)   Activity other (see comment)  (resting in bed mostly, no grps, no socialization)   Speech clear;coherent   Medication Sensitivity no stated side effects;no observed side effects   Psychomotor / Gait balanced;steady   Activities of Daily Living   Hygiene/Grooming independent   Oral Hygiene independent   Dress independent   Laundry with supervision   Room Organization independent   Behavioral Health Interventions   Psychotic Symptoms maintain safety  precautions;maintain safe secure environment;reality orientation;simple, clear language;provide emotional support;establish therapeutic relationship;assist with developing & utilizing healthy coping strategies;build upon strengths   Social and Therapeutic Interventions (Psychotic Symptoms) encourage participation in therapeutic groups and milieu activities

## 2017-12-03 PROCEDURE — 25000132 ZZH RX MED GY IP 250 OP 250 PS 637: Performed by: EMERGENCY MEDICINE

## 2017-12-03 PROCEDURE — 25000132 ZZH RX MED GY IP 250 OP 250 PS 637: Performed by: PHYSICIAN ASSISTANT

## 2017-12-03 PROCEDURE — 25000132 ZZH RX MED GY IP 250 OP 250 PS 637: Performed by: PSYCHIATRY & NEUROLOGY

## 2017-12-03 PROCEDURE — 25000132 ZZH RX MED GY IP 250 OP 250 PS 637: Performed by: NURSE PRACTITIONER

## 2017-12-03 PROCEDURE — 12400007 ZZH R&B MH INTERMEDIATE UMMC

## 2017-12-03 RX ADMIN — RIFAXIMIN 550 MG: 550 TABLET ORAL at 20:28

## 2017-12-03 RX ADMIN — HALOPERIDOL 5 MG: 5 TABLET ORAL at 14:34

## 2017-12-03 RX ADMIN — HALOPERIDOL 2.5 MG: 1 TABLET ORAL at 20:23

## 2017-12-03 RX ADMIN — PANTOPRAZOLE SODIUM 40 MG: 40 TABLET, DELAYED RELEASE ORAL at 09:28

## 2017-12-03 RX ADMIN — FERROUS GLUCONATE 324 MG: 324 TABLET ORAL at 09:29

## 2017-12-03 RX ADMIN — HYDROXYZINE HYDROCHLORIDE 25 MG: 25 TABLET ORAL at 18:57

## 2017-12-03 RX ADMIN — LACTULOSE 30 G: 10 POWDER, FOR SOLUTION ORAL at 09:32

## 2017-12-03 RX ADMIN — RISPERIDONE 1 MG: 1 TABLET ORAL at 09:29

## 2017-12-03 RX ADMIN — LACTULOSE 30 G: 10 POWDER, FOR SOLUTION ORAL at 20:27

## 2017-12-03 RX ADMIN — GABAPENTIN 200 MG: 100 CAPSULE ORAL at 14:34

## 2017-12-03 RX ADMIN — RIFAXIMIN 550 MG: 550 TABLET ORAL at 09:29

## 2017-12-03 RX ADMIN — GABAPENTIN 200 MG: 100 CAPSULE ORAL at 09:28

## 2017-12-03 RX ADMIN — LACTULOSE 30 G: 10 POWDER, FOR SOLUTION ORAL at 14:34

## 2017-12-03 RX ADMIN — SPIRONOLACTONE 50 MG: 50 TABLET ORAL at 09:29

## 2017-12-03 RX ADMIN — HALOPERIDOL 2.5 MG: 1 TABLET ORAL at 09:30

## 2017-12-03 RX ADMIN — GABAPENTIN 200 MG: 100 CAPSULE ORAL at 20:23

## 2017-12-03 RX ADMIN — Medication 20 MG: at 09:29

## 2017-12-03 RX ADMIN — MULTIPLE VITAMINS W/ MINERALS TAB 1 TABLET: TAB at 09:31

## 2017-12-03 RX ADMIN — FOLIC ACID 1 MG: 1 TABLET ORAL at 09:29

## 2017-12-03 RX ADMIN — RISPERIDONE 2 MG: 2 TABLET ORAL at 20:23

## 2017-12-03 ASSESSMENT — ACTIVITIES OF DAILY LIVING (ADL)
GROOMING: INDEPENDENT
LAUNDRY: UNABLE TO COMPLETE
DRESS: PROMPTS
DRESS: INDEPENDENT
GROOMING: PROMPTS
ORAL_HYGIENE: PROMPTS
LAUNDRY: WITH SUPERVISION
ORAL_HYGIENE: INDEPENDENT

## 2017-12-03 NOTE — PLAN OF CARE
Problem: Psychotic Symptoms  Goal: Psychotic Symptoms  Signs and symptoms of listed problems will be absent or manageable.   Outcome: No Change  Mildred isolated to her room except for dinner. Blunted affect. Denies SI/SIB/AH, however appears to talk to herself in her room. No behavioral escalation/agitation noted today. She requires prompting for all ADLs, especially washing her hands and fingernails. Compliant with all medications. No visitors.    Assessment of pt's progress toward meeting careplan goals: no change.

## 2017-12-03 NOTE — PROGRESS NOTES
"  Pt reports, \"I don't like that Elizabeth that is filming up here.\" Appears to have delusions, and making odd movements with her fingers, moving one hand away from the space around each finger and flinging something imaginary into the air. Reports that she has been caring for her hand hygiene today. Wondering about snacks in her locker but nothing was found in her locker. Eating. Appears to have poor insight and delusional thinking.       12/03/17 1948   Behavioral Health   Hallucinations appears responding;other (see comment)  (denies but appears have to poor insight surrounding them)   Thinking confused;distractable;delusional;paranoid   Orientation person: oriented;place: oriented;situation, disoriented;time, disoriented   Memory confabulation   Insight poor   Judgement impaired   Eye Contact at examiner   Affect irritable;tense   Mood irritable;anxious;labile   Physical Appearance/Attire neat   Hygiene well groomed   Suicidality other (see comments)  (denies)   1. Wish to be Dead No   2. Non-Specific Active Suicidal Thoughts  No   Self Injury other (see comment)  (denies)   Elopement (reports wanting to leave)   Activity isolative;withdrawn;restless   Speech other (see comments)  (mostly coherent)   Medication Sensitivity no stated side effects;no observed side effects   Psychomotor / Gait balanced;steady  (mostly, may use walker at times)   Activities of Daily Living   Hygiene/Grooming independent   Oral Hygiene independent   Dress independent   Laundry with supervision   Room Organization independent   Behavioral Health Interventions   Psychotic Symptoms maintain safety precautions;maintain safe secure environment;simple, clear language;provide emotional support;establish therapeutic relationship;reality orientation;assist with developing & utilizing healthy coping strategies;build upon strengths   Social and Therapeutic Interventions (Psychotic Symptoms) encourage effective boundaries with peers     "

## 2017-12-04 PROCEDURE — 25000132 ZZH RX MED GY IP 250 OP 250 PS 637: Performed by: EMERGENCY MEDICINE

## 2017-12-04 PROCEDURE — 12400007 ZZH R&B MH INTERMEDIATE UMMC

## 2017-12-04 PROCEDURE — 25000132 ZZH RX MED GY IP 250 OP 250 PS 637: Performed by: PHYSICIAN ASSISTANT

## 2017-12-04 PROCEDURE — 25000132 ZZH RX MED GY IP 250 OP 250 PS 637: Performed by: PSYCHIATRY & NEUROLOGY

## 2017-12-04 PROCEDURE — 25000132 ZZH RX MED GY IP 250 OP 250 PS 637: Performed by: NURSE PRACTITIONER

## 2017-12-04 RX ADMIN — GABAPENTIN 200 MG: 100 CAPSULE ORAL at 09:35

## 2017-12-04 RX ADMIN — GABAPENTIN 200 MG: 100 CAPSULE ORAL at 13:30

## 2017-12-04 RX ADMIN — FERROUS GLUCONATE 324 MG: 324 TABLET ORAL at 09:36

## 2017-12-04 RX ADMIN — PANTOPRAZOLE SODIUM 40 MG: 40 TABLET, DELAYED RELEASE ORAL at 09:36

## 2017-12-04 RX ADMIN — SPIRONOLACTONE 50 MG: 50 TABLET ORAL at 09:36

## 2017-12-04 RX ADMIN — RISPERIDONE 1 MG: 1 TABLET ORAL at 09:36

## 2017-12-04 RX ADMIN — GABAPENTIN 200 MG: 100 CAPSULE ORAL at 22:22

## 2017-12-04 RX ADMIN — LACTULOSE 30 G: 10 POWDER, FOR SOLUTION ORAL at 13:30

## 2017-12-04 RX ADMIN — RISPERIDONE 2 MG: 2 TABLET ORAL at 22:23

## 2017-12-04 RX ADMIN — Medication 20 MG: at 09:35

## 2017-12-04 RX ADMIN — RIFAXIMIN 550 MG: 550 TABLET ORAL at 09:36

## 2017-12-04 RX ADMIN — LACTULOSE 30 G: 10 POWDER, FOR SOLUTION ORAL at 22:22

## 2017-12-04 RX ADMIN — HALOPERIDOL 2.5 MG: 1 TABLET ORAL at 09:39

## 2017-12-04 RX ADMIN — RIFAXIMIN 550 MG: 550 TABLET ORAL at 22:23

## 2017-12-04 RX ADMIN — FOLIC ACID 1 MG: 1 TABLET ORAL at 09:37

## 2017-12-04 RX ADMIN — MULTIPLE VITAMINS W/ MINERALS TAB 1 TABLET: TAB at 09:36

## 2017-12-04 RX ADMIN — HALOPERIDOL 2.5 MG: 1 TABLET ORAL at 22:50

## 2017-12-04 ASSESSMENT — ACTIVITIES OF DAILY LIVING (ADL)
LAUNDRY: WITH SUPERVISION
ORAL_HYGIENE: INDEPENDENT;PROMPTS
ORAL_HYGIENE: INDEPENDENT
GROOMING: INDEPENDENT
DRESS: INDEPENDENT
DRESS: INDEPENDENT
GROOMING: INDEPENDENT;PROMPTS

## 2017-12-04 NOTE — PLAN OF CARE
Problem: General Plan of Care (Inpatient Behavioral)  Goal: Team Discussion  Team Plan:    Outcome: Improving  BEHAVIORAL TEAM DISCUSSION    Participants:   Dr. Payne, Karsten Cardenas RN and Danielle POSADAS    Progress:   This pt was [facility] on 8/9/17 and transferred [facility] on 12/1/17.  This pt was admitted after family called 911 due to emotional dysregulation and drug use.  Pt had recently left a nursing home facility when under a provisional discharge.  Upon arrival to the ER the pt was assessed as responding to internal stimuli,   and having symptoms of paranoia and having delusions.  The pt also received a thorough evaluation from medical with some thought that she might be admitted to a medical unit.  RN asked MD about DNR/DNR and MD states pt is full code. MD reports taht pt is ill but not terminally ill.  Pt was re-committed on November 29, 2017. Unfortunately the order reads as the pt still being committed to the [facility] rather than [facility]. This will need to be changed and the pt can not be provisionally discharge under this order.  Placement has been sought and has been difficult to find.      Continued Stay Criteria/Rationale:   Pt is committed and has ongoing psychiatric symptoms.    Medical/Physical:   High ammonia levels  Ammonia 94 (H) 10 - 50 umol/L Final 10/09/2017  8:10 AM       Arthritis with knee replacement with pain management consult completed 11/14/17.  There was a question of pt having cochlear implants per her report but her husabnd reports she does not have these.  Hepatic encephalopathy  Liver disease  TBI  Pt uses a walker.      EXAMINATION: US ABDOMEN COMPLETE WITH DOPPLER, 9/10/2017 9:13 AM      COMPARISON: None.     HISTORY: hepatic encephalopathy, high ammonia levels, need to look for  portal venous flow     TECHNIQUE: The abdomen was scanned in standard fashion with  specialized ultrasound transducer(s) using both gray-scale, color  Doppler, and spectral  flow techniques.  Liver: The liver demonstrates mildly nodular contour, consistent with  cirrhosis. Hypoattenuating lesion within the right lobe of the liver,  measuring 1.2 x 1.1 x 1.4 cm. No significant internal flow on color  Doppler evaluation..   Extrahepatic portal vein flow is antegrade, measuring 12 cm/sec.  Right portal vein flow is antegrade, measuring 18 cm/sec.  Left portal vein flow is antegrade, measuring 6 cm/sec.  Flow in the hepatic artery is towards the liver and:  80 cm/sec peak systolic  0.7 resistive index.   The splenic vein is retrograde and flow is away from the liver.  The  left, middle, and right hepatic veins are patent with flow towards the  IVC. The IVC is patent with flow towards the heart.   The visualized  aorta is not dilated.  Gallbladder: Surgically absent.  Bile Ducts: Both the intra- and extrahepatic biliary system are of  normal caliber.  The common bile duct measures 3 mm in diameter.  Pancreas: Visualized portions of the head and body of the pancreas are  unremarkable.   Kidneys: Both kidneys are of normal echotexture, without mass or  hydronephrosis.   The craniocaudal dimensions are: right- 12.2 cm,  left- 11.8 cm.  Spleen: The spleen measures 14.9 cm in sagittal dimension. Calcified  granulomas.  Fluid: No evidence of ascites or pleural effusions.  Impression:   1.  Antegrade flow of the portal vein. Decreased velocity could  suggest intermittent portal hypertension and/or reversal of flow.  2.  Retrograde flow of the splenic vein.  3.  Indeterminate hypoattenuating lesion within the cirrhotic liver,  right lobe. While there is suggestion of faint posterior acoustic  enhancement, solid mass cannot entirely be excluded. Contrast-enhanced  MRI could be considered on a nonemergent basis.  4.  Splenomegaly is likely related to hepatic dysfunction.  DONELL DOUGHERTY MD    Precautions:   Behavioral Orders   Procedures     Code 2     Elopement precautions     Routine Programming      As clinically indicated     Seizure precautions     Status 15     Every 15 minutes.     Withdrawal precautions     Plan:   Medication management  Placement in a MSOCS placement    Rationale for change in precautions or plan:   No change      Problem: Patient Care Overview  Goal: Team Discussion  Team Plan:    Outcome: Improving  BEHAVIORAL TEAM DISCUSSION    Participants:   Dr. Payne, Karsten Cardenas RN and Danielle Flores [facility]    Progress:   This pt was admitted [facility] on 8/9/17 and transferred [facility] on 12/1/17.  This pt was admitted after family called 911 due to emotional dysregulation and drug use.  Pt had recently left a nursing home facility when under a provisional discharge.  Upon arrival to the ER the pt was assessed as responding to internal stimuli,   and having symptoms of paranoia and having delusions.  The pt also received a thorough evaluation from medical with some thought that she might be admitted to a medical unit.  RN asked MD about DNR/DNR and MD states pt is full code. MD reports taht pt is ill but not terminally ill.  Pt was re-committed on November 29, 2017. Unfortunately the order reads as the pt still being committed to the [facility] rather than [facility]. This will need to be changed and the pt can not be provisionally discharge under this order.  Placement has been sought and has been difficult to find.      Continued Stay Criteria/Rationale:   Pt is committed and has ongoing psychiatric symptoms.    Medical/Physical:   High ammonia levels  Ammonia 94 (H) 10 - 50 umol/L Final 10/09/2017  8:10 AM       Arthritis with knee replacement with pain management consult completed 11/14/17.  There was a question of pt having cochlear implants per her report but her husabnd reports she does not have these.  Hepatic encephalopathy  Liver disease  TBI  Pt uses a walker.      EXAMINATION: US ABDOMEN COMPLETE WITH DOPPLER, 9/10/2017 9:13 AM      COMPARISON: None.     HISTORY: hepatic  encephalopathy, high ammonia levels, need to look for  portal venous flow     TECHNIQUE: The abdomen was scanned in standard fashion with  specialized ultrasound transducer(s) using both gray-scale, color  Doppler, and spectral flow techniques.  Liver: The liver demonstrates mildly nodular contour, consistent with  cirrhosis. Hypoattenuating lesion within the right lobe of the liver,  measuring 1.2 x 1.1 x 1.4 cm. No significant internal flow on color  Doppler evaluation..   Extrahepatic portal vein flow is antegrade, measuring 12 cm/sec.  Right portal vein flow is antegrade, measuring 18 cm/sec.  Left portal vein flow is antegrade, measuring 6 cm/sec.  Flow in the hepatic artery is towards the liver and:  80 cm/sec peak systolic  0.7 resistive index.   The splenic vein is retrograde and flow is away from the liver.  The  left, middle, and right hepatic veins are patent with flow towards the  IVC. The IVC is patent with flow towards the heart.   The visualized  aorta is not dilated.  Gallbladder: Surgically absent.  Bile Ducts: Both the intra- and extrahepatic biliary system are of  normal caliber.  The common bile duct measures 3 mm in diameter.  Pancreas: Visualized portions of the head and body of the pancreas are  unremarkable.   Kidneys: Both kidneys are of normal echotexture, without mass or  hydronephrosis.   The craniocaudal dimensions are: right- 12.2 cm,  left- 11.8 cm.  Spleen: The spleen measures 14.9 cm in sagittal dimension. Calcified  granulomas.  Fluid: No evidence of ascites or pleural effusions.  Impression:   1.  Antegrade flow of the portal vein. Decreased velocity could  suggest intermittent portal hypertension and/or reversal of flow.  2.  Retrograde flow of the splenic vein.  3.  Indeterminate hypoattenuating lesion within the cirrhotic liver,  right lobe. While there is suggestion of faint posterior acoustic  enhancement, solid mass cannot entirely be excluded. Contrast-enhanced  MRI could be  considered on a nonemergent basis.  4.  Splenomegaly is likely related to hepatic dysfunction.  DONELL DOUGHERTY MD    Precautions:   Behavioral Orders   Procedures     Code 2     Elopement precautions     Routine Programming     As clinically indicated     Seizure precautions     Status 15     Every 15 minutes.     Withdrawal precautions     Plan:   Medication management  Placement in a MSOCS placement    Rationale for change in precautions or plan:   No change  I have reviewed and agree with the plan of care as written above.  Aisf Payne MD

## 2017-12-04 NOTE — PROGRESS NOTES
"Pt appears to have some delusional thoughts and auditory hallucinations, reporting, \"I've got an earbud in my head and I can't take it out.\" When asked about this woman \"Dannielle\" that the pt was reporting agitatedly about yesterday, the patient said she hasn't seen her but \"I can hear her.\" Pt believes she has been in the hospital for 5 1/2 months when it's been just under 4 months. When asked why she believes has continued to be in the hospital, the patient appeared confused and didn't appear to understand why she is being hospitalized. Denies SI. Irritable and begins to become agitated when asking more than a couple treatment oriented questions at a time.        12/04/17 1447   Behavioral Health   Hallucinations auditory;other (see comment)  (about dannielle, \"I didn't see her, but I hear her\")   Thinking confused;distractable;delusional;paranoid;poor concentration   Orientation person: oriented;place: oriented;date, disoriented;time, disoriented;situation, disoriented   Memory confabulation;short term   Insight poor;denial of illness   Judgement impaired   Eye Contact into space   Affect irritable;blunted, flat;tense   Mood irritable;labile   Physical Appearance/Attire disheveled   Hygiene other (see comment)  (fair)   Suicidality other (see comments)  (denies)   1. Wish to be Dead No   2. Non-Specific Active Suicidal Thoughts  No   Self Injury other (see comment)  (denies)   Elopement Distress about legal situation (holds for mental health or criminal);Statements about wanting to leave   Activity isolative;withdrawn;other (see comment)  (eating, no group)   Speech clear;coherent;rambling;pressured   Medication Sensitivity no stated side effects;no observed side effects   Psychomotor / Gait balanced;steady   Activities of Daily Living   Hygiene/Grooming independent;prompts   Oral Hygiene independent;prompts   Dress independent   Laundry with supervision   Room Organization independent   Behavioral Health " Interventions   Psychotic Symptoms maintain safety precautions;maintain safe secure environment;reality orientation;simple, clear language;encourage nutrition and hydration;encourage participation / independence with adls;provide emotional support;establish therapeutic relationship;assist with developing & utilizing healthy coping strategies;build upon strengths   Social and Therapeutic Interventions (Psychotic Symptoms) encourage effective boundaries with peers;encourage participation in therapeutic groups and milieu activities

## 2017-12-04 NOTE — PROGRESS NOTES
"    [facility] [place]   Psychiatric Progress Note        Interim History:     The patient's care was discussed with the treatment team during the daily team meeting and/or staff's chart notes were reviewed. Patient was not interviewed today, she was asleep and I didn't want to wake her up. There were no changes over weekend. Patient didn't comment on her transfer [facility]. According to staff, she reported delusional ideas and Auditory hallucinations. See below:  Per Psych associate's note: \"Pt appears to have some delusional thoughts and auditory hallucinations, reporting, \"I've got an earbud in my head and I can't take it out.\" When asked about this woman \"Elizabeth\" that the pt was reporting agitatedly about yesterday, the patient said she hasn't seen her but \"I can hear her.\" Pt believes she has been in the hospital for 5 1/2 months when it's been just under 4 months. When asked why she believes has continued to be in the hospital, the patient appeared confused and didn't appear to understand why she is being hospitalized. Denies SI. Irritable and begins to become agitated when asking more than a couple treatment oriented questions at a time.\"        Medications:       gabapentin  200 mg Oral TID     ferrous gluconate  324 mg Oral Daily with breakfast     lactulose  30 g Oral TID     haloperidol  2.5 mg Oral BID     risperiDONE  1 mg Oral QAM     risperiDONE  2 mg Oral At Bedtime     pantoprazole  40 mg Oral Daily     multivitamin, therapeutic with minerals  1 tablet Oral Daily     folic acid  1 mg Oral Daily     rifaximin  550 mg Oral BID     spironolactone  50 mg Oral Daily     furosemide  20 mg Oral Daily          Allergies:     Allergies   Allergen Reactions     Acetaminophen      Ambien [Zolpidem Tartrate] Other (See Comments)     Sleep walks and eats     Ciprofloxacin Other (See Comments)     Seizure.     Citalopram Nausea and Vomiting          Labs:     No " "results found for this or any previous visit (from the past 24 hour(s)).       Psychiatric Examination:     Vitals:    12/01/17 0900 12/02/17 0900 12/03/17 1642 12/03/17 2032   BP:   (!) 85/47 98/57   BP Location: Left arm Left arm Left arm Left arm   Pulse:   59 74   Resp: 16 16 18    Temp: 97.7  F (36.5  C) 96.6  F (35.9  C) 98.4  F (36.9  C)    TempSrc: Oral  Oral    SpO2:       Height:                     Lying Orthostatic BP: 85/47         Sitting Orthostatic BP: 115/68         Standing Orthostatic BP: 110/69     Appearance: awake, alert, adequately groomed, appeared about stated age and no apparent distress  Attitude: more cooperative and less guarded  Eye Contact: poor  Mood:  \"fine\"  Affect:  intensity is blunted  Speech:  clear, coherent and slow  Psychomotor Behavior:  no evidence of tardive dyskinesia, dystonia, or tics  Throught Process:  goal oriented  Associations:  no loose associations  Thought Content:  no evidence of suicidal ideation or homicidal ideation, auditory hallucinations are present, Visual hallucinations are present off and on, delusions are present.   Insight:  limited  Judgement:  limited  Oriented to:  time, person, and place  Attention Span and Concentration:  fair  Recent and Remote Memory:  fair         Precautions:     Behavioral Orders   Procedures     Code 2     Elopement precautions     Routine Programming     As clinically indicated     Seizure precautions     Status 15     Every 15 minutes.     Withdrawal precautions          Diagnoses:     Cognitive Disorder NOS.   Encephalopathy probably multifactorial such as chronic hepatic encephalopathy, organic brain damage due to encephalopathy, alcohol drinking, possibly underlying psychotic illness; possibly head injury contributes to patient's symptoms.   Alcohol use disorder.   amphetamine dependence vs abuse.  She meets criteria for Major depressive disorder, moderate severity.          Plan:     No medication changes today. "   Legal Status and Disposition:  --  Under full MICD commitment.   -- the patient appears to be at her current baseline, she is on waiting list for [facility]. Referred to several NH and memory care unit, was rejected by number of them. Per [facility], CM works on CADI waiver and patient also needs to be rejected by 1 more group home. See discussion above. Will continue to provide support and structure.  [facility] emailed CM for update. Her recommitment was taken under advisement, see discussion above.

## 2017-12-04 NOTE — PROGRESS NOTES
"  This case has been transferred to me today.  I have reviewed the case notes including the legal documents.  I called Gabriela Dallas - [facility] [place] Attorney s Office   - Phone: [phone] to ask for updated order. Gabriela says there was a commitment order on Nov 29. Gabriela is emailing this to me. Gabriela states that the pt has been recommitted.  The order dated November 29, 2017 reads: \" That Respondent's commitment as a person who is mentally ill to [facility] and to the [facility] hospital[facility] Human [facility] is continued in effect until November 29, 2018.\"  I placed a copy for scanning and a copy in the hard chart.  I had been informed that the order would have a change for us to be on the order in place of [facility]. We will need to change this with the . I will also ask the team if they want to add CD to this MI order as long as we are going to ask for a change.  At this point, I would not be able to provisionally discharge this pt under this order.    I called the [facility]  - [email or web address] [email or web address]: [phone] and informed her of the above.  I asked about placement and she reports that RIZWANA - Faye - is still reviewing her case notes.    I called the Supervisor of the [facility] - Suleiman @[phone]. He says that he hasn't had time to review the records but he will this week. He wonders if [facility] has received the information they need about all the denials that the pt has to have as they are a placement of last resort.  "

## 2017-12-04 NOTE — PLAN OF CARE
"Problem: Psychotic Symptoms  Goal: Psychotic Symptoms  Signs and symptoms of listed problems will be absent or manageable.   Patient will verbalize reduction in AH/VH  Patient will verbalize rationale for medication compliance  Patient's speech content will be absent of paranoid/delusional content.  Outcome: Therapy, progress towards functional goals is fair  Mildred has been isolative and withdrawn the entire shift. She also has been sleeping/ napping for a large portion of the shift. Arousable to voice Mildred is at times confused to where she is and needs to be reoriented.The Patient needs prompting with her ADLS and many times doesn't remember what she was asked to do. Patient is ambulating well with her walker. This writer noticed some paper tissues with a small amount of blood on them in Mildred's room. Per Mildred \"Those are from my bloody nose.\" No active bleeding at this time. Patient noted to be wearing the same clothes two days in a roll, this writer has offered to help patient change into scrubs but patient has declined. This writer has also offered to help patient with her HS Cares (Help her wash her face, hands, brush her teeth) and patient once again declined.  Patient ate dinner tonight. Mildred had one episode this evening where she was crying in her room and appeared to be speaking to someone whom wasn't there. At this time Mildred denies SI/SIB/HI/AH/VH.      "

## 2017-12-05 PROCEDURE — 25000132 ZZH RX MED GY IP 250 OP 250 PS 637: Performed by: NURSE PRACTITIONER

## 2017-12-05 PROCEDURE — 25000132 ZZH RX MED GY IP 250 OP 250 PS 637: Performed by: PHYSICIAN ASSISTANT

## 2017-12-05 PROCEDURE — 25000132 ZZH RX MED GY IP 250 OP 250 PS 637: Performed by: PSYCHIATRY & NEUROLOGY

## 2017-12-05 PROCEDURE — 12400007 ZZH R&B MH INTERMEDIATE UMMC

## 2017-12-05 PROCEDURE — 25000132 ZZH RX MED GY IP 250 OP 250 PS 637: Performed by: EMERGENCY MEDICINE

## 2017-12-05 RX ADMIN — GABAPENTIN 200 MG: 100 CAPSULE ORAL at 14:50

## 2017-12-05 RX ADMIN — LACTULOSE 30 G: 10 POWDER, FOR SOLUTION ORAL at 14:50

## 2017-12-05 RX ADMIN — GABAPENTIN 200 MG: 100 CAPSULE ORAL at 10:27

## 2017-12-05 RX ADMIN — LACTULOSE 30 G: 10 POWDER, FOR SOLUTION ORAL at 10:29

## 2017-12-05 RX ADMIN — RISPERIDONE 1 MG: 1 TABLET ORAL at 10:28

## 2017-12-05 RX ADMIN — RIFAXIMIN 550 MG: 550 TABLET ORAL at 10:27

## 2017-12-05 RX ADMIN — HALOPERIDOL 5 MG: 5 TABLET ORAL at 15:07

## 2017-12-05 RX ADMIN — RIFAXIMIN 550 MG: 550 TABLET ORAL at 22:01

## 2017-12-05 RX ADMIN — MULTIPLE VITAMINS W/ MINERALS TAB 1 TABLET: TAB at 10:27

## 2017-12-05 RX ADMIN — LACTULOSE 30 G: 10 POWDER, FOR SOLUTION ORAL at 22:01

## 2017-12-05 RX ADMIN — PANTOPRAZOLE SODIUM 40 MG: 40 TABLET, DELAYED RELEASE ORAL at 10:28

## 2017-12-05 RX ADMIN — FERROUS GLUCONATE 324 MG: 324 TABLET ORAL at 10:27

## 2017-12-05 RX ADMIN — SPIRONOLACTONE 50 MG: 50 TABLET ORAL at 10:28

## 2017-12-05 RX ADMIN — HALOPERIDOL 2.5 MG: 1 TABLET ORAL at 22:00

## 2017-12-05 RX ADMIN — HALOPERIDOL 2.5 MG: 1 TABLET ORAL at 10:28

## 2017-12-05 RX ADMIN — Medication 20 MG: at 10:28

## 2017-12-05 RX ADMIN — GABAPENTIN 200 MG: 100 CAPSULE ORAL at 22:00

## 2017-12-05 RX ADMIN — RISPERIDONE 2 MG: 2 TABLET ORAL at 22:00

## 2017-12-05 RX ADMIN — FOLIC ACID 1 MG: 1 TABLET ORAL at 10:28

## 2017-12-05 ASSESSMENT — ACTIVITIES OF DAILY LIVING (ADL)
LAUNDRY: UNABLE TO COMPLETE
DRESS: INDEPENDENT
GROOMING: INDEPENDENT
ORAL_HYGIENE: PROMPTS
DRESS: STREET CLOTHES;INDEPENDENT
GROOMING: PROMPTS
ORAL_HYGIENE: INDEPENDENT

## 2017-12-05 NOTE — PLAN OF CARE
"Problem: Psychotic Symptoms  Goal: Psychotic Symptoms  Signs and symptoms of listed problems will be absent or manageable.   Patient will verbalize reduction in AH/VH  Patient will verbalize rationale for medication compliance  Patient's speech content will be absent of paranoid/delusional content.   Outcome: No Change  Patient oriented to person, place only. Spent the morning in bed with depressed mood, \"I'm sad about being here still\" and reporting feeling frustrated with extended admission. Affect flat, blunted, labile, irritable, tearful at times. She did not eat breakfast. Did come out to [facility] for lunch with encouragement and ate well. Appears disheveled with unclean clothing and hair. Offered assistance to shower, which patient declined \"I'll do it tonight\". Denies SI or self harm ideation. Did have both AH and VH this afternoon. She was yelling and crying in her room, reaching in the air (as if trying to grab things) and stating \"stop talking to me Dannielle\" and stating that there was a device inserted into her ear that was causing \"dannielle\" and her ex-'s voice to be telling her things. She was given prn haldol and headphones at that time and calmed after 30-40 minutes. She was compliant with all scheduled medications and was urinating frequently. She had 3-4 stools this shift.       " Cartilage Graft Text: The defect edges were debeveled with a #15 scalpel blade.  Given the location of the defect, shape of the defect, the fact the defect involved a full thickness cartilage defect a cartilage graft was deemed most appropriate.  An appropriate donor site was identified, cleansed, and anesthetized. The cartilage graft was then harvested and transferred to the recipient site, oriented appropriately and then sutured into place.  The secondary defect was then repaired using a primary closure.

## 2017-12-05 NOTE — PROGRESS NOTES
12/04/17 5400   Behavioral Health   Hallucinations denies / not responding to hallucinations   Thinking distractable;poor concentration   Orientation person: oriented   Memory baseline memory   Insight poor   Affect blunted, flat   Suicidality other (see comments)  (pt denies)   Self Injury other (see comment)  (pt denies)   Activity isolative  (family did visit)   Activities of Daily Living   Hygiene/Grooming independent   Oral Hygiene independent   Dress independent   Room Organization independent   Behavioral Health Interventions   Psychotic Symptoms maintain safety precautions;maintain safe secure environment   Social and Therapeutic Interventions (Psychotic Symptoms) encourage socialization with peers;encourage effective boundaries with peers;encourage participation in therapeutic groups and milieu activities   pt isolative. Pt did come out for dinner. Pt washed clothes. Pt visited with family. Pt denies SI/ SIB.

## 2017-12-06 PROCEDURE — 99232 SBSQ HOSP IP/OBS MODERATE 35: CPT | Performed by: PSYCHIATRY & NEUROLOGY

## 2017-12-06 PROCEDURE — 25000132 ZZH RX MED GY IP 250 OP 250 PS 637: Performed by: NURSE PRACTITIONER

## 2017-12-06 PROCEDURE — 25000132 ZZH RX MED GY IP 250 OP 250 PS 637: Performed by: PSYCHIATRY & NEUROLOGY

## 2017-12-06 PROCEDURE — 25000132 ZZH RX MED GY IP 250 OP 250 PS 637: Performed by: EMERGENCY MEDICINE

## 2017-12-06 PROCEDURE — 12400007 ZZH R&B MH INTERMEDIATE UMMC

## 2017-12-06 PROCEDURE — 25000132 ZZH RX MED GY IP 250 OP 250 PS 637: Performed by: PHYSICIAN ASSISTANT

## 2017-12-06 RX ORDER — HALOPERIDOL 2 MG/1
2 TABLET ORAL 2 TIMES DAILY
Status: DISCONTINUED | OUTPATIENT
Start: 2017-12-06 | End: 2018-01-02 | Stop reason: HOSPADM

## 2017-12-06 RX ORDER — HALOPERIDOL 0.5 MG/1
0.5 TABLET ORAL 2 TIMES DAILY
Status: DISCONTINUED | OUTPATIENT
Start: 2017-12-06 | End: 2018-01-02 | Stop reason: HOSPADM

## 2017-12-06 RX ADMIN — FOLIC ACID 1 MG: 1 TABLET ORAL at 09:46

## 2017-12-06 RX ADMIN — LACTULOSE 30 G: 10 POWDER, FOR SOLUTION ORAL at 13:31

## 2017-12-06 RX ADMIN — FERROUS GLUCONATE 324 MG: 324 TABLET ORAL at 09:47

## 2017-12-06 RX ADMIN — HALOPERIDOL 2 MG: 0.5 TABLET ORAL at 13:29

## 2017-12-06 RX ADMIN — RISPERIDONE 1 MG: 1 TABLET ORAL at 09:47

## 2017-12-06 RX ADMIN — HALOPERIDOL 0.5 MG: 0.5 TABLET ORAL at 20:51

## 2017-12-06 RX ADMIN — RISPERIDONE 2 MG: 2 TABLET ORAL at 20:51

## 2017-12-06 RX ADMIN — LACTULOSE 30 G: 10 POWDER, FOR SOLUTION ORAL at 20:49

## 2017-12-06 RX ADMIN — GABAPENTIN 200 MG: 100 CAPSULE ORAL at 20:51

## 2017-12-06 RX ADMIN — LACTULOSE 30 G: 10 POWDER, FOR SOLUTION ORAL at 09:46

## 2017-12-06 RX ADMIN — RIFAXIMIN 550 MG: 550 TABLET ORAL at 09:47

## 2017-12-06 RX ADMIN — HALOPERIDOL 2 MG: 0.5 TABLET ORAL at 20:51

## 2017-12-06 RX ADMIN — HALOPERIDOL 0.5 MG: 0.5 TABLET ORAL at 13:30

## 2017-12-06 RX ADMIN — RIFAXIMIN 550 MG: 550 TABLET ORAL at 20:50

## 2017-12-06 RX ADMIN — MULTIPLE VITAMINS W/ MINERALS TAB 1 TABLET: TAB at 09:47

## 2017-12-06 RX ADMIN — GABAPENTIN 200 MG: 100 CAPSULE ORAL at 09:47

## 2017-12-06 RX ADMIN — Medication 20 MG: at 09:47

## 2017-12-06 RX ADMIN — PANTOPRAZOLE SODIUM 40 MG: 40 TABLET, DELAYED RELEASE ORAL at 09:47

## 2017-12-06 RX ADMIN — GABAPENTIN 200 MG: 100 CAPSULE ORAL at 13:31

## 2017-12-06 RX ADMIN — SPIRONOLACTONE 50 MG: 50 TABLET ORAL at 09:47

## 2017-12-06 ASSESSMENT — ACTIVITIES OF DAILY LIVING (ADL)
ORAL_HYGIENE: INDEPENDENT
GROOMING: SHOWER;INDEPENDENT;PROMPTS
DRESS: STREET CLOTHES;INDEPENDENT
GROOMING: INDEPENDENT
LAUNDRY: WITH SUPERVISION
DRESS: INDEPENDENT
ORAL_HYGIENE: INDEPENDENT

## 2017-12-06 NOTE — PROGRESS NOTES
12/05/17 2236   Behavioral Health   Hallucinations appears responding   Thinking delusional;poor concentration   Orientation person: oriented   Memory confabulation   Insight denial of illness;poor   Judgement impaired   Affect blunted, flat;sad   Mood depressed   Suicidality other (see comments)  (pt denies)   Self Injury other (see comment)  (pt denies)   Activity isolative   Activities of Daily Living   Hygiene/Grooming independent   Oral Hygiene independent   Dress independent   Room Organization independent   Behavioral Health Interventions   Psychotic Symptoms maintain safety precautions;maintain safe secure environment   Social and Therapeutic Interventions (Psychotic Symptoms) encourage socialization with peers;encourage effective boundaries with peers;encourage participation in therapeutic groups and milieu activities   pt isolative and was teary in room. Pt stated the  Told her she could leave and she is unsure why she is still here. Pt denies illness - pt denies SI SIB.

## 2017-12-06 NOTE — PROGRESS NOTES
"    [facility], [place]   Psychiatric Progress Note        Interim History:     The patient's care was discussed with the treatment team during the daily team meeting and/or staff's chart notes were reviewed. Patient was seen in her bed. She stated she had no complaints and no side effects. Patient didn't comment on her transfer from Station 20 to [facility] 30. According to staff, she voiced delusional ideas and Auditory hallucinations, but, as usual, was reluctant to acknowledge them.   Per CTC: \"This case has been transferred to me today.  I have reviewed the case notes including the legal documents.  I called Gabriela [place] - [facility] Attorney s Office   - Phone: [phone] to ask for updated order. Gabriela says there was a commitment order on Nov 29. Gabriela is emailing this to me. Gabriela states that the pt has been recommitted.  The order dated November 29, 2017 reads: \" That Respondent's commitment as a person who is mentally ill to [facility] and [facility] is continued in effect until November 29, 2018.\"  I placed a copy for scanning and a copy in the hard chart.  I had been informed that the order would have a change for us to be on the order in place of [facility]. We will need to change this with the . I will also ask the team if they want to add CD to this MI order as long as we are going to ask for a change.  At this point, I would not be able to provisionally discharge this pt under this order.     I called the [facility]  - Shira Mora@Ph: [phone] and informed her of the above.  I asked about placement and she reports that RIZWANA - Faye - is still reviewing her case notes.     I called the Supervisor of the [facility] - Suleiman @[phone]. He says that he hasn't had time to review the records but he will this week. He wonders if [facility] has received the information they need about all the " "denials that the pt has to have as they are a placement of last resort.\"       Medications:       haloperidol  2 mg Oral BID    And     haloperidol  0.5 mg Oral BID     gabapentin  200 mg Oral TID     ferrous gluconate  324 mg Oral Daily with breakfast     lactulose  30 g Oral TID     risperiDONE  1 mg Oral QAM     risperiDONE  2 mg Oral At Bedtime     pantoprazole  40 mg Oral Daily     multivitamin, therapeutic with minerals  1 tablet Oral Daily     folic acid  1 mg Oral Daily     rifaximin  550 mg Oral BID     spironolactone  50 mg Oral Daily     furosemide  20 mg Oral Daily          Allergies:     Allergies   Allergen Reactions     Acetaminophen      Ambien [Zolpidem Tartrate] Other (See Comments)     Sleep walks and eats     Ciprofloxacin Other (See Comments)     Seizure.     Citalopram Nausea and Vomiting          Labs:     No results found for this or any previous visit (from the past 24 hour(s)).       Psychiatric Examination:     Vitals:    12/03/17 2032 12/04/17 1614 12/05/17 0900 12/05/17 1300   BP: 98/57 121/69     BP Location: Left arm      Pulse: 74 75     Resp:   16    Temp:  97.9  F (36.6  C) 98.3  F (36.8  C)    TempSrc:  Oral     SpO2:       Weight:    101.6 kg (224 lb)   Height:                     Lying Orthostatic BP: 85/47         Sitting Orthostatic BP: 106/58         Standing Orthostatic BP:  (Refused)     Appearance: awake, alert, adequately groomed, appeared about stated age and no apparent distress  Attitude: more cooperative and less guarded  Eye Contact: poor  Mood:  \"fine\"  Affect:  intensity is blunted  Speech:  clear, coherent and slow  Psychomotor Behavior:  no evidence of tardive dyskinesia, dystonia, or tics  Throught Process:  goal oriented  Associations:  no loose associations  Thought Content:  no evidence of suicidal ideation or homicidal ideation, auditory hallucinations are present, Visual hallucinations are present off and on, delusions are present.   Insight:  " limited  Judgement:  limited  Oriented to:  time, person, and place  Attention Span and Concentration:  fair  Recent and Remote Memory:  fair         Precautions:     Behavioral Orders   Procedures     Code 2     Elopement precautions     Routine Programming     As clinically indicated     Seizure precautions     Status 15     Every 15 minutes.     Withdrawal precautions          Diagnoses:     Cognitive Disorder NOS.   Encephalopathy probably multifactorial such as chronic hepatic encephalopathy, organic brain damage due to encephalopathy, alcohol drinking, possibly underlying psychotic illness; possibly head injury contributes to patient's symptoms.   Alcohol use disorder.   amphetamine dependence vs abuse.  She meets criteria for Major depressive disorder, moderate severity.          Plan:     No medication changes today.   Legal Status and Disposition:  --  Under full MICD commitment.   -- the patient appears to be at her current baseline, she is on waiting list for [facility]. Referred to several NH and memory care unit, was rejected by number of them.  patient is fully recommitted. See discussion above. Will continue to provide support and structure.  CTC emailed CM for update. Her recommitment was taken under advisement, see discussion above.

## 2017-12-06 NOTE — PROGRESS NOTES
12/06/17 1456   Behavioral Health   Hallucinations denies / not responding to hallucinations   Thinking delusional;paranoid   Orientation person: oriented;place: oriented   Memory confabulation   Insight denial of illness;poor   Judgement impaired   Affect blunted, flat   Physical Appearance/Attire appears stated age;attire appropriate to age and situation   Hygiene well groomed   Suicidality other (see comments)  (denied SI)   Self Injury other (see comment)  (denied SIB urges)   Elopement (no indicators this shift)   Activity isolative;withdrawn   Speech clear;coherent   Psychomotor / Gait balanced;steady  (used a walker)   Sleep/Rest/Relaxation   Day/Evening Time Hours napping;resting in bed   Number of hours napping 4 hours   Number of hours resting in bed 2 hours   Safety   Suicidality Status 15   Psycho Education   Type of Intervention 1:1 intervention   Response participates with encouragement   Hours 0.5   Treatment Detail current MH status   Activities of Daily Living   Hygiene/Grooming shower;independent;prompts   Oral Hygiene independent   Dress street clothes;independent   Room Organization independent   Groups   Details no group involvement   Behavioral Health Interventions   Psychotic Symptoms maintain safety precautions;maintain safe secure environment;simple, clear language;establish therapeutic relationship;assist with developing & utilizing healthy coping strategies;build upon strengths;monitor confusion, memory loss, decision making ability and reorient / intervent as needed   Social and Therapeutic Interventions (Psychotic Symptoms) encourage socialization with peers;encourage participation in therapeutic groups and milieu activities     Patient awake at intervals and in the milieu briefly for meals and medications, etc.but spend the majority of her time in bed resting and sleeping.  She was reluctantly responsive to staff on approach to mental health status inquiries, essentially denying all  MH issues.  She has been cooperative with all unit and personal care protocols, including her medications.  Unlike yesterday, she exhibited no overt psychotic behaviors.   Her overall focus has been on being D/Charlie.   Naveed Dyson   12/06/2017

## 2017-12-07 LAB — AMMONIA PLAS-SCNC: 96 UMOL/L (ref 10–50)

## 2017-12-07 PROCEDURE — 82140 ASSAY OF AMMONIA: CPT | Performed by: PSYCHIATRY & NEUROLOGY

## 2017-12-07 PROCEDURE — 99207 ZZC CDG-MDM COMPONENT: MEETS LOW - DOWN CODED: CPT | Performed by: PSYCHIATRY & NEUROLOGY

## 2017-12-07 PROCEDURE — 99231 SBSQ HOSP IP/OBS SF/LOW 25: CPT | Performed by: PSYCHIATRY & NEUROLOGY

## 2017-12-07 PROCEDURE — 36415 COLL VENOUS BLD VENIPUNCTURE: CPT | Performed by: PSYCHIATRY & NEUROLOGY

## 2017-12-07 PROCEDURE — 12400007 ZZH R&B MH INTERMEDIATE UMMC

## 2017-12-07 PROCEDURE — 25000132 ZZH RX MED GY IP 250 OP 250 PS 637: Performed by: PSYCHIATRY & NEUROLOGY

## 2017-12-07 PROCEDURE — 25000132 ZZH RX MED GY IP 250 OP 250 PS 637: Performed by: EMERGENCY MEDICINE

## 2017-12-07 PROCEDURE — 25000132 ZZH RX MED GY IP 250 OP 250 PS 637: Performed by: PHYSICIAN ASSISTANT

## 2017-12-07 PROCEDURE — 25000132 ZZH RX MED GY IP 250 OP 250 PS 637: Performed by: NURSE PRACTITIONER

## 2017-12-07 RX ADMIN — GABAPENTIN 200 MG: 100 CAPSULE ORAL at 21:54

## 2017-12-07 RX ADMIN — RISPERIDONE 1 MG: 1 TABLET ORAL at 09:14

## 2017-12-07 RX ADMIN — HALOPERIDOL 2 MG: 0.5 TABLET ORAL at 09:14

## 2017-12-07 RX ADMIN — RIFAXIMIN 550 MG: 550 TABLET ORAL at 21:55

## 2017-12-07 RX ADMIN — FOLIC ACID 1 MG: 1 TABLET ORAL at 09:14

## 2017-12-07 RX ADMIN — HALOPERIDOL 0.5 MG: 0.5 TABLET ORAL at 09:18

## 2017-12-07 RX ADMIN — Medication 20 MG: at 09:14

## 2017-12-07 RX ADMIN — SPIRONOLACTONE 50 MG: 50 TABLET ORAL at 09:14

## 2017-12-07 RX ADMIN — FERROUS GLUCONATE 324 MG: 324 TABLET ORAL at 09:13

## 2017-12-07 RX ADMIN — GABAPENTIN 200 MG: 100 CAPSULE ORAL at 14:06

## 2017-12-07 RX ADMIN — RISPERIDONE 2 MG: 2 TABLET ORAL at 21:56

## 2017-12-07 RX ADMIN — PANTOPRAZOLE SODIUM 40 MG: 40 TABLET, DELAYED RELEASE ORAL at 09:14

## 2017-12-07 RX ADMIN — RIFAXIMIN 550 MG: 550 TABLET ORAL at 09:14

## 2017-12-07 RX ADMIN — HALOPERIDOL 0.5 MG: 0.5 TABLET ORAL at 21:55

## 2017-12-07 RX ADMIN — LACTULOSE 30 G: 10 POWDER, FOR SOLUTION ORAL at 09:13

## 2017-12-07 RX ADMIN — MULTIPLE VITAMINS W/ MINERALS TAB 1 TABLET: TAB at 09:13

## 2017-12-07 RX ADMIN — LACTULOSE 30 G: 10 POWDER, FOR SOLUTION ORAL at 21:55

## 2017-12-07 RX ADMIN — HALOPERIDOL 2 MG: 0.5 TABLET ORAL at 21:54

## 2017-12-07 RX ADMIN — GABAPENTIN 200 MG: 100 CAPSULE ORAL at 09:13

## 2017-12-07 RX ADMIN — LACTULOSE 30 G: 10 POWDER, FOR SOLUTION ORAL at 14:06

## 2017-12-07 ASSESSMENT — ACTIVITIES OF DAILY LIVING (ADL)
GROOMING: INDEPENDENT
ORAL_HYGIENE: INDEPENDENT
DRESS: INDEPENDENT
LAUNDRY: UNABLE TO COMPLETE
GROOMING: INDEPENDENT
ORAL_HYGIENE: INDEPENDENT
DRESS: INDEPENDENT

## 2017-12-07 NOTE — PROGRESS NOTES
12/06/17 2200   Behavioral Health   Hallucinations denies / not responding to hallucinations   Thinking delusional;paranoid;poor concentration   Orientation person: oriented   Memory confabulation   Insight denial of illness;poor   Judgement impaired   Eye Contact at examiner   Affect blunted, flat;sad;angry   Mood depressed   Physical Appearance/Attire attire appropriate to age and situation   Hygiene neglected grooming - unclean body, hair, teeth   Suicidality other (see comments)  (denies)   1. Wish to be Dead No   2. Non-Specific Active Suicidal Thoughts  No   3. Active Sucidal Ideation with any Methods (Not Plan) Without Intent to Act  No   4. Active Suicidal Ideation with Some Intent to Act, Without Specific Plan  No   5. Active Suicidal Ideation with Specific Plan and Intent  No   Change in Protective Factors? No   Enviromental Risk Factors None   Self Injury other (see comment)  (denies)   Elopement (n/a)   Activity isolative;withdrawn   Speech clear;coherent   Medication Sensitivity no stated side effects;no observed side effects   Psychomotor / Gait balanced;steady   Activities of Daily Living   Hygiene/Grooming independent   Oral Hygiene independent   Dress independent   Laundry with supervision   Room Organization independent     Pt was delusional this evening and told staff that she and her  own this hospital and if she is not let out now she is going to elmer everyone.

## 2017-12-07 NOTE — PROGRESS NOTES
12/07/17 1317   Cognitive/Neuro/Behavioral WDL   Level Of Consciousness confused   Behavioral Health   Hallucinations appears responding   Thinking delusional;confused;paranoid   Orientation person: oriented   Memory confabulation   Insight denial of illness;poor   Judgement impaired   Eye Contact at examiner;staring   Affect sad;tense;irritable;angry   Mood irritable   Physical Appearance/Attire attire appropriate to age and situation   Hygiene neglected grooming - unclean body, hair, teeth   Suicidality other (see comments)  (JOSE)   Self Injury other (see comment)  (JOSE)   Elopement Statements about wanting to leave;Loitering near exit doors   Activity isolative;withdrawn;restless;hyperactive (agitated, impulsive)   Speech rambling   Medication Sensitivity no stated side effects   Psychomotor / Gait slow  (using a walker)   Psycho Education   Type of Intervention 1:1 intervention   Response refuses   Hours 0.5   Treatment Detail community meeting   Activities of Daily Living   Hygiene/Grooming independent   Oral Hygiene independent   Dress independent   Laundry unable to complete   Room Organization independent   Behavioral Health Interventions   Psychotic Symptoms maintain safety precautions;monitor need to revise level of observation;maintain safe secure environment;reality orientation;simple, clear language;decrease environmental stimulation;redirection of intrusive behaviors;redirection of aggressive behaviors;assist patient in developing safety plan;assist patient in following safety plan;encourage participation / independence with adls;provide emotional support;build upon strengths;assist with developing & utilizing healthy coping strategies;monitor confusion, memory loss, decision making ability and reorient / intervent as needed   Social and Therapeutic Interventions (Psychotic Symptoms) encourage socialization with peers;encourage effective boundaries with peers;encourage participation in therapeutic  "groups and milieu activities   Patient appears confused, appears having hallucinations. She thinks she is going home today, and her  is coming to pick her today. Patient needs redirections. Patient got agitated when staff asked her to not keep standing near exit doors, and shouted, 'You all can call code on me, go ahead\".  Patient SI/SIB status unable to assess. Not cooperative with normal communication.  "

## 2017-12-07 NOTE — PROGRESS NOTES
[facility], [place]   Psychiatric Progress Note        Interim History:     The patient's care was discussed with the treatment team during the daily team meeting and/or staff's chart notes were reviewed. Patient was seen in her bed. She stated she had no complaints and no side effects.According to staff, she voiced delusional ideas and Auditory hallucinations, but, as usual, was reluctant to acknowledge them. She again talked to an imaginary woman and stated that she and her  owned this building.          Medications:       haloperidol  2 mg Oral BID    And     haloperidol  0.5 mg Oral BID     gabapentin  200 mg Oral TID     ferrous gluconate  324 mg Oral Daily with breakfast     lactulose  30 g Oral TID     risperiDONE  1 mg Oral QAM     risperiDONE  2 mg Oral At Bedtime     pantoprazole  40 mg Oral Daily     multivitamin, therapeutic with minerals  1 tablet Oral Daily     folic acid  1 mg Oral Daily     rifaximin  550 mg Oral BID     spironolactone  50 mg Oral Daily     furosemide  20 mg Oral Daily          Allergies:     Allergies   Allergen Reactions     Acetaminophen      Ambien [Zolpidem Tartrate] Other (See Comments)     Sleep walks and eats     Ciprofloxacin Other (See Comments)     Seizure.     Citalopram Nausea and Vomiting          Labs:     Recent Results (from the past 24 hour(s))   Ammonia    Collection Time: 12/07/17  7:20 AM   Result Value Ref Range    Ammonia 96 (H) 10 - 50 umol/L          Psychiatric Examination:     Vitals:    12/04/17 1614 12/05/17 0900 12/05/17 1300 12/07/17 1200   BP: 121/69   119/78   BP Location:       Pulse: 75   89   Resp:  16  15   Temp: 97.9  F (36.6  C) 98.3  F (36.8  C)  97.3  F (36.3  C)   TempSrc: Oral   Oral   SpO2:    94%   Weight:   101.6 kg (224 lb)    Height:                       Lying Orthostatic BP: 85/47         Sitting Orthostatic BP: 106/58         Standing Orthostatic BP: 112/71     Appearance: awake, but somnolent  "adequately groomed, appeared about stated age and no apparent distress  Attitude: more cooperative and less guarded  Eye Contact: poor  Mood:  \"fine\"  Affect:  intensity is blunted  Speech:  clear, coherent and slow  Psychomotor Behavior:  no evidence of tardive dyskinesia, dystonia, or tics  Throught Process:  goal oriented  Associations:  no loose associations  Thought Content:  no evidence of suicidal ideation or homicidal ideation, auditory hallucinations are present, Visual hallucinations are present off and on, delusions are present.   Insight:  limited  Judgement:  limited  Oriented to:  time, person, and place  Attention Span and Concentration:  fair  Recent and Remote Memory:  fair         Precautions:     Behavioral Orders   Procedures     Code 2     Elopement precautions     Routine Programming     As clinically indicated     Seizure precautions     Status 15     Every 15 minutes.     Withdrawal precautions          Diagnoses:     Cognitive Disorder NOS.   Encephalopathy probably multifactorial such as chronic hepatic encephalopathy, organic brain damage due to encephalopathy, alcohol drinking, possibly underlying psychotic illness; possibly head injury contributes to patient's symptoms.   Alcohol use disorder.   amphetamine dependence vs abuse.  She meets criteria for Major depressive disorder, moderate severity.          Plan:     No medication changes today.   Legal Status and Disposition:  --  Under full MICD commitment.   -- the patient appears to be at her current baseline, she is on waiting list for [facility]. Referred to several NH and memory care unit, was rejected by number of them.  patient is fully recommitted. See discussion above. Will continue to provide support and structure. We are trying to get [facility] into [facility]. Ammonia levels have minimally changed since October from 94 to 96.                 "

## 2017-12-08 PROCEDURE — 25000132 ZZH RX MED GY IP 250 OP 250 PS 637: Performed by: EMERGENCY MEDICINE

## 2017-12-08 PROCEDURE — 12400007 ZZH R&B MH INTERMEDIATE UMMC

## 2017-12-08 PROCEDURE — 25000132 ZZH RX MED GY IP 250 OP 250 PS 637: Performed by: PSYCHIATRY & NEUROLOGY

## 2017-12-08 PROCEDURE — 25000132 ZZH RX MED GY IP 250 OP 250 PS 637: Performed by: PHYSICIAN ASSISTANT

## 2017-12-08 PROCEDURE — 99232 SBSQ HOSP IP/OBS MODERATE 35: CPT | Performed by: PSYCHIATRY & NEUROLOGY

## 2017-12-08 PROCEDURE — 25000132 ZZH RX MED GY IP 250 OP 250 PS 637: Performed by: NURSE PRACTITIONER

## 2017-12-08 RX ADMIN — LACTULOSE 30 G: 10 POWDER, FOR SOLUTION ORAL at 22:11

## 2017-12-08 RX ADMIN — GABAPENTIN 200 MG: 100 CAPSULE ORAL at 22:10

## 2017-12-08 RX ADMIN — LACTULOSE 30 G: 10 POWDER, FOR SOLUTION ORAL at 14:27

## 2017-12-08 RX ADMIN — FOLIC ACID 1 MG: 1 TABLET ORAL at 09:24

## 2017-12-08 RX ADMIN — GABAPENTIN 200 MG: 100 CAPSULE ORAL at 14:27

## 2017-12-08 RX ADMIN — GABAPENTIN 200 MG: 100 CAPSULE ORAL at 09:24

## 2017-12-08 RX ADMIN — LACTULOSE 30 G: 10 POWDER, FOR SOLUTION ORAL at 09:23

## 2017-12-08 RX ADMIN — RISPERIDONE 2 MG: 2 TABLET ORAL at 22:09

## 2017-12-08 RX ADMIN — HALOPERIDOL 0.5 MG: 0.5 TABLET ORAL at 22:10

## 2017-12-08 RX ADMIN — FERROUS GLUCONATE 324 MG: 324 TABLET ORAL at 09:26

## 2017-12-08 RX ADMIN — MULTIPLE VITAMINS W/ MINERALS TAB 1 TABLET: TAB at 09:23

## 2017-12-08 RX ADMIN — RISPERIDONE 1 MG: 1 TABLET ORAL at 09:24

## 2017-12-08 RX ADMIN — RIFAXIMIN 550 MG: 550 TABLET ORAL at 22:10

## 2017-12-08 RX ADMIN — Medication 20 MG: at 09:24

## 2017-12-08 RX ADMIN — HALOPERIDOL 0.5 MG: 0.5 TABLET ORAL at 09:26

## 2017-12-08 RX ADMIN — RIFAXIMIN 550 MG: 550 TABLET ORAL at 09:23

## 2017-12-08 RX ADMIN — HALOPERIDOL 2 MG: 0.5 TABLET ORAL at 22:09

## 2017-12-08 RX ADMIN — HALOPERIDOL 2 MG: 0.5 TABLET ORAL at 09:25

## 2017-12-08 RX ADMIN — SPIRONOLACTONE 50 MG: 50 TABLET ORAL at 09:26

## 2017-12-08 RX ADMIN — PANTOPRAZOLE SODIUM 40 MG: 40 TABLET, DELAYED RELEASE ORAL at 09:26

## 2017-12-08 ASSESSMENT — ACTIVITIES OF DAILY LIVING (ADL)
LAUNDRY: WITH SUPERVISION
LAUNDRY: UNABLE TO COMPLETE
ORAL_HYGIENE: INDEPENDENT
DRESS: INDEPENDENT
GROOMING: SHOWER;INDEPENDENT;HANDWASHING
GROOMING: INDEPENDENT
DRESS: STREET CLOTHES;INDEPENDENT;SCRUBS (BEHAVIORAL HEALTH)
ORAL_HYGIENE: INDEPENDENT

## 2017-12-08 NOTE — PLAN OF CARE
Problem: Psychotic Symptoms  Goal: Psychotic Symptoms  Signs and symptoms of listed problems will be absent or manageable.   Patient will verbalize reduction in AH/VH  Patient will verbalize rationale for medication compliance  Patient's speech content will be absent of paranoid/delusional content.   Outcome: Therapy, progress toward functional goals is gradual  Pt in room almost entire shift.  Pt came out only for lunch, then immediately returned to bed. Pt reports having 1   BM. Pt declined all ADLS. Pt refused am medications x3 this morning eventually accepting medications @ 1030. Pt denies any suicidal thoughts or auditory hallucinations. Pt alert and oriented x3  Pt made no delusional statements during our check in. Pt reports feeling tired all day. Refused all groups.

## 2017-12-08 NOTE — PROGRESS NOTES
"    [facility], [place]   Psychiatric Progress Note        Interim History:     The patient's care was discussed with the treatment team during the daily team meeting and/or staff's chart notes were reviewed. The patient is a 42 year old  female with longstanding history of polysubstance dependence and multifactorial encephalopathy with delusions and hallucinations as well as depressive disorder.  she still presents with delusions and hallucinations and those, unfortunately, are unlikely to improve. She appears to be at her present baseline. Patient may need long term placement into institution.     Patient was seen in her bed. She stated she had no complaints and no side effects. Described knees pain as tolerable. According to staff, she voiced delusional ideas and Auditory hallucinations, but, as usual, was reluctant to acknowledge them. She frequently asks the same questions as if to: \"test the davila\" or to make her point. She again asked me if she could be discharged home. She didn't respond when I asked her why she requested to be discharged home if she knew very well about plan to discharge her to [facility] . Patient had visitors yesterday, one was her sister who is very involved with the patient's care.   Per Psych associate: Patient appears confused, appears having hallucinations. She thinks she is going home today, and her  is coming to pick her today. Patient needs redirections. Patient got agitated when staff asked her to not keep standing near exit doors, and shouted, 'You all can call code on me, go ahead\".\"           Medications:       haloperidol  2 mg Oral BID    And     haloperidol  0.5 mg Oral BID     gabapentin  200 mg Oral TID     ferrous gluconate  324 mg Oral Daily with breakfast     lactulose  30 g Oral TID     risperiDONE  1 mg Oral QAM     risperiDONE  2 mg Oral At Bedtime     pantoprazole  40 mg Oral Daily     multivitamin, therapeutic with " "minerals  1 tablet Oral Daily     folic acid  1 mg Oral Daily     rifaximin  550 mg Oral BID     spironolactone  50 mg Oral Daily     furosemide  20 mg Oral Daily          Allergies:     Allergies   Allergen Reactions     Acetaminophen      Ambien [Zolpidem Tartrate] Other (See Comments)     Sleep walks and eats     Ciprofloxacin Other (See Comments)     Seizure.     Citalopram Nausea and Vomiting          Labs:     No results found for this or any previous visit (from the past 24 hour(s)).       Psychiatric Examination:     Vitals:    12/05/17 0900 12/05/17 1300 12/07/17 1200 12/08/17 1208   BP:   119/78 113/66   BP Location:       Pulse:   89 69   Resp: 16  15    Temp: 98.3  F (36.8  C)  97.3  F (36.3  C) 96.5  F (35.8  C)   TempSrc:   Oral Oral   SpO2:   94%    Weight:  101.6 kg (224 lb)     Height:                       Lying Orthostatic BP: 85/47         Sitting Orthostatic BP: 106/58         Standing Orthostatic BP: 112/71     Appearance: awake, but somnolent adequately groomed, appeared about stated age and no apparent distress  Attitude: more cooperative and less guarded  Eye Contact: poor  Mood:  \"fine\"  Affect:  intensity is blunted  Speech:  clear, coherent and slow  Psychomotor Behavior:  no evidence of tardive dyskinesia, dystonia, or tics  Throught Process:  goal oriented  Associations:  no loose associations  Thought Content:  no evidence of suicidal ideation or homicidal ideation, auditory hallucinations are present, Visual hallucinations are present off and on, delusions are present.   Insight:  limited  Judgement:  limited  Oriented to:  time, person, and place  Attention Span and Concentration:  fair  Recent and Remote Memory:  fair         Precautions:     Behavioral Orders   Procedures     Code 2     Elopement precautions     Routine Programming     As clinically indicated     Seizure precautions     Status 15     Every 15 minutes.          Diagnoses:     Cognitive Disorder NOS.   Encephalopathy " probably multifactorial such as chronic hepatic encephalopathy, organic brain damage due to encephalopathy, alcohol drinking, possibly underlying psychotic illness; possibly head injury contributes to patient's symptoms.   Alcohol use disorder.   amphetamine dependence vs abuse.  She meets criteria for Major depressive disorder, moderate severity.          Plan:     No medication changes today.   Legal Status and Disposition:  --  Under full MICD commitment.   -- the patient appears to be at her current baseline, she is on waiting list for [facility]. Referred to several NH and memory care unit, was rejected by number of them.  patient is fully recommitted. See discussion above. Will continue to provide support and structure. We are [facility] to get [facility]. Ammonia levels have minimally changed since October from 94 to 96. [facility] will talk to the patient's sister if she wants to become patient's legal guardian.   Will transfer patient's care to Jeffrey Paz MD.

## 2017-12-08 NOTE — PLAN OF CARE
Problem: General Plan of Care (Inpatient Behavioral)  Goal: Individualization/Patient Specific Goal (IP Behavioral)  PSYCHOSIS CAREPLAN GOALS    1) Patient will be oriented x4  2) Patient will exhibit organized thought and speech  3) Patient will be absent of auditory and visual hallucinations  4) Verbalize reduction of fear or anxiety to a manageable level.  5) Patient will report improved sleep and feeling rested upon waking.  6) Patient will verbalize their current medication; including dosage and time it is due.  7) Demonstrate participation in unit programming  8) Patient will demonstrate daily independence with ADL s.   9) Patient will verbalize persons outside the hospital they can call if needing assistance  10) Patient will have adequate daily oral intake.    Outcome: Therapy, progress toward functional goals is gradual  Illness Management Recovery model:  Taking Action.    Patient identified the following actions they can take when early warning signs are present in order to prevent relapse:    1.talking with family  2. Asking for help  3.

## 2017-12-08 NOTE — PROGRESS NOTES
12/07/17 2224   Behavioral Health   Hallucinations appears responding   Thinking distractable;poor concentration   Orientation person: oriented   Memory confabulation   Insight poor   Judgement impaired   Affect blunted, flat;sad   Mood depressed   Suicidality other (see comments)  (pt denies)   Self Injury other (see comment)  (pt denies)   Activity isolative;other (see comment)  (family visited)   Activities of Daily Living   Hygiene/Grooming independent   Oral Hygiene independent   Dress independent   Room Organization independent   Behavioral Health Interventions   Psychotic Symptoms maintain safety precautions;maintain safe secure environment   Social and Therapeutic Interventions (Psychotic Symptoms) encourage socialization with peers;encourage effective boundaries with peers;encourage participation in therapeutic groups and milieu activities   pt isolative. Pt denies SI /SIB. PT states she is ready to go home. Family visited.

## 2017-12-09 PROCEDURE — 25000132 ZZH RX MED GY IP 250 OP 250 PS 637: Performed by: PHYSICIAN ASSISTANT

## 2017-12-09 PROCEDURE — 25000132 ZZH RX MED GY IP 250 OP 250 PS 637: Performed by: NURSE PRACTITIONER

## 2017-12-09 PROCEDURE — 12400007 ZZH R&B MH INTERMEDIATE UMMC

## 2017-12-09 PROCEDURE — 25000132 ZZH RX MED GY IP 250 OP 250 PS 637: Performed by: EMERGENCY MEDICINE

## 2017-12-09 PROCEDURE — 25000132 ZZH RX MED GY IP 250 OP 250 PS 637: Performed by: PSYCHIATRY & NEUROLOGY

## 2017-12-09 RX ADMIN — HALOPERIDOL 2 MG: 0.5 TABLET ORAL at 10:50

## 2017-12-09 RX ADMIN — LACTULOSE 30 G: 10 POWDER, FOR SOLUTION ORAL at 13:29

## 2017-12-09 RX ADMIN — RISPERIDONE 1 MG: 1 TABLET ORAL at 10:49

## 2017-12-09 RX ADMIN — SPIRONOLACTONE 50 MG: 50 TABLET ORAL at 10:51

## 2017-12-09 RX ADMIN — RISPERIDONE 2 MG: 2 TABLET ORAL at 21:00

## 2017-12-09 RX ADMIN — RIFAXIMIN 550 MG: 550 TABLET ORAL at 20:59

## 2017-12-09 RX ADMIN — HALOPERIDOL 0.5 MG: 0.5 TABLET ORAL at 10:48

## 2017-12-09 RX ADMIN — FERROUS GLUCONATE 324 MG: 324 TABLET ORAL at 10:49

## 2017-12-09 RX ADMIN — LACTULOSE 30 G: 10 POWDER, FOR SOLUTION ORAL at 21:01

## 2017-12-09 RX ADMIN — GABAPENTIN 200 MG: 100 CAPSULE ORAL at 13:29

## 2017-12-09 RX ADMIN — GABAPENTIN 200 MG: 100 CAPSULE ORAL at 10:48

## 2017-12-09 RX ADMIN — HALOPERIDOL 0.5 MG: 0.5 TABLET ORAL at 21:00

## 2017-12-09 RX ADMIN — FOLIC ACID 1 MG: 1 TABLET ORAL at 10:49

## 2017-12-09 RX ADMIN — RIFAXIMIN 550 MG: 550 TABLET ORAL at 10:49

## 2017-12-09 RX ADMIN — Medication 20 MG: at 10:49

## 2017-12-09 RX ADMIN — PANTOPRAZOLE SODIUM 40 MG: 40 TABLET, DELAYED RELEASE ORAL at 10:49

## 2017-12-09 RX ADMIN — HALOPERIDOL 2 MG: 0.5 TABLET ORAL at 20:59

## 2017-12-09 RX ADMIN — MULTIPLE VITAMINS W/ MINERALS TAB 1 TABLET: TAB at 10:49

## 2017-12-09 RX ADMIN — GABAPENTIN 200 MG: 100 CAPSULE ORAL at 20:59

## 2017-12-09 ASSESSMENT — ACTIVITIES OF DAILY LIVING (ADL)
DRESS: INDEPENDENT
GROOMING: INDEPENDENT
ORAL_HYGIENE: INDEPENDENT
DRESS: INDEPENDENT
ORAL_HYGIENE: INDEPENDENT
GROOMING: INDEPENDENT

## 2017-12-09 NOTE — PROGRESS NOTES
12/09/17 1409   Behavioral Health   Hallucinations appears responding   Thinking distractable;poor concentration   Orientation person: oriented   Memory confabulation   Insight denial of illness;poor   Judgement impaired   Affect blunted, flat   Suicidality other (see comments)  (pt denies)   Self Injury other (see comment)  (pt denies)   Activity other (see comment)  (isolative/ visited with family)   Activities of Daily Living   Hygiene/Grooming independent   Oral Hygiene independent   Dress independent   Room Organization independent   Behavioral Health Interventions   Psychotic Symptoms maintain safety precautions;maintain safe secure environment   Social and Therapeutic Interventions (Psychotic Symptoms) encourage socialization with peers;encourage effective boundaries with peers;encourage participation in therapeutic groups and milieu activities   pt was isolative pt visited with family. Pt reports it is time for her to go home. Pt showered.
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
## 2017-12-10 PROCEDURE — 25000132 ZZH RX MED GY IP 250 OP 250 PS 637: Performed by: NURSE PRACTITIONER

## 2017-12-10 PROCEDURE — 25000132 ZZH RX MED GY IP 250 OP 250 PS 637: Performed by: EMERGENCY MEDICINE

## 2017-12-10 PROCEDURE — 25000132 ZZH RX MED GY IP 250 OP 250 PS 637: Performed by: PHYSICIAN ASSISTANT

## 2017-12-10 PROCEDURE — 12400007 ZZH R&B MH INTERMEDIATE UMMC

## 2017-12-10 PROCEDURE — 25000132 ZZH RX MED GY IP 250 OP 250 PS 637: Performed by: PSYCHIATRY & NEUROLOGY

## 2017-12-10 RX ADMIN — HALOPERIDOL 2 MG: 0.5 TABLET ORAL at 21:09

## 2017-12-10 RX ADMIN — RISPERIDONE 1 MG: 1 TABLET ORAL at 11:44

## 2017-12-10 RX ADMIN — FERROUS GLUCONATE 324 MG: 324 TABLET ORAL at 11:44

## 2017-12-10 RX ADMIN — RISPERIDONE 2 MG: 2 TABLET ORAL at 21:08

## 2017-12-10 RX ADMIN — GABAPENTIN 200 MG: 100 CAPSULE ORAL at 14:14

## 2017-12-10 RX ADMIN — HALOPERIDOL 0.5 MG: 0.5 TABLET ORAL at 21:08

## 2017-12-10 RX ADMIN — LACTULOSE 30 G: 10 POWDER, FOR SOLUTION ORAL at 14:14

## 2017-12-10 RX ADMIN — PANTOPRAZOLE SODIUM 40 MG: 40 TABLET, DELAYED RELEASE ORAL at 11:44

## 2017-12-10 RX ADMIN — LACTULOSE 30 G: 10 POWDER, FOR SOLUTION ORAL at 21:09

## 2017-12-10 RX ADMIN — GABAPENTIN 200 MG: 100 CAPSULE ORAL at 21:07

## 2017-12-10 RX ADMIN — HALOPERIDOL 0.5 MG: 0.5 TABLET ORAL at 11:46

## 2017-12-10 RX ADMIN — SPIRONOLACTONE 50 MG: 50 TABLET ORAL at 11:43

## 2017-12-10 RX ADMIN — MULTIPLE VITAMINS W/ MINERALS TAB 1 TABLET: TAB at 11:45

## 2017-12-10 RX ADMIN — GABAPENTIN 200 MG: 100 CAPSULE ORAL at 11:43

## 2017-12-10 RX ADMIN — RIFAXIMIN 550 MG: 550 TABLET ORAL at 11:44

## 2017-12-10 RX ADMIN — HALOPERIDOL 2 MG: 0.5 TABLET ORAL at 11:43

## 2017-12-10 RX ADMIN — LACTULOSE 30 G: 10 POWDER, FOR SOLUTION ORAL at 11:45

## 2017-12-10 RX ADMIN — Medication 20 MG: at 11:43

## 2017-12-10 RX ADMIN — RIFAXIMIN 550 MG: 550 TABLET ORAL at 21:08

## 2017-12-10 RX ADMIN — FOLIC ACID 1 MG: 1 TABLET ORAL at 11:43

## 2017-12-10 ASSESSMENT — ACTIVITIES OF DAILY LIVING (ADL)
GROOMING: INDEPENDENT
GROOMING: INDEPENDENT
DRESS: INDEPENDENT
DRESS: INDEPENDENT
ORAL_HYGIENE: INDEPENDENT
LAUNDRY: WITH SUPERVISION

## 2017-12-10 NOTE — PROGRESS NOTES
12/10/17 1351   Behavioral Health   Hallucinations denies / not responding to hallucinations   Thinking other (see comment)  (slightly more thought clarity noted today)   Orientation person: oriented;place: oriented   Memory other (see comment)  (JOSE)   Insight other (see comment)  (limited)   Judgement impaired   Affect blunted, flat   Mood mood is calm   Physical Appearance/Attire disheveled;appears stated age;attire appropriate to age and situation   Hygiene other (see comment)  (adequate)   Suicidality other (see comments)  (denied SI)   Self Injury other (see comment)   Elopement (no indicators this shift)   Activity isolative;withdrawn;other (see comment)  (in room all shift)   Speech clear;coherent   Psychomotor / Gait (not seen out of bed this shift)   Sleep/Rest/Relaxation   Day/Evening Time Hours napping;resting in bed   Safety   Suicidality Status 15   Psycho Education   Type of Intervention 1:1 intervention   Response participates with encouragement   Hours 0.5   Treatment Detail current MH status   Activities of Daily Living   Hygiene/Grooming independent   Dress independent   Room Organization independent   Groups   Details no group involvement     Patient remained in her room all shift, most of that time in her bed.  Needed prompting for meals, medications, etc.but was cooperative.  Generally flat on approach with little to no intonation in her voice and a vacant look in her eyes.  She answered staff inquiries regarding her MH status without latency or guardedness.  She continued to deny/minimize all significant psychopathology including SI, SIB urges, AH's, etc.  She made no delusional references in our conversation today and gave no behavioral indicators of same either.  Naveed Dyson   12/10/2017

## 2017-12-10 NOTE — PROGRESS NOTES
12/09/17 9563   Behavioral Health   Hallucinations auditory;appears responding   Thinking distractable;delusional;paranoid;poor concentration   Orientation person: oriented   Memory confabulation   Insight poor   Judgement impaired   Affect blunted, flat   Suicidality other (see comments)  (pt denies)   Self Injury other (see comment)  (pt denies)   Activity isolative   Activities of Daily Living   Hygiene/Grooming independent   Oral Hygiene independent   Dress independent   Room Organization independent   Behavioral Health Interventions   Psychotic Symptoms maintain safety precautions;maintain safe secure environment   Social and Therapeutic Interventions (Psychotic Symptoms) encourage socialization with peers;encourage effective boundaries with peers;encourage participation in therapeutic groups and milieu activities   pt crying out - stating they are putting bugs on my face / pt reassured they were not. Pt was isolative but came out for dinner.

## 2017-12-10 NOTE — PLAN OF CARE
Problem: Psychotic Symptoms  Intervention: Social and Therapeutic Interv (Psychotic Symptoms)    Pt did not come to OT group today.

## 2017-12-11 PROCEDURE — 25000132 ZZH RX MED GY IP 250 OP 250 PS 637: Performed by: NURSE PRACTITIONER

## 2017-12-11 PROCEDURE — 25000132 ZZH RX MED GY IP 250 OP 250 PS 637: Performed by: EMERGENCY MEDICINE

## 2017-12-11 PROCEDURE — 99231 SBSQ HOSP IP/OBS SF/LOW 25: CPT | Performed by: PSYCHIATRY & NEUROLOGY

## 2017-12-11 PROCEDURE — 12400007 ZZH R&B MH INTERMEDIATE UMMC

## 2017-12-11 PROCEDURE — 25000132 ZZH RX MED GY IP 250 OP 250 PS 637: Performed by: PHYSICIAN ASSISTANT

## 2017-12-11 PROCEDURE — 25000132 ZZH RX MED GY IP 250 OP 250 PS 637: Performed by: PSYCHIATRY & NEUROLOGY

## 2017-12-11 RX ADMIN — MULTIPLE VITAMINS W/ MINERALS TAB 1 TABLET: TAB at 11:38

## 2017-12-11 RX ADMIN — RISPERIDONE 2 MG: 2 TABLET ORAL at 20:21

## 2017-12-11 RX ADMIN — RISPERIDONE 1 MG: 1 TABLET ORAL at 11:37

## 2017-12-11 RX ADMIN — LACTULOSE 30 G: 10 POWDER, FOR SOLUTION ORAL at 20:22

## 2017-12-11 RX ADMIN — RIFAXIMIN 550 MG: 550 TABLET ORAL at 11:37

## 2017-12-11 RX ADMIN — HALOPERIDOL 0.5 MG: 0.5 TABLET ORAL at 20:22

## 2017-12-11 RX ADMIN — Medication 20 MG: at 11:37

## 2017-12-11 RX ADMIN — HALOPERIDOL 2 MG: 0.5 TABLET ORAL at 11:38

## 2017-12-11 RX ADMIN — FOLIC ACID 1 MG: 1 TABLET ORAL at 11:38

## 2017-12-11 RX ADMIN — GABAPENTIN 200 MG: 100 CAPSULE ORAL at 20:21

## 2017-12-11 RX ADMIN — LACTULOSE 30 G: 10 POWDER, FOR SOLUTION ORAL at 11:36

## 2017-12-11 RX ADMIN — RIFAXIMIN 550 MG: 550 TABLET ORAL at 20:21

## 2017-12-11 RX ADMIN — SPIRONOLACTONE 50 MG: 50 TABLET ORAL at 11:37

## 2017-12-11 RX ADMIN — FERROUS GLUCONATE 324 MG: 324 TABLET ORAL at 11:38

## 2017-12-11 RX ADMIN — HALOPERIDOL 0.5 MG: 0.5 TABLET ORAL at 11:37

## 2017-12-11 RX ADMIN — GABAPENTIN 200 MG: 100 CAPSULE ORAL at 14:54

## 2017-12-11 RX ADMIN — GABAPENTIN 200 MG: 100 CAPSULE ORAL at 11:36

## 2017-12-11 RX ADMIN — HALOPERIDOL 2 MG: 0.5 TABLET ORAL at 20:22

## 2017-12-11 RX ADMIN — PANTOPRAZOLE SODIUM 40 MG: 40 TABLET, DELAYED RELEASE ORAL at 11:37

## 2017-12-11 RX ADMIN — LACTULOSE 30 G: 10 POWDER, FOR SOLUTION ORAL at 14:54

## 2017-12-11 ASSESSMENT — ACTIVITIES OF DAILY LIVING (ADL)
DRESS: INDEPENDENT
ORAL_HYGIENE: INDEPENDENT
GROOMING: INDEPENDENT

## 2017-12-11 NOTE — PLAN OF CARE
Problem: Psychotic Symptoms  Goal: Psychotic Symptoms  Signs and symptoms of listed problems will be absent or manageable.   Patient will verbalize reduction in AH/VH  Patient will verbalize rationale for medication compliance  Patient's speech content will be absent of paranoid/delusional content.   Outcome: Improving  48 Hour Assessment: Patient is more engaging and came out of her room few times. Her affect is much brighter, hygiene is more improved. She reports having good day today. She had a visit from family. Patient still remains isolative and withdrawn, she denies depression and anxiety. Denies SI/SIB, hallucinations and paranoid thoughts. Her insight is improving. Patient reports improvement with medications. She denies medication side effects. Patient is able to follow direction from staff. She has not required restraints in the last 48 hours. Patient encouraged to verbalized and express feelings.

## 2017-12-11 NOTE — PLAN OF CARE
Problem: Psychotic Symptoms  Goal: Psychotic Symptoms  Signs and symptoms of listed problems will be absent or manageable.   Patient will verbalize reduction in AH/VH  Patient will verbalize rationale for medication compliance  Patient's speech content will be absent of paranoid/delusional content.   Outcome: Improving  Patient is oriented x3. Did not eat breakfast but did come out of room for lunch. Patient has been complaint with her medication. Walking in [place]aba periodically with her walker, and has remained stable with her walker. Patient says that she is happy about Friday meeting with her  and sister to discuss discharge plan. Patient is sleeping periodically during the day shift. Displays neutral to flat affect. Does respond positively to humor.

## 2017-12-11 NOTE — PROGRESS NOTES
I called sister - Kristofer  -[phone] - and we conferenced in via phone the [facility] .   We agreed on a Care Conference for Friday at 1PM.    The [facility]  was here at the hospital on another matter and came to the unit to discuss this case. She is quite frustrated with the [facility] situation and this has gone up the chain to management at [facility].    I went into the pt's room and spoke with her. She is in agreement with the care conference.

## 2017-12-12 PROCEDURE — 25000132 ZZH RX MED GY IP 250 OP 250 PS 637: Performed by: PSYCHIATRY & NEUROLOGY

## 2017-12-12 PROCEDURE — 25000132 ZZH RX MED GY IP 250 OP 250 PS 637: Performed by: EMERGENCY MEDICINE

## 2017-12-12 PROCEDURE — 12400007 ZZH R&B MH INTERMEDIATE UMMC

## 2017-12-12 PROCEDURE — 99231 SBSQ HOSP IP/OBS SF/LOW 25: CPT | Performed by: PSYCHIATRY & NEUROLOGY

## 2017-12-12 PROCEDURE — 25000132 ZZH RX MED GY IP 250 OP 250 PS 637: Performed by: PHYSICIAN ASSISTANT

## 2017-12-12 PROCEDURE — 25000132 ZZH RX MED GY IP 250 OP 250 PS 637: Performed by: NURSE PRACTITIONER

## 2017-12-12 RX ADMIN — FERROUS GLUCONATE 324 MG: 324 TABLET ORAL at 08:39

## 2017-12-12 RX ADMIN — PANTOPRAZOLE SODIUM 40 MG: 40 TABLET, DELAYED RELEASE ORAL at 08:39

## 2017-12-12 RX ADMIN — RIFAXIMIN 550 MG: 550 TABLET ORAL at 20:34

## 2017-12-12 RX ADMIN — GABAPENTIN 200 MG: 100 CAPSULE ORAL at 08:39

## 2017-12-12 RX ADMIN — HALOPERIDOL 2 MG: 0.5 TABLET ORAL at 20:33

## 2017-12-12 RX ADMIN — HALOPERIDOL 2 MG: 0.5 TABLET ORAL at 08:41

## 2017-12-12 RX ADMIN — FOLIC ACID 1 MG: 1 TABLET ORAL at 08:39

## 2017-12-12 RX ADMIN — GABAPENTIN 200 MG: 100 CAPSULE ORAL at 20:33

## 2017-12-12 RX ADMIN — RIFAXIMIN 550 MG: 550 TABLET ORAL at 08:39

## 2017-12-12 RX ADMIN — Medication 20 MG: at 08:42

## 2017-12-12 RX ADMIN — GABAPENTIN 200 MG: 100 CAPSULE ORAL at 13:36

## 2017-12-12 RX ADMIN — RISPERIDONE 2 MG: 2 TABLET ORAL at 20:33

## 2017-12-12 RX ADMIN — HALOPERIDOL 0.5 MG: 0.5 TABLET ORAL at 20:34

## 2017-12-12 RX ADMIN — LACTULOSE 30 G: 10 POWDER, FOR SOLUTION ORAL at 20:33

## 2017-12-12 RX ADMIN — RISPERIDONE 1 MG: 1 TABLET ORAL at 08:40

## 2017-12-12 RX ADMIN — HALOPERIDOL 0.5 MG: 0.5 TABLET ORAL at 08:39

## 2017-12-12 RX ADMIN — LACTULOSE 30 G: 10 POWDER, FOR SOLUTION ORAL at 13:36

## 2017-12-12 RX ADMIN — SPIRONOLACTONE 50 MG: 50 TABLET ORAL at 08:40

## 2017-12-12 RX ADMIN — MULTIPLE VITAMINS W/ MINERALS TAB 1 TABLET: TAB at 08:40

## 2017-12-12 RX ADMIN — LACTULOSE 30 G: 10 POWDER, FOR SOLUTION ORAL at 08:40

## 2017-12-12 ASSESSMENT — ACTIVITIES OF DAILY LIVING (ADL)
DRESS: INDEPENDENT
ORAL_HYGIENE: INDEPENDENT
GROOMING: INDEPENDENT
LAUNDRY: WITH SUPERVISION

## 2017-12-12 NOTE — PROGRESS NOTES
"    [facility], [place]   Psychiatric Progress Note        Interim History:     Subjective: \"When can I go home?\"     Nursing Note: Pt has been isolative the entire shift, did not attend any group.  Pt denies auditory hallucinations and visual hallucinations.  Pt presents with a flat blunted affect and mood is depressed.  Pt denies SI/SIB.  Pt took all her medication.  Pt is oriented to place and person.  Pt does not seem to know the day and date.  Will continue to monitor pt as ordered.      As per today's interview: Patient seen walking around on her walker in the common area of the milieu, observed to be steady on her feet. She was cooperative to meet with me in the interview room. She mentioned that she was tired of being in the hospital for so long. She wanted to live at home with her  or with her sister. She felt that the medications where helping her. Denied any SI/HI/AH/VH.          Medications:       haloperidol  2 mg Oral BID    And     haloperidol  0.5 mg Oral BID     gabapentin  200 mg Oral TID     ferrous gluconate  324 mg Oral Daily with breakfast     lactulose  30 g Oral TID     risperiDONE  1 mg Oral QAM     risperiDONE  2 mg Oral At Bedtime     pantoprazole  40 mg Oral Daily     multivitamin, therapeutic with minerals  1 tablet Oral Daily     folic acid  1 mg Oral Daily     rifaximin  550 mg Oral BID     spironolactone  50 mg Oral Daily     furosemide  20 mg Oral Daily          Allergies:     Allergies   Allergen Reactions     Acetaminophen      Ambien [Zolpidem Tartrate] Other (See Comments)     Sleep walks and eats     Ciprofloxacin Other (See Comments)     Seizure.     Citalopram Nausea and Vomiting          Labs:     No results found for this or any previous visit (from the past 24 hour(s)).       Psychiatric Examination:     Vitals:    12/08/17 1208 12/09/17 0900 12/11/17 1152 12/11/17 2020   BP: 113/66   111/71   BP Location:   Left arm    Pulse: 69   76 " "  Resp:  16 16    Temp: 96.5  F (35.8  C)  97.2  F (36.2  C)    TempSrc: Oral      SpO2:       Weight:       Height:                       Lying Orthostatic BP: 85/47         Sitting Orthostatic BP: 106/58         Standing Orthostatic BP: 112/71     Appearance: awake, but somnolent adequately groomed, appeared about stated age and no apparent distress  Attitude: more cooperative and less guarded  Eye Contact: poor  Mood:  \"fine\"  Affect:  intensity is blunted  Speech:  clear, coherent and slow  Psychomotor Behavior:  no evidence of tardive dyskinesia, dystonia, or tics  Throught Process:  goal oriented  Associations:  no loose associations  Thought Content:  no evidence of suicidal ideation or homicidal ideation, auditory hallucinations are present, Visual hallucinations are present off and on, delusions are present.   Insight:  limited  Judgement:  limited  Oriented to:  time, person, and place  Attention Span and Concentration:  fair  Recent and Remote Memory:  fair         Precautions:     Behavioral Orders   Procedures     Code 2     Elopement precautions     Routine Programming     As clinically indicated     Seizure precautions     Status 15     Every 15 minutes.          Diagnoses:     Cognitive Disorder NOS.   Encephalopathy probably multifactorial such as chronic hepatic encephalopathy, organic brain damage due to encephalopathy, alcohol drinking, possibly underlying psychotic illness; possibly head injury contributes to patient's symptoms.   Alcohol use disorder.   amphetamine dependence vs abuse.  She meets criteria for Major depressive disorder, moderate severity.          Plan:     Medications:  - Gabapentin 200 mg TID  - Haldol 2 mg BID and 0.5 mg BID  - Lactulose 30 g TID  - Risperdal 1 mg AM, 2 mg HS  - Spironolactone 50 mg Daily     --Awaiting care conference with patient's sister on Friday.     No medication changes today.   Legal Status and Disposition:  --  Under full [facility] commitment. "       Andrés Hinojosa MD  [facility] Psychiatry    Spent 20   minutes on encounter, >50% of which was spent in counseling and/or coordination of care, consisting of taking history, reviewing system, and discussing medication and side effects

## 2017-12-12 NOTE — PROGRESS NOTES
"    [facility], [place]   Psychiatric Progress Note        Interim History:     Subjective: \"When can I go home?\"     As per today's interview: Patient seen walking around on her walker in the common area of the milieu, observed to be steady on her feet. She was cooperative to meet with me in the interview room. She mentioned that she was tired of being in the hospital for so long. She wanted to live at home with her  or with her sister. She felt that the medications where helping her. Denied any SI/HI/AH/VH.          Medications:       haloperidol  2 mg Oral BID    And     haloperidol  0.5 mg Oral BID     gabapentin  200 mg Oral TID     ferrous gluconate  324 mg Oral Daily with breakfast     lactulose  30 g Oral TID     risperiDONE  1 mg Oral QAM     risperiDONE  2 mg Oral At Bedtime     pantoprazole  40 mg Oral Daily     multivitamin, therapeutic with minerals  1 tablet Oral Daily     folic acid  1 mg Oral Daily     rifaximin  550 mg Oral BID     spironolactone  50 mg Oral Daily     furosemide  20 mg Oral Daily          Allergies:     Allergies   Allergen Reactions     Acetaminophen      Ambien [Zolpidem Tartrate] Other (See Comments)     Sleep walks and eats     Ciprofloxacin Other (See Comments)     Seizure.     Citalopram Nausea and Vomiting          Labs:     No results found for this or any previous visit (from the past 24 hour(s)).       Psychiatric Examination:     Vitals:    12/08/17 1208 12/09/17 0900 12/11/17 1152 12/11/17 2020   BP: 113/66   111/71   BP Location:   Left arm    Pulse: 69   76   Resp:  16 16    Temp: 96.5  F (35.8  C)  97.2  F (36.2  C)    TempSrc: Oral      SpO2:       Weight:       Height:                       Lying Orthostatic BP: 85/47         Sitting Orthostatic BP: 106/58         Standing Orthostatic BP: 112/71     Appearance: awake, but somnolent adequately groomed, appeared about stated age and no apparent distress  Attitude: more cooperative " "and less guarded  Eye Contact: poor  Mood:  \"fine\"  Affect:  intensity is blunted  Speech:  clear, coherent and slow  Psychomotor Behavior:  no evidence of tardive dyskinesia, dystonia, or tics  Throught Process:  goal oriented  Associations:  no loose associations  Thought Content:  no evidence of suicidal ideation or homicidal ideation, auditory hallucinations are present, Visual hallucinations are present off and on, delusions are present.   Insight:  limited  Judgement:  limited  Oriented to:  time, person, and place  Attention Span and Concentration:  fair  Recent and Remote Memory:  fair         Precautions:     Behavioral Orders   Procedures     Code 2     Elopement precautions     Routine Programming     As clinically indicated     Seizure precautions     Status 15     Every 15 minutes.          Diagnoses:     Cognitive Disorder NOS.   Encephalopathy probably multifactorial such as chronic hepatic encephalopathy, organic brain damage due to encephalopathy, alcohol drinking, possibly underlying psychotic illness; possibly head injury contributes to patient's symptoms.   Alcohol use disorder.   amphetamine dependence vs abuse.  She meets criteria for Major depressive disorder, moderate severity.          Plan:     Medications:  - Gabapentin 200 mg TID  - Haldol 2 mg BID and 0.5 mg BID  - Lactulose 30 g TID  - Risperdal 1 mg AM, 2 mg HS  - Spironolactone 50 mg Daily     --Awaiting care conference with patient's sister on Friday.     No medication changes today.   Legal Status and Disposition:  --  Under full MICD commitment.       Andrés Hinojosa MD  [facility] Psychiatry    Spent 20   minutes on encounter, >50% of which was spent in counseling and/or coordination of care, consisting of taking history, reviewing system, and discussing medication and side effects                    "

## 2017-12-12 NOTE — PROGRESS NOTES
12/11/17 8054   Behavioral Health   Hallucinations denies / not responding to hallucinations   Thinking distractable;poor concentration   Orientation person: oriented;place: oriented   Memory baseline memory   Insight denial of illness;poor   Eye Contact at examiner   Affect blunted, flat   Mood mood is calm   Activities of Daily Living   Hygiene/Grooming independent   Oral Hygiene independent   Dress independent   Room Organization independent   Behavioral Health Interventions   Psychotic Symptoms maintain safety precautions;maintain safe secure environment   Social and Therapeutic Interventions (Psychotic Symptoms) encourage socialization with peers;encourage effective boundaries with peers;encourage participation in therapeutic groups and milieu activities   pt was isolative. Pt came out for dinner. Pt denies SI and SIB.

## 2017-12-12 NOTE — PROGRESS NOTES
12/12/17 1100   Significant Event   Significant Event Other (see comments)       Pt has been isolative the entire shift, did not attend any group.  Pt denies auditory hallucinations and visual hallucinations.  Pt presents with a flat blunted affect and mood is depressed.  Pt denies SI/SIB.  Pt took all her medication.  Pt is oriented to place and person.  Pt does not seem to know the day and date.  Will continue to monitor pt as ordered.
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
## 2017-12-13 PROCEDURE — 25000132 ZZH RX MED GY IP 250 OP 250 PS 637: Performed by: PSYCHIATRY & NEUROLOGY

## 2017-12-13 PROCEDURE — 25000132 ZZH RX MED GY IP 250 OP 250 PS 637: Performed by: NURSE PRACTITIONER

## 2017-12-13 PROCEDURE — 25000132 ZZH RX MED GY IP 250 OP 250 PS 637: Performed by: EMERGENCY MEDICINE

## 2017-12-13 PROCEDURE — 12400007 ZZH R&B MH INTERMEDIATE UMMC

## 2017-12-13 PROCEDURE — 25000132 ZZH RX MED GY IP 250 OP 250 PS 637: Performed by: PHYSICIAN ASSISTANT

## 2017-12-13 RX ADMIN — HALOPERIDOL 2 MG: 0.5 TABLET ORAL at 09:20

## 2017-12-13 RX ADMIN — RIFAXIMIN 550 MG: 550 TABLET ORAL at 21:38

## 2017-12-13 RX ADMIN — HALOPERIDOL 2 MG: 0.5 TABLET ORAL at 21:38

## 2017-12-13 RX ADMIN — RISPERIDONE 2 MG: 2 TABLET ORAL at 21:39

## 2017-12-13 RX ADMIN — PANTOPRAZOLE SODIUM 40 MG: 40 TABLET, DELAYED RELEASE ORAL at 09:21

## 2017-12-13 RX ADMIN — LACTULOSE 30 G: 10 POWDER, FOR SOLUTION ORAL at 14:33

## 2017-12-13 RX ADMIN — Medication 20 MG: at 09:20

## 2017-12-13 RX ADMIN — LACTULOSE 30 G: 10 POWDER, FOR SOLUTION ORAL at 09:19

## 2017-12-13 RX ADMIN — LACTULOSE 30 G: 10 POWDER, FOR SOLUTION ORAL at 21:39

## 2017-12-13 RX ADMIN — OLANZAPINE 10 MG: 5 TABLET, FILM COATED ORAL at 16:43

## 2017-12-13 RX ADMIN — FOLIC ACID 1 MG: 1 TABLET ORAL at 09:21

## 2017-12-13 RX ADMIN — GABAPENTIN 200 MG: 100 CAPSULE ORAL at 21:39

## 2017-12-13 RX ADMIN — SPIRONOLACTONE 50 MG: 50 TABLET ORAL at 09:20

## 2017-12-13 RX ADMIN — HALOPERIDOL 0.5 MG: 0.5 TABLET ORAL at 21:40

## 2017-12-13 RX ADMIN — GABAPENTIN 200 MG: 100 CAPSULE ORAL at 09:21

## 2017-12-13 RX ADMIN — FERROUS GLUCONATE 324 MG: 324 TABLET ORAL at 09:20

## 2017-12-13 RX ADMIN — RIFAXIMIN 550 MG: 550 TABLET ORAL at 09:19

## 2017-12-13 RX ADMIN — GABAPENTIN 200 MG: 100 CAPSULE ORAL at 14:33

## 2017-12-13 RX ADMIN — RISPERIDONE 1 MG: 1 TABLET ORAL at 09:19

## 2017-12-13 RX ADMIN — MULTIPLE VITAMINS W/ MINERALS TAB 1 TABLET: TAB at 09:20

## 2017-12-13 RX ADMIN — HALOPERIDOL 0.5 MG: 0.5 TABLET ORAL at 09:20

## 2017-12-13 ASSESSMENT — ACTIVITIES OF DAILY LIVING (ADL)
ORAL_HYGIENE: INDEPENDENT
ORAL_HYGIENE: INDEPENDENT
LAUNDRY: WITH SUPERVISION
GROOMING: INDEPENDENT
DRESS: STREET CLOTHES;SCRUBS (BEHAVIORAL HEALTH);INDEPENDENT
LAUNDRY: WITH SUPERVISION
DRESS: INDEPENDENT;STREET CLOTHES
GROOMING: HANDWASHING;SHOWER;INDEPENDENT

## 2017-12-13 NOTE — PROGRESS NOTES
12/12/17 2200   Behavioral Health   Hallucinations denies / not responding to hallucinations   Thinking distractable   Orientation person: oriented;place: oriented;date: oriented   Memory baseline memory   Insight denial of illness   Judgement intact   Eye Contact at examiner   Affect other (see comments)   Mood mood is calm   Physical Appearance/Attire disheveled   Hygiene neglected grooming - unclean body, hair, teeth   Suicidality other (see comments)   Pt isolated in room out for dinner only. Pt did not attend groups. Pt denies all mental health issues.

## 2017-12-13 NOTE — PROGRESS NOTES
I am planning  for the care conference on Friday at 1PM.  Shira Pate,  from [facility] will be here.  Ph: [phone]  She is having her superiors address the issues regarding [place] [facility]  Sister Kristofer is also coming. It is my unconfirmed belief that she may propose that the pt come live with her.  I called the [facility] CADI worker - Yoon Braun @ [phone]. She said the assessment is still good until December 28. I invited her to the care conference and she will ask her supervisor if she can come. I stated that we would need to know what services would be in place for the pt through CADI if the pt went to live with sister. Yoon says she has never heard of a sister before and wonders if the sister has an appropriate situation for the pt. I stated that the nursing assessment when the sister is here on the weekend is that the interaction are supportive and appropriate. I will get more information about the housing but I do know sister does not have transportation.  Yoon called back and said she would participate by phone in the care conference on Friday.    I am trying to fully understand the extent of the pt's medical conditions and the implications of these as well as the nursing care that she needs.  It was reported that pt does not have a problem with incontinence but it appears as if there may have been a resent episode.  Pt is not able to prepare meals but can feed herself.  There are varyng reports of pt being able to independently shower.  Pt is oriented x 2.  Pt does not make platelets and could suffer a fatal bleeding incident.      I will try to list things that need to be discussed:  Guardianship  Medical decision making  [place] - [facility], Sister's home

## 2017-12-13 NOTE — PLAN OF CARE
Problem: General Plan of Care (Inpatient Behavioral)  Goal: Individualization/Patient Specific Goal (IP Behavioral)  PSYCHOSIS CAREPLAN GOALS    1) Patient will be oriented x4  2) Patient will exhibit organized thought and speech  3) Patient will be absent of auditory and visual hallucinations  4) Verbalize reduction of fear or anxiety to a manageable level.  5) Patient will report improved sleep and feeling rested upon waking.  6) Patient will verbalize their current medication; including dosage and time it is due.  7) Demonstrate participation in unit programming  8) Patient will demonstrate daily independence with ADL s.   9) Patient will verbalize persons outside the hospital they can call if needing assistance  10) Patient will have adequate daily oral intake.    Outcome: Therapy, progress toward functional goals is gradual  Illness Management Recovery model:  Warning Signs.    Patient identified the following early warning signs which may indicate that a relapse of their illness is startin. Not able to offer cogent responses to question  2.   3.

## 2017-12-13 NOTE — PLAN OF CARE
Problem: Psychotic Symptoms  Goal: Psychotic Symptoms  Signs and symptoms of listed problems will be absent or manageable.   Patient will verbalize reduction in AH/VH  Patient will verbalize rationale for medication compliance  Patient's speech content will be absent of paranoid/delusional content.   Outcome: Therapy, progress toward functional goals is gradual  Pt n room most of shift. Pt cooperative with all cares.  Medication compliant. Pt took a bath this morning. This afternoon pt voicing frustration with length of stay. 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 Pt responded well to gentle realty orientation.

## 2017-12-14 LAB
AMMONIA PLAS-SCNC: 69 UMOL/L (ref 10–50)
ANION GAP SERPL CALCULATED.3IONS-SCNC: 8 MMOL/L (ref 3–14)
BASOPHILS # BLD AUTO: 0.1 10E9/L (ref 0–0.2)
BASOPHILS NFR BLD AUTO: 0.9 %
BUN SERPL-MCNC: 14 MG/DL (ref 7–30)
CALCIUM SERPL-MCNC: 9 MG/DL (ref 8.5–10.1)
CHLORIDE SERPL-SCNC: 110 MMOL/L (ref 94–109)
CO2 SERPL-SCNC: 24 MMOL/L (ref 20–32)
CREAT SERPL-MCNC: 0.67 MG/DL (ref 0.52–1.04)
DIFFERENTIAL METHOD BLD: ABNORMAL
EOSINOPHIL # BLD AUTO: 0.2 10E9/L (ref 0–0.7)
EOSINOPHIL NFR BLD AUTO: 2.8 %
ERYTHROCYTE [DISTWIDTH] IN BLOOD BY AUTOMATED COUNT: 13.2 % (ref 10–15)
GFR SERPL CREATININE-BSD FRML MDRD: >90 ML/MIN/1.7M2
GLUCOSE SERPL-MCNC: 121 MG/DL (ref 70–99)
HCT VFR BLD AUTO: 40.3 % (ref 35–47)
HGB BLD-MCNC: 14.1 G/DL (ref 11.7–15.7)
IMM GRANULOCYTES # BLD: 0 10E9/L (ref 0–0.4)
IMM GRANULOCYTES NFR BLD: 0 %
LYMPHOCYTES # BLD AUTO: 1.4 10E9/L (ref 0.8–5.3)
LYMPHOCYTES NFR BLD AUTO: 27 %
MCH RBC QN AUTO: 31.9 PG (ref 26.5–33)
MCHC RBC AUTO-ENTMCNC: 35 G/DL (ref 31.5–36.5)
MCV RBC AUTO: 91 FL (ref 78–100)
MONOCYTES # BLD AUTO: 0.3 10E9/L (ref 0–1.3)
MONOCYTES NFR BLD AUTO: 5.3 %
NEUTROPHILS # BLD AUTO: 3.4 10E9/L (ref 1.6–8.3)
NEUTROPHILS NFR BLD AUTO: 64 %
NRBC # BLD AUTO: 0 10*3/UL
NRBC BLD AUTO-RTO: 0 /100
PLATELET # BLD AUTO: 81 10E9/L (ref 150–450)
POTASSIUM SERPL-SCNC: 3.9 MMOL/L (ref 3.4–5.3)
RBC # BLD AUTO: 4.42 10E12/L (ref 3.8–5.2)
SODIUM SERPL-SCNC: 142 MMOL/L (ref 133–144)
WBC # BLD AUTO: 5.3 10E9/L (ref 4–11)

## 2017-12-14 PROCEDURE — 25000132 ZZH RX MED GY IP 250 OP 250 PS 637: Performed by: EMERGENCY MEDICINE

## 2017-12-14 PROCEDURE — 25000132 ZZH RX MED GY IP 250 OP 250 PS 637: Performed by: PHYSICIAN ASSISTANT

## 2017-12-14 PROCEDURE — 80048 BASIC METABOLIC PNL TOTAL CA: CPT | Performed by: PSYCHIATRY & NEUROLOGY

## 2017-12-14 PROCEDURE — 36415 COLL VENOUS BLD VENIPUNCTURE: CPT | Performed by: PSYCHIATRY & NEUROLOGY

## 2017-12-14 PROCEDURE — 82140 ASSAY OF AMMONIA: CPT | Performed by: PSYCHIATRY & NEUROLOGY

## 2017-12-14 PROCEDURE — 85025 COMPLETE CBC W/AUTO DIFF WBC: CPT | Performed by: PSYCHIATRY & NEUROLOGY

## 2017-12-14 PROCEDURE — 25000132 ZZH RX MED GY IP 250 OP 250 PS 637: Performed by: NURSE PRACTITIONER

## 2017-12-14 PROCEDURE — 25000132 ZZH RX MED GY IP 250 OP 250 PS 637: Performed by: PSYCHIATRY & NEUROLOGY

## 2017-12-14 PROCEDURE — 99231 SBSQ HOSP IP/OBS SF/LOW 25: CPT | Performed by: PSYCHIATRY & NEUROLOGY

## 2017-12-14 PROCEDURE — 12400007 ZZH R&B MH INTERMEDIATE UMMC

## 2017-12-14 RX ORDER — LEVOCARNITINE 330 MG/1
660 TABLET ORAL 2 TIMES DAILY
Status: DISCONTINUED | OUTPATIENT
Start: 2017-12-14 | End: 2017-12-18

## 2017-12-14 RX ADMIN — MULTIPLE VITAMINS W/ MINERALS TAB 1 TABLET: TAB at 09:29

## 2017-12-14 RX ADMIN — RIFAXIMIN 550 MG: 550 TABLET ORAL at 20:18

## 2017-12-14 RX ADMIN — FOLIC ACID 1 MG: 1 TABLET ORAL at 09:27

## 2017-12-14 RX ADMIN — RIFAXIMIN 550 MG: 550 TABLET ORAL at 09:29

## 2017-12-14 RX ADMIN — GABAPENTIN 200 MG: 100 CAPSULE ORAL at 09:26

## 2017-12-14 RX ADMIN — HALOPERIDOL 2 MG: 0.5 TABLET ORAL at 09:29

## 2017-12-14 RX ADMIN — HALOPERIDOL 2 MG: 0.5 TABLET ORAL at 20:18

## 2017-12-14 RX ADMIN — Medication 20 MG: at 09:27

## 2017-12-14 RX ADMIN — GABAPENTIN 200 MG: 100 CAPSULE ORAL at 20:18

## 2017-12-14 RX ADMIN — PANTOPRAZOLE SODIUM 40 MG: 40 TABLET, DELAYED RELEASE ORAL at 09:30

## 2017-12-14 RX ADMIN — LACTULOSE 30 G: 10 POWDER, FOR SOLUTION ORAL at 20:19

## 2017-12-14 RX ADMIN — GABAPENTIN 200 MG: 100 CAPSULE ORAL at 14:16

## 2017-12-14 RX ADMIN — HALOPERIDOL 0.5 MG: 0.5 TABLET ORAL at 20:18

## 2017-12-14 RX ADMIN — HALOPERIDOL 0.5 MG: 0.5 TABLET ORAL at 09:27

## 2017-12-14 RX ADMIN — RISPERIDONE 1 MG: 1 TABLET ORAL at 09:40

## 2017-12-14 RX ADMIN — LACTULOSE 30 G: 10 POWDER, FOR SOLUTION ORAL at 09:27

## 2017-12-14 RX ADMIN — SPIRONOLACTONE 50 MG: 50 TABLET ORAL at 09:27

## 2017-12-14 RX ADMIN — RISPERIDONE 2 MG: 2 TABLET ORAL at 20:18

## 2017-12-14 RX ADMIN — LEVOCARNITINE 660 MG: 330 TABLET ORAL at 20:27

## 2017-12-14 RX ADMIN — FERROUS GLUCONATE 324 MG: 324 TABLET ORAL at 09:30

## 2017-12-14 RX ADMIN — LACTULOSE 30 G: 10 POWDER, FOR SOLUTION ORAL at 14:16

## 2017-12-14 ASSESSMENT — ACTIVITIES OF DAILY LIVING (ADL)
LAUNDRY: UNABLE TO COMPLETE
ORAL_HYGIENE: INDEPENDENT
GROOMING: INDEPENDENT
DRESS: INDEPENDENT;STREET CLOTHES

## 2017-12-14 NOTE — PLAN OF CARE
"Problem: Psychotic Symptoms  Goal: Psychotic Symptoms  Signs and symptoms of listed problems will be absent or manageable.   Patient will verbalize reduction in AH/VH  Patient will verbalize rationale for medication compliance  Patient's speech content will be absent of paranoid/delusional content.   Outcome: Therapy, progress toward functional goals is gradual  \"Elizabeth get out of here.\" They won't let me leave here, but I own the building. I don't know how this works.\"  Pt in room most of shift. Out for meals only. Denies any suicidal thoughts. Continues to have some auditory hallucinations and delusional thoughts.        "

## 2017-12-14 NOTE — PROGRESS NOTES
12/13/17 2117   Behavioral Health   Hallucinations denies / not responding to hallucinations   Thinking distractable   Orientation time: oriented;place: oriented;person: oriented;situation, disoriented   Memory baseline memory   Insight denial of illness   Judgement impaired   Eye Contact at examiner;staring;into space   Affect other (see comments)  (neutral)   Mood anxious   Physical Appearance/Attire disheveled   Hygiene neglected grooming - unclean body, hair, teeth   Suicidality other (see comments)  (Denies )   Self Injury other (see comment)  (Denies )   Activity restless;isolative;withdrawn   Speech clear;coherent   Medication Sensitivity no observed side effects;no stated side effects   Psychomotor / Gait slow  (walker )     Pt. Denies mental illness. Pt. Believes we don't want to let her go. Pt.'s reasoning for being her for 6 months was ammonia levels off and here ever since. Pt. Denies SI and SIB. Pt. Wants a . Pt. Wasn't sure what to do if gets hungry on the unit so encouraged to come out of room and talk with staff. Pt. Is isolative and withdrawn. Pt. Appears restless in bed. Pt. Uses a walker.

## 2017-12-14 NOTE — PLAN OF CARE
Problem: General Plan of Care (Inpatient Behavioral)  Goal: Individualization/Patient Specific Goal (IP Behavioral)  PSYCHOSIS CAREPLAN GOALS    1) Patient will be oriented x4  2) Patient will exhibit organized thought and speech  3) Patient will be absent of auditory and visual hallucinations  4) Verbalize reduction of fear or anxiety to a manageable level.  5) Patient will report improved sleep and feeling rested upon waking.  6) Patient will verbalize their current medication; including dosage and time it is due.  7) Demonstrate participation in unit programming  8) Patient will demonstrate daily independence with ADL s.   9) Patient will verbalize persons outside the hospital they can call if needing assistance  10) Patient will have adequate daily oral intake.    Outcome: Therapy, progress toward functional goals is gradual  Illness Management Recovery model:  Feedback.    Patient identified the following people they would like to receive feedback from if/when they see early warning signs:    Friend(s) no    Family(s): family  Partner/Spouse: no    Support Group Member(s): no    Co-Worker(s):

## 2017-12-14 NOTE — PROGRESS NOTES
"    [employer], [place]   Psychiatric Progress Note        Interim History:     Subjective: \"When can I go home?\"     Nursing Note: [facility]  called unit stated was working a missing person report since august. Writer approached pt who agreed to speak with him. After conversation, pt was very distraught. Pt began crying,\" I've been here since august 8 th. I didn't do anything wrong. They won't let me leave. I own the f--k'n building and they still won't let me leave.\" writer sat with pt provided support and reassurance. Sobbing subsided.        As per today's interview: Patient seen in her room, resting. She denied any imminent issues today. I tested her memory, with a quick cognitive screen, and she was oriented to person, place, month, and year. She was able to spell world forward, but made a mistake spelling it backwards. She was able to go serial 7s. She was able to have short term recall after 5 mins. Denied any SI/HI/AH/VH.          Medications:       levOCARNitine  660 mg Oral BID     haloperidol  2 mg Oral BID    And     haloperidol  0.5 mg Oral BID     gabapentin  200 mg Oral TID     ferrous gluconate  324 mg Oral Daily with breakfast     lactulose  30 g Oral TID     risperiDONE  1 mg Oral QAM     risperiDONE  2 mg Oral At Bedtime     pantoprazole  40 mg Oral Daily     multivitamin, therapeutic with minerals  1 tablet Oral Daily     folic acid  1 mg Oral Daily     rifaximin  550 mg Oral BID     spironolactone  50 mg Oral Daily     furosemide  20 mg Oral Daily          Allergies:     Allergies   Allergen Reactions     Acetaminophen      Ambien [Zolpidem Tartrate] Other (See Comments)     Sleep walks and eats     Ciprofloxacin Other (See Comments)     Seizure.     Citalopram Nausea and Vomiting          Labs:     Recent Results (from the past 24 hour(s))   CBC with platelets differential    Collection Time: 12/14/17 11:07 AM   Result Value Ref Range    WBC 5.3 4.0 " "- 11.0 10e9/L    RBC Count 4.42 3.8 - 5.2 10e12/L    Hemoglobin 14.1 11.7 - 15.7 g/dL    Hematocrit 40.3 35.0 - 47.0 %    MCV 91 78 - 100 fl    MCH 31.9 26.5 - 33.0 pg    MCHC 35.0 31.5 - 36.5 g/dL    RDW 13.2 10.0 - 15.0 %    Platelet Count 81 (L) 150 - 450 10e9/L    Diff Method Automated Method     % Neutrophils 64.0 %    % Lymphocytes 27.0 %    % Monocytes 5.3 %    % Eosinophils 2.8 %    % Basophils 0.9 %    % Immature Granulocytes 0.0 %    Nucleated RBCs 0 0 /100    Absolute Neutrophil 3.4 1.6 - 8.3 10e9/L    Absolute Lymphocytes 1.4 0.8 - 5.3 10e9/L    Absolute Monocytes 0.3 0.0 - 1.3 10e9/L    Absolute Eosinophils 0.2 0.0 - 0.7 10e9/L    Absolute Basophils 0.1 0.0 - 0.2 10e9/L    Abs Immature Granulocytes 0.0 0 - 0.4 10e9/L    Absolute Nucleated RBC 0.0    Ammonia    Collection Time: 12/14/17 11:07 AM   Result Value Ref Range    Ammonia 69 (H) 10 - 50 umol/L   Basic metabolic panel    Collection Time: 12/14/17 11:07 AM   Result Value Ref Range    Sodium 142 133 - 144 mmol/L    Potassium 3.9 3.4 - 5.3 mmol/L    Chloride 110 (H) 94 - 109 mmol/L    Carbon Dioxide 24 20 - 32 mmol/L    Anion Gap 8 3 - 14 mmol/L    Glucose 121 (H) 70 - 99 mg/dL    Urea Nitrogen 14 7 - 30 mg/dL    Creatinine 0.67 0.52 - 1.04 mg/dL    GFR Estimate >90 >60 mL/min/1.7m2    GFR Estimate If Black >90 >60 mL/min/1.7m2    Calcium 9.0 8.5 - 10.1 mg/dL          Psychiatric Examination:     Vitals:    12/09/17 0900 12/11/17 1152 12/11/17 2020 12/12/17 2016   BP:   111/71 99/57   BP Location:  Left arm     Pulse:   76 66   Resp: 16 16     Temp:  97.2  F (36.2  C)     TempSrc:       SpO2:       Weight:       Height:                       Lying Orthostatic BP: 85/47         Sitting Orthostatic BP: 106/58         Standing Orthostatic BP: 112/71     Appearance: awake, but somnolent adequately groomed, appeared about stated age and no apparent distress  Attitude: more cooperative and less guarded  Eye Contact: poor  Mood:  \"fine\"  Affect:  intensity " is blunted  Speech:  clear, coherent and slow  Psychomotor Behavior:  no evidence of tardive dyskinesia, dystonia, or tics  Throught Process:  goal oriented  Associations:  no loose associations  Thought Content:  no evidence of suicidal ideation or homicidal ideation, auditory hallucinations are present, Visual hallucinations are present off and on, delusions are present.   Insight:  limited  Judgement:  limited  Oriented to:  time, person, and place  Attention Span and Concentration:  fair  Recent and Remote Memory:  fair         Precautions:     Behavioral Orders   Procedures     Code 2     Elopement precautions     Routine Programming     As clinically indicated     Seizure precautions     Status 15     Every 15 minutes.          Diagnoses:     Cognitive Disorder NOS.   Encephalopathy probably multifactorial such as chronic hepatic encephalopathy, organic brain damage due to encephalopathy, alcohol drinking, possibly underlying psychotic illness; possibly head injury contributes to patient's symptoms.   Alcohol use disorder.   amphetamine dependence vs abuse.  She meets criteria for Major depressive disorder, moderate severity.          Plan:     Assessment:     (12/14): Regarding my mini cognitive screen, she seemed to improve in her short term memory (which was 3/3 recall in 6 min). Notes indicate she still harbors some delusions, which could be related to her ongoing hepatic encepahlopathy. I ordered new CBC/WBC/Ammonia levels today, which revealed some improvements in platelets (76 to 81), and ammonia level (96 down to 69). I will start her on L-carnitine (Levocarnitine), which has empirical evidence in lowering ammonia levels (and subsequent neuronal transmission) in patients with liver cirrhosis. Randomized/double blind studies exist: https://www.ncbi.nlm.nih.gov/pmc/articles/JFP7709114/. I will start 660 mg BID today, and quickly titrate up to 990 mg BID. Will retake ammonia level next week.      Medications:  - Start Levocarnitine 660 mg BID  - Gabapentin 200 mg TID  - Haldol 2 mg BID and 0.5 mg BID  - Lactulose 30 g TID  - Risperdal 1 mg AM, 2 mg HS  - Spironolactone 50 mg Daily     --Awaiting care conference with patient's sister on Friday.     No medication changes today.   Legal Status and Disposition:  --  Under full MICD commitment.       Andrés Hinojosa MD  [facility] Psychiatry    Spent 20   minutes on encounter, >50% of which was spent in counseling and/or coordination of care, consisting of taking history, reviewing system, and discussing medication and side effects

## 2017-12-14 NOTE — PLAN OF CARE
"Problem: Psychotic Symptoms  Goal: Psychotic Symptoms  Signs and symptoms of listed problems will be absent or manageable.   Patient will verbalize reduction in AH/VH  Patient will verbalize rationale for medication compliance  Patient's speech content will be absent of paranoid/delusional content.   Outcome: Therapy, progress toward functional goals is gradual  [facility]  called unit stated was working a missing person report since august. Writer approached pt who agreed to speak with him. After conversation, pt was very distraught. Pt began crying,\" I've been here since august 8 th. I didn't do anything wrong. They won't let me leave. I own the f--k'n building and they still won't let me leave.\" writer sat with pt provided support and reassurance. Sobbing subsided.          "

## 2017-12-15 PROCEDURE — 25000132 ZZH RX MED GY IP 250 OP 250 PS 637: Performed by: PSYCHIATRY & NEUROLOGY

## 2017-12-15 PROCEDURE — 99231 SBSQ HOSP IP/OBS SF/LOW 25: CPT | Performed by: PSYCHIATRY & NEUROLOGY

## 2017-12-15 PROCEDURE — 25000132 ZZH RX MED GY IP 250 OP 250 PS 637: Performed by: PHYSICIAN ASSISTANT

## 2017-12-15 PROCEDURE — 25000132 ZZH RX MED GY IP 250 OP 250 PS 637: Performed by: EMERGENCY MEDICINE

## 2017-12-15 PROCEDURE — 25000132 ZZH RX MED GY IP 250 OP 250 PS 637: Performed by: NURSE PRACTITIONER

## 2017-12-15 PROCEDURE — 12400007 ZZH R&B MH INTERMEDIATE UMMC

## 2017-12-15 RX ADMIN — GABAPENTIN 200 MG: 100 CAPSULE ORAL at 20:30

## 2017-12-15 RX ADMIN — HALOPERIDOL 0.5 MG: 0.5 TABLET ORAL at 09:40

## 2017-12-15 RX ADMIN — RISPERIDONE 1 MG: 1 TABLET ORAL at 09:37

## 2017-12-15 RX ADMIN — LEVOCARNITINE 660 MG: 330 TABLET ORAL at 20:29

## 2017-12-15 RX ADMIN — RIFAXIMIN 550 MG: 550 TABLET ORAL at 09:37

## 2017-12-15 RX ADMIN — HALOPERIDOL 2 MG: 0.5 TABLET ORAL at 09:36

## 2017-12-15 RX ADMIN — GABAPENTIN 200 MG: 100 CAPSULE ORAL at 09:37

## 2017-12-15 RX ADMIN — LACTULOSE 30 G: 10 POWDER, FOR SOLUTION ORAL at 09:34

## 2017-12-15 RX ADMIN — MULTIPLE VITAMINS W/ MINERALS TAB 1 TABLET: TAB at 09:36

## 2017-12-15 RX ADMIN — RISPERIDONE 2 MG: 2 TABLET ORAL at 20:30

## 2017-12-15 RX ADMIN — HALOPERIDOL 0.5 MG: 0.5 TABLET ORAL at 20:30

## 2017-12-15 RX ADMIN — SPIRONOLACTONE 50 MG: 50 TABLET ORAL at 09:35

## 2017-12-15 RX ADMIN — GABAPENTIN 200 MG: 100 CAPSULE ORAL at 14:37

## 2017-12-15 RX ADMIN — RIFAXIMIN 550 MG: 550 TABLET ORAL at 20:29

## 2017-12-15 RX ADMIN — LACTULOSE 30 G: 10 POWDER, FOR SOLUTION ORAL at 20:29

## 2017-12-15 RX ADMIN — HALOPERIDOL 2 MG: 0.5 TABLET ORAL at 20:30

## 2017-12-15 RX ADMIN — FOLIC ACID 1 MG: 1 TABLET ORAL at 09:37

## 2017-12-15 RX ADMIN — LEVOCARNITINE 660 MG: 330 TABLET ORAL at 09:37

## 2017-12-15 RX ADMIN — Medication 20 MG: at 09:35

## 2017-12-15 RX ADMIN — FERROUS GLUCONATE 324 MG: 324 TABLET ORAL at 09:35

## 2017-12-15 RX ADMIN — LACTULOSE 30 G: 10 POWDER, FOR SOLUTION ORAL at 14:37

## 2017-12-15 RX ADMIN — PANTOPRAZOLE SODIUM 40 MG: 40 TABLET, DELAYED RELEASE ORAL at 09:35

## 2017-12-15 ASSESSMENT — ACTIVITIES OF DAILY LIVING (ADL)
GROOMING: INDEPENDENT
ORAL_HYGIENE: INDEPENDENT
DRESS: INDEPENDENT

## 2017-12-15 NOTE — PROGRESS NOTES
"   12/14/17 2211   Behavioral Health   Hallucinations auditory   Thinking distractable;delusional   Orientation time: oriented;date: oriented;place: oriented;person: oriented;situation, disoriented   Memory baseline memory   Insight poor   Judgement impaired   Eye Contact at examiner;into space   Affect other (see comments)  (neutral)   Mood anxious   Physical Appearance/Attire neat;attire appropriate to age and situation;appears stated age   Hygiene well groomed   Suicidality other (see comments)  (UA)   Self Injury other (see comment)  (UA)   Activity restless;withdrawn;isolative   Speech clear;coherent   Medication Sensitivity no observed side effects;no stated side effects   Psychomotor / Gait steady;slow  (walker )     Pt. Yelling in room \"stop it.\" Pt. Did not have anyone in the room at the time. Pt. Not attending groups. Pt. Attending meals. Pt. Appeared restless in bed. Pt. Staring in room at times.   "

## 2017-12-15 NOTE — PLAN OF CARE
Problem: General Plan of Care (Inpatient Behavioral)  Goal: Team Discussion  Team Plan:    Outcome: No Change  BEHAVIORAL TEAM DISCUSSION    Participants:   Dr. Hinojosa and Danielle POSADAS    Progress:   Pt has hepatic encephalopathy. Pt has cirrohis of the liver. She has high ammonia levels and   Low platelet counts. Both of these things are slowly improving. She is at risk of severe bleeding.  She is oriented x2. She still has some short term memory. She continues to have delusions and and calls out for Elizabeth and thinks she owns the hospital. She is not incontinent, she is able to shower.    Care conference held today. Sister did not show up. Pt declined referral [place] [facility].  Continued Stay Criteria/Rationale:   Pt is committed and unable to care for self.    Medical/Physical:   She has high ammonia levels and   Low platelet counts. Both of these things are slowly improving.    Precautions:   Behavioral Orders   Procedures     Code 2     Elopement precautions     Routine Programming     As clinically indicated     Seizure precautions     Status 15     Every 15 minutes.     Plan:   Look for locked nursing home unit.    Rationale for change in precautions or plan:   Pt is refusing recommendations.        Problem: Patient Care Overview  Goal: Team Discussion  Team Plan:    Outcome: No Change  BEHAVIORAL TEAM DISCUSSION    Participants:   Dr. Hinojosa and Danielle POSADAS    Progress:   Pt has hepatic encephalopathy. Pt has cirrohis of the liver. She has high ammonia levels and   Low platelet counts. Both of these things are slowly improving. She is at risk of severe bleeding.  She is oriented x2. She still has some short term memory. She continues to have delusions and and calls out for Elizabeth and thinks she owns the hospital. She is not incontinent, she is able to shower.    Care conference held today. Sister did not show up. Pt [place] [facility].  Continued Stay Criteria/Rationale:    Pt is committed and unable to care for self.    Medical/Physical:   She has high ammonia levels and   Low platelet counts. Both of these things are slowly improving.    Precautions:   Behavioral Orders   Procedures     Code 2     Elopement precautions     Routine Programming     As clinically indicated     Seizure precautions     Status 15     Every 15 minutes.     Plan:   Look for locked nursing home unit.    Rationale for change in precautions or plan:   Pt is refusing recommendations.

## 2017-12-15 NOTE — PROGRESS NOTES
Mildred Rascon/ Care Conference: December 15, 2017 at 1PM  [employer] [address]  Attendees:  Dr. Hinojosa  Patient  Danielle Hawkinswhitney, [facility]  - Ph: [phone]  Shira Mora - [facility] mental health  - Ph: [phone]  Janki Benítez, [facility] - [facility] adult mental health case  - [phone]  [facility]  - Ph; [phone] - via phone  Kirstofer - sister - Ph: [phone] - did not show up for this meeting.  Issues to be discussed:  Medical condition and Psychiatric condition explained by Dr. Hinojosa.  Pt has hepatic encephalopathy. Pt has cirrohis of the liver. She has high ammonia levels and   Low platelet counts. Both of these things are slowly improving. She is at risk of severe bleeding.  She is oriented x2. She still has some short term memory. She continues to have delusions and and calls out for Elizabeth and thinks she owns the hospital. She is not incontinent, she is able to shower.  Guardianship  [facility] is  asking that we identify a family member, they just have to agree and show up for one court appearance and this will not cost them money.  Court order needs to be corrected before pt can be discharged.  Danielle and Dr. Marc will send a letter to [facility] court to change   DNR/DNI  Pt needs medical decision maker.  Police missing person s report  I informed them that the police called here yesterday and Shira said she was the one who filed the missing persons report. The police has just sent her an e-mail asking for an update and Shira told the police she was here.  Placement  Shira has been trying to place the pt for months - nursing homes, adult foster care, [facility]. Pt has been declined by all places.  Shira and Janki state that they have continue to explore the [facility] [place] [facility] [place]. The [facility] [place] has  "declined the pt. GEOVANNA Falk has agreed to come interview the pt. The patient declined to consider this option.  Pt said she wanted to go home. Shira explained that there was no home to go to, that Franklin and his mother no longer have that rented home. Pt started crying and was given tissue.  Shira reminded the pt that the last times she has left facilities she used alcohol and drugs. Pt repleidm \" I am not going to do that.\" Shira told her that her health was fragile and one drink could send her back to the hospital and pt replied \" I know that.\"    Yoon said that the services they could offer through CADI in the sister's home are: 2.5 hours of PCA a day, 3-4 hours of ILS a week, skilled nursing 1 x week, metro mobility, adult day program and a medication machine.  Shira asked pt if sister was sober and pt said yes. Even if pt went to a family members home, these would take weeks to get into place. In light of this information and since sister never showed, we are no longer considering this option.  [facility] staff and I agreed that the most appropriate place was [facility] memory care because she will flee a facility and needs total care. I asked why some of these have declined her and they said due to substance use. We all agreed that this wouldn't be an issue while she was there.      "

## 2017-12-15 NOTE — PROGRESS NOTES
"    [facility], [place]   Psychiatric Progress Note        Interim History:     Subjective: \"When can I go home?\"     Nursing Note: Patient in her room and bed for most of the shift, coming out primarily for lunch and to make requests.  No peer interaction or program involvement.  Pleasant and cooperative on approach with all unit and personal care protocols.  Intake limited at mealtimes.  Stated that she was feeling \"much better now...\" and essentially denied all significant psychopathology - I.e. depression, anxiety, SIB urges, AH's, etc.  Stated that her concentration was better today. No overtly delusional statements or  overtly psychotic behaviors noted this shift so far.    As per today's interview: Patient seen in her room, resting. She denied any imminent issues today. I tested her memory, with a quick cognitive screen, and she was oriented to person, place, month, and year. She was able to spell world forward, but made a mistake spelling it backwards. She was able to go serial 7s. She was able to have short term recall after 5 mins. Denied any issues with her medications. Denied any SI/HI/AH/VH.          Medications:       levOCARNitine  660 mg Oral BID     haloperidol  2 mg Oral BID    And     haloperidol  0.5 mg Oral BID     gabapentin  200 mg Oral TID     ferrous gluconate  324 mg Oral Daily with breakfast     lactulose  30 g Oral TID     risperiDONE  1 mg Oral QAM     risperiDONE  2 mg Oral At Bedtime     pantoprazole  40 mg Oral Daily     multivitamin, therapeutic with minerals  1 tablet Oral Daily     folic acid  1 mg Oral Daily     rifaximin  550 mg Oral BID     spironolactone  50 mg Oral Daily     furosemide  20 mg Oral Daily          Allergies:     Allergies   Allergen Reactions     Acetaminophen      Ambien [Zolpidem Tartrate] Other (See Comments)     Sleep walks and eats     Ciprofloxacin Other (See Comments)     Seizure.     Citalopram Nausea and Vomiting          " "Labs:     No results found for this or any previous visit (from the past 24 hour(s)).       Psychiatric Examination:     Vitals:    12/11/17 1152 12/11/17 2020 12/12/17 2016 12/15/17 0940   BP:  111/71 99/57    BP Location: Left arm   Right arm   Pulse:  76 66    Resp: 16   16   Temp: 97.2  F (36.2  C)   97  F (36.1  C)   TempSrc:       SpO2:       Weight:       Height:                       Lying Orthostatic BP: 85/47         Sitting Orthostatic BP: 106/58         Standing Orthostatic BP: 112/71     Appearance: awake, but somnolent adequately groomed, appeared about stated age and no apparent distress  Attitude: more cooperative and less guarded  Eye Contact: poor  Mood:  \"fine\"  Affect:  intensity is blunted  Speech:  clear, coherent and slow  Psychomotor Behavior:  no evidence of tardive dyskinesia, dystonia, or tics  Throught Process:  goal oriented  Associations:  no loose associations  Thought Content:  no evidence of suicidal ideation or homicidal ideation, auditory hallucinations are present, Visual hallucinations are present off and on, delusions are present.   Insight:  limited  Judgement:  limited  Oriented to:  time, person, and place  Attention Span and Concentration:  fair  Recent and Remote Memory:  fair         Precautions:     Behavioral Orders   Procedures     Code 2     Elopement precautions     Routine Programming     As clinically indicated     Seizure precautions     Status 15     Every 15 minutes.          Diagnoses:     Cognitive Disorder NOS.   Encephalopathy probably multifactorial such as chronic hepatic encephalopathy, organic brain damage due to encephalopathy, alcohol drinking, possibly underlying psychotic illness; possibly head injury contributes to patient's symptoms.   Alcohol use disorder.   amphetamine dependence vs abuse.  She meets criteria for Major depressive disorder, moderate severity.          Plan:     Assessment:     (12/14): Regarding my mini cognitive screen, she seemed " to improve in her short term memory (which was 3/3 recall in 6 min). Notes indicate she still harbors some delusions, which could be related to her ongoing hepatic encepahlopathy. I ordered new CBC/WBC/Ammonia levels today, which revealed some improvements in platelets (76 to 81), and ammonia level (96 down to 69). I will start her on L-carnitine (Levocarnitine), which has empirical evidence in lowering ammonia levels (and subsequent neuronal transmission) in patients with liver cirrhosis. Randomized/double blind studies exist: https://www.ncbi.nlm.nih.gov/pmc/articles/RBD2797278/. I will start 660 mg BID today, and quickly titrate up to 990 mg BID. Will retake ammonia level next week.     (12/15): No significant change today. Had meeting with patient, [facility] , and case . Patient believed that she still had a home and her BF still owned it. Patient became tearful after hearing her ex-BF was homeless. Patient was compliant with medications.      Medications:  - Levocarnitine 660 mg BID  - Gabapentin 200 mg TID  - Haldol 2 mg BID and 0.5 mg BID  - Lactulose 30 g TID  - Risperdal 1 mg AM, 2 mg HS  - Spironolactone 50 mg Daily     --Awaiting care conference with patient's sister on Friday.     No medication changes today.   Legal Status and Disposition:  --  Under full MICD commitment.       Andrés Hinojosa MD  [facility] Psychiatry    Spent 20   minutes on encounter, >50% of which was spent in counseling and/or coordination of care, consisting of taking history, reviewing system, and discussing medication and side effects

## 2017-12-16 PROCEDURE — 25000132 ZZH RX MED GY IP 250 OP 250 PS 637: Performed by: PSYCHIATRY & NEUROLOGY

## 2017-12-16 PROCEDURE — 25000132 ZZH RX MED GY IP 250 OP 250 PS 637: Performed by: PHYSICIAN ASSISTANT

## 2017-12-16 PROCEDURE — 12400007 ZZH R&B MH INTERMEDIATE UMMC

## 2017-12-16 PROCEDURE — 25000132 ZZH RX MED GY IP 250 OP 250 PS 637: Performed by: EMERGENCY MEDICINE

## 2017-12-16 PROCEDURE — 25000132 ZZH RX MED GY IP 250 OP 250 PS 637: Performed by: NURSE PRACTITIONER

## 2017-12-16 RX ADMIN — LACTULOSE 30 G: 10 POWDER, FOR SOLUTION ORAL at 15:28

## 2017-12-16 RX ADMIN — LACTULOSE 30 G: 10 POWDER, FOR SOLUTION ORAL at 20:34

## 2017-12-16 RX ADMIN — GABAPENTIN 200 MG: 100 CAPSULE ORAL at 15:28

## 2017-12-16 RX ADMIN — LEVOCARNITINE 660 MG: 330 TABLET ORAL at 09:24

## 2017-12-16 RX ADMIN — HALOPERIDOL 0.5 MG: 0.5 TABLET ORAL at 09:22

## 2017-12-16 RX ADMIN — PANTOPRAZOLE SODIUM 40 MG: 40 TABLET, DELAYED RELEASE ORAL at 09:23

## 2017-12-16 RX ADMIN — Medication 20 MG: at 09:24

## 2017-12-16 RX ADMIN — RIFAXIMIN 550 MG: 550 TABLET ORAL at 09:23

## 2017-12-16 RX ADMIN — LACTULOSE 30 G: 10 POWDER, FOR SOLUTION ORAL at 09:22

## 2017-12-16 RX ADMIN — FERROUS GLUCONATE 324 MG: 324 TABLET ORAL at 09:24

## 2017-12-16 RX ADMIN — MULTIPLE VITAMINS W/ MINERALS TAB 1 TABLET: TAB at 09:22

## 2017-12-16 RX ADMIN — LEVOCARNITINE 660 MG: 330 TABLET ORAL at 20:33

## 2017-12-16 RX ADMIN — FOLIC ACID 1 MG: 1 TABLET ORAL at 09:24

## 2017-12-16 RX ADMIN — RISPERIDONE 2 MG: 2 TABLET ORAL at 20:34

## 2017-12-16 RX ADMIN — SPIRONOLACTONE 50 MG: 50 TABLET ORAL at 09:24

## 2017-12-16 RX ADMIN — RIFAXIMIN 550 MG: 550 TABLET ORAL at 20:33

## 2017-12-16 RX ADMIN — HALOPERIDOL 2 MG: 0.5 TABLET ORAL at 20:34

## 2017-12-16 RX ADMIN — HALOPERIDOL 0.5 MG: 0.5 TABLET ORAL at 20:34

## 2017-12-16 RX ADMIN — HALOPERIDOL 2 MG: 0.5 TABLET ORAL at 09:23

## 2017-12-16 RX ADMIN — RISPERIDONE 1 MG: 1 TABLET ORAL at 09:23

## 2017-12-16 RX ADMIN — GABAPENTIN 200 MG: 100 CAPSULE ORAL at 09:23

## 2017-12-16 RX ADMIN — GABAPENTIN 200 MG: 100 CAPSULE ORAL at 20:33

## 2017-12-16 ASSESSMENT — ACTIVITIES OF DAILY LIVING (ADL)
LAUNDRY: UNABLE TO COMPLETE
HYGIENE/GROOMING: INDEPENDENT
ORAL_HYGIENE: INDEPENDENT;PROMPTS
GROOMING: HANDWASHING;SHOWER;INDEPENDENT
LAUNDRY: WITH SUPERVISION
ORAL_HYGIENE: INDEPENDENT
DRESS: INDEPENDENT
DRESS: SCRUBS (BEHAVIORAL HEALTH);INDEPENDENT;STREET CLOTHES

## 2017-12-16 NOTE — PROGRESS NOTES
Pt was isolative this shift, she denies SI SIB.      12/15/17 2250   Behavioral Health   Hallucinations denies / not responding to hallucinations   Thinking poor concentration   Orientation person: oriented;place: oriented;date: oriented;time: oriented   Insight poor   Judgement impaired   Eye Contact cultural specific   Affect blunted, flat;sad   Mood mood is calm;depressed   Physical Appearance/Attire attire appropriate to age and situation   Hygiene well groomed   1. Wish to be Dead No   2. Non-Specific Active Suicidal Thoughts  No   3. Active Sucidal Ideation with any Methods (Not Plan) Without Intent to Act  No   4. Active Suicidal Ideation with Some Intent to Act, Without Specific Plan  No   5. Active Suicidal Ideation with Specific Plan and Intent  No   Self Injury other (see comment)  (denies)   Activity isolative;withdrawn   Speech clear;coherent   Psychomotor / Gait slow

## 2017-12-16 NOTE — PLAN OF CARE
Problem: Psychotic Symptoms  Goal: Psychotic Symptoms  Signs and symptoms of listed problems will be absent or manageable.   Patient will verbalize reduction in AH/VH  Patient will verbalize rationale for medication compliance  Patient's speech content will be absent of paranoid/delusional content.   Outcome: Therapy, progress toward functional goals is gradual  Pt in room most of shift.  Out of room only for lunch. Pt c/o being tired. Medication compliant. Denies suicidal thoughts, continues frustrated and angry about continued hospitalization. Denies mental health sx's.

## 2017-12-16 NOTE — PLAN OF CARE
Problem: General Plan of Care (Inpatient Behavioral)  Goal: Individualization/Patient Specific Goal (IP Behavioral)  PSYCHOSIS CAREPLAN GOALS    1) Patient will be oriented x4  2) Patient will exhibit organized thought and speech  3) Patient will be absent of auditory and visual hallucinations  4) Verbalize reduction of fear or anxiety to a manageable level.  5) Patient will report improved sleep and feeling rested upon waking.  6) Patient will verbalize their current medication; including dosage and time it is due.  7) Demonstrate participation in unit programming  8) Patient will demonstrate daily independence with ADL s.   9) Patient will verbalize persons outside the hospital they can call if needing assistance  10) Patient will have adequate daily oral intake.    Outcome: Therapy, progress toward functional goals is gradual  Illness Management Recovery model:  Ways to [place].    Patient identified the following specific strategies to cope with their symptoms:    1. Pt unable to indentify sx's  2.   3.

## 2017-12-17 PROCEDURE — 25000132 ZZH RX MED GY IP 250 OP 250 PS 637: Performed by: PSYCHIATRY & NEUROLOGY

## 2017-12-17 PROCEDURE — 25000132 ZZH RX MED GY IP 250 OP 250 PS 637: Performed by: EMERGENCY MEDICINE

## 2017-12-17 PROCEDURE — 25000132 ZZH RX MED GY IP 250 OP 250 PS 637: Performed by: PHYSICIAN ASSISTANT

## 2017-12-17 PROCEDURE — 25000132 ZZH RX MED GY IP 250 OP 250 PS 637: Performed by: NURSE PRACTITIONER

## 2017-12-17 PROCEDURE — 12400007 ZZH R&B MH INTERMEDIATE UMMC

## 2017-12-17 RX ADMIN — HALOPERIDOL 2 MG: 0.5 TABLET ORAL at 09:13

## 2017-12-17 RX ADMIN — FOLIC ACID 1 MG: 1 TABLET ORAL at 09:14

## 2017-12-17 RX ADMIN — GABAPENTIN 200 MG: 100 CAPSULE ORAL at 09:14

## 2017-12-17 RX ADMIN — RIFAXIMIN 550 MG: 550 TABLET ORAL at 09:14

## 2017-12-17 RX ADMIN — RISPERIDONE 2 MG: 2 TABLET ORAL at 19:53

## 2017-12-17 RX ADMIN — Medication 20 MG: at 09:13

## 2017-12-17 RX ADMIN — LACTULOSE 30 G: 10 POWDER, FOR SOLUTION ORAL at 19:53

## 2017-12-17 RX ADMIN — LACTULOSE 30 G: 10 POWDER, FOR SOLUTION ORAL at 09:13

## 2017-12-17 RX ADMIN — HALOPERIDOL 2 MG: 0.5 TABLET ORAL at 19:54

## 2017-12-17 RX ADMIN — SPIRONOLACTONE 50 MG: 50 TABLET ORAL at 09:14

## 2017-12-17 RX ADMIN — RIFAXIMIN 550 MG: 550 TABLET ORAL at 19:54

## 2017-12-17 RX ADMIN — MULTIPLE VITAMINS W/ MINERALS TAB 1 TABLET: TAB at 09:14

## 2017-12-17 RX ADMIN — RISPERIDONE 1 MG: 1 TABLET ORAL at 09:14

## 2017-12-17 RX ADMIN — HALOPERIDOL 0.5 MG: 0.5 TABLET ORAL at 09:14

## 2017-12-17 RX ADMIN — LEVOCARNITINE 660 MG: 330 TABLET ORAL at 09:13

## 2017-12-17 RX ADMIN — PANTOPRAZOLE SODIUM 40 MG: 40 TABLET, DELAYED RELEASE ORAL at 09:14

## 2017-12-17 RX ADMIN — FERROUS GLUCONATE 324 MG: 324 TABLET ORAL at 09:14

## 2017-12-17 RX ADMIN — HALOPERIDOL 0.5 MG: 0.5 TABLET ORAL at 19:54

## 2017-12-17 RX ADMIN — GABAPENTIN 200 MG: 100 CAPSULE ORAL at 14:33

## 2017-12-17 RX ADMIN — LEVOCARNITINE 660 MG: 330 TABLET ORAL at 19:54

## 2017-12-17 RX ADMIN — GABAPENTIN 200 MG: 100 CAPSULE ORAL at 19:53

## 2017-12-17 RX ADMIN — LACTULOSE 30 G: 10 POWDER, FOR SOLUTION ORAL at 14:33

## 2017-12-17 ASSESSMENT — ACTIVITIES OF DAILY LIVING (ADL)
DRESS: INDEPENDENT
ORAL_HYGIENE: PROMPTS
LAUNDRY: WITH SUPERVISION
GROOMING: PROMPTS

## 2017-12-17 NOTE — PROGRESS NOTES
"   12/16/17 6469   Behavioral Health   Hallucinations appears responding   Thinking delusional;distractable   Orientation person: oriented;place: oriented   Memory confabulation   Insight poor   Judgement impaired   Eye Contact at examiner   Affect angry;tense   Mood labile   Physical Appearance/Attire attire appropriate to age and situation   Hygiene well groomed   Suicidality other (see comments)  (Denied)   Self Injury other (see comment)  (Denied)   Elopement (None observed)   Activity isolative   Speech coherent;clear   Medication Sensitivity no observed side effects   Psychomotor / Gait balanced;steady  (Uses a walker )   Psycho Education   Type of Intervention 1:1 intervention   Response participates, initiates socially appropriate   Hours 0.5   Activities of Daily Living   Hygiene/Grooming independent   Oral Hygiene independent   Dress independent   Laundry with supervision   Room Organization independent   Behavioral Health Interventions   Psychotic Symptoms provide emotional support;establish therapeutic relationship;build upon strengths;reality orientation;redirection of aggressive behaviors   Social and Therapeutic Interventions (Psychotic Symptoms) encourage participation in therapeutic groups and milieu activities     Pt was isolative in her room for the majority of the shift. Pt only came out of her room for snack and dinner. Pt was heard yelling in her room. When staff approached her and asked her who is she talking to, pt replied by saying, \"this f**mehdi b**silke Johnson.\" Pt was also observed picking dust from the air. Pt reported no thoughts of SI and SIB.   "

## 2017-12-18 PROCEDURE — 25000132 ZZH RX MED GY IP 250 OP 250 PS 637: Performed by: PSYCHIATRY & NEUROLOGY

## 2017-12-18 PROCEDURE — 25000132 ZZH RX MED GY IP 250 OP 250 PS 637: Performed by: PHYSICIAN ASSISTANT

## 2017-12-18 PROCEDURE — 99231 SBSQ HOSP IP/OBS SF/LOW 25: CPT | Performed by: PSYCHIATRY & NEUROLOGY

## 2017-12-18 PROCEDURE — 25000132 ZZH RX MED GY IP 250 OP 250 PS 637: Performed by: EMERGENCY MEDICINE

## 2017-12-18 PROCEDURE — 25000132 ZZH RX MED GY IP 250 OP 250 PS 637: Performed by: NURSE PRACTITIONER

## 2017-12-18 PROCEDURE — 12400007 ZZH R&B MH INTERMEDIATE UMMC

## 2017-12-18 RX ORDER — LEVOCARNITINE 330 MG/1
990 TABLET ORAL 2 TIMES DAILY
Status: DISCONTINUED | OUTPATIENT
Start: 2017-12-18 | End: 2018-01-02 | Stop reason: HOSPADM

## 2017-12-18 RX ADMIN — GABAPENTIN 200 MG: 100 CAPSULE ORAL at 14:37

## 2017-12-18 RX ADMIN — GABAPENTIN 200 MG: 100 CAPSULE ORAL at 20:54

## 2017-12-18 RX ADMIN — FERROUS GLUCONATE 324 MG: 324 TABLET ORAL at 09:01

## 2017-12-18 RX ADMIN — HALOPERIDOL 0.5 MG: 0.5 TABLET ORAL at 21:01

## 2017-12-18 RX ADMIN — LEVOCARNITINE 990 MG: 330 TABLET ORAL at 20:54

## 2017-12-18 RX ADMIN — LACTULOSE 30 G: 10 POWDER, FOR SOLUTION ORAL at 09:00

## 2017-12-18 RX ADMIN — LACTULOSE 30 G: 10 POWDER, FOR SOLUTION ORAL at 14:37

## 2017-12-18 RX ADMIN — SPIRONOLACTONE 50 MG: 50 TABLET ORAL at 09:01

## 2017-12-18 RX ADMIN — MULTIPLE VITAMINS W/ MINERALS TAB 1 TABLET: TAB at 09:01

## 2017-12-18 RX ADMIN — LEVOCARNITINE 660 MG: 330 TABLET ORAL at 09:01

## 2017-12-18 RX ADMIN — RIFAXIMIN 550 MG: 550 TABLET ORAL at 09:00

## 2017-12-18 RX ADMIN — RISPERIDONE 2 MG: 2 TABLET ORAL at 20:54

## 2017-12-18 RX ADMIN — HALOPERIDOL 2 MG: 0.5 TABLET ORAL at 09:01

## 2017-12-18 RX ADMIN — FOLIC ACID 1 MG: 1 TABLET ORAL at 09:03

## 2017-12-18 RX ADMIN — RISPERIDONE 1 MG: 1 TABLET ORAL at 09:01

## 2017-12-18 RX ADMIN — Medication 20 MG: at 09:00

## 2017-12-18 RX ADMIN — GABAPENTIN 200 MG: 100 CAPSULE ORAL at 09:00

## 2017-12-18 RX ADMIN — HALOPERIDOL 0.5 MG: 0.5 TABLET ORAL at 09:00

## 2017-12-18 RX ADMIN — PANTOPRAZOLE SODIUM 40 MG: 40 TABLET, DELAYED RELEASE ORAL at 09:01

## 2017-12-18 RX ADMIN — HALOPERIDOL 2 MG: 0.5 TABLET ORAL at 20:54

## 2017-12-18 RX ADMIN — LACTULOSE 30 G: 10 POWDER, FOR SOLUTION ORAL at 20:54

## 2017-12-18 RX ADMIN — RIFAXIMIN 550 MG: 550 TABLET ORAL at 20:54

## 2017-12-18 ASSESSMENT — ACTIVITIES OF DAILY LIVING (ADL)
GROOMING: INDEPENDENT
ORAL_HYGIENE: INDEPENDENT
LAUNDRY: WITH SUPERVISION
DRESS: INDEPENDENT;STREET CLOTHES

## 2017-12-18 NOTE — PROGRESS NOTES
"    [facility], [place]   Psychiatric Progress Note        Interim History:     Subjective: \"When can I go home?\"       As per today's interview: Patient seen in her room, resting. She denied any imminent issues today. I tested her memory, with a quick cognitive screen, and she was oriented to person, place, month, and year. She was able to spell world forward, but made a mistake spelling it backwards. She was able to go serial 7s. She was able to have short term recall after 5 mins. Denied any issues with her medications. Denied any SI/HI/AH/VH.          Medications:       levOCARNitine  990 mg Oral BID     haloperidol  2 mg Oral BID    And     haloperidol  0.5 mg Oral BID     gabapentin  200 mg Oral TID     ferrous gluconate  324 mg Oral Daily with breakfast     lactulose  30 g Oral TID     risperiDONE  1 mg Oral QAM     risperiDONE  2 mg Oral At Bedtime     pantoprazole  40 mg Oral Daily     multivitamin, therapeutic with minerals  1 tablet Oral Daily     folic acid  1 mg Oral Daily     rifaximin  550 mg Oral BID     spironolactone  50 mg Oral Daily     furosemide  20 mg Oral Daily          Allergies:     Allergies   Allergen Reactions     Acetaminophen      Ambien [Zolpidem Tartrate] Other (See Comments)     Sleep walks and eats     Ciprofloxacin Other (See Comments)     Seizure.     Citalopram Nausea and Vomiting          Labs:     No results found for this or any previous visit (from the past 24 hour(s)).       Psychiatric Examination:     Vitals:    12/11/17 2020 12/12/17 2016 12/15/17 0940 12/16/17 0901   BP: 111/71 99/57     BP Location:   Right arm    Pulse: 76 66     Resp:   16 16   Temp:   97  F (36.1  C) 95.6  F (35.3  C)   TempSrc:       SpO2:       Weight:       Height:                       Lying Orthostatic BP: 85/47         Sitting Orthostatic BP: 106/58         Standing Orthostatic BP: 112/71     Appearance: awake, but somnolent adequately groomed, appeared about " "stated age and no apparent distress  Attitude: more cooperative and less guarded  Eye Contact: poor  Mood:  \"fine\"  Affect:  intensity is blunted  Speech:  clear, coherent and slow  Psychomotor Behavior:  no evidence of tardive dyskinesia, dystonia, or tics  Throught Process:  goal oriented  Associations:  no loose associations  Thought Content:  no evidence of suicidal ideation or homicidal ideation, auditory hallucinations are present, Visual hallucinations are present off and on, delusions are present.   Insight:  limited  Judgement:  limited  Oriented to:  time, person, and place  Attention Span and Concentration:  fair  Recent and Remote Memory:  fair         Precautions:     Behavioral Orders   Procedures     Code 2     Elopement precautions     Neuropsych Testing     Dr. Corbin will be contacted regarding neurospsych testing.     Routine Programming     As clinically indicated     Seizure precautions     Status 15     Every 15 minutes.          Diagnoses:     Cognitive Disorder NOS.   Encephalopathy probably multifactorial such as chronic hepatic encephalopathy, organic brain damage due to encephalopathy, alcohol drinking, possibly underlying psychotic illness; possibly head injury contributes to patient's symptoms.   Alcohol use disorder.   amphetamine dependence vs abuse.  She meets criteria for Major depressive disorder, moderate severity.          Plan:     Assessment:     (12/14): Regarding my mini cognitive screen, she seemed to improve in her short term memory (which was 3/3 recall in 6 min). Notes indicate she still harbors some delusions, which could be related to her ongoing hepatic encepahlopathy. I ordered new CBC/WBC/Ammonia levels today, which revealed some improvements in platelets (76 to 81), and ammonia level (96 down to 69). I will start her on L-carnitine (Levocarnitine), which has empirical evidence in lowering ammonia levels (and subsequent neuronal transmission) in patients with liver " cirrhosis. Randomized/double blind studies exist: https://www.ncbi.nlm.nih.gov/pmc/articles/ZSE1595145/. I will start 660 mg BID today, and quickly titrate up to 990 mg BID. Will retake ammonia level next week.     (12/18): No significant change today. Patient mildly interactive during interview, and was adequately oriented. Denied any intolerable side effects from her medications. Compliant with medications. Will increase levocarnitine level tonight. It seems that a neuropsych evaluation will give us a good idea of her processing, memory, and any other cognitive deficits. This evaluation would also help us on what services she would qualify for (such as memory care), in regards to her disposition/placement.     Medications:  - Increase to Levocarnitine 990 mg BID  - Gabapentin 200 mg TID  - Haldol 2 mg BID and 0.5 mg BID  - Lactulose 30 g TID  - Risperdal 1 mg AM, 2 mg HS  - Spironolactone 50 mg Daily     Other Orders:  - Neuropsychology Testing (Dr. Corbin was messaged today about consult)       Legal Status and Disposition:  --  Under full MICD commitment.       Andrés Hinojosa MD  [facility] Psychiatry    Spent 20   minutes on encounter, >50% of which was spent in counseling and/or coordination of care, consisting of taking history, reviewing system, and discussing medication and side effects

## 2017-12-18 NOTE — PROGRESS NOTES
12/18/17 1400   Behavioral Health   Hallucinations denies / not responding to hallucinations   Orientation place: oriented;date: oriented;time: oriented   Insight poor   Judgement impaired   Affect blunted, flat   Mood mood is calm   Physical Appearance/Attire attire appropriate to age and situation   Hygiene well groomed   1. Wish to be Dead No   2. Non-Specific Active Suicidal Thoughts  No   Self Injury other (see comment)  (Denies.)   Elopement (Does not have th e skills necessary to elope.)   Activity isolative;withdrawn;other (see comment)  (Out of room for meals only.)   Speech clear;other (see comments)  (slow to process information.)   Medication Sensitivity no stated side effects;no observed side effects   Psychomotor / Gait slow;balanced;steady

## 2017-12-18 NOTE — PROGRESS NOTES
I faxed a letter to the 's office asking for [facility] to be removed from the court order and to add [facility].  NH staff said pt would  not have access to a memory care unit unless there was a formal diagnosis of dementia. MD said he would order neuropsych testing from Dr. Corbin.

## 2017-12-18 NOTE — PROGRESS NOTES
I contacted [facility] [place] (who bought [facility] in [place]) at their main administrative number [phone] and was provided with contact info for the liaison for the [facility]. Tracie Escalona, patient transition liaison, [phone] (cell).      I made a referral for Mildred, providing name, MRN, , and social security number. Tracie is able to pull files from Epic to get the rest of the information necessary for the referral. I did fax Tracie commitment paperwork, including the letter Danielle Flores [facility], has submitted to [facility] Attorney's Office requesting [facility] be dropped from the commitment and [facility] be added to the commitment order so that [facility] can discharge the patient provisionally. Fax number for Tracie is [phone].    Tracie will send records to the [facility] in [place] as well as [facility] in [place]. They both have locked behavioral health units for those with elopement risk. Mildred would not be appropriate for a memory care unit due to not having a formal dementia diagnosis. She believes there is availability at [facility] and is considering that as first priority.      I attempted to meet with Mildred to provide her with an update about where things are at with discharge planning. She was resting in bed and requested I come back later.     I contacted Raya Alexis at [facility] to check waitlist status for Mildred. There has been no movement there. She suggested that I try [facility], [facility] or [facility]. Wants us to keep her updated. I asked if [facility] could manage her medical needs there. Raya indicated that they cannot provide complex medical care such as IV's, etc., but can do blood draw and monitoring. They typically take patients to [facility] for additional medical needs.     I met with Mildred and asked if she would be willing to sign  "JENNIFER's for [facility] and for [place] facilities (her mental health 's supervisor recommended [place]). She said \"I just want to go home. I want to go to [facility]'s home\". I informed her that I'm told that this home is no longer available and she cannot go there. She ultimately was willing to sign the JENNIFER's after some hesitancy about this.     Ubaldo Scott contact info:  L[place], intake   [phone]  Fax [phone]  I left a message requesting that Sharon call me back regarding a potential referral.   "

## 2017-12-18 NOTE — PLAN OF CARE
"Problem: Psychotic Symptoms  Goal: Psychotic Symptoms  Signs and symptoms of listed problems will be absent or manageable.   Patient will verbalize reduction in AH/VH  Patient will verbalize rationale for medication compliance  Patient's speech content will be absent of paranoid/delusional content.   Outcome: No Change  Mildred's daily activities have remained unchanged. She has spent the majority of this shift resting on her bed in her darkened room. She is able to identify she is in the hospital and what time of day it is. She did come out of her room around 6pm looking for her supper knowning that it was late. She has a flat, blunted affect. Has been staring a lot today, has been medication compliant. Has had an episode this evening, as she usually does of yelling out at \"Elizabeth\"   She is non social, needs prompts to do adl's.       "

## 2017-12-19 PROCEDURE — 25000132 ZZH RX MED GY IP 250 OP 250 PS 637: Performed by: PSYCHIATRY & NEUROLOGY

## 2017-12-19 PROCEDURE — 25000132 ZZH RX MED GY IP 250 OP 250 PS 637: Performed by: EMERGENCY MEDICINE

## 2017-12-19 PROCEDURE — 25000132 ZZH RX MED GY IP 250 OP 250 PS 637: Performed by: PHYSICIAN ASSISTANT

## 2017-12-19 PROCEDURE — 12400007 ZZH R&B MH INTERMEDIATE UMMC

## 2017-12-19 PROCEDURE — 99231 SBSQ HOSP IP/OBS SF/LOW 25: CPT | Performed by: PSYCHIATRY & NEUROLOGY

## 2017-12-19 PROCEDURE — 25000132 ZZH RX MED GY IP 250 OP 250 PS 637: Performed by: NURSE PRACTITIONER

## 2017-12-19 RX ADMIN — HALOPERIDOL 2 MG: 0.5 TABLET ORAL at 09:37

## 2017-12-19 RX ADMIN — FERROUS GLUCONATE 324 MG: 324 TABLET ORAL at 09:36

## 2017-12-19 RX ADMIN — HALOPERIDOL 0.5 MG: 0.5 TABLET ORAL at 09:36

## 2017-12-19 RX ADMIN — LACTULOSE 30 G: 10 POWDER, FOR SOLUTION ORAL at 14:16

## 2017-12-19 RX ADMIN — RISPERIDONE 2 MG: 2 TABLET ORAL at 20:13

## 2017-12-19 RX ADMIN — GABAPENTIN 200 MG: 100 CAPSULE ORAL at 14:13

## 2017-12-19 RX ADMIN — Medication 20 MG: at 09:35

## 2017-12-19 RX ADMIN — RISPERIDONE 1 MG: 1 TABLET ORAL at 09:35

## 2017-12-19 RX ADMIN — SPIRONOLACTONE 50 MG: 50 TABLET ORAL at 09:35

## 2017-12-19 RX ADMIN — GABAPENTIN 200 MG: 100 CAPSULE ORAL at 09:35

## 2017-12-19 RX ADMIN — LACTULOSE 30 G: 10 POWDER, FOR SOLUTION ORAL at 09:37

## 2017-12-19 RX ADMIN — LEVOCARNITINE 990 MG: 330 TABLET ORAL at 20:13

## 2017-12-19 RX ADMIN — LACTULOSE 30 G: 10 POWDER, FOR SOLUTION ORAL at 20:12

## 2017-12-19 RX ADMIN — FOLIC ACID 1 MG: 1 TABLET ORAL at 09:36

## 2017-12-19 RX ADMIN — RIFAXIMIN 550 MG: 550 TABLET ORAL at 20:13

## 2017-12-19 RX ADMIN — HALOPERIDOL 2 MG: 0.5 TABLET ORAL at 20:14

## 2017-12-19 RX ADMIN — GABAPENTIN 200 MG: 100 CAPSULE ORAL at 20:13

## 2017-12-19 RX ADMIN — HALOPERIDOL 0.5 MG: 0.5 TABLET ORAL at 20:14

## 2017-12-19 RX ADMIN — PANTOPRAZOLE SODIUM 40 MG: 40 TABLET, DELAYED RELEASE ORAL at 09:35

## 2017-12-19 RX ADMIN — MULTIPLE VITAMINS W/ MINERALS TAB 1 TABLET: TAB at 09:35

## 2017-12-19 RX ADMIN — RIFAXIMIN 550 MG: 550 TABLET ORAL at 09:35

## 2017-12-19 RX ADMIN — LEVOCARNITINE 990 MG: 330 TABLET ORAL at 09:36

## 2017-12-19 ASSESSMENT — ACTIVITIES OF DAILY LIVING (ADL)
ORAL_HYGIENE: INDEPENDENT
GROOMING: INDEPENDENT
LAUNDRY: WITH SUPERVISION
DRESS: INDEPENDENT

## 2017-12-19 NOTE — PROGRESS NOTES
"    [facility], [place]   Psychiatric Progress Note        Interim History:     Subjective: \"When can I go home? I want to go home\"       As per today's interview: Patient seen in her room, resting. She was anxious about when she would leave, and felt that she had been in the hospital for a long time. Denied any issues with her medications. Denied any SI/HI/AH/VH.          Medications:       levOCARNitine  990 mg Oral BID     haloperidol  2 mg Oral BID    And     haloperidol  0.5 mg Oral BID     gabapentin  200 mg Oral TID     ferrous gluconate  324 mg Oral Daily with breakfast     lactulose  30 g Oral TID     risperiDONE  1 mg Oral QAM     risperiDONE  2 mg Oral At Bedtime     pantoprazole  40 mg Oral Daily     multivitamin, therapeutic with minerals  1 tablet Oral Daily     folic acid  1 mg Oral Daily     rifaximin  550 mg Oral BID     spironolactone  50 mg Oral Daily     furosemide  20 mg Oral Daily          Allergies:     Allergies   Allergen Reactions     Acetaminophen      Ambien [Zolpidem Tartrate] Other (See Comments)     Sleep walks and eats     Ciprofloxacin Other (See Comments)     Seizure.     Citalopram Nausea and Vomiting          Labs:     No results found for this or any previous visit (from the past 24 hour(s)).       Psychiatric Examination:     Vitals:    12/11/17 2020 12/12/17 2016 12/15/17 0940 12/16/17 0901   BP: 111/71 99/57     BP Location:   Right arm    Pulse: 76 66     Resp:   16 16   Temp:   97  F (36.1  C) 95.6  F (35.3  C)   TempSrc:       SpO2:       Weight:       Height:                       Lying Orthostatic BP: 85/47         Sitting Orthostatic BP: 106/58         Standing Orthostatic BP: 112/71     Appearance: awake, but somnolent adequately groomed, appeared about stated age and no apparent distress  Attitude: more cooperative and less guarded  Eye Contact: poor  Mood:  \"fine\"  Affect:  intensity is blunted  Speech:  clear, coherent and " slow  Psychomotor Behavior:  no evidence of tardive dyskinesia, dystonia, or tics  Throught Process:  goal oriented  Associations:  no loose associations  Thought Content:  no evidence of suicidal ideation or homicidal ideation, auditory hallucinations are present, Visual hallucinations are present off and on, delusions are present.   Insight:  limited  Judgement:  limited  Oriented to:  time, person, and place  Attention Span and Concentration:  fair  Recent and Remote Memory:  fair         Precautions:     Behavioral Orders   Procedures     Code 2     Elopement precautions     Neuropsych Testing     Dr. Corbin will be contacted regarding neurospsych testing.     Routine Programming     As clinically indicated     Seizure precautions     Status 15     Every 15 minutes.          Diagnoses:     Cognitive Disorder NOS.   Encephalopathy probably multifactorial such as chronic hepatic encephalopathy, organic brain damage due to encephalopathy, alcohol drinking, possibly underlying psychotic illness; possibly head injury contributes to patient's symptoms.   Alcohol use disorder.   amphetamine dependence vs abuse.  She meets criteria for Major depressive disorder, moderate severity.          Plan:     Assessment:     (12/14): Regarding my mini cognitive screen, she seemed to improve in her short term memory (which was 3/3 recall in 6 min). Notes indicate she still harbors some delusions, which could be related to her ongoing hepatic encepahlopathy. I ordered new CBC/WBC/Ammonia levels today, which revealed some improvements in platelets (76 to 81), and ammonia level (96 down to 69). I will start her on L-carnitine (Levocarnitine), which has empirical evidence in lowering ammonia levels (and subsequent neuronal transmission) in patients with liver cirrhosis. Randomized/double blind studies exist: https://www.ncbi.nlm.nih.gov/pmc/articles/ABA9414385/. I will start 660 mg BID today, and quickly titrate up to 990 mg BID. Will  retake ammonia level next week.     Update (12/19): No significant change today, but anxious about her prolonged hospital stay. Patient mildly interactive during interview, and was adequately oriented. Denied any intolerable side effects from her medications. Compliant with medications. Will increase levocarnitine level tonight. She seems to have refused neurospsych testing and could not give a coherent reason why she had refused.     Medications:  - Increase to Levocarnitine 990 mg BID  - Gabapentin 200 mg TID  - Haldol 2 mg BID and 0.5 mg BID  - Lactulose 30 g TID  - Risperdal 1 mg AM, 2 mg HS  - Spironolactone 50 mg Daily             Legal Status and Disposition:  --  Under full MICD commitment.       Andrés Hinojosa MD  [facility]    Spent 20   minutes on encounter, >50% of which was spent in counseling and/or coordination of care, consisting of taking history, reviewing system, and discussing medication and side effects

## 2017-12-19 NOTE — PLAN OF CARE
"Problem: Psychotic Symptoms  Goal: Psychotic Symptoms  Signs and symptoms of listed problems will be absent or manageable.   Patient will verbalize reduction in AH/VH  Patient will verbalize rationale for medication compliance  Patient's speech content will be absent of paranoid/delusional content.   Outcome: No Change  Patient breaking down and crying saying that she has been her so long and needs to go home. Has remained in her room except for meals. Has been complaint with medication. Ate at lunchtime. Declined to agree to neuropsychic testing. Approached staff late morning in the hallway stated,\"Can you open the door? Juliane told me I was leaving.\" Patient was informed that there was no staff by the name of Theres and there were no plans for her to leave today.Patient accepted this statement. Agreed that she felt depressed and sad today.      "

## 2017-12-19 NOTE — PROGRESS NOTES
12/18/17 2229   Behavioral Health   Hallucinations denies / not responding to hallucinations   Thinking poor concentration   Orientation place: oriented;person: oriented;situation, disoriented   Memory other (see comment)  (ua)   Insight poor;denial of illness   Judgement impaired   Eye Contact at examiner;staring   Affect blunted, flat   Mood mood is calm   Physical Appearance/Attire neat;attire appropriate to age and situation;appears stated age   Hygiene well groomed   Suicidality other (see comments)  (Denies)   Self Injury other (see comment)  (Denies )   Activity withdrawn;other (see comment)  (meals)   Speech clear;coherent   Medication Sensitivity no observed side effects;no stated side effects   Psychomotor / Gait steady;slow  (walker)     Pt.'s concerns is only thing doing here is eating, watching tv, and relaxation group. Pt. Denies mental health.

## 2017-12-19 NOTE — PROGRESS NOTES
Dr. Hinojosa and RN states that Dr. Corbin had called to see if pt was going to cooperate with the neuropsych testing and pt said no.    I called the 's Office about the letter I sent yesterday asking for the order to be amended to drop the [facility] and add [facility] so we can discharge the pt. They have received the letter and have contacted the pt's  per protocol. He will have to agree to move forward.    I have reviewed the records. There are medicine notes that speak about competency, getting a guardian, getting a palliative care evaluation.     I have received a call from Tracie at [facility] @[phone] who states that they are agreeable to admitting this pt to a locked behavioral unit. [facility] in [place].    They want a provisional discharge back to here.  They want admission next week when a key staff will be available.    Tracie asks if the pt would be agreeable to this and we will work with her on acceptance.    Tracie says that [facility] needs to do a Level 2 screening.    We talked about a decision maker and that there is no family to be guardian.

## 2017-12-20 PROCEDURE — 25000132 ZZH RX MED GY IP 250 OP 250 PS 637: Performed by: PSYCHIATRY & NEUROLOGY

## 2017-12-20 PROCEDURE — 25000132 ZZH RX MED GY IP 250 OP 250 PS 637: Performed by: EMERGENCY MEDICINE

## 2017-12-20 PROCEDURE — 25000132 ZZH RX MED GY IP 250 OP 250 PS 637: Performed by: PHYSICIAN ASSISTANT

## 2017-12-20 PROCEDURE — 12400007 ZZH R&B MH INTERMEDIATE UMMC

## 2017-12-20 PROCEDURE — 25000132 ZZH RX MED GY IP 250 OP 250 PS 637: Performed by: NURSE PRACTITIONER

## 2017-12-20 RX ADMIN — LACTULOSE 30 G: 10 POWDER, FOR SOLUTION ORAL at 20:59

## 2017-12-20 RX ADMIN — RISPERIDONE 1 MG: 1 TABLET ORAL at 08:51

## 2017-12-20 RX ADMIN — HALOPERIDOL 0.5 MG: 0.5 TABLET ORAL at 08:51

## 2017-12-20 RX ADMIN — GABAPENTIN 200 MG: 100 CAPSULE ORAL at 20:58

## 2017-12-20 RX ADMIN — RIFAXIMIN 550 MG: 550 TABLET ORAL at 21:00

## 2017-12-20 RX ADMIN — GABAPENTIN 200 MG: 100 CAPSULE ORAL at 08:51

## 2017-12-20 RX ADMIN — Medication 20 MG: at 08:51

## 2017-12-20 RX ADMIN — PANTOPRAZOLE SODIUM 40 MG: 40 TABLET, DELAYED RELEASE ORAL at 08:51

## 2017-12-20 RX ADMIN — RIFAXIMIN 550 MG: 550 TABLET ORAL at 08:51

## 2017-12-20 RX ADMIN — HALOPERIDOL 2 MG: 0.5 TABLET ORAL at 20:59

## 2017-12-20 RX ADMIN — LEVOCARNITINE 990 MG: 330 TABLET ORAL at 20:59

## 2017-12-20 RX ADMIN — SPIRONOLACTONE 50 MG: 50 TABLET ORAL at 08:51

## 2017-12-20 RX ADMIN — HALOPERIDOL 0.5 MG: 0.5 TABLET ORAL at 20:59

## 2017-12-20 RX ADMIN — MULTIPLE VITAMINS W/ MINERALS TAB 1 TABLET: TAB at 08:49

## 2017-12-20 RX ADMIN — FOLIC ACID 1 MG: 1 TABLET ORAL at 08:50

## 2017-12-20 RX ADMIN — RISPERIDONE 2 MG: 2 TABLET ORAL at 21:00

## 2017-12-20 RX ADMIN — GABAPENTIN 200 MG: 100 CAPSULE ORAL at 14:57

## 2017-12-20 RX ADMIN — LEVOCARNITINE 990 MG: 330 TABLET ORAL at 08:50

## 2017-12-20 RX ADMIN — LACTULOSE 30 G: 10 POWDER, FOR SOLUTION ORAL at 08:49

## 2017-12-20 RX ADMIN — LACTULOSE 30 G: 10 POWDER, FOR SOLUTION ORAL at 14:57

## 2017-12-20 RX ADMIN — HALOPERIDOL 2 MG: 0.5 TABLET ORAL at 08:51

## 2017-12-20 RX ADMIN — FERROUS GLUCONATE 324 MG: 324 TABLET ORAL at 08:51

## 2017-12-20 ASSESSMENT — ACTIVITIES OF DAILY LIVING (ADL)
ORAL_HYGIENE: PROMPTS
ORAL_HYGIENE: PROMPTS;INDEPENDENT
LAUNDRY: UNABLE TO COMPLETE
GROOMING: PROMPTS
GROOMING: PROMPTS;INDEPENDENT
DRESS: STREET CLOTHES;INDEPENDENT
LAUNDRY: UNABLE TO COMPLETE
DRESS: INDEPENDENT;STREET CLOTHES

## 2017-12-20 NOTE — PROGRESS NOTES
"Pt was in her room most of the shift.  Pt denies anx, dep, SI,SIB.  Pt talked about having \"no idea how long I will have to be here.  No one tell's me anything!\"  Pt was asked in the morning to have her vitals taken b/4 breakfast but swore at staff + returned to her room.  Later in the afternoon pt returned to to the lounge + apologized to staff.    "

## 2017-12-20 NOTE — PROGRESS NOTES
"Patient had a calm shift.  She chose to take a very long bath.  After, she chose to rest in her room for the majority of the shift.  She woke up briefly and mentioned, \"Elizabeth,\" her imaginary person.  She asked Elizabeth to get off her bed.  She chose to eat a snack, check back in with staff and go to bed.  She stated her goal was to take a bath tonight.  She denies SI/SIB and said she feels good and sees no barriers to her discharge.  "

## 2017-12-21 PROCEDURE — 12400007 ZZH R&B MH INTERMEDIATE UMMC

## 2017-12-21 PROCEDURE — 25000132 ZZH RX MED GY IP 250 OP 250 PS 637: Performed by: PSYCHIATRY & NEUROLOGY

## 2017-12-21 PROCEDURE — 99232 SBSQ HOSP IP/OBS MODERATE 35: CPT | Performed by: PSYCHIATRY & NEUROLOGY

## 2017-12-21 PROCEDURE — 25000132 ZZH RX MED GY IP 250 OP 250 PS 637: Performed by: NURSE PRACTITIONER

## 2017-12-21 PROCEDURE — 99207 ZZC CDG-MDM COMPONENT: MEETS MODERATE - UP CODED: CPT | Performed by: PSYCHIATRY & NEUROLOGY

## 2017-12-21 PROCEDURE — 25000132 ZZH RX MED GY IP 250 OP 250 PS 637: Performed by: PHYSICIAN ASSISTANT

## 2017-12-21 PROCEDURE — 25000132 ZZH RX MED GY IP 250 OP 250 PS 637: Performed by: EMERGENCY MEDICINE

## 2017-12-21 RX ADMIN — HALOPERIDOL 2 MG: 0.5 TABLET ORAL at 20:55

## 2017-12-21 RX ADMIN — RIFAXIMIN 550 MG: 550 TABLET ORAL at 09:05

## 2017-12-21 RX ADMIN — LEVOCARNITINE 990 MG: 330 TABLET ORAL at 09:05

## 2017-12-21 RX ADMIN — FOLIC ACID 1 MG: 1 TABLET ORAL at 09:06

## 2017-12-21 RX ADMIN — HALOPERIDOL 2 MG: 0.5 TABLET ORAL at 09:06

## 2017-12-21 RX ADMIN — LACTULOSE 30 G: 10 POWDER, FOR SOLUTION ORAL at 09:05

## 2017-12-21 RX ADMIN — GABAPENTIN 200 MG: 100 CAPSULE ORAL at 09:06

## 2017-12-21 RX ADMIN — HALOPERIDOL 0.5 MG: 0.5 TABLET ORAL at 09:05

## 2017-12-21 RX ADMIN — GABAPENTIN 200 MG: 100 CAPSULE ORAL at 14:48

## 2017-12-21 RX ADMIN — RISPERIDONE 1 MG: 1 TABLET ORAL at 09:06

## 2017-12-21 RX ADMIN — PANTOPRAZOLE SODIUM 40 MG: 40 TABLET, DELAYED RELEASE ORAL at 09:05

## 2017-12-21 RX ADMIN — RISPERIDONE 2 MG: 2 TABLET ORAL at 20:55

## 2017-12-21 RX ADMIN — LACTULOSE 30 G: 10 POWDER, FOR SOLUTION ORAL at 14:48

## 2017-12-21 RX ADMIN — SPIRONOLACTONE 50 MG: 50 TABLET ORAL at 09:05

## 2017-12-21 RX ADMIN — GABAPENTIN 200 MG: 100 CAPSULE ORAL at 20:55

## 2017-12-21 RX ADMIN — HALOPERIDOL 0.5 MG: 0.5 TABLET ORAL at 21:00

## 2017-12-21 RX ADMIN — LACTULOSE 30 G: 10 POWDER, FOR SOLUTION ORAL at 20:54

## 2017-12-21 RX ADMIN — MULTIPLE VITAMINS W/ MINERALS TAB 1 TABLET: TAB at 09:05

## 2017-12-21 RX ADMIN — LEVOCARNITINE 990 MG: 330 TABLET ORAL at 20:54

## 2017-12-21 RX ADMIN — FERROUS GLUCONATE 324 MG: 324 TABLET ORAL at 09:05

## 2017-12-21 RX ADMIN — RIFAXIMIN 550 MG: 550 TABLET ORAL at 20:55

## 2017-12-21 RX ADMIN — Medication 20 MG: at 09:06

## 2017-12-21 ASSESSMENT — ACTIVITIES OF DAILY LIVING (ADL)
LAUNDRY: UNABLE TO COMPLETE
ORAL_HYGIENE: INDEPENDENT
DRESS: STREET CLOTHES
GROOMING: INDEPENDENT
ORAL_HYGIENE: INDEPENDENT
GROOMING: INDEPENDENT
DRESS: STREET CLOTHES;INDEPENDENT

## 2017-12-21 NOTE — PROGRESS NOTES
"  I received the court order from [facility] that adds us to the commitment.  I placed a copy for scanning and a copy in the chart.    Order is dated December 20, 2017   \"That respondent's continued commitment as mentally ill previously ordered herein on November 29, 2017 is amended so that she is committed as mentally ill to the [facility] and to the Commissioner of Human Services.\"    I have drafted the provisional discharge at [facility]'s request and faxed this and the new order to [facility] at FAx: [phone].    Shira From [facility] has asked me who the MD was will will write the orders and I gave her Dr. Hinojosa's name. She has completed the Level 2 screening.    I clarified with all parties that this is a nursing home and the payment is medical assistance, not CADI funding.  We discussed in team that we need to get the pt's buy in and  had this conversation with the pt and she agrees to the placement.    Dr. Hinojosa will need to write nursing home orders.  "

## 2017-12-21 NOTE — PROGRESS NOTES
12/21/17 1153   Behavioral Health   Hallucinations denies / not responding to hallucinations   Thinking other (see comment)  (denied all MH issues; ruminative on approach)   Orientation person: oriented;place: oriented   Memory baseline memory   Insight denial of illness   Judgement impaired   Eye Contact at examiner   Affect sad;tense;irritable   Mood depressed;irritable   Physical Appearance/Attire appears stated age;attire appropriate to age and situation   Hygiene other (see comment)  (adequate)   Suicidality (denied SI)   Self Injury other (see comment)  (denied SIB urges)   Elopement Statements about wanting to leave   Activity isolative;withdrawn   Speech clear;coherent   Psychomotor / Gait balanced;steady   Sleep/Rest/Relaxation   Day/Evening Time Hours napping;resting in bed   Number of hours napping (on/off throughout shift)   Number of hours resting in bed (on/off throughout shift)   Safety   Suicidality Status 15   Coping/Psychosocial   Verbalized Emotional State anger;frustration   Psycho Education   Type of Intervention 1:1 intervention   Response participates with encouragement   Hours 0.5   Treatment Detail current MH status   Activities of Daily Living   Hygiene/Grooming independent   Oral Hygiene independent   Dress street clothes;independent   Room Organization independent   Groups   Details no group involvement   Behavioral Health Interventions   Psychotic Symptoms maintain safety precautions;establish therapeutic relationship;assist with developing & utilizing healthy coping strategies;build upon strengths;monitor confusion, memory loss, decision making ability and reorient / intervent as needed   Social and Therapeutic Interventions (Psychotic Symptoms) encourage socialization with peers;encourage participation in therapeutic groups and milieu activities     Patient has isolated herself to her room and bed most of the shift so far.  No peer interaction or program involvement noted.   Cooperative with unit and personal care protocols.  Responsive to staff inquiries and interventions, however.  Observed mood was sad and affect tearful, but she continued to deny all acute psychopathology.  Stated that she was ready for D/C but that she wanted to go home not to the NH she was scheduled to go to in the near future.   Naveed Dyson   12/21/2017

## 2017-12-21 NOTE — PROGRESS NOTES
12/20/17 1863   Behavioral Health   Hallucinations visual;auditory   Thinking delusional;distractable   Orientation person: oriented;situation, disoriented   Memory new learning, recall loss   Insight poor;denial of illness   Judgement impaired   Eye Contact at examiner;into space;staring  (Up at ceiling )   Affect tense   Mood anxious;irritable   Physical Appearance/Attire neat;attire appropriate to age and situation;appears stated age   Hygiene well groomed   Suicidality other (see comments)  (Denies)   Self Injury other (see comment)  (Denies )   Elopement Loitering near exit doors;Trying handles of doors;Hypervigilance to activities on and off the unit;Statements about wanting to leave  (calls 911)   Activity withdrawn;isolative   Speech clear;coherent   Medication Sensitivity no observed side effects;no stated side effects   Psychomotor / Gait steady;balanced     Pt. appears to have visual and auditory hallucinations. Pt. Not attending groups. Pt. looks at ceiling and stares at times. Pt. Attempting to elope from unit. Pt. Appeared tense. Pt. Uses a walker. Pt. Was calling 911.

## 2017-12-21 NOTE — PROGRESS NOTES
"    [facility], [place]   Psychiatric Progress Note        Interim History:     Subjective: \"When can I go home? I want to go home\"       As per today's interview: Patient seen in her room, resting. She was anxious about when she would leave, and felt that she had been in the hospital for a long time. Patient was ok with going to a nursing home after I had described to her what and where it was. Denied any issues with her medications. Denied any SI/HI/AH/VH.          Medications:       levOCARNitine  990 mg Oral BID     haloperidol  2 mg Oral BID    And     haloperidol  0.5 mg Oral BID     gabapentin  200 mg Oral TID     ferrous gluconate  324 mg Oral Daily with breakfast     lactulose  30 g Oral TID     risperiDONE  1 mg Oral QAM     risperiDONE  2 mg Oral At Bedtime     pantoprazole  40 mg Oral Daily     multivitamin, therapeutic with minerals  1 tablet Oral Daily     folic acid  1 mg Oral Daily     rifaximin  550 mg Oral BID     spironolactone  50 mg Oral Daily     furosemide  20 mg Oral Daily          Allergies:     Allergies   Allergen Reactions     Acetaminophen      Ambien [Zolpidem Tartrate] Other (See Comments)     Sleep walks and eats     Ciprofloxacin Other (See Comments)     Seizure.     Citalopram Nausea and Vomiting          Labs:     No results found for this or any previous visit (from the past 24 hour(s)).       Psychiatric Examination:     Vitals:    12/15/17 0940 12/16/17 0901 12/20/17 0900 12/21/17 0700   BP:       BP Location: Right arm   Left arm   Pulse:       Resp: 16 16 12 16   Temp: 97  F (36.1  C) 95.6  F (35.3  C) 96.5  F (35.8  C) 96.5  F (35.8  C)   TempSrc:       SpO2:       Weight:    100.2 kg (221 lb)   Height:                       Lying Orthostatic BP: 85/47         Sitting Orthostatic BP: 106/58         Standing Orthostatic BP: 112/71     Appearance: awake, but somnolent adequately groomed, appeared about stated age and no apparent distress  Attitude: " "more cooperative and less guarded  Eye Contact: poor  Mood:  \"fine\"  Affect:  intensity is blunted  Speech:  clear, coherent and slow  Psychomotor Behavior:  no evidence of tardive dyskinesia, dystonia, or tics  Throught Process:  goal oriented  Associations:  no loose associations  Thought Content:  no evidence of suicidal ideation or homicidal ideation, auditory hallucinations are present, Visual hallucinations are present off and on, delusions are present.   Insight:  limited  Judgement:  limited  Oriented to:  time, person, and place  Attention Span and Concentration:  fair  Recent and Remote Memory:  fair         Precautions:     Behavioral Orders   Procedures     Code 2     Elopement precautions     Neuropsych Testing     Dr. Corbin will be contacted regarding neurospsych testing.     Routine Programming     As clinically indicated     Seizure precautions     Status 15     Every 15 minutes.          Diagnoses:     Cognitive Disorder NOS.   Encephalopathy probably multifactorial such as chronic hepatic encephalopathy, organic brain damage due to encephalopathy, alcohol drinking, possibly underlying psychotic illness; possibly head injury contributes to patient's symptoms.   Alcohol use disorder.   amphetamine dependence vs abuse.  She meets criteria for Major depressive disorder, moderate severity.          Plan:     Assessment:     Past Assessment: Regarding my mini cognitive screen, she seemed to improve in her short term memory (which was 3/3 recall in 6 min). Notes indicate she still harbors some delusions, which could be related to her ongoing hepatic encepahlopathy. I ordered new CBC/WBC/Ammonia levels today, which revealed some improvements in platelets (76 to 81), and ammonia level (96 down to 69). I will start her on L-carnitine (Levocarnitine), which has empirical evidence in lowering ammonia levels (and subsequent neuronal transmission) in patients with liver cirrhosis. Randomized/double blind studies " exist: https://www.ncbi.nlm.nih.gov/pmc/articles/PJC1574485/. I will start 660 mg BID today, and quickly titrate up to 990 mg BID. Will retake ammonia level next week.     Update (12/21): No significant change today, but anxious about her prolonged hospital stay. Patient mildly interactive during interview, and was adequately oriented. Denied any intolerable side effects from her medications. Compliant with medications. Will order basic labs (including Ammonia level) for tomorrow. Patient may discharge next week hopefully at a nursing home. I took time to explain to the patient her possible discharge to a nursing home, which she was accepting of.     Medications:  -  Levocarnitine 990 mg BID  - Gabapentin 200 mg TID  - Haldol 2 mg BID and 0.5 mg BID  - Lactulose 30 g TID  - Risperdal 1 mg AM, 2 mg HS  - Spironolactone 50 mg Daily     Labs:  - BMP, CBC, LFTs, Ammonia level (ordered for tomorrow morning).             Legal Status and Disposition:  --  Under full MICD commitment.       Andrés Hinojosa MD  [facility]    Spent 20   minutes on encounter, >50% of which was spent in counseling and/or coordination of care, consisting of taking history, reviewing system, and discussing medication and side effects

## 2017-12-22 LAB
ALBUMIN SERPL-MCNC: 2.7 G/DL (ref 3.4–5)
ALP SERPL-CCNC: 118 U/L (ref 40–150)
ALT SERPL W P-5'-P-CCNC: 35 U/L (ref 0–50)
AMMONIA PLAS-SCNC: 81 UMOL/L (ref 10–50)
ANION GAP SERPL CALCULATED.3IONS-SCNC: 7 MMOL/L (ref 3–14)
AST SERPL W P-5'-P-CCNC: 38 U/L (ref 0–45)
BASOPHILS # BLD AUTO: 0 10E9/L (ref 0–0.2)
BASOPHILS NFR BLD AUTO: 0.6 %
BILIRUB DIRECT SERPL-MCNC: 0.2 MG/DL (ref 0–0.2)
BILIRUB SERPL-MCNC: 0.7 MG/DL (ref 0.2–1.3)
BUN SERPL-MCNC: 14 MG/DL (ref 7–30)
CALCIUM SERPL-MCNC: 8.3 MG/DL (ref 8.5–10.1)
CHLORIDE SERPL-SCNC: 108 MMOL/L (ref 94–109)
CO2 SERPL-SCNC: 27 MMOL/L (ref 20–32)
CREAT SERPL-MCNC: 0.65 MG/DL (ref 0.52–1.04)
DIFFERENTIAL METHOD BLD: ABNORMAL
EOSINOPHIL # BLD AUTO: 0.2 10E9/L (ref 0–0.7)
EOSINOPHIL NFR BLD AUTO: 3 %
ERYTHROCYTE [DISTWIDTH] IN BLOOD BY AUTOMATED COUNT: 13.4 % (ref 10–15)
GFR SERPL CREATININE-BSD FRML MDRD: >90 ML/MIN/1.7M2
GLUCOSE SERPL-MCNC: 108 MG/DL (ref 70–99)
HCT VFR BLD AUTO: 38.5 % (ref 35–47)
HGB BLD-MCNC: 13.3 G/DL (ref 11.7–15.7)
IMM GRANULOCYTES # BLD: 0 10E9/L (ref 0–0.4)
IMM GRANULOCYTES NFR BLD: 0.2 %
LYMPHOCYTES # BLD AUTO: 1.5 10E9/L (ref 0.8–5.3)
LYMPHOCYTES NFR BLD AUTO: 27.6 %
MCH RBC QN AUTO: 31.8 PG (ref 26.5–33)
MCHC RBC AUTO-ENTMCNC: 34.5 G/DL (ref 31.5–36.5)
MCV RBC AUTO: 92 FL (ref 78–100)
MONOCYTES # BLD AUTO: 0.4 10E9/L (ref 0–1.3)
MONOCYTES NFR BLD AUTO: 7.4 %
NEUTROPHILS # BLD AUTO: 3.3 10E9/L (ref 1.6–8.3)
NEUTROPHILS NFR BLD AUTO: 61.2 %
NRBC # BLD AUTO: 0 10*3/UL
NRBC BLD AUTO-RTO: 0 /100
PLATELET # BLD AUTO: 73 10E9/L (ref 150–450)
POTASSIUM SERPL-SCNC: 3.7 MMOL/L (ref 3.4–5.3)
PROT SERPL-MCNC: 6.7 G/DL (ref 6.8–8.8)
RBC # BLD AUTO: 4.18 10E12/L (ref 3.8–5.2)
SODIUM SERPL-SCNC: 142 MMOL/L (ref 133–144)
WBC # BLD AUTO: 5.4 10E9/L (ref 4–11)

## 2017-12-22 PROCEDURE — 25000132 ZZH RX MED GY IP 250 OP 250 PS 637: Performed by: PSYCHIATRY & NEUROLOGY

## 2017-12-22 PROCEDURE — 80048 BASIC METABOLIC PNL TOTAL CA: CPT | Performed by: PSYCHIATRY & NEUROLOGY

## 2017-12-22 PROCEDURE — 12400007 ZZH R&B MH INTERMEDIATE UMMC

## 2017-12-22 PROCEDURE — 36415 COLL VENOUS BLD VENIPUNCTURE: CPT | Performed by: PSYCHIATRY & NEUROLOGY

## 2017-12-22 PROCEDURE — 99231 SBSQ HOSP IP/OBS SF/LOW 25: CPT | Performed by: PSYCHIATRY & NEUROLOGY

## 2017-12-22 PROCEDURE — 25000132 ZZH RX MED GY IP 250 OP 250 PS 637: Performed by: EMERGENCY MEDICINE

## 2017-12-22 PROCEDURE — 80076 HEPATIC FUNCTION PANEL: CPT | Performed by: PSYCHIATRY & NEUROLOGY

## 2017-12-22 PROCEDURE — 85025 COMPLETE CBC W/AUTO DIFF WBC: CPT | Performed by: PSYCHIATRY & NEUROLOGY

## 2017-12-22 PROCEDURE — 25000132 ZZH RX MED GY IP 250 OP 250 PS 637: Performed by: NURSE PRACTITIONER

## 2017-12-22 PROCEDURE — 25000132 ZZH RX MED GY IP 250 OP 250 PS 637: Performed by: PHYSICIAN ASSISTANT

## 2017-12-22 PROCEDURE — 82140 ASSAY OF AMMONIA: CPT | Performed by: PSYCHIATRY & NEUROLOGY

## 2017-12-22 RX ADMIN — HALOPERIDOL 0.5 MG: 0.5 TABLET ORAL at 09:34

## 2017-12-22 RX ADMIN — GABAPENTIN 200 MG: 100 CAPSULE ORAL at 20:35

## 2017-12-22 RX ADMIN — GABAPENTIN 200 MG: 100 CAPSULE ORAL at 09:34

## 2017-12-22 RX ADMIN — SPIRONOLACTONE 50 MG: 50 TABLET ORAL at 09:36

## 2017-12-22 RX ADMIN — RIFAXIMIN 550 MG: 550 TABLET ORAL at 09:34

## 2017-12-22 RX ADMIN — RISPERIDONE 1 MG: 1 TABLET ORAL at 09:34

## 2017-12-22 RX ADMIN — HALOPERIDOL 2 MG: 0.5 TABLET ORAL at 20:36

## 2017-12-22 RX ADMIN — OLANZAPINE 10 MG: 5 TABLET, FILM COATED ORAL at 16:08

## 2017-12-22 RX ADMIN — Medication 20 MG: at 09:35

## 2017-12-22 RX ADMIN — MULTIPLE VITAMINS W/ MINERALS TAB 1 TABLET: TAB at 09:34

## 2017-12-22 RX ADMIN — LACTULOSE 30 G: 10 POWDER, FOR SOLUTION ORAL at 20:35

## 2017-12-22 RX ADMIN — RISPERIDONE 2 MG: 2 TABLET ORAL at 20:36

## 2017-12-22 RX ADMIN — LEVOCARNITINE 990 MG: 330 TABLET ORAL at 09:35

## 2017-12-22 RX ADMIN — FERROUS GLUCONATE 324 MG: 324 TABLET ORAL at 09:36

## 2017-12-22 RX ADMIN — LACTULOSE 30 G: 10 POWDER, FOR SOLUTION ORAL at 09:33

## 2017-12-22 RX ADMIN — FOLIC ACID 1 MG: 1 TABLET ORAL at 09:36

## 2017-12-22 RX ADMIN — PANTOPRAZOLE SODIUM 40 MG: 40 TABLET, DELAYED RELEASE ORAL at 09:34

## 2017-12-22 RX ADMIN — LACTULOSE 30 G: 10 POWDER, FOR SOLUTION ORAL at 15:04

## 2017-12-22 RX ADMIN — LEVOCARNITINE 990 MG: 330 TABLET ORAL at 20:36

## 2017-12-22 RX ADMIN — RIFAXIMIN 550 MG: 550 TABLET ORAL at 20:35

## 2017-12-22 RX ADMIN — HALOPERIDOL 0.5 MG: 0.5 TABLET ORAL at 20:42

## 2017-12-22 RX ADMIN — GABAPENTIN 200 MG: 100 CAPSULE ORAL at 15:04

## 2017-12-22 RX ADMIN — HALOPERIDOL 2 MG: 0.5 TABLET ORAL at 09:36

## 2017-12-22 ASSESSMENT — ACTIVITIES OF DAILY LIVING (ADL)
DRESS: STREET CLOTHES;INDEPENDENT
GROOMING: INDEPENDENT
GROOMING: INDEPENDENT
ORAL_HYGIENE: INDEPENDENT
LAUNDRY: UNABLE TO COMPLETE
DRESS: INDEPENDENT
ORAL_HYGIENE: INDEPENDENT

## 2017-12-22 NOTE — PROGRESS NOTES
"Pt was in the milieu only for meals.  She denies anx, dep, SI, SIB.  Pt has no idea of her current tx plan.  \"I don't know what's going on.  No one tells me anything.\"  Pt wants to talk with [facility] to find out if there is any plan in place for her tx or d/c.    "

## 2017-12-22 NOTE — PROGRESS NOTES
"I spoke with Tracie at [facility] who stated that the placement, [facility], is reviewing the draft of the provisional discharge. She will update us if they request any changes so that it can be ready to go on the day of discharge.      Tracie asked if the Level 2 screening was completed and I told her it was. She asked if it \"triggered a Level 2\" because if so, that would require the [facility] to come to the hospital to do brief further analysis prior to admit to the nursing home. I viewed the copy of the assessment completed by Shira, [facility] , and it was unclear if a Level 2 was triggered. I contacted Shira at the [facility] to inquire further about this and she is out of the office until Tuesday. I requested that she call me back then.     Tracie requested that scripts be faxed to the [facility] at [phone] for their internal pharmacy to fill when Mildred discharges and I have passed this information on to Dr. Hinojosa.    I left a message for Janki Benítez, Shira's supervisor, on her cell [phone] and asked her if she knew further information about if the assessment triggered a Level 2. Passed on the information that I provided in Shira's voicemail as well.  I also did update her that Danielle [facility], spoke with Mildred's sister Yareli who verbalized she did want to be Mildred's rep payee. Requested a call back from Janki to myself or Danielle today. Janki called me back on her day off, stating she did not know about the Level 2 status and stated that Shira said in passing that her portion was completed and we would have to follow up on next steps. She suggested that I contact [facility] to get [facility] info and ask who does the Level 2 assessment.     I spoke with Tracie and she further explained the Level 2 assessment. It appears that Mildred's assessment did trigger a Level 2 due to mental health symptoms/diagnosis. Her [facility] , Shira, will need to come to the unit " to complete an OBRA II assessment which [facility]. This is required before admit to the facility. MARYBEL Thompson, will email Shira regarding this need.

## 2017-12-22 NOTE — PLAN OF CARE
Problem: Psychotic Symptoms  Goal: Psychotic Symptoms  Signs and symptoms of listed problems will be absent or manageable.   Patient will verbalize reduction in AH/VH  Patient will verbalize rationale for medication compliance  Patient's speech content will be absent of paranoid/delusional content.   Outcome: No Change  Patient had a good shift.  She was out of her room for a while before dinner and requested to pick out a movie.  Affect was blunted/flat.  Patient's sister visited this evening, and patient reported this went well.  Patient denies all psych symptoms.  Med compliant.

## 2017-12-23 PROCEDURE — 25000132 ZZH RX MED GY IP 250 OP 250 PS 637: Performed by: PSYCHIATRY & NEUROLOGY

## 2017-12-23 PROCEDURE — 12400007 ZZH R&B MH INTERMEDIATE UMMC

## 2017-12-23 PROCEDURE — 25000132 ZZH RX MED GY IP 250 OP 250 PS 637: Performed by: NURSE PRACTITIONER

## 2017-12-23 PROCEDURE — 25000132 ZZH RX MED GY IP 250 OP 250 PS 637: Performed by: EMERGENCY MEDICINE

## 2017-12-23 PROCEDURE — 25000132 ZZH RX MED GY IP 250 OP 250 PS 637: Performed by: PHYSICIAN ASSISTANT

## 2017-12-23 RX ADMIN — RISPERIDONE 1 MG: 1 TABLET ORAL at 09:44

## 2017-12-23 RX ADMIN — FERROUS GLUCONATE 324 MG: 324 TABLET ORAL at 09:44

## 2017-12-23 RX ADMIN — RISPERIDONE 2 MG: 2 TABLET ORAL at 22:47

## 2017-12-23 RX ADMIN — GABAPENTIN 200 MG: 100 CAPSULE ORAL at 09:43

## 2017-12-23 RX ADMIN — PANTOPRAZOLE SODIUM 40 MG: 40 TABLET, DELAYED RELEASE ORAL at 09:44

## 2017-12-23 RX ADMIN — GABAPENTIN 200 MG: 100 CAPSULE ORAL at 14:42

## 2017-12-23 RX ADMIN — HALOPERIDOL 0.5 MG: 0.5 TABLET ORAL at 09:44

## 2017-12-23 RX ADMIN — LACTULOSE 30 G: 10 POWDER, FOR SOLUTION ORAL at 14:42

## 2017-12-23 RX ADMIN — LEVOCARNITINE 660 MG: 330 TABLET ORAL at 22:46

## 2017-12-23 RX ADMIN — HALOPERIDOL 2 MG: 0.5 TABLET ORAL at 22:48

## 2017-12-23 RX ADMIN — FOLIC ACID 1 MG: 1 TABLET ORAL at 09:42

## 2017-12-23 RX ADMIN — RIFAXIMIN 550 MG: 550 TABLET ORAL at 22:48

## 2017-12-23 RX ADMIN — HALOPERIDOL 2 MG: 0.5 TABLET ORAL at 09:45

## 2017-12-23 RX ADMIN — MULTIPLE VITAMINS W/ MINERALS TAB 1 TABLET: TAB at 09:43

## 2017-12-23 RX ADMIN — LACTULOSE 30 G: 10 POWDER, FOR SOLUTION ORAL at 22:49

## 2017-12-23 RX ADMIN — GABAPENTIN 200 MG: 100 CAPSULE ORAL at 22:46

## 2017-12-23 RX ADMIN — LEVOCARNITINE 330 MG: 330 TABLET ORAL at 22:47

## 2017-12-23 RX ADMIN — HALOPERIDOL 0.5 MG: 0.5 TABLET ORAL at 22:49

## 2017-12-23 RX ADMIN — RIFAXIMIN 550 MG: 550 TABLET ORAL at 09:43

## 2017-12-23 RX ADMIN — LEVOCARNITINE 990 MG: 330 TABLET ORAL at 09:43

## 2017-12-23 RX ADMIN — Medication 20 MG: at 09:41

## 2017-12-23 RX ADMIN — LACTULOSE 30 G: 10 POWDER, FOR SOLUTION ORAL at 09:42

## 2017-12-23 RX ADMIN — SPIRONOLACTONE 50 MG: 50 TABLET ORAL at 09:43

## 2017-12-23 ASSESSMENT — ACTIVITIES OF DAILY LIVING (ADL)
ORAL_HYGIENE: INDEPENDENT
DRESS: INDEPENDENT
GROOMING: INDEPENDENT

## 2017-12-23 NOTE — PROGRESS NOTES
Pt isolative. At top of shift pt was crying stating she is to be home and not here. Pt family visited. Pt denies SI/SIB.     12/22/17 2204   Behavioral Health   Hallucinations denies / not responding to hallucinations   Thinking distractable;poor concentration   Orientation person: oriented   Memory baseline memory   Insight denial of illness;poor   Affect blunted, flat   Mood depressed   Suicidality other (see comments)  (pt denies)   Self Injury other (see comment)  (pt denies)   Activity isolative  (family visited)   Activities of Daily Living   Hygiene/Grooming independent   Oral Hygiene independent   Dress independent   Room Organization independent   Behavioral Health Interventions   Psychotic Symptoms maintain safety precautions;maintain safe secure environment   Social and Therapeutic Interventions (Psychotic Symptoms) encourage socialization with peers;encourage effective boundaries with peers;encourage participation in therapeutic groups and milieu activities

## 2017-12-23 NOTE — PROGRESS NOTES
"    [facility], [place]   Psychiatric Progress Note        Interim History:     Subjective: \"When can I go home? I want to go home\"       As per today's interview: Patient seen in her room, resting. Calm and coopeartive to speak with me. Patient was ok with going to a nursing home after I had described to her what and where it was. Denied any issues with her medications. Denied any SI/HI/AH/VH.          Medications:       levOCARNitine  990 mg Oral BID     haloperidol  2 mg Oral BID    And     haloperidol  0.5 mg Oral BID     gabapentin  200 mg Oral TID     ferrous gluconate  324 mg Oral Daily with breakfast     lactulose  30 g Oral TID     risperiDONE  1 mg Oral QAM     risperiDONE  2 mg Oral At Bedtime     pantoprazole  40 mg Oral Daily     multivitamin, therapeutic with minerals  1 tablet Oral Daily     folic acid  1 mg Oral Daily     rifaximin  550 mg Oral BID     spironolactone  50 mg Oral Daily     furosemide  20 mg Oral Daily          Allergies:     Allergies   Allergen Reactions     Acetaminophen      Ambien [Zolpidem Tartrate] Other (See Comments)     Sleep walks and eats     Ciprofloxacin Other (See Comments)     Seizure.     Citalopram Nausea and Vomiting          Labs:     Recent Results (from the past 24 hour(s))   Ammonia    Collection Time: 12/22/17  7:29 AM   Result Value Ref Range    Ammonia 81 (H) 10 - 50 umol/L   CBC with platelets differential    Collection Time: 12/22/17  7:29 AM   Result Value Ref Range    WBC 5.4 4.0 - 11.0 10e9/L    RBC Count 4.18 3.8 - 5.2 10e12/L    Hemoglobin 13.3 11.7 - 15.7 g/dL    Hematocrit 38.5 35.0 - 47.0 %    MCV 92 78 - 100 fl    MCH 31.8 26.5 - 33.0 pg    MCHC 34.5 31.5 - 36.5 g/dL    RDW 13.4 10.0 - 15.0 %    Platelet Count 73 (L) 150 - 450 10e9/L    Diff Method Automated Method     % Neutrophils 61.2 %    % Lymphocytes 27.6 %    % Monocytes 7.4 %    % Eosinophils 3.0 %    % Basophils 0.6 %    % Immature Granulocytes 0.2 %    " "Nucleated RBCs 0 0 /100    Absolute Neutrophil 3.3 1.6 - 8.3 10e9/L    Absolute Lymphocytes 1.5 0.8 - 5.3 10e9/L    Absolute Monocytes 0.4 0.0 - 1.3 10e9/L    Absolute Eosinophils 0.2 0.0 - 0.7 10e9/L    Absolute Basophils 0.0 0.0 - 0.2 10e9/L    Abs Immature Granulocytes 0.0 0 - 0.4 10e9/L    Absolute Nucleated RBC 0.0    Basic metabolic panel    Collection Time: 12/22/17  7:29 AM   Result Value Ref Range    Sodium 142 133 - 144 mmol/L    Potassium 3.7 3.4 - 5.3 mmol/L    Chloride 108 94 - 109 mmol/L    Carbon Dioxide 27 20 - 32 mmol/L    Anion Gap 7 3 - 14 mmol/L    Glucose 108 (H) 70 - 99 mg/dL    Urea Nitrogen 14 7 - 30 mg/dL    Creatinine 0.65 0.52 - 1.04 mg/dL    GFR Estimate >90 >60 mL/min/1.7m2    GFR Estimate If Black >90 >60 mL/min/1.7m2    Calcium 8.3 (L) 8.5 - 10.1 mg/dL   Hepatic panel    Collection Time: 12/22/17  7:29 AM   Result Value Ref Range    Bilirubin Direct 0.2 0.0 - 0.2 mg/dL    Bilirubin Total 0.7 0.2 - 1.3 mg/dL    Albumin 2.7 (L) 3.4 - 5.0 g/dL    Protein Total 6.7 (L) 6.8 - 8.8 g/dL    Alkaline Phosphatase 118 40 - 150 U/L    ALT 35 0 - 50 U/L    AST 38 0 - 45 U/L          Psychiatric Examination:     Vitals:    12/15/17 0940 12/16/17 0901 12/20/17 0900 12/21/17 0700   BP:       BP Location: Right arm   Left arm   Pulse:       Resp: 16 16 12 16   Temp: 97  F (36.1  C) 95.6  F (35.3  C) 96.5  F (35.8  C) 96.5  F (35.8  C)   TempSrc:       SpO2:       Weight:    100.2 kg (221 lb)   Height:                       Lying Orthostatic BP: 85/47         Sitting Orthostatic BP: 106/58         Standing Orthostatic BP: 112/71     Appearance: awake, but somnolent adequately groomed, appeared about stated age and no apparent distress  Attitude: more cooperative and less guarded  Eye Contact: poor  Mood:  \"fine\"  Affect:  intensity is blunted  Speech:  clear, coherent and slow  Psychomotor Behavior:  no evidence of tardive dyskinesia, dystonia, or tics  Throught Process:  goal oriented  Associations:  " no loose associations  Thought Content:  no evidence of suicidal ideation or homicidal ideation, auditory hallucinations are present, Visual hallucinations are present off and on, delusions are present.   Insight:  limited  Judgement:  limited  Oriented to:  time, person, and place  Attention Span and Concentration:  fair  Recent and Remote Memory:  fair         Precautions:     Behavioral Orders   Procedures     Code 2     Elopement precautions     Neuropsych Testing     Dr. Corbin will be contacted regarding neurospsych testing.     Routine Programming     As clinically indicated     Seizure precautions     Status 15     Every 15 minutes.          Diagnoses:     Cognitive Disorder NOS.   Encephalopathy probably multifactorial such as chronic hepatic encephalopathy, organic brain damage due to encephalopathy, alcohol drinking, possibly underlying psychotic illness; possibly head injury contributes to patient's symptoms.   Alcohol use disorder.   amphetamine dependence vs abuse.  She meets criteria for Major depressive disorder, moderate severity.          Plan:     Assessment:     Past Assessment: Regarding my mini cognitive screen, she seemed to improve in her short term memory (which was 3/3 recall in 6 min). Notes indicate she still harbors some delusions, which could be related to her ongoing hepatic encepahlopathy. I ordered new CBC/WBC/Ammonia levels today, which revealed some improvements in platelets (76 to 81), and ammonia level (96 down to 69). I will start her on L-carnitine (Levocarnitine), which has empirical evidence in lowering ammonia levels (and subsequent neuronal transmission) in patients with liver cirrhosis. Randomized/double blind studies exist: https://www.ncbi.nlm.nih.gov/pmc/articles/RDP4987366/. I will start 660 mg BID today, and quickly titrate up to 990 mg BID. Will retake ammonia level next week.     Update (12/22): No significant change today, but anxious about her prolonged hospital  stay. Patient mildly interactive during interview, and was adequately oriented. Denied any intolerable side effects from her medications. Compliant with medications. New labs show raising Ammonia level, along with decreasing platelet level. Patient may discharge next week hopefully at a nursing home. I took time to explain to the patient her possible discharge to a nursing home, which she was accepting of.     Medications:  -  Levocarnitine 990 mg BID  - Gabapentin 200 mg TID  - Haldol 2 mg BID and 0.5 mg BID  - Lactulose 30 g TID  - Risperdal 1 mg AM, 2 mg HS  - Spironolactone 50 mg Daily     Legal Status and Disposition:  --  Under full MICD commitment.       Andrés Hinojosa MD  [facility] Psychiatry    Spent 20   minutes on encounter, >50% of which was spent in counseling and/or coordination of care, consisting of taking history, reviewing system, and discussing medication and side effects

## 2017-12-23 NOTE — PLAN OF CARE
Problem: Psychotic Symptoms  Goal: Psychotic Symptoms  Signs and symptoms of listed problems will be absent or manageable.   Patient will verbalize reduction in AH/VH  Patient will verbalize rationale for medication compliance  Patient's speech content will be absent of paranoid/delusional content.   Outcome: Improving  Patient has been cooperative with her medication all day, and complaint. Patient shows a sense of humor at times. Was out for lunch, but otherwise spends all day in bed. Patient looking forward to seeing her sister, but her sister did not come for visiting. Patients mood is calm with a depressed affect.

## 2017-12-24 PROCEDURE — 25000132 ZZH RX MED GY IP 250 OP 250 PS 637: Performed by: PSYCHIATRY & NEUROLOGY

## 2017-12-24 PROCEDURE — 25000132 ZZH RX MED GY IP 250 OP 250 PS 637: Performed by: EMERGENCY MEDICINE

## 2017-12-24 PROCEDURE — 25000132 ZZH RX MED GY IP 250 OP 250 PS 637: Performed by: NURSE PRACTITIONER

## 2017-12-24 PROCEDURE — 12400007 ZZH R&B MH INTERMEDIATE UMMC

## 2017-12-24 PROCEDURE — 25000132 ZZH RX MED GY IP 250 OP 250 PS 637: Performed by: PHYSICIAN ASSISTANT

## 2017-12-24 RX ADMIN — HALOPERIDOL 2 MG: 0.5 TABLET ORAL at 11:23

## 2017-12-24 RX ADMIN — RIFAXIMIN 550 MG: 550 TABLET ORAL at 11:24

## 2017-12-24 RX ADMIN — PANTOPRAZOLE SODIUM 40 MG: 40 TABLET, DELAYED RELEASE ORAL at 11:22

## 2017-12-24 RX ADMIN — FERROUS GLUCONATE 324 MG: 324 TABLET ORAL at 11:24

## 2017-12-24 RX ADMIN — LEVOCARNITINE 990 MG: 330 TABLET ORAL at 21:37

## 2017-12-24 RX ADMIN — LACTULOSE 30 G: 10 POWDER, FOR SOLUTION ORAL at 14:23

## 2017-12-24 RX ADMIN — Medication 20 MG: at 11:22

## 2017-12-24 RX ADMIN — RISPERIDONE 1 MG: 1 TABLET ORAL at 11:24

## 2017-12-24 RX ADMIN — SPIRONOLACTONE 50 MG: 50 TABLET ORAL at 11:23

## 2017-12-24 RX ADMIN — HALOPERIDOL 0.5 MG: 0.5 TABLET ORAL at 11:25

## 2017-12-24 RX ADMIN — HALOPERIDOL 0.5 MG: 0.5 TABLET ORAL at 21:37

## 2017-12-24 RX ADMIN — FOLIC ACID 1 MG: 1 TABLET ORAL at 11:26

## 2017-12-24 RX ADMIN — GABAPENTIN 200 MG: 100 CAPSULE ORAL at 11:23

## 2017-12-24 RX ADMIN — GABAPENTIN 200 MG: 100 CAPSULE ORAL at 14:23

## 2017-12-24 RX ADMIN — LEVOCARNITINE 990 MG: 330 TABLET ORAL at 11:23

## 2017-12-24 RX ADMIN — GABAPENTIN 200 MG: 100 CAPSULE ORAL at 21:36

## 2017-12-24 RX ADMIN — RISPERIDONE 2 MG: 2 TABLET ORAL at 21:36

## 2017-12-24 RX ADMIN — HALOPERIDOL 2 MG: 0.5 TABLET ORAL at 21:38

## 2017-12-24 RX ADMIN — LACTULOSE 30 G: 10 POWDER, FOR SOLUTION ORAL at 21:36

## 2017-12-24 RX ADMIN — RIFAXIMIN 550 MG: 550 TABLET ORAL at 21:36

## 2017-12-24 RX ADMIN — LACTULOSE 30 G: 10 POWDER, FOR SOLUTION ORAL at 11:21

## 2017-12-24 RX ADMIN — MULTIPLE VITAMINS W/ MINERALS TAB 1 TABLET: TAB at 11:24

## 2017-12-24 ASSESSMENT — ACTIVITIES OF DAILY LIVING (ADL)
DRESS: INDEPENDENT
GROOMING: INDEPENDENT
ORAL_HYGIENE: INDEPENDENT
LAUNDRY: WITH SUPERVISION

## 2017-12-24 NOTE — PLAN OF CARE
Problem: Psychotic Symptoms  Goal: Psychotic Symptoms  Signs and symptoms of listed problems will be absent or manageable.   Patient will verbalize reduction in AH/VH  Patient will verbalize rationale for medication compliance  Patient's speech content will be absent of paranoid/delusional content.   Outcome: Improving  Patient  Slept late and then initiated a morning shower, and wanted to do her laundry. Patient was complaint with her medication. Displays neutral to flat affect and interacts quietly with others. Spends all of her time in her room except for meals. Denies self harm thoughts.Mood is calm.

## 2017-12-24 NOTE — PROGRESS NOTES
Pt isolative. Pt came out of room for dinner. This writer did she wish to shower she stated no maybe later. Pt. denies SI/SIB.      12/23/17 2023   Behavioral Health   Hallucinations appears responding   Thinking confused;distractable;poor concentration   Orientation person: oriented   Memory baseline memory   Insight poor   Judgement impaired   Affect blunted, flat;sad   Mood mood is calm   Suicidality other (see comments)  (PT DENIES)   Self Injury other (see comment)  (PT DENIES)   Activity isolative;withdrawn   Activities of Daily Living   Hygiene/Grooming independent   Oral Hygiene independent   Dress independent   Room Organization independent   Behavioral Health Interventions   Psychotic Symptoms maintain safety precautions;maintain safe secure environment   Social and Therapeutic Interventions (Psychotic Symptoms) encourage socialization with peers;encourage effective boundaries with peers;encourage participation in therapeutic groups and milieu activities

## 2017-12-25 PROCEDURE — 25000132 ZZH RX MED GY IP 250 OP 250 PS 637: Performed by: PHYSICIAN ASSISTANT

## 2017-12-25 PROCEDURE — 12400007 ZZH R&B MH INTERMEDIATE UMMC

## 2017-12-25 PROCEDURE — 25000132 ZZH RX MED GY IP 250 OP 250 PS 637: Performed by: EMERGENCY MEDICINE

## 2017-12-25 PROCEDURE — 25000132 ZZH RX MED GY IP 250 OP 250 PS 637: Performed by: NURSE PRACTITIONER

## 2017-12-25 PROCEDURE — 25000132 ZZH RX MED GY IP 250 OP 250 PS 637: Performed by: PSYCHIATRY & NEUROLOGY

## 2017-12-25 RX ADMIN — MULTIPLE VITAMINS W/ MINERALS TAB 1 TABLET: TAB at 09:09

## 2017-12-25 RX ADMIN — GABAPENTIN 200 MG: 100 CAPSULE ORAL at 22:15

## 2017-12-25 RX ADMIN — RIFAXIMIN 550 MG: 550 TABLET ORAL at 22:13

## 2017-12-25 RX ADMIN — RISPERIDONE 1 MG: 1 TABLET ORAL at 09:10

## 2017-12-25 RX ADMIN — HALOPERIDOL 0.5 MG: 0.5 TABLET ORAL at 09:11

## 2017-12-25 RX ADMIN — GABAPENTIN 200 MG: 100 CAPSULE ORAL at 14:27

## 2017-12-25 RX ADMIN — GABAPENTIN 200 MG: 100 CAPSULE ORAL at 09:10

## 2017-12-25 RX ADMIN — RIFAXIMIN 550 MG: 550 TABLET ORAL at 09:09

## 2017-12-25 RX ADMIN — FOLIC ACID 1 MG: 1 TABLET ORAL at 09:13

## 2017-12-25 RX ADMIN — PANTOPRAZOLE SODIUM 40 MG: 40 TABLET, DELAYED RELEASE ORAL at 09:10

## 2017-12-25 RX ADMIN — HALOPERIDOL 2 MG: 0.5 TABLET ORAL at 09:10

## 2017-12-25 RX ADMIN — SPIRONOLACTONE 50 MG: 50 TABLET ORAL at 09:10

## 2017-12-25 RX ADMIN — Medication 20 MG: at 09:09

## 2017-12-25 RX ADMIN — LEVOCARNITINE 990 MG: 330 TABLET ORAL at 22:13

## 2017-12-25 RX ADMIN — LEVOCARNITINE 990 MG: 330 TABLET ORAL at 09:09

## 2017-12-25 RX ADMIN — HALOPERIDOL 2 MG: 0.5 TABLET ORAL at 22:14

## 2017-12-25 RX ADMIN — FERROUS GLUCONATE 324 MG: 324 TABLET ORAL at 09:10

## 2017-12-25 RX ADMIN — RISPERIDONE 2 MG: 2 TABLET ORAL at 22:15

## 2017-12-25 RX ADMIN — LACTULOSE 30 G: 10 POWDER, FOR SOLUTION ORAL at 14:27

## 2017-12-25 RX ADMIN — LACTULOSE 30 G: 10 POWDER, FOR SOLUTION ORAL at 09:11

## 2017-12-25 RX ADMIN — HALOPERIDOL 0.5 MG: 0.5 TABLET ORAL at 22:15

## 2017-12-25 NOTE — PLAN OF CARE
Problem: Psychotic Symptoms  Goal: Psychotic Symptoms  Signs and symptoms of listed problems will be absent or manageable.   Patient will verbalize reduction in AH/VH  Patient will verbalize rationale for medication compliance  Patient's speech content will be absent of paranoid/delusional content.   Outcome: No Change  Patient continues to be isolative and withdrawn to her room. Patient will occasionally walk in the hallway with her walker, and come out to eat meals. Patient is pleasant and agreeable. Medication complaint. Patient visited with her sister this afternoon. Patient is looking forward to discharging next week. Displays neutral to flat affect, occasionally shows a sense of humor.

## 2017-12-25 NOTE — PROGRESS NOTES
12/24/17 2200   Behavioral Health   Hallucinations denies / not responding to hallucinations   Thinking intact   Orientation person: oriented;place: oriented   Memory confabulation   Insight poor   Judgement impaired   Eye Contact at examiner   Affect blunted, flat   Mood mood is calm;anxious   Physical Appearance/Attire attire appropriate to age and situation   Hygiene well groomed   Suicidality other (see comments)  (denies)   Enviromental Risk Factors None   Self Injury other (see comment)  (denies)   Elopement (n/a)   Activity isolative;withdrawn   Speech clear;coherent   Medication Sensitivity no stated side effects;no observed side effects   Psychomotor / Gait balanced;steady   Activities of Daily Living   Hygiene/Grooming independent   Oral Hygiene independent   Dress independent   Laundry with supervision   Room Organization independent

## 2017-12-26 PROCEDURE — 25000132 ZZH RX MED GY IP 250 OP 250 PS 637: Performed by: PHYSICIAN ASSISTANT

## 2017-12-26 PROCEDURE — 99231 SBSQ HOSP IP/OBS SF/LOW 25: CPT | Performed by: PSYCHIATRY & NEUROLOGY

## 2017-12-26 PROCEDURE — 12400007 ZZH R&B MH INTERMEDIATE UMMC

## 2017-12-26 PROCEDURE — 25000132 ZZH RX MED GY IP 250 OP 250 PS 637: Performed by: EMERGENCY MEDICINE

## 2017-12-26 PROCEDURE — 25000132 ZZH RX MED GY IP 250 OP 250 PS 637: Performed by: NURSE PRACTITIONER

## 2017-12-26 PROCEDURE — 25000132 ZZH RX MED GY IP 250 OP 250 PS 637: Performed by: PSYCHIATRY & NEUROLOGY

## 2017-12-26 RX ADMIN — RISPERIDONE 1 MG: 1 TABLET ORAL at 11:43

## 2017-12-26 RX ADMIN — GABAPENTIN 200 MG: 100 CAPSULE ORAL at 21:55

## 2017-12-26 RX ADMIN — HALOPERIDOL 2 MG: 0.5 TABLET ORAL at 21:54

## 2017-12-26 RX ADMIN — HALOPERIDOL 0.5 MG: 0.5 TABLET ORAL at 21:55

## 2017-12-26 RX ADMIN — SPIRONOLACTONE 50 MG: 50 TABLET ORAL at 11:43

## 2017-12-26 RX ADMIN — LEVOCARNITINE 990 MG: 330 TABLET ORAL at 11:45

## 2017-12-26 RX ADMIN — Medication 20 MG: at 11:43

## 2017-12-26 RX ADMIN — FOLIC ACID 1 MG: 1 TABLET ORAL at 11:45

## 2017-12-26 RX ADMIN — RIFAXIMIN 550 MG: 550 TABLET ORAL at 21:54

## 2017-12-26 RX ADMIN — RISPERIDONE 2 MG: 2 TABLET ORAL at 21:55

## 2017-12-26 RX ADMIN — GABAPENTIN 200 MG: 100 CAPSULE ORAL at 11:43

## 2017-12-26 RX ADMIN — HALOPERIDOL 0.5 MG: 0.5 TABLET ORAL at 11:43

## 2017-12-26 RX ADMIN — RIFAXIMIN 550 MG: 550 TABLET ORAL at 11:45

## 2017-12-26 RX ADMIN — MULTIPLE VITAMINS W/ MINERALS TAB 1 TABLET: TAB at 11:43

## 2017-12-26 RX ADMIN — GABAPENTIN 200 MG: 100 CAPSULE ORAL at 15:11

## 2017-12-26 RX ADMIN — PANTOPRAZOLE SODIUM 40 MG: 40 TABLET, DELAYED RELEASE ORAL at 11:43

## 2017-12-26 RX ADMIN — LACTULOSE 30 G: 10 POWDER, FOR SOLUTION ORAL at 21:53

## 2017-12-26 RX ADMIN — FERROUS GLUCONATE 324 MG: 324 TABLET ORAL at 11:44

## 2017-12-26 RX ADMIN — LEVOCARNITINE 990 MG: 330 TABLET ORAL at 21:55

## 2017-12-26 RX ADMIN — LACTULOSE 30 G: 10 POWDER, FOR SOLUTION ORAL at 11:43

## 2017-12-26 RX ADMIN — HALOPERIDOL 2 MG: 0.5 TABLET ORAL at 11:46

## 2017-12-26 RX ADMIN — LACTULOSE 30 G: 10 POWDER, FOR SOLUTION ORAL at 15:11

## 2017-12-26 ASSESSMENT — ACTIVITIES OF DAILY LIVING (ADL)
DRESS: STREET CLOTHES
GROOMING: PROMPTS
ORAL_HYGIENE: PROMPTS
GROOMING: PROMPTS
LAUNDRY: UNABLE TO COMPLETE
ORAL_HYGIENE: PROMPTS
DRESS: STREET CLOTHES
LAUNDRY: UNABLE TO COMPLETE

## 2017-12-26 NOTE — PLAN OF CARE
Problem: General Plan of Care (Inpatient Behavioral)  Goal: Team Discussion  Team Plan:    Outcome: Improving  BEHAVIORAL TEAM DISCUSSION    Participants:   Danielle Valencia, Tiara Cabezas RN , Bree Keating MSW Intern    Progress:  This is day 138 of this admission.    Pt has hepatic encephalopathy. Pt has cirrohis of the liver. She has high ammonia levels and   Low platelet counts. Both of these things are slowly improving. She is at risk of severe bleeding.  She is oriented x2. She still has some short term memory. She continues to have delusions and and calls out for [place] and thinks she owns the hospital.   Pt has been accepted by a nursing home and hopefull will be placed this week.  Continued Stay Criteria/Rationale:   Pt no longer needs hospitalization, pt is waiting for placement    Medical/Physical:   Multiple, complex medical problems are documented    Precautions:   Behavioral Orders   Procedures     Code 2     Elopement precautions     Neuropsych Testing     Dr. Corbin will be contacted regarding neurospsych testing.     Routine Programming     As clinically indicated     Seizure precautions     Status 15     Every 15 minutes.     Plan:   Facilitate placement at [facility] for this week  Use ambulance for transfer  Sister is going to call [facility] Worker about being the guardian  No more medication changes    Rationale for change in precautions or plan:   Pt may go to nursing home this week      Problem: Patient Care Overview  Goal: Team Discussion  Team Plan:    Outcome: Improving  BEHAVIORAL TEAM DISCUSSION    Participants:   Danielle Valencia, Tiara Cabezas RN , Bree Keating MSW Intern    Progress:  This is day 138 of this admission.    Pt has hepatic encephalopathy. Pt has cirrohis of the liver. She has high ammonia levels and   Low platelet counts. Both of these things are slowly improving. She is at risk of severe bleeding.  She is oriented x2. She still has some  short term memory. She continues to have delusions and and calls out for [facility] and thinks she owns the hospital.   Pt has been accepted by a nursing home and hopefull will be placed this week.  Continued Stay Criteria/Rationale:   Pt no longer needs hospitalization, pt is waiting for placement    Medical/Physical:   Multiple, complex medical problems are documented    Precautions:   Behavioral Orders   Procedures     Code 2     Elopement precautions     Neuropsych Testing     Dr. Corbin will be contacted regarding neurospsych testing.     Routine Programming     As clinically indicated     Seizure precautions     Status 15     Every 15 minutes.     Plan:   Facilitate placement at [facility] for this week  Use ambulance for transfer  Sister is going to call [place] Worker about being the guardian  No more medication changes    Rationale for change in precautions or plan:   Pt may go to nursing home this week

## 2017-12-26 NOTE — PROGRESS NOTES
I completed a preadmission screening today as it has to be done by the health care facility not the county .  This has triggered a Level 2 OBRA screening due to mental health diagnoses.    This will be sent to [facility]:  Jesi Salgado-Financial Case Aide  Pre Admission Screening   Email: [email or web address]  Softphone: [phone]  Cell Phone [phone]  Rightfax:      [phone]

## 2017-12-26 NOTE — PROGRESS NOTES
Pt requested that her cc and drivers license from the RN . Pt requested it be given to her sister. Pt and her sister signed a paper that she has taken those items. Pt appeared agitated     12/25/17 2150   Behavioral Health   Thoughts/Cognition (WDL) orientation   Hallucinations denies / not responding to hallucinations   Thinking confused;distractable   Orientation person: oriented;place: oriented;date: oriented;time: oriented   Memory baseline memory   Insight poor   Judgement impaired   Eye Contact at examiner   Affect irritable   Mood mood is calm   Physical Appearance/Attire attire appropriate to age and situation;appears stated age   Hygiene well groomed   Suicidality other (see comments)   1. Wish to be Dead No   2. Non-Specific Active Suicidal Thoughts  No   3. Active Sucidal Ideation with any Methods (Not Plan) Without Intent to Act  No   4. Active Suicidal Ideation with Some Intent to Act, Without Specific Plan  No   5. Active Suicidal Ideation with Specific Plan and Intent  No   Self Injury other (see comment)  (denies)   Speech coherent;clear    after her visit. Denies SI SIB.

## 2017-12-27 PROCEDURE — 25000132 ZZH RX MED GY IP 250 OP 250 PS 637: Performed by: PHYSICIAN ASSISTANT

## 2017-12-27 PROCEDURE — 12400007 ZZH R&B MH INTERMEDIATE UMMC

## 2017-12-27 PROCEDURE — 25000132 ZZH RX MED GY IP 250 OP 250 PS 637: Performed by: NURSE PRACTITIONER

## 2017-12-27 PROCEDURE — 25000132 ZZH RX MED GY IP 250 OP 250 PS 637: Performed by: PSYCHIATRY & NEUROLOGY

## 2017-12-27 PROCEDURE — 25000132 ZZH RX MED GY IP 250 OP 250 PS 637: Performed by: EMERGENCY MEDICINE

## 2017-12-27 RX ADMIN — FOLIC ACID 1 MG: 1 TABLET ORAL at 09:04

## 2017-12-27 RX ADMIN — RISPERIDONE 2 MG: 2 TABLET ORAL at 21:12

## 2017-12-27 RX ADMIN — FERROUS GLUCONATE 324 MG: 324 TABLET ORAL at 09:04

## 2017-12-27 RX ADMIN — SPIRONOLACTONE 50 MG: 50 TABLET ORAL at 09:04

## 2017-12-27 RX ADMIN — GABAPENTIN 200 MG: 100 CAPSULE ORAL at 09:04

## 2017-12-27 RX ADMIN — PANTOPRAZOLE SODIUM 40 MG: 40 TABLET, DELAYED RELEASE ORAL at 09:04

## 2017-12-27 RX ADMIN — LACTULOSE 30 G: 10 POWDER, FOR SOLUTION ORAL at 21:09

## 2017-12-27 RX ADMIN — LACTULOSE 30 G: 10 POWDER, FOR SOLUTION ORAL at 09:04

## 2017-12-27 RX ADMIN — Medication 20 MG: at 09:04

## 2017-12-27 RX ADMIN — RISPERIDONE 1 MG: 1 TABLET ORAL at 09:04

## 2017-12-27 RX ADMIN — LACTULOSE 30 G: 10 POWDER, FOR SOLUTION ORAL at 14:32

## 2017-12-27 RX ADMIN — LEVOCARNITINE 990 MG: 330 TABLET ORAL at 21:10

## 2017-12-27 RX ADMIN — GABAPENTIN 200 MG: 100 CAPSULE ORAL at 21:11

## 2017-12-27 RX ADMIN — RIFAXIMIN 550 MG: 550 TABLET ORAL at 09:04

## 2017-12-27 RX ADMIN — HALOPERIDOL 0.5 MG: 0.5 TABLET ORAL at 21:11

## 2017-12-27 RX ADMIN — MULTIPLE VITAMINS W/ MINERALS TAB 1 TABLET: TAB at 09:04

## 2017-12-27 RX ADMIN — HALOPERIDOL 2 MG: 0.5 TABLET ORAL at 09:05

## 2017-12-27 RX ADMIN — RIFAXIMIN 550 MG: 550 TABLET ORAL at 21:10

## 2017-12-27 RX ADMIN — RIFAXIMIN 550 MG: 550 TABLET ORAL at 21:09

## 2017-12-27 RX ADMIN — HALOPERIDOL 0.5 MG: 0.5 TABLET ORAL at 09:04

## 2017-12-27 RX ADMIN — LEVOCARNITINE 990 MG: 330 TABLET ORAL at 09:04

## 2017-12-27 RX ADMIN — HALOPERIDOL 2 MG: 0.5 TABLET ORAL at 21:11

## 2017-12-27 RX ADMIN — GABAPENTIN 200 MG: 100 CAPSULE ORAL at 14:32

## 2017-12-27 ASSESSMENT — ACTIVITIES OF DAILY LIVING (ADL)
DRESS: INDEPENDENT
LAUNDRY: UNABLE TO COMPLETE
GROOMING: PROMPTS
ORAL_HYGIENE: PROMPTS

## 2017-12-27 NOTE — PLAN OF CARE
Problem: Psychotic Symptoms  Intervention: Social and Therapeutic Interv (Psychotic Symptoms)    Pt did not attend any OT groups today.

## 2017-12-27 NOTE — PLAN OF CARE
"Problem: Psychotic Symptoms  Goal: Psychotic Symptoms  Signs and symptoms of listed problems will be absent or manageable.   Patient will verbalize reduction in AH/VH  Patient will verbalize rationale for medication compliance  Patient's speech content will be absent of paranoid/delusional content.   Outcome: Improving  48 hour nursing assessment:  Pt evaluation continues. Assessed mood, anxiety, thoughts, and behavior. Is progressing towards goals. Encourage participation in groups and developing healthy coping skills. Pt denies auditory or visual  hallucinations. Refer to daily team meeting notes for individualized plan of care. Will continue to assess.    Patient presents with flat affect, reports mood is calm, denies anxiety or depression today. Patient denies hallucinations, also denies any thoughts or intent to harm self, and denies thoughts of suicide. Patient has spent the majority of the shift in her room, states she is feeling \"tired\". Patient is pleasant in interactions, medication compliant. States she is eating well. No physical complaints. Will continue to monitor and assess.      "

## 2017-12-27 NOTE — PROGRESS NOTES
"    [facility], [place]   Psychiatric Progress Note        Interim History:     Subjective: \"I'm ok\"       As per today's interview: Patient seen in the milieu. Calm and coopeartive to speak with me. Patient was ok with going to a nursing home later this week. Denied any issues with her medications. Denied any SI/HI/AH/VH.          Medications:       levOCARNitine  990 mg Oral BID     haloperidol  2 mg Oral BID    And     haloperidol  0.5 mg Oral BID     gabapentin  200 mg Oral TID     ferrous gluconate  324 mg Oral Daily with breakfast     lactulose  30 g Oral TID     risperiDONE  1 mg Oral QAM     risperiDONE  2 mg Oral At Bedtime     pantoprazole  40 mg Oral Daily     multivitamin, therapeutic with minerals  1 tablet Oral Daily     folic acid  1 mg Oral Daily     rifaximin  550 mg Oral BID     spironolactone  50 mg Oral Daily     furosemide  20 mg Oral Daily          Allergies:     Allergies   Allergen Reactions     Acetaminophen      Ambien [Zolpidem Tartrate] Other (See Comments)     Sleep walks and eats     Ciprofloxacin Other (See Comments)     Seizure.     Citalopram Nausea and Vomiting          Labs:     No results found for this or any previous visit (from the past 24 hour(s)).       Psychiatric Examination:     Vitals:    12/23/17 0959 12/24/17 1030 12/25/17 0900 12/26/17 1100   BP:   104/56    BP Location:    Left arm   Pulse:   67    Resp:    16   Temp: 97.7  F (36.5  C) 97.7  F (36.5  C) 97  F (36.1  C) 98.1  F (36.7  C)   TempSrc: Oral Oral Oral    SpO2:       Weight:    102.5 kg (226 lb)   Height:                       Lying Orthostatic BP: 85/47         Sitting Orthostatic BP: 106/58         Standing Orthostatic BP: 112/71     Appearance: awake, but somnolent adequately groomed, appeared about stated age and no apparent distress  Attitude: more cooperative and less guarded  Eye Contact: poor  Mood:  \"fine\"  Affect:  intensity is blunted  Speech:  clear, coherent and " slow  Psychomotor Behavior:  no evidence of tardive dyskinesia, dystonia, or tics  Throught Process:  goal oriented  Associations:  no loose associations  Thought Content:  no evidence of suicidal ideation or homicidal ideation, auditory hallucinations are present, Visual hallucinations are present off and on, delusions are present.   Insight:  limited  Judgement:  limited  Oriented to:  time, person, and place  Attention Span and Concentration:  fair  Recent and Remote Memory:  fair         Precautions:     Behavioral Orders   Procedures     Code 2     Elopement precautions     Neuropsych Testing     Dr. Corbin will be contacted regarding neurospsych testing.     Routine Programming     As clinically indicated     Seizure precautions     Status 15     Every 15 minutes.          Diagnoses:     Cognitive Disorder NOS.   Encephalopathy probably multifactorial such as chronic hepatic encephalopathy, organic brain damage due to encephalopathy, alcohol drinking, possibly underlying psychotic illness; possibly head injury contributes to patient's symptoms.   Alcohol use disorder.   amphetamine dependence vs abuse.  She meets criteria for Major depressive disorder, moderate severity.          Plan:     Assessment:     Past Assessment: Regarding my mini cognitive screen, she seemed to improve in her short term memory (which was 3/3 recall in 6 min). Notes indicate she still harbors some delusions, which could be related to her ongoing hepatic encepahlopathy. I ordered new CBC/WBC/Ammonia levels today, which revealed some improvements in platelets (76 to 81), and ammonia level (96 down to 69). I will start her on L-carnitine (Levocarnitine), which has empirical evidence in lowering ammonia levels (and subsequent neuronal transmission) in patients with liver cirrhosis. Randomized/double blind studies exist: https://www.ncbi.nlm.nih.gov/pmc/articles/ZMD0069271/. I will start 660 mg BID today, and quickly titrate up to 990 mg  BID. Will retake ammonia level next week.     Update (12/26): No significant change today, but anxious about her prolonged hospital stay. Patient mildly interactive during interview, and was adequately oriented. Denied any intolerable side effects from her medications. Compliant with medications. New labs show raising Ammonia level, along with decreasing platelet level. Patient may discharge later this week to nursing home.     Medications:  -  Levocarnitine 990 mg BID  - Gabapentin 200 mg TID  - Haldol 2 mg BID and 0.5 mg BID  - Lactulose 30 g TID  - Risperdal 1 mg AM, 2 mg HS  - Spironolactone 50 mg Daily     Legal Status and Disposition:  --  Under full MICD commitment.       Andrés Hinojosa MD  [facility] Psychiatry    Spent 20   minutes on encounter, >50% of which was spent in counseling and/or coordination of care, consisting of taking history, reviewing system, and discussing medication and side effects

## 2017-12-27 NOTE — PROGRESS NOTES
12/26/17 2200   Behavioral Health   Hallucinations denies / not responding to hallucinations   Thinking distractable;poor concentration   Orientation person: oriented;place: oriented   Memory baseline memory   Insight poor   Judgement impaired   Eye Contact at examiner   Affect blunted, flat   Mood depressed   Physical Appearance/Attire disheveled   Hygiene neglected grooming - unclean body, hair, teeth   Suicidality other (see comments)  (denies)   Self Injury other (see comment)  (denies)   Elopement (n/a)   Activity isolative;withdrawn   Speech clear;coherent   Medication Sensitivity no stated side effects;no observed side effects   Psychomotor / Gait slow;unsteady   Activities of Daily Living   Hygiene/Grooming prompts   Oral Hygiene prompts   Dress street clothes   Laundry unable to complete   Room Organization prompts

## 2017-12-27 NOTE — PROGRESS NOTES
I sat with pt for a bit while she was eating her lunch out int the common area. I reminded her who I was. I asked her if she was excited to go to [place] soon and she said no, she wanted to go home. I reminded her that there is no home any more. I reminded her that her sister Kristofer is in [place] and is eager to have her near by.    At 4:45PM pt came to the desk and it was unclear what she wanted.  A few minutes later pt was loudly sobbing in her room, such t hat could be heard down the kaba.

## 2017-12-28 PROCEDURE — 25000132 ZZH RX MED GY IP 250 OP 250 PS 637: Performed by: PSYCHIATRY & NEUROLOGY

## 2017-12-28 PROCEDURE — 12400007 ZZH R&B MH INTERMEDIATE UMMC

## 2017-12-28 PROCEDURE — 25000132 ZZH RX MED GY IP 250 OP 250 PS 637: Performed by: NURSE PRACTITIONER

## 2017-12-28 PROCEDURE — 25000132 ZZH RX MED GY IP 250 OP 250 PS 637: Performed by: PHYSICIAN ASSISTANT

## 2017-12-28 PROCEDURE — 99231 SBSQ HOSP IP/OBS SF/LOW 25: CPT | Performed by: PSYCHIATRY & NEUROLOGY

## 2017-12-28 PROCEDURE — 25000132 ZZH RX MED GY IP 250 OP 250 PS 637: Performed by: EMERGENCY MEDICINE

## 2017-12-28 RX ADMIN — LACTULOSE 30 G: 10 POWDER, FOR SOLUTION ORAL at 21:02

## 2017-12-28 RX ADMIN — HALOPERIDOL 2 MG: 0.5 TABLET ORAL at 10:44

## 2017-12-28 RX ADMIN — HALOPERIDOL 2 MG: 0.5 TABLET ORAL at 20:54

## 2017-12-28 RX ADMIN — SPIRONOLACTONE 50 MG: 50 TABLET ORAL at 10:41

## 2017-12-28 RX ADMIN — RIFAXIMIN 550 MG: 550 TABLET ORAL at 21:01

## 2017-12-28 RX ADMIN — Medication 20 MG: at 11:01

## 2017-12-28 RX ADMIN — PANTOPRAZOLE SODIUM 40 MG: 40 TABLET, DELAYED RELEASE ORAL at 10:41

## 2017-12-28 RX ADMIN — RISPERIDONE 2 MG: 2 TABLET ORAL at 21:01

## 2017-12-28 RX ADMIN — LACTULOSE 30 G: 10 POWDER, FOR SOLUTION ORAL at 10:38

## 2017-12-28 RX ADMIN — LEVOCARNITINE 990 MG: 330 TABLET ORAL at 10:46

## 2017-12-28 RX ADMIN — FERROUS GLUCONATE 324 MG: 324 TABLET ORAL at 10:43

## 2017-12-28 RX ADMIN — HALOPERIDOL 0.5 MG: 0.5 TABLET ORAL at 10:44

## 2017-12-28 RX ADMIN — MULTIPLE VITAMINS W/ MINERALS TAB 1 TABLET: TAB at 10:40

## 2017-12-28 RX ADMIN — FOLIC ACID 1 MG: 1 TABLET ORAL at 11:03

## 2017-12-28 RX ADMIN — RIFAXIMIN 550 MG: 550 TABLET ORAL at 10:45

## 2017-12-28 RX ADMIN — LACTULOSE 30 G: 10 POWDER, FOR SOLUTION ORAL at 20:55

## 2017-12-28 RX ADMIN — LEVOCARNITINE 990 MG: 330 TABLET ORAL at 20:55

## 2017-12-28 RX ADMIN — GABAPENTIN 200 MG: 100 CAPSULE ORAL at 10:41

## 2017-12-28 RX ADMIN — RISPERIDONE 1 MG: 1 TABLET ORAL at 10:45

## 2017-12-28 RX ADMIN — GABAPENTIN 200 MG: 100 CAPSULE ORAL at 20:54

## 2017-12-28 RX ADMIN — HALOPERIDOL 0.5 MG: 0.5 TABLET ORAL at 20:55

## 2017-12-28 ASSESSMENT — ACTIVITIES OF DAILY LIVING (ADL)
LAUNDRY: WITH SUPERVISION
ORAL_HYGIENE: INDEPENDENT
DRESS: INDEPENDENT
GROOMING: INDEPENDENT

## 2017-12-28 NOTE — PROGRESS NOTES
"   12/28/17 1342   Behavioral Health   Hallucinations auditory;visual   Thinking distractable;delusional   Orientation date, disoriented;time, disoriented;situation, disoriented   Memory baseline memory   Insight denial of illness;poor   Judgement impaired   Eye Contact into space   Affect blunted, flat   Mood depressed;anxious   Physical Appearance/Attire untidy   Hygiene neglected grooming - unclean body, hair, teeth   Suicidality other (see comments)   1. Wish to be Dead No   2. Non-Specific Active Suicidal Thoughts  No   3. Active Sucidal Ideation with any Methods (Not Plan) Without Intent to Act  No   4. Active Suicidal Ideation with Some Intent to Act, Without Specific Plan  No   5. Active Suicidal Ideation with Specific Plan and Intent  No   Duration (Lifetime) NA   Change in Protective Factors? No   Self Injury other (see comment)   Elopement Loitering near exit doors;Statements about wanting to leave   Activity isolative;withdrawn;hyperactive (agitated, impulsive)   Speech clear;coherent   Safety   Suicidality Status 15   Elopement status 15;no shoes   Coping/Psychosocial   Patient Agreement with Plan of Care disagrees (describe)     \"i'm leaving now so open the door.\"  Pt spent most of the shifton her bed, at times crying loudly.  Staff approached to offer assist or to talk and she did not respond.  She did some walking in the kaba, mostly down towards the door.  She yelled loudly at staff when staff would not opn the door but turned and went back to her room.  Out for meals.  "

## 2017-12-28 NOTE — PROGRESS NOTES
12/27/17 2031   Behavioral Health   Hallucinations auditory;visual   Thinking distractable;confused   Orientation time: oriented;date: oriented;place: oriented;person: oriented;situation, disoriented   Memory baseline memory   Insight poor;denial of illness   Judgement impaired   Eye Contact at examiner;staring  (at ceiling )   Affect blunted, flat;tense   Mood anxious;depressed;hopeless;irritable   Physical Appearance/Attire attire appropriate to age and situation;appears stated age;posture slouched   Hygiene neglected grooming - unclean body, hair, teeth   Suicidality other (see comments)  (UA)   Self Injury other (see comment)  (UA)   Elopement Hypervigilance to activities on and off the unit;Statements about wanting to leave   Activity isolative;withdrawn   Speech clear;coherent;pressured   Medication Sensitivity no observed side effects;no stated side effects   Psychomotor / Gait steady;balanced  (walker)       Pt. Not attending group. Pt. Isolative and withdrawn. Pt. Appears to have auditory and visual hallucinations. Pt. Tearful in room. Pt. Appears confused with having staff look for her boots that were on her feet. Pt. Appears independent in ADLS and plans to shower tomorrow morning. Pt. Attends and eat meals. Pt. Denies mental illness explaining she didn't know she had mental illness. Pt. Continues to use a walker. Pt. Made statements of wanting to go home. Pt. Asked for coat and asked staff to let her off the unit.
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
## 2017-12-29 PROCEDURE — 99231 SBSQ HOSP IP/OBS SF/LOW 25: CPT | Performed by: PSYCHIATRY & NEUROLOGY

## 2017-12-29 PROCEDURE — 25000132 ZZH RX MED GY IP 250 OP 250 PS 637: Performed by: PHYSICIAN ASSISTANT

## 2017-12-29 PROCEDURE — 25000132 ZZH RX MED GY IP 250 OP 250 PS 637: Performed by: PSYCHIATRY & NEUROLOGY

## 2017-12-29 PROCEDURE — 25000132 ZZH RX MED GY IP 250 OP 250 PS 637: Performed by: EMERGENCY MEDICINE

## 2017-12-29 PROCEDURE — 25000132 ZZH RX MED GY IP 250 OP 250 PS 637: Performed by: NURSE PRACTITIONER

## 2017-12-29 PROCEDURE — 12400007 ZZH R&B MH INTERMEDIATE UMMC

## 2017-12-29 RX ADMIN — LEVOCARNITINE 990 MG: 330 TABLET ORAL at 09:58

## 2017-12-29 RX ADMIN — RIFAXIMIN 550 MG: 550 TABLET ORAL at 09:56

## 2017-12-29 RX ADMIN — GABAPENTIN 200 MG: 100 CAPSULE ORAL at 20:25

## 2017-12-29 RX ADMIN — MULTIPLE VITAMINS W/ MINERALS TAB 1 TABLET: TAB at 09:55

## 2017-12-29 RX ADMIN — RIFAXIMIN 550 MG: 550 TABLET ORAL at 20:25

## 2017-12-29 RX ADMIN — HALOPERIDOL 2 MG: 0.5 TABLET ORAL at 20:25

## 2017-12-29 RX ADMIN — SPIRONOLACTONE 50 MG: 50 TABLET ORAL at 10:01

## 2017-12-29 RX ADMIN — FOLIC ACID 1 MG: 1 TABLET ORAL at 10:00

## 2017-12-29 RX ADMIN — LACTULOSE 30 G: 10 POWDER, FOR SOLUTION ORAL at 16:49

## 2017-12-29 RX ADMIN — RISPERIDONE 1 MG: 1 TABLET ORAL at 09:58

## 2017-12-29 RX ADMIN — PANTOPRAZOLE SODIUM 40 MG: 40 TABLET, DELAYED RELEASE ORAL at 10:00

## 2017-12-29 RX ADMIN — Medication 20 MG: at 10:01

## 2017-12-29 RX ADMIN — HALOPERIDOL 2 MG: 0.5 TABLET ORAL at 10:06

## 2017-12-29 RX ADMIN — FERROUS GLUCONATE 324 MG: 324 TABLET ORAL at 09:56

## 2017-12-29 RX ADMIN — GABAPENTIN 200 MG: 100 CAPSULE ORAL at 09:57

## 2017-12-29 RX ADMIN — LACTULOSE 30 G: 10 POWDER, FOR SOLUTION ORAL at 20:23

## 2017-12-29 RX ADMIN — GABAPENTIN 200 MG: 100 CAPSULE ORAL at 16:49

## 2017-12-29 RX ADMIN — HALOPERIDOL 0.5 MG: 0.5 TABLET ORAL at 20:25

## 2017-12-29 RX ADMIN — LEVOCARNITINE 990 MG: 330 TABLET ORAL at 20:25

## 2017-12-29 RX ADMIN — LACTULOSE 30 G: 10 POWDER, FOR SOLUTION ORAL at 10:06

## 2017-12-29 RX ADMIN — RISPERIDONE 2 MG: 2 TABLET ORAL at 20:25

## 2017-12-29 RX ADMIN — HALOPERIDOL 0.5 MG: 0.5 TABLET ORAL at 09:56

## 2017-12-29 ASSESSMENT — ACTIVITIES OF DAILY LIVING (ADL)
DRESS: STREET CLOTHES;INDEPENDENT
ORAL_HYGIENE: INDEPENDENT
GROOMING: HANDWASHING;SHOWER;INDEPENDENT
LAUNDRY: WITH SUPERVISION

## 2017-12-29 NOTE — PROGRESS NOTES
"    [facility], [place]   Psychiatric Progress Note        Interim History:     Subjective: \"Why am I not discharged yet?\"       As per today's interview: Patient seen in the milieu. Irritable that she is not discharged yet. Denied any issues with her medications. Denied any SI/HI/AH/VH.          Medications:       levOCARNitine  990 mg Oral BID     haloperidol  2 mg Oral BID    And     haloperidol  0.5 mg Oral BID     gabapentin  200 mg Oral TID     ferrous gluconate  324 mg Oral Daily with breakfast     lactulose  30 g Oral TID     risperiDONE  1 mg Oral QAM     risperiDONE  2 mg Oral At Bedtime     pantoprazole  40 mg Oral Daily     multivitamin, therapeutic with minerals  1 tablet Oral Daily     folic acid  1 mg Oral Daily     rifaximin  550 mg Oral BID     spironolactone  50 mg Oral Daily     furosemide  20 mg Oral Daily          Allergies:     Allergies   Allergen Reactions     Acetaminophen      Ambien [Zolpidem Tartrate] Other (See Comments)     Sleep walks and eats     Ciprofloxacin Other (See Comments)     Seizure.     Citalopram Nausea and Vomiting          Labs:     No results found for this or any previous visit (from the past 24 hour(s)).       Psychiatric Examination:     Vitals:    12/25/17 0900 12/26/17 1100 12/27/17 0900 12/28/17 0917   BP: 104/56      BP Location:  Left arm Right arm Left arm   Pulse: 67      Resp:  16 16 16   Temp: 97  F (36.1  C) 98.1  F (36.7  C) 95.3  F (35.2  C) 96.6  F (35.9  C)   TempSrc: Oral      SpO2:       Weight:  102.5 kg (226 lb)     Height:                       Lying Orthostatic BP: 85/47         Sitting Orthostatic BP: 106/58         Standing Orthostatic BP: 112/71     Appearance: awake, but somnolent adequately groomed, appeared about stated age and no apparent distress  Attitude: more cooperative and less guarded  Eye Contact: poor  Mood:  \"fine\"  Affect:  intensity is blunted  Speech:  clear, coherent and slow  Psychomotor Behavior: "  no evidence of tardive dyskinesia, dystonia, or tics  Throught Process:  goal oriented  Associations:  no loose associations  Thought Content:  no evidence of suicidal ideation or homicidal ideation, auditory hallucinations are present, Visual hallucinations are present off and on, delusions are present.   Insight:  limited  Judgement:  limited  Oriented to:  time, person, and place  Attention Span and Concentration:  fair  Recent and Remote Memory:  fair         Precautions:     Behavioral Orders   Procedures     Code 2     Elopement precautions     Neuropsych Testing     Dr. Corbin will be contacted regarding neurospsych testing.     Routine Programming     As clinically indicated     Seizure precautions     Status 15     Every 15 minutes.          Diagnoses:     Cognitive Disorder NOS.   Encephalopathy probably multifactorial such as chronic hepatic encephalopathy, organic brain damage due to encephalopathy, alcohol drinking, possibly underlying psychotic illness; possibly head injury contributes to patient's symptoms.   Alcohol use disorder.   amphetamine dependence vs abuse.  She meets criteria for Major depressive disorder, moderate severity.          Plan:     Assessment:     Past Assessment: Regarding my mini cognitive screen, she seemed to improve in her short term memory (which was 3/3 recall in 6 min). Notes indicate she still harbors some delusions, which could be related to her ongoing hepatic encepahlopathy. I ordered new CBC/WBC/Ammonia levels today, which revealed some improvements in platelets (76 to 81), and ammonia level (96 down to 69). I will start her on L-carnitine (Levocarnitine), which has empirical evidence in lowering ammonia levels (and subsequent neuronal transmission) in patients with liver cirrhosis. Randomized/double blind studies exist: https://www.ncbi.nlm.nih.gov/pmc/articles/OZS5374611/. I will start 660 mg BID today, and quickly titrate up to 990 mg BID. Will retake ammonia level  next week.     Update (12/28): No significant change today, but anxious about her prolonged hospital stay. Irritable today due to not being discharged yet. Patient mildly interactive during interview, and was adequately oriented. Denied any intolerable side effects from her medications. Compliant with medications. New labs show raising Ammonia level, along with decreasing platelet level.     Medications:  -  Levocarnitine 990 mg BID  - Gabapentin 200 mg TID  - Haldol 2 mg BID and 0.5 mg BID  - Lactulose 30 g TID  - Risperdal 1 mg AM, 2 mg HS  - Spironolactone 50 mg Daily     Legal Status and Disposition:  --  Under full MICD commitment.       Andrés Hinojosa MD  [facility]iat[facility]    Spent 20   minutes on encounter, >50% of which was spent in counseling and/or coordination of care, consisting of taking history, reviewing system, and discussing medication and side effects

## 2017-12-29 NOTE — PLAN OF CARE
Problem: Psychotic Symptoms  Goal: Psychotic Symptoms  Signs and symptoms of listed problems will be absent or manageable.   Patient will verbalize reduction in AH/VH  Patient will verbalize rationale for medication compliance  Patient's speech content will be absent of paranoid/delusional content.   Outcome: Improving  48 hour nursing assessment:  Pt evaluation continues. Assessed mood, anxiety, thoughts, and behavior. Is progressing towards goals. Encourage participation in groups and developing healthy coping skills. Pt denies auditory or visual  Hallucinations.  Pt denies SI/SIB.  Pt was hard to get out of bed but was compliant with meds, although refused vital signs.  Isolative throughout the shift but pleasant on approach. Refer to daily team meeting notes for individualized plan of care. Will continue to assess.

## 2017-12-29 NOTE — PROGRESS NOTES
When writer approached pt to give her HS meds to her, pt was seen by writer interacting with things/people that were not there. She was talking to this hallucination and it appeared, repeatedly removing things from her hands to give to this hallucination. Pt was able to interact with writer throughout this episode as well.

## 2017-12-29 NOTE — PROGRESS NOTES
"   12/28/17 2100   Behavioral Health   Hallucinations auditory   Thinking poor concentration   Orientation person: oriented;place: oriented   Memory baseline memory   Insight poor   Judgement impaired   Eye Contact at examiner   Affect blunted, flat   Mood mood is calm   Physical Appearance/Attire disheveled   Hygiene neglected grooming - unclean body, hair, teeth   Suicidality other (see comments)  (denies )   Self Injury other (see comment)  (denies )   Elopement Statements about wanting to leave   Activity isolative;withdrawn   Speech clear;coherent   Activities of Daily Living   Hygiene/Grooming independent   Oral Hygiene independent   Dress independent   Laundry with supervision   Room Organization independent     Pt denied SI and SIB.  Pt reported feeling sad \" because I'm suppose to be home but I'm not home right now.\"  Pt seems calm, blunted affect, disheveled, ate supper, did not attend group and spend most of the shift in her room.  Pt daily goal \" no.\"  Pt goal after discharge \" stay away from here.\"  Pt is independent with ADL's.  Pt reported no nutritional concerns.  This writer heard pt in her room crying and saying to herself \"do you see it it's right there.\"  Pt wants visitors.   "

## 2017-12-29 NOTE — PROGRESS NOTES
Pt will be admitted to [facility] on Tuesday. It does not matter what time but they need the order 1-2 hours before she comes.    I entered pt's room today and talked to her about leaving next week. She seemed in agreement.  I asked if I could do anything for her and she asked me to get a new set of headphones and I did.  She said her sister didn't visit last night.  She complained of knee/leg pain.      The OBRA Level 2 screening was completed today by [facility]   Macarena Martel MA/Senior   Ph: [phone]  Fax: [phone]      We worked with Tracie as a liaison to the admission to this [facility].  Ph: [phone]  Fax:[phone]  Tracie is checking to see when they would admit the pt.  Tracie reinforces that Dr. Hinojosa needs to print out all the medication scripts and sign then and fax them to the NH.  Tracie states that Dr. Hinojosa needs to write an order that says he is in agreement with the placement in a secure unit.  Dr. Hinojosa needs to write nursing home orders.  I will need to call the [facility] ambulance for transport.      [facility]  [facility] at [address] [address]  Ph: [phone]  Fax: [phone]

## 2017-12-30 PROCEDURE — 25000132 ZZH RX MED GY IP 250 OP 250 PS 637: Performed by: PHYSICIAN ASSISTANT

## 2017-12-30 PROCEDURE — 25000132 ZZH RX MED GY IP 250 OP 250 PS 637: Performed by: PSYCHIATRY & NEUROLOGY

## 2017-12-30 PROCEDURE — 12400007 ZZH R&B MH INTERMEDIATE UMMC

## 2017-12-30 PROCEDURE — 25000132 ZZH RX MED GY IP 250 OP 250 PS 637: Performed by: NURSE PRACTITIONER

## 2017-12-30 PROCEDURE — 25000132 ZZH RX MED GY IP 250 OP 250 PS 637: Performed by: EMERGENCY MEDICINE

## 2017-12-30 RX ADMIN — MULTIPLE VITAMINS W/ MINERALS TAB 1 TABLET: TAB at 09:24

## 2017-12-30 RX ADMIN — LEVOCARNITINE 990 MG: 330 TABLET ORAL at 09:24

## 2017-12-30 RX ADMIN — RISPERIDONE 2 MG: 2 TABLET ORAL at 19:18

## 2017-12-30 RX ADMIN — RIFAXIMIN 550 MG: 550 TABLET ORAL at 19:18

## 2017-12-30 RX ADMIN — FOLIC ACID 1 MG: 1 TABLET ORAL at 09:23

## 2017-12-30 RX ADMIN — HALOPERIDOL 0.5 MG: 0.5 TABLET ORAL at 09:25

## 2017-12-30 RX ADMIN — LEVOCARNITINE 990 MG: 330 TABLET ORAL at 19:18

## 2017-12-30 RX ADMIN — Medication 20 MG: at 09:23

## 2017-12-30 RX ADMIN — PANTOPRAZOLE SODIUM 40 MG: 40 TABLET, DELAYED RELEASE ORAL at 09:25

## 2017-12-30 RX ADMIN — HALOPERIDOL 2 MG: 0.5 TABLET ORAL at 19:18

## 2017-12-30 RX ADMIN — GABAPENTIN 200 MG: 100 CAPSULE ORAL at 16:36

## 2017-12-30 RX ADMIN — LACTULOSE 30 G: 10 POWDER, FOR SOLUTION ORAL at 21:55

## 2017-12-30 RX ADMIN — RIFAXIMIN 550 MG: 550 TABLET ORAL at 09:25

## 2017-12-30 RX ADMIN — HALOPERIDOL 2 MG: 0.5 TABLET ORAL at 09:24

## 2017-12-30 RX ADMIN — RISPERIDONE 1 MG: 1 TABLET ORAL at 09:25

## 2017-12-30 RX ADMIN — GABAPENTIN 200 MG: 100 CAPSULE ORAL at 19:18

## 2017-12-30 RX ADMIN — FERROUS GLUCONATE 324 MG: 324 TABLET ORAL at 09:24

## 2017-12-30 RX ADMIN — GABAPENTIN 200 MG: 100 CAPSULE ORAL at 09:24

## 2017-12-30 RX ADMIN — LACTULOSE 30 G: 10 POWDER, FOR SOLUTION ORAL at 09:22

## 2017-12-30 RX ADMIN — HALOPERIDOL 0.5 MG: 0.5 TABLET ORAL at 19:18

## 2017-12-30 RX ADMIN — SPIRONOLACTONE 50 MG: 50 TABLET ORAL at 09:23

## 2017-12-30 RX ADMIN — LACTULOSE 30 G: 10 POWDER, FOR SOLUTION ORAL at 16:36

## 2017-12-30 ASSESSMENT — ACTIVITIES OF DAILY LIVING (ADL)
LAUNDRY: WITH SUPERVISION
LAUNDRY: WITH SUPERVISION
ORAL_HYGIENE: INDEPENDENT
DRESS: INDEPENDENT;STREET CLOTHES
ORAL_HYGIENE: INDEPENDENT
DRESS: STREET CLOTHES
GROOMING: INDEPENDENT;PROMPTS
GROOMING: INDEPENDENT;PROMPTS
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
## 2017-12-30 NOTE — PROGRESS NOTES
"    [facility], [place]   Psychiatric Progress Note        Interim History:     Subjective: \"Why am I not discharged yet?\" \    As per nursing report: Pt evaluation continues. Assessed mood, anxiety, thoughts, and behavior. Is progressing towards goals. Encourage participation in groups and developing healthy coping skills. Pt denies auditory or visual  Hallucinations.  Pt denies SI/SIB.  Pt was hard to get out of bed but was compliant with meds, although refused vital signs.  Isolative throughout the shift but pleasant on approach. Refer to daily team meeting notes for individualized plan of care. Will continue to assess.      As per today's interview: Patient seen in the milieu. Irritable that she is not discharged yet. Denied any issues with her medications. Denied any SI/HI/AH/VH.          Medications:       levOCARNitine  990 mg Oral BID     haloperidol  2 mg Oral BID    And     haloperidol  0.5 mg Oral BID     gabapentin  200 mg Oral TID     ferrous gluconate  324 mg Oral Daily with breakfast     lactulose  30 g Oral TID     risperiDONE  1 mg Oral QAM     risperiDONE  2 mg Oral At Bedtime     pantoprazole  40 mg Oral Daily     multivitamin, therapeutic with minerals  1 tablet Oral Daily     folic acid  1 mg Oral Daily     rifaximin  550 mg Oral BID     spironolactone  50 mg Oral Daily     furosemide  20 mg Oral Daily          Allergies:     Allergies   Allergen Reactions     Acetaminophen      Ambien [Zolpidem Tartrate] Other (See Comments)     Sleep walks and eats     Ciprofloxacin Other (See Comments)     Seizure.     Citalopram Nausea and Vomiting          Labs:     No results found for this or any previous visit (from the past 24 hour(s)).       Psychiatric Examination:     Vitals:    12/26/17 1100 12/27/17 0900 12/28/17 0917 12/29/17 2036   BP:    113/77   BP Location: Left arm Right arm Left arm Left arm   Pulse:    91   Resp: 16 16 16    Temp: 98.1  F (36.7  C) 95.3  F (35.2 " " C) 96.6  F (35.9  C)    TempSrc:       SpO2:       Weight: 102.5 kg (226 lb)      Height:                       Lying Orthostatic BP: 85/47         Sitting Orthostatic BP: 106/58         Standing Orthostatic BP: 112/71     Appearance: awake, but somnolent adequately groomed, appeared about stated age and no apparent distress  Attitude: more cooperative and less guarded  Eye Contact: poor  Mood:  \"fine\"  Affect:  intensity is blunted  Speech:  clear, coherent and slow  Psychomotor Behavior:  no evidence of tardive dyskinesia, dystonia, or tics  Throught Process:  goal oriented  Associations:  no loose associations  Thought Content:  no evidence of suicidal ideation or homicidal ideation, auditory hallucinations are present, Visual hallucinations are present off and on, delusions are present.   Insight:  limited  Judgement:  limited  Oriented to:  time, person, and place  Attention Span and Concentration:  fair  Recent and Remote Memory:  fair         Precautions:     Behavioral Orders   Procedures     Code 2     Elopement precautions     Neuropsych Testing     Dr. Corbin will be contacted regarding neurospsych testing.     Routine Programming     As clinically indicated     Seizure precautions     Status 15     Every 15 minutes.          Diagnoses:     Cognitive Disorder NOS.   Encephalopathy probably multifactorial such as chronic hepatic encephalopathy, organic brain damage due to encephalopathy, alcohol drinking, possibly underlying psychotic illness; possibly head injury contributes to patient's symptoms.   Alcohol use disorder.   amphetamine dependence vs abuse.  She meets criteria for Major depressive disorder, moderate severity.          Plan:     Assessment:     Past Assessment: Regarding my mini cognitive screen, she seemed to improve in her short term memory (which was 3/3 recall in 6 min). Notes indicate she still harbors some delusions, which could be related to her ongoing hepatic encepahlopathy. I " ordered new CBC/WBC/Ammonia levels today, which revealed some improvements in platelets (76 to 81), and ammonia level (96 down to 69). I will start her on L-carnitine (Levocarnitine), which has empirical evidence in lowering ammonia levels (and subsequent neuronal transmission) in patients with liver cirrhosis. Randomized/double blind studies exist: https://www.ncbi.nlm.nih.gov/pmc/articles/KUA8040925/. I will start 660 mg BID today, and quickly titrate up to 990 mg BID. Will retake ammonia level next week.     Update (12/29): No significant change today, but anxious about her prolonged hospital stay. Irritable today due to not being discharged yet. Patient mildly interactive during interview, and was adequately oriented. Denied any intolerable side effects from her medications. Compliant with medications. New labs show raising Ammonia level, along with decreasing platelet level.     Medications:  -  Levocarnitine 990 mg BID  - Gabapentin 200 mg TID  - Haldol 2 mg BID and 0.5 mg BID  - Lactulose 30 g TID  - Risperdal 1 mg AM, 2 mg HS  - Spironolactone 50 mg Daily     Legal Status and Disposition:  --  Under full MICD commitment.       Andrés Hinojosa MD  [facility]iat[facility]    Spent 20   minutes on encounter, >50% of which was spent in counseling and/or coordination of care, consisting of taking history, reviewing system, and discussing medication and side effects

## 2017-12-31 PROCEDURE — 25000132 ZZH RX MED GY IP 250 OP 250 PS 637: Performed by: NURSE PRACTITIONER

## 2017-12-31 PROCEDURE — 12400007 ZZH R&B MH INTERMEDIATE UMMC

## 2017-12-31 PROCEDURE — 25000132 ZZH RX MED GY IP 250 OP 250 PS 637: Performed by: PSYCHIATRY & NEUROLOGY

## 2017-12-31 PROCEDURE — 25000132 ZZH RX MED GY IP 250 OP 250 PS 637: Performed by: EMERGENCY MEDICINE

## 2017-12-31 PROCEDURE — 25000132 ZZH RX MED GY IP 250 OP 250 PS 637: Performed by: PHYSICIAN ASSISTANT

## 2017-12-31 RX ADMIN — SPIRONOLACTONE 50 MG: 50 TABLET ORAL at 09:34

## 2017-12-31 RX ADMIN — LACTULOSE 30 G: 10 POWDER, FOR SOLUTION ORAL at 09:30

## 2017-12-31 RX ADMIN — HALOPERIDOL 0.5 MG: 0.5 TABLET ORAL at 20:19

## 2017-12-31 RX ADMIN — HALOPERIDOL 2 MG: 0.5 TABLET ORAL at 20:17

## 2017-12-31 RX ADMIN — LORAZEPAM 1 MG: 1 TABLET ORAL at 20:19

## 2017-12-31 RX ADMIN — GABAPENTIN 200 MG: 100 CAPSULE ORAL at 16:43

## 2017-12-31 RX ADMIN — RISPERIDONE 2 MG: 2 TABLET ORAL at 20:17

## 2017-12-31 RX ADMIN — LEVOCARNITINE 990 MG: 330 TABLET ORAL at 09:31

## 2017-12-31 RX ADMIN — RIFAXIMIN 550 MG: 550 TABLET ORAL at 20:19

## 2017-12-31 RX ADMIN — Medication 20 MG: at 09:31

## 2017-12-31 RX ADMIN — FERROUS GLUCONATE 324 MG: 324 TABLET ORAL at 09:34

## 2017-12-31 RX ADMIN — RIFAXIMIN 550 MG: 550 TABLET ORAL at 09:33

## 2017-12-31 RX ADMIN — LACTULOSE 30 G: 10 POWDER, FOR SOLUTION ORAL at 16:43

## 2017-12-31 RX ADMIN — LEVOCARNITINE 990 MG: 330 TABLET ORAL at 20:17

## 2017-12-31 RX ADMIN — GABAPENTIN 200 MG: 100 CAPSULE ORAL at 09:31

## 2017-12-31 RX ADMIN — MULTIPLE VITAMINS W/ MINERALS TAB 1 TABLET: TAB at 09:31

## 2017-12-31 RX ADMIN — LACTULOSE 30 G: 10 POWDER, FOR SOLUTION ORAL at 21:37

## 2017-12-31 RX ADMIN — HALOPERIDOL 2 MG: 0.5 TABLET ORAL at 09:33

## 2017-12-31 RX ADMIN — GABAPENTIN 200 MG: 100 CAPSULE ORAL at 20:19

## 2017-12-31 RX ADMIN — HALOPERIDOL 0.5 MG: 0.5 TABLET ORAL at 09:33

## 2017-12-31 RX ADMIN — FOLIC ACID 1 MG: 1 TABLET ORAL at 09:33

## 2017-12-31 RX ADMIN — PANTOPRAZOLE SODIUM 40 MG: 40 TABLET, DELAYED RELEASE ORAL at 09:34

## 2017-12-31 RX ADMIN — RISPERIDONE 1 MG: 1 TABLET ORAL at 09:34

## 2017-12-31 ASSESSMENT — ACTIVITIES OF DAILY LIVING (ADL)
GROOMING: INDEPENDENT
DRESS: INDEPENDENT
ORAL_HYGIENE: INDEPENDENT
LAUNDRY: WITH SUPERVISION

## 2017-12-31 NOTE — PROGRESS NOTES
Pt was mostly isolative in her room throughout the evening, frustrated and crying about being here.     12/30/17 2200   Behavioral Health   Hallucinations denies / not responding to hallucinations   Thinking poor concentration   Orientation place: oriented;person: oriented   Memory baseline memory   Insight denial of illness;poor   Judgement impaired   Eye Contact at examiner   Affect blunted, flat;sad   Mood anxious   Physical Appearance/Attire attire appropriate to age and situation   Hygiene neglected grooming - unclean body, hair, teeth   Suicidality other (see comments)  (denies)   1. Wish to be Dead No   2. Non-Specific Active Suicidal Thoughts  No   3. Active Sucidal Ideation with any Methods (Not Plan) Without Intent to Act  No   4. Active Suicidal Ideation with Some Intent to Act, Without Specific Plan  No   5. Active Suicidal Ideation with Specific Plan and Intent  No   Change in Protective Factors? No   Enviromental Risk Factors None   Self Injury other (see comment)  (denies)   Elopement Statements about wanting to leave   Activity isolative;withdrawn   Speech clear;coherent   Medication Sensitivity no stated side effects;no observed side effects   Psychomotor / Gait steady   Activities of Daily Living   Hygiene/Grooming independent;prompts   Oral Hygiene independent   Dress street clothes   Laundry with supervision   Room Organization independent

## 2017-12-31 NOTE — PROGRESS NOTES
"Mildred has had frequent episodes this evening of yelling at a \"Elizabeth and Ezequiel\". Both delusions have made appearances in her room this evening. She is not happy about them being there and wants them out of her room.  "

## 2017-12-31 NOTE — PROGRESS NOTES
12/31/17 1400   Behavioral Health   Hallucinations denies / not responding to hallucinations   Thinking poor concentration   Orientation person: oriented;place: oriented;date: oriented;time: oriented   Memory baseline memory   Insight denial of illness;poor   Judgement impaired   Eye Contact at examiner   Affect blunted, flat;sad   Mood anxious   Physical Appearance/Attire attire appropriate to age and situation   Hygiene neglected grooming - unclean body, hair, teeth   Suicidality other (see comments)  (Denies.)   1. Wish to be Dead No   2. Non-Specific Active Suicidal Thoughts  No   Self Injury other (see comment)  (Denies.)   Elopement (Is expecting D/C soon.)   Activity isolative;withdrawn   Speech clear   Medication Sensitivity no stated side effects;no observed side effects   Psychomotor / Gait steady;slow

## 2018-01-01 PROCEDURE — 25000132 ZZH RX MED GY IP 250 OP 250 PS 637: Performed by: PHYSICIAN ASSISTANT

## 2018-01-01 PROCEDURE — 25000132 ZZH RX MED GY IP 250 OP 250 PS 637: Performed by: EMERGENCY MEDICINE

## 2018-01-01 PROCEDURE — 12400007 ZZH R&B MH INTERMEDIATE UMMC

## 2018-01-01 PROCEDURE — 25000132 ZZH RX MED GY IP 250 OP 250 PS 637: Performed by: NURSE PRACTITIONER

## 2018-01-01 PROCEDURE — 25000132 ZZH RX MED GY IP 250 OP 250 PS 637: Performed by: PSYCHIATRY & NEUROLOGY

## 2018-01-01 RX ADMIN — MULTIPLE VITAMINS W/ MINERALS TAB 1 TABLET: TAB at 09:25

## 2018-01-01 RX ADMIN — RIFAXIMIN 550 MG: 550 TABLET ORAL at 09:26

## 2018-01-01 RX ADMIN — HALOPERIDOL 2 MG: 0.5 TABLET ORAL at 09:25

## 2018-01-01 RX ADMIN — LACTULOSE 30 G: 10 POWDER, FOR SOLUTION ORAL at 17:16

## 2018-01-01 RX ADMIN — HALOPERIDOL 0.5 MG: 0.5 TABLET ORAL at 21:24

## 2018-01-01 RX ADMIN — PANTOPRAZOLE SODIUM 40 MG: 40 TABLET, DELAYED RELEASE ORAL at 09:25

## 2018-01-01 RX ADMIN — RISPERIDONE 1 MG: 1 TABLET ORAL at 09:30

## 2018-01-01 RX ADMIN — LACTULOSE 30 G: 10 POWDER, FOR SOLUTION ORAL at 09:22

## 2018-01-01 RX ADMIN — GABAPENTIN 200 MG: 100 CAPSULE ORAL at 17:15

## 2018-01-01 RX ADMIN — Medication 20 MG: at 09:26

## 2018-01-01 RX ADMIN — RISPERIDONE 2 MG: 2 TABLET ORAL at 21:22

## 2018-01-01 RX ADMIN — HALOPERIDOL 0.5 MG: 0.5 TABLET ORAL at 09:25

## 2018-01-01 RX ADMIN — GABAPENTIN 200 MG: 100 CAPSULE ORAL at 21:23

## 2018-01-01 RX ADMIN — LEVOCARNITINE 990 MG: 330 TABLET ORAL at 21:23

## 2018-01-01 RX ADMIN — GABAPENTIN 200 MG: 100 CAPSULE ORAL at 09:26

## 2018-01-01 RX ADMIN — HALOPERIDOL 2 MG: 0.5 TABLET ORAL at 21:21

## 2018-01-01 RX ADMIN — SPIRONOLACTONE 50 MG: 50 TABLET ORAL at 09:26

## 2018-01-01 RX ADMIN — RIFAXIMIN 550 MG: 550 TABLET ORAL at 21:22

## 2018-01-01 RX ADMIN — FERROUS GLUCONATE 324 MG: 324 TABLET ORAL at 09:27

## 2018-01-01 RX ADMIN — LACTULOSE 30 G: 10 POWDER, FOR SOLUTION ORAL at 21:17

## 2018-01-01 RX ADMIN — LEVOCARNITINE 990 MG: 330 TABLET ORAL at 09:26

## 2018-01-01 RX ADMIN — FOLIC ACID 1 MG: 1 TABLET ORAL at 09:26

## 2018-01-01 ASSESSMENT — ACTIVITIES OF DAILY LIVING (ADL)
GROOMING: INDEPENDENT
DRESS: INDEPENDENT
ORAL_HYGIENE: INDEPENDENT
GROOMING: INDEPENDENT
DRESS: INDEPENDENT
ORAL_HYGIENE: INDEPENDENT
LAUNDRY: WITH SUPERVISION

## 2018-01-01 NOTE — PLAN OF CARE
"Problem: Psychotic Symptoms  Goal: Psychotic Symptoms  Signs and symptoms of listed problems will be absent or manageable.   Patient will verbalize reduction in AH/VH  Patient will verbalize rationale for medication compliance  Patient's speech content will be absent of paranoid/delusional content.   Patient is isolative and withdrawn. Patent was observed in her room crying stating \"I want to go home, I don't want to be here\". Pt given PRN ativan to help with anxiety/agitation. She denies SI/SIB.       "

## 2018-01-01 NOTE — PROGRESS NOTES
Pt isolative in . Pt came out for lunch. Pt was crying in room stating she should not be here but home. Pt would walk the kaba and stand at the door for some time.      01/01/18 5172   Behavioral Health   Hallucinations appears responding   Thinking distractable;delusional;paranoid   Orientation person: oriented   Memory baseline memory   Insight denial of illness;poor   Judgement impaired   Eye Contact at examiner   Affect blunted, flat   Mood hopeless   Suicidality other (see comments)  (pt denies)   Self Injury other (see comment)  (pt denies)   Activity isolative   Activities of Daily Living   Hygiene/Grooming independent   Oral Hygiene independent   Dress independent   Room Organization independent   Behavioral Health Interventions   Psychotic Symptoms maintain safety precautions;maintain safe secure environment   Social and Therapeutic Interventions (Psychotic Symptoms) encourage socialization with peers;encourage effective boundaries with peers;encourage participation in therapeutic groups and milieu activities

## 2018-01-02 VITALS
HEIGHT: 66 IN | HEART RATE: 75 BPM | SYSTOLIC BLOOD PRESSURE: 109 MMHG | RESPIRATION RATE: 16 BRPM | DIASTOLIC BLOOD PRESSURE: 69 MMHG | WEIGHT: 226 LBS | BODY MASS INDEX: 36.32 KG/M2 | OXYGEN SATURATION: 94 % | TEMPERATURE: 96 F

## 2018-01-02 PROCEDURE — 25000132 ZZH RX MED GY IP 250 OP 250 PS 637: Performed by: PSYCHIATRY & NEUROLOGY

## 2018-01-02 PROCEDURE — 99207 ZZC CDG-CODE INCORRECT PER BILLING BASED ON TIME: CPT | Performed by: PSYCHIATRY & NEUROLOGY

## 2018-01-02 PROCEDURE — 25000132 ZZH RX MED GY IP 250 OP 250 PS 637: Performed by: NURSE PRACTITIONER

## 2018-01-02 PROCEDURE — 25000132 ZZH RX MED GY IP 250 OP 250 PS 637: Performed by: EMERGENCY MEDICINE

## 2018-01-02 PROCEDURE — 99238 HOSP IP/OBS DSCHRG MGMT 30/<: CPT | Performed by: PSYCHIATRY & NEUROLOGY

## 2018-01-02 PROCEDURE — 25000132 ZZH RX MED GY IP 250 OP 250 PS 637: Performed by: PHYSICIAN ASSISTANT

## 2018-01-02 RX ORDER — GABAPENTIN 100 MG/1
200 CAPSULE ORAL 3 TIMES DAILY
Qty: 180 CAPSULE | Refills: 1 | Status: SHIPPED | OUTPATIENT
Start: 2018-01-02

## 2018-01-02 RX ORDER — RISPERIDONE 2 MG/1
2 TABLET ORAL AT BEDTIME
Qty: 30 TABLET | Refills: 1 | Status: SHIPPED | OUTPATIENT
Start: 2018-01-02

## 2018-01-02 RX ORDER — MULTIPLE VITAMINS W/ MINERALS TAB 9MG-400MCG
1 TAB ORAL DAILY
Qty: 30 EACH | Refills: 1 | Status: SHIPPED | OUTPATIENT
Start: 2018-01-02

## 2018-01-02 RX ORDER — SPIRONOLACTONE 50 MG/1
50 TABLET, FILM COATED ORAL DAILY
Qty: 30 TABLET | Refills: 1 | Status: SHIPPED | OUTPATIENT
Start: 2018-01-02

## 2018-01-02 RX ORDER — RISPERIDONE 1 MG/1
1 TABLET ORAL EVERY MORNING
Qty: 30 TABLET | Refills: 1 | Status: SHIPPED | OUTPATIENT
Start: 2018-01-02

## 2018-01-02 RX ORDER — FERROUS GLUCONATE 324(38)MG
324 TABLET ORAL
Qty: 30 TABLET | Refills: 1 | Status: SHIPPED | OUTPATIENT
Start: 2018-01-02

## 2018-01-02 RX ORDER — LEVOCARNITINE 330 MG/1
990 TABLET ORAL 2 TIMES DAILY
Qty: 180 TABLET | Refills: 1 | Status: SHIPPED | OUTPATIENT
Start: 2018-01-02

## 2018-01-02 RX ORDER — PANTOPRAZOLE SODIUM 40 MG/1
40 TABLET, DELAYED RELEASE ORAL DAILY
Qty: 30 TABLET | Refills: 1 | Status: SHIPPED | OUTPATIENT
Start: 2018-01-02

## 2018-01-02 RX ORDER — HALOPERIDOL 0.5 MG/1
0.5 TABLET ORAL 2 TIMES DAILY
Qty: 60 TABLET | Refills: 1 | Status: SHIPPED | OUTPATIENT
Start: 2018-01-02

## 2018-01-02 RX ORDER — FOLIC ACID 1 MG/1
1 TABLET ORAL DAILY
Qty: 30 TABLET | Refills: 1 | Status: SHIPPED | OUTPATIENT
Start: 2018-01-02

## 2018-01-02 RX ORDER — HALOPERIDOL 2 MG/1
2 TABLET ORAL 2 TIMES DAILY
Qty: 60 TABLET | Refills: 1 | Status: SHIPPED | OUTPATIENT
Start: 2018-01-02

## 2018-01-02 RX ORDER — FUROSEMIDE 20 MG
20 TABLET ORAL DAILY
Qty: 30 TABLET | Refills: 1 | Status: SHIPPED | OUTPATIENT
Start: 2018-01-02

## 2018-01-02 RX ORDER — LACTULOSE 10 G/10G
30 SOLUTION ORAL 3 TIMES DAILY
Qty: 90 EACH | Refills: 1 | Status: SHIPPED | OUTPATIENT
Start: 2018-01-02

## 2018-01-02 RX ADMIN — RISPERIDONE 1 MG: 1 TABLET ORAL at 10:22

## 2018-01-02 RX ADMIN — HALOPERIDOL 0.5 MG: 0.5 TABLET ORAL at 10:23

## 2018-01-02 RX ADMIN — SPIRONOLACTONE 50 MG: 50 TABLET ORAL at 10:24

## 2018-01-02 RX ADMIN — GABAPENTIN 200 MG: 100 CAPSULE ORAL at 10:20

## 2018-01-02 RX ADMIN — FOLIC ACID 1 MG: 1 TABLET ORAL at 10:22

## 2018-01-02 RX ADMIN — LEVOCARNITINE 990 MG: 330 TABLET ORAL at 10:22

## 2018-01-02 RX ADMIN — HALOPERIDOL 2 MG: 0.5 TABLET ORAL at 10:21

## 2018-01-02 RX ADMIN — RIFAXIMIN 550 MG: 550 TABLET ORAL at 10:21

## 2018-01-02 RX ADMIN — PANTOPRAZOLE SODIUM 40 MG: 40 TABLET, DELAYED RELEASE ORAL at 10:22

## 2018-01-02 RX ADMIN — LACTULOSE 30 G: 10 POWDER, FOR SOLUTION ORAL at 10:20

## 2018-01-02 RX ADMIN — Medication 20 MG: at 10:20

## 2018-01-02 RX ADMIN — MULTIPLE VITAMINS W/ MINERALS TAB 1 TABLET: TAB at 10:22

## 2018-01-02 RX ADMIN — FERROUS GLUCONATE 324 MG: 324 TABLET ORAL at 10:22

## 2018-01-02 ASSESSMENT — ACTIVITIES OF DAILY LIVING (ADL)
DRESS: STREET CLOTHES;INDEPENDENT
ORAL_HYGIENE: INDEPENDENT
GROOMING: HANDWASHING;INDEPENDENT

## 2018-01-02 NOTE — PLAN OF CARE
Brief Plan of Care Note:    I have been working with this patient for the past 2 weeks. The patient is compliant with medications, more or less at baseline in regards to thought process and behavior, and is appropriate for discharge at this time. Based on the patient's needs, she would require a secure (locked) nursing facility post discharge. Further details will be in the discharge summary.     Andrés Hinojosa MD  [facility]

## 2018-01-02 NOTE — DISCHARGE INSTRUCTIONS
Behavioral Discharge Planning and Instructions      Summary:  You were admitted on 8/9/2017 after eloping from the [facility] you were placed in from [facility].  You were then found at a party where you were smoking methamphetamine and crying uncontrollably and your family called 911. You were treated by Dr. Asif Payne MD and then Andrés Hinojosa MD. You were responding to internal stimuli. You spent most of the time in your room. You were not able to participate in the program. Your symptoms did not resolve but did improve.  You were discharged on 1/2/2018 from [facility] to a Skilled Nursing  Home at [facility] on a provisional discharge. You were transported by [facility] ambulance. You will be on a secure behavioral health unit.      Principal Diagnosis:   Cognitive Disorder NOS.   Encephalopathy probably multifactorial such as chronic hepatic encephalopathy, organic brain damage due to encephalopathy, alcohol drinking, possibly underlying psychotic illness; possibly head injury contributes to patient's symptoms.   Alcohol use disorder.   Amphetamine use disorder.  Major depressive disorder, moderate severity.       Health Care Follow-up Appointments:     You were recommitted through [facility] on November 29, 2017.   Continue to coordinate the terms of your commitment with the adult mental health .  [facility]  is  Shira Pate Ph: [phone]  [facility] is Janki Beíntez Ph: [phone]  The Health unit Coordinator has faxed these discharge instructions to fax: [phone]      You are being discharged on a provisional discharge. This is filed with [facility].      Tracie is the liaison to your admission to this [facility].  Ph: [phone]  Fax:[phone]    [facility]  [facility] at [address] [address]  Ph: [phone]  Fax: [phone]      The OBRA Level 2  screening was completed 12/29/17 by [facility].  Macarena Tabatha  Ph: [phone]  Fax: [phone]      Your [facility] CADI Worker is:  Yoon Braun  Cell: [phone]  The Health Unit Coordinator has faxed these discharge instructions to Fax: [phone]      You sister would like to be your guardian.  Yareli  Ph: [phone]    Attend all scheduled appointments with your outpatient providers. Call at least 24 hours in advance if you need to reschedule an appointment to ensure continued access to your outpatient providers.   Major Treatments, Procedures and Findings:  You were provided with: a psychiatric assessment and medication evaluation and/or management    You were evaluated by internal medicine multiple times for your physical health conditions.    You were seen by a pain management specialist on 11/14/17.    Symptoms to Report: feeling more aggressive, increased confusion, losing more sleep, mood getting worse or thoughts of suicide    Early warning signs can include: increased depression or anxiety sleep disturbances increased thoughts or behaviors of suicide or self-harm  increased unusual thinking, such as paranoia or hearing voices    Safety and Wellness:  Take all medicines as directed.  Make no changes unless your doctor suggests them.      Follow treatment recommendations.  Refrain from alcohol and non-prescribed drugs.  If there is a concern for safety, call 911.    Resources:   [facility] Crisis (COPE) Response - Adult [phone]        The treatment team has appreciated the opportunity to work with you.     If you have any questions or concerns our unit number is [phone]

## 2018-01-02 NOTE — PLAN OF CARE
"Problem: Psychotic Symptoms  Goal: Psychotic Symptoms  Signs and symptoms of listed problems will be absent or manageable.   Patient will verbalize reduction in AH/VH  Patient will verbalize rationale for medication compliance  Patient's speech content will be absent of paranoid/delusional content.   Outcome: No Change  Pt was Islotive and withdrawn. She spent most of the shift in her room, but she did come out of dinner and to occasionally pace the hallway. When pacing the hallway pt would stand by the door staring at it. Pt was heared crying loudly in her room. Writer went in to check on her she stated \"they said I could leave at 6, but they lied\" and \"Do you work for [employer]? If you do I can't talk to you\". She declined to check in with writer this evening. Pt was mood was irritable and tense. Her affect blunt and flat. She denied auditory and visual hallucinations but staff observed her responding to internal stimuli. She was reluctant to take her medications this, but she did take them. She received no PRNs this evening. Will continue to monitor the pt.      "

## 2018-01-02 NOTE — PROGRESS NOTES
"Pt is dc'd by ambulance as per avs, has all belongings, no dc meds. She verbalizes understanding of dc instructions. She refuses to sign her avs. She denies s/sx psychosis, none noted. She also denies si, HI. Her affect is bland; she states her mood is \"ok.\" She denies acute somatic concerns, vss. See dc clara and avs, also.  "

## 2018-01-02 NOTE — PROGRESS NOTES
"I have arranged for [facility] ambulance to come at 1:30PM today. They will have pt on a stretcher.  We talked about an EMMY hold but they said this was not necessary.  I have faxed a draft copy of the provisional discharge to [facility] for Shira's review.  I have informed Shira and her supervisor of the transfer to the nursing home today.  I approached pt in her room and informed her she was moving to [place] today. She was sleeping with the blanket over her face and I tried to arouse her. She said that she was \"OK\" with the move today. I asked her if she wanted to get up for breakfast and she said she would rather sleep. I attempted to present the PD to her to sign but she did not want to move from this position.  I approached pt a second time and she said she was going to the bathroom.  I approached pt a 3rd time and she was dressed in the lounge. She wanted a Pepsi and an oatmeal pie from her locker and I got these for her. I showed her the PD and explained it was from the commitment. She said \" I am not committed.\" Pt declined to sign this. Pt did affirm that she was heppy to go.  I faxed the declined PD to the [facility] and the nursing home.  I faxed the AVS to [facility] and the nursing home.  I faxed the MD orders and the prescriptions to the nursing home.  I called the NH and asked for admissions and got a voice mail for Justin and left a vm that the pt is coming.  I called Leonela Hodges transition liaison, live and she said all seems in order and will let me know if there are any complications.  I faxed the provisional discharge agreement to [facility], made a copy for pt to take, placed a copy in the scanning box, placed a copy in the hard chart and am keeping a copy in my files.    Behavioral Discharge Planning and Instructions      Summary:  You were admitted on 8/9/2017 after eloping from the [facility] you were placed in from [facility].  You were then found at a party where " you were smoking methamphetamine and crying uncontrollably and your family called 911. You were treated by Dr. Asif Payne MD and then Andrés Hinojosa MD. You were responding to internal stimuli. You spent most of the time in your room. You were not able to participate in the program. Your symptoms did not resolve but did improve.  You were discharged on 1/2/2018 from [facility] to a Skilled Nursing  Home at [facility] on a provisional discharge. You were transported by [facility] ambulance. You will be on a secure behavioral health unit.      Principal Diagnosis:   Cognitive Disorder NOS.   Encephalopathy probably multifactorial such as chronic hepatic encephalopathy, organic brain damage due to encephalopathy, alcohol drinking, possibly underlying psychotic illness; possibly head injury contributes to patient's symptoms.   Alcohol use disorder.   Amphetamine use disorder.  Major depressive disorder, moderate severity.       Health Care Follow-up Appointments:     You were recommitted through [facility] on November 29, 2017.   Continue to coordinate the terms of your commitment with the adult mental health .  [facility]  is  Shira Pate Ph: [phone]  [facility] is Janki Benítez Ph: [phone]  The Health unit Coordinator has faxed these discharge instructions to fax: [phone]      You are being discharged on a provisional discharge. This is filed with [facility].      Tracie is the liaison to your admission to this [facility].  Ph: [phone]  Fax:[phone]    [address] [address]  Ph: [phone]  Fax: [phone]      The OBRA Level 2 screening was completed 12/29/17 by [facility].  Macarena Mays  Ph: [phone]  Fax: [phone]      Your [facility] CADI Worker is:  Yoon Braun  Cell: [phone]  The Health Unit Coordinator has faxed  these discharge instructions to Fax: [phone]      You sister would like to be your guardian.  Yareli  Ph: [phone]    Attend all scheduled appointments with your outpatient providers. Call at least 24 hours in advance if you need to reschedule an appointment to ensure continued access to your outpatient providers.   Major Treatments, Procedures and Findings:  You were provided with: a psychiatric assessment and medication evaluation and/or management    You were evaluated by internal medicine multiple times for your physical health conditions.    You were seen by a pain management specialist on 11/14/17.    Symptoms to Report: feeling more aggressive, increased confusion, losing more sleep, mood getting worse or thoughts of suicide    Early warning signs can include: increased depression or anxiety sleep disturbances increased thoughts or behaviors of suicide or self-harm  increased unusual thinking, such as paranoia or hearing voices    Safety and Wellness:  Take all medicines as directed.  Make no changes unless your doctor suggests them.      Follow treatment recommendations.  Refrain from alcohol and non-prescribed drugs.  If there is a concern for safety, call 911.    Resources:   [facility] Crisis (COPE) Response - Adult [phone]        The treatment team has appreciated the opportunity to work with you.     If you have any questions or concerns our unit number is [phone]

## 2018-01-03 ENCOUNTER — AMBULATORY - HEALTHEAST (OUTPATIENT)
Dept: GERIATRICS | Facility: CLINIC | Age: 43
End: 2018-01-03

## 2018-01-04 ENCOUNTER — AMBULATORY - HEALTHEAST (OUTPATIENT)
Dept: ADMINISTRATIVE | Facility: CLINIC | Age: 43
End: 2018-01-04

## 2018-01-04 NOTE — DISCHARGE SUMMARY
"[facility], [place]   Psychiatric Discharge Summary      Mildred Rascon MRN# [id number]   Age: 42 year old YOB: 1975     Date of Admission:  8/9/2017  Date of Discharge:  01/03/18  Admitting Physician:  Asif Payne MD  Discharge Physician:  Andrés Hinojosa MD         Summary/Hospital Course/Disposition:   Reason for Hospitalization: Mildred Rascon is a 42-year-old ,  female admitted from transfer from medical service due to agitated, possibly psychotic behavior.  The patient was at a [employer] party and apparently smoked meth.  Family called the police because the patient was crying uncontrollably.  The patient also made statements that family members were \"playing a joke on me\" and proceeded to tell her that she had cancer and was going to die.     Hospital Course: Patient had a very prolonged hospital stay. In the course, it was determined that she is unable to adequately make informed decisions about her psychiatric care, thus she had been filed for commitment, resulting in a full commitment with [facility]. Her altered mentation/disorganized thoughts and depressive moods seems closely liked to her liver failure, which was objectively demonstrated by raising ammonia (which tended to fluctuate, but tended to stay elevated), LFT level instability (which have since normalized), and platelet levels (which have trended downwards). She had intermittently displayed elevated, irritable moods, with delusions. At times she was disoriented and confused. Regarding my mini cognitive screen  (done in the last month of her hospitalization), she seemed to improve in her short term memory (which was 3/3 recall in 6 min). Notes indicate she still harbors some delusions, which could be related to her ongoing hepatic encepahlopathy.I started her on L-carnitine (Levocarnitine), which has empirical evidence in lowering ammonia levels (and subsequent " neuronal transmission) in patients with liver cirrhosis. Randomized/double blind studies exist: https://www.ncbi.nlm.nih.gov/pmc/articles/FVZ6883408/. Recheck on ammonia and platelet did not show much improvement. She was mobile with her walker, and had eaten her meals, and had been compliant with her medications. She denied any ongoing suicidal thoughts, homicidal thoughts, self injury thoughts, nor any auditory/visual hallucinations. She was an appropriate candidate for nursing home, and was finally successfully placed at [facility]. Paper copies of her medication list (which were signed) were provided upon discharge.            DIagnoses:     Encephalopathy probably multifactorial such as chronic hepatic encephalopathy, organic brain damage due to encephalopathy, alcohol drinking, possibly underlying psychotic illness; possibly head injury contributes to patient's symptoms.   Alcohol use disorder.   Amphetamine dependence vs abuse.  She meets criteria for Major depressive disorder, moderate severity.   Cognitive Disorder NOS.          Labs:   No results found for this or any previous visit (from the past 24 hour(s)).         Consults:   1.) Pain Medicine Consult as per VERN [place] on 11/14:  ASSESSMENT:   1. Bilateral knee pain due to OA.  Pt states that she has had knee pain x 3 years.  Denies injuries as the cause of pain.  States that she has been evaluated by orthopedics 3 years ago in [place] and had Synvisc injections to knees x 3 separate occasions without benefits.   She has also been evaluated by orthopedics here in [place] within the last year and was recommend bilat. TKAs which patient states she is ready to consider.  2. Polysubstance abuse: alcohol and methamphetamine (see UDS on this admission).  Pt was reported smoking methamphetamine which contributed to psychosis and leading to current admission.  Pt denies illicit drug abuse history during visit today  "(11/14/17).  3. H/o hepatic encephalopathy  4. Poor self care and medical adherence-has h/o leaving AMA and elopement  5. Poor insight-pt states that \"she lives next door\" and wants to go home.  Social work is involved in housing placement for the patient.  It appears that this has been challenging as patient has been declined at several facilities (NH and group homes).  She has been inpatient since 8/9/17 while awaiting for housing placement.    2.) Medicine consult as per VERN Bazzi on 8/24:   1. Elevated Ammonia. Ammonia gradually up-trending since admission, most recently 82 (73). Liver enzymes down-trending with AST/ALT 48/57 (previously 86/89). History of hepatic cirrhosis c/b multiple episodes of hepatic encephalopathy (most recently 4/2017). Maintained on lactulose and rifaximin PTA, although documented poor compliance. Abdominal US 9/2016 with cirrhotic liver, small volume ascites, retrograde flow in portal venous system. Neurology consulted on 8/17 due to persistent visual hallucinations; felt likely patient suffers from permanent sequelae of multiple attacks of HE and/or Korsakoff syndrome. Unclear how many BM's patient is currently having per day, may need increased lactulose dosing. Will also check UA for possible infection. No obvious ascites on exam.  - Lactulose protocol  - I&O's  - UA/UCx ordered  - Repeat ammonia today  - Trend ammonia, CMP         Discharge Medications:        Review of your medicines      START taking       Dose / Directions    furosemide 20 MG tablet   Commonly known as:  LASIX   Used for:  Renal insufficiency        Dose:  20 mg   Take 1 tablet (20 mg) by mouth daily   Quantity:  30 tablet   Refills:  1       * haloperidol 2 MG tablet   Commonly known as:  HALDOL   Used for:  Altered mental status, unspecified altered mental status type, Mixed anxiety depressive disorder        Dose:  2 mg   Take 1 tablet (2 mg) by mouth 2 times daily   Quantity:  60 tablet   Refills:  1 "       * haloperidol 0.5 MG tablet   Commonly known as:  HALDOL   Used for:  Altered mental status, unspecified altered mental status type, Mixed anxiety depressive disorder        Dose:  0.5 mg   Take 1 tablet (0.5 mg) by mouth 2 times daily   Quantity:  60 tablet   Refills:  1       lactulose 10 GM Packet   Commonly known as:  CEPHULAC   Used for:  Altered mental status, unspecified altered mental status type, Liver disease, chronic, due to alcohol (H)        Dose:  30 g   Take 3 packets (30 g) by mouth 3 times daily   Quantity:  90 each   Refills:  1       levOCARNitine 330 MG tablet   Commonly known as:  CARNITOR   Used for:  Liver disease, chronic, due to alcohol (H), Alcoholic liver disease (H)        Dose:  990 mg   Take 3 tablets (990 mg) by mouth 2 times daily   Quantity:  180 tablet   Refills:  1       rifaximin 550 MG Tabs tablet   Commonly known as:  XIFAXAN   Indication:  hepatic encephalopathy   Used for:  Liver disease, chronic, due to alcohol (H), Altered mental status, unspecified altered mental status type        Dose:  550 mg   Take 1 tablet (550 mg) by mouth 2 times daily   Quantity:  60 tablet   Refills:  1       spironolactone 50 MG tablet   Commonly known as:  ALDACTONE   Used for:  Liver disease, chronic, due to alcohol (H), Renal insufficiency        Dose:  50 mg   Take 1 tablet (50 mg) by mouth daily   Quantity:  30 tablet   Refills:  1       * Notice:  This list has 2 medication(s) that are the same as other medications prescribed for you. Read the directions carefully, and ask your doctor or other care provider to review them with you.      CONTINUE these medicines which may have CHANGED, or have new prescriptions. If we are uncertain of the size of tablets/capsules you have at home, strength may be listed as something that might have changed.       Dose / Directions    gabapentin 100 MG capsule   Commonly known as:  NEURONTIN   This may have changed:    - medication strength  - how much  to take   Used for:  Mixed anxiety depressive disorder        Dose:  200 mg   Take 2 capsules (200 mg) by mouth 3 times daily   Quantity:  180 capsule   Refills:  1       * risperiDONE 2 MG tablet   Commonly known as:  risperDAL   This may have changed:  Another medication with the same name was changed. Make sure you understand how and when to take each.   Used for:  Altered mental status, unspecified altered mental status type, Mixed anxiety depressive disorder        Dose:  2 mg   Take 1 tablet (2 mg) by mouth At Bedtime   Quantity:  30 tablet   Refills:  1       * risperiDONE 1 MG tablet   Commonly known as:  risperDAL   This may have changed:    - when to take this  - reasons to take this   Used for:  Altered mental status, unspecified altered mental status type, Mixed anxiety depressive disorder        Dose:  1 mg   Take 1 tablet (1 mg) by mouth every morning   Quantity:  30 tablet   Refills:  1       * Notice:  This list has 2 medication(s) that are the same as other medications prescribed for you. Read the directions carefully, and ask your doctor or other care provider to review them with you.      CONTINUE these medicines which have NOT CHANGED       Dose / Directions    albuterol 108 (90 BASE) MCG/ACT Inhaler   Commonly known as:  PROAIR HFA/PROVENTIL HFA/VENTOLIN HFA   Used for:  Mild intermittent asthma without complication        Use 1-2 puffs every 4 to 6 hours as needed   Quantity:  1 Inhaler   Refills:  0       ferrous gluconate 324 (38 FE) MG tablet   Commonly known as:  FERGON   Used for:  Liver disease, chronic, due to alcohol (H), Alcoholic liver disease (H)        Dose:  324 mg   Take 1 tablet (324 mg) by mouth daily (with breakfast)   Quantity:  30 tablet   Refills:  1       folic acid 1 MG tablet   Commonly known as:  FOLVITE   Used for:  Liver disease, chronic, due to alcohol (H), Alcoholic liver disease (H)        Dose:  1 mg   Take 1 tablet (1 mg) by mouth daily   Quantity:  30 tablet  "  Refills:  1       multivitamin, therapeutic with minerals Tabs tablet   Used for:  Liver disease, chronic, due to alcohol (H)        Dose:  1 tablet   Take 1 tablet by mouth daily   Quantity:  30 each   Refills:  1       pantoprazole 40 MG EC tablet   Commonly known as:  PROTONIX   Used for:  Gastroesophageal reflux disease without esophagitis        Dose:  40 mg   Take 1 tablet (40 mg) by mouth daily   Quantity:  30 tablet   Refills:  1            Where to get your medicines      Some of these will need a paper prescription and others can be bought over the counter. Ask your nurse if you have questions.     Bring a paper prescription for each of these medications      ferrous gluconate 324 (38 FE) MG tablet     folic acid 1 MG tablet     furosemide 20 MG tablet     gabapentin 100 MG capsule     haloperidol 0.5 MG tablet     haloperidol 2 MG tablet     lactulose 10 GM Packet     levOCARNitine 330 MG tablet     multivitamin, therapeutic with minerals Tabs tablet     pantoprazole 40 MG EC tablet     rifaximin 550 MG Tabs tablet     risperiDONE 1 MG tablet     risperiDONE 2 MG tablet     spironolactone 50 MG tablet                  Mental Status Examination:   Appearance: awake, but somewhat somnolent. Adequately groomed, appeared about stated age and no apparent distress  Attitude: more cooperative and less guarded  Eye Contact: poor  Mood:  \"fine\"  Affect:  intensity is blunted  Speech:  clear, coherent and slow  Psychomotor Behavior:  no evidence of tardive dyskinesia, dystonia, or tics  Throught Process:  goal oriented  Associations:  no loose associations  Thought Content:  no evidence of suicidal ideation or homicidal ideation, auditory hallucinations are present, Visual hallucinations are present off and on, delusions are present.   Insight:  limited  Judgement:  limited  Oriented to:  time, person, and place  Attention Span and Concentration:  fair  Recent and Remote Memory:  fair         Discharge Plan: "     MR Abdomen w Contrast   Administration of IV contrast (contrast agent, dose, and amount) will be tailored to this examination per the appropriate written protocol listed in the Protocol E-Book, or by the supervising imaging provider.      Follow Up ([facility])   Follow up MRI to assess liver lesion. See you PCP 1 week after MRI to discuss results    Appointments on [place] and/or [facility] (with [facility] or [facility] provider or service). Call [phone] if you haven't heard regarding these appointments within 7 days of discharge.     General info for SNF   Admitting Provider(s): Andrés Hinojosa MD    Admit to Facility: [facility]  Length of Stay Estimate: Undetermined   Condition: Improving  Level of care:skilled   Rehabilitation Potential: Good  Admission H&P completed: Yes   Recent Chemotherapy: No  Use Nursing Home Standing Orders: Yes    For questions about these admission orders, please contact:  [address] [address]  Phone: [phone]     Activity   Activity Level: up as tolerated. Patient uses a walker, so her ambulation is slow.     Diet   Diet order: Regular  Fluid restriction? NA  Texture: regular  Thicken liquids? No     Full Code     Fall precautions         - Patient discharged with paper scripts of the medications listed above.   - Patient was discharged to [facility] ([facility])     Andrés Hinojosa MD  [facility]

## 2018-01-30 ENCOUNTER — OFFICE VISIT - HEALTHEAST (OUTPATIENT)
Dept: GERIATRICS | Facility: CLINIC | Age: 43
End: 2018-01-30

## 2018-01-30 ENCOUNTER — HOSPITAL ENCOUNTER (OUTPATIENT)
Dept: MRI IMAGING | Facility: CLINIC | Age: 43
Discharge: HOME | End: 2018-01-30
Attending: PHYSICIAN ASSISTANT | Admitting: PHYSICIAN ASSISTANT
Payer: MEDICAID

## 2018-01-30 DIAGNOSIS — K76.9 HEPATIC LESION: ICD-10-CM

## 2018-01-30 DIAGNOSIS — R52 PAIN MANAGEMENT: ICD-10-CM

## 2018-01-30 DIAGNOSIS — F10.10 ALCOHOL ABUSE: ICD-10-CM

## 2018-01-30 DIAGNOSIS — F19.10 POLYSUBSTANCE ABUSE (H): ICD-10-CM

## 2018-01-30 DIAGNOSIS — K70.9 ALCOHOLIC LIVER DISEASE (H): ICD-10-CM

## 2018-01-30 DIAGNOSIS — R41.82 MENTAL STATUS ALTERATION: ICD-10-CM

## 2018-01-30 PROCEDURE — 74183 MRI ABD W/O CNTR FLWD CNTR: CPT

## 2018-01-30 PROCEDURE — A9585 GADOBUTROL INJECTION: HCPCS | Performed by: PHYSICIAN ASSISTANT

## 2018-01-30 PROCEDURE — 25000128 H RX IP 250 OP 636: Performed by: PHYSICIAN ASSISTANT

## 2018-01-30 RX ORDER — GADOBUTROL 604.72 MG/ML
10 INJECTION INTRAVENOUS ONCE
Status: COMPLETED | OUTPATIENT
Start: 2018-01-30 | End: 2018-01-30

## 2018-01-30 RX ADMIN — SODIUM CHLORIDE 50 ML: 900 INJECTION, SOLUTION INTRAVENOUS at 10:51

## 2018-01-30 RX ADMIN — GADOBUTROL 10 ML: 604.72 INJECTION INTRAVENOUS at 10:51

## 2018-02-09 ENCOUNTER — OFFICE VISIT - HEALTHEAST (OUTPATIENT)
Dept: GERIATRICS | Facility: CLINIC | Age: 43
End: 2018-02-09

## 2018-02-09 DIAGNOSIS — M25.561 BILATERAL KNEE PAIN: ICD-10-CM

## 2018-02-09 DIAGNOSIS — K70.30 LIVER CIRRHOSIS, ALCOHOLIC (H): ICD-10-CM

## 2018-02-09 DIAGNOSIS — K21.9 GERD (GASTROESOPHAGEAL REFLUX DISEASE): ICD-10-CM

## 2018-02-09 DIAGNOSIS — G40.909 SEIZURE DISORDER (H): ICD-10-CM

## 2018-02-09 DIAGNOSIS — Z91.148 NON COMPLIANCE W MEDICATION REGIMEN: ICD-10-CM

## 2018-02-09 DIAGNOSIS — F29 PSYCHOSIS, UNSPECIFIED PSYCHOSIS TYPE (H): ICD-10-CM

## 2018-02-09 DIAGNOSIS — M25.562 BILATERAL KNEE PAIN: ICD-10-CM

## 2018-02-09 DIAGNOSIS — Z87.820 H/O TRAUMATIC BRAIN INJURY: ICD-10-CM

## 2018-02-09 DIAGNOSIS — K76.82 HEPATIC ENCEPHALOPATHY (H): ICD-10-CM

## 2018-02-11 ASSESSMENT — MIFFLIN-ST. JEOR: SCORE: 1695.02

## 2018-02-12 ENCOUNTER — COMMUNICATION - HEALTHEAST (OUTPATIENT)
Dept: GERIATRICS | Facility: CLINIC | Age: 43
End: 2018-02-12

## 2018-02-12 DIAGNOSIS — R52 PAIN: ICD-10-CM

## 2018-03-02 ENCOUNTER — AMBULATORY - HEALTHEAST (OUTPATIENT)
Dept: GERIATRICS | Facility: CLINIC | Age: 43
End: 2018-03-02

## 2018-07-08 NOTE — PROGRESS NOTES
09/05/17 1321   Behavioral Health   Hallucinations appears responding   Thinking poor concentration;distractable   Orientation time: oriented;date: oriented;place: oriented;person: oriented   Memory baseline memory   Insight poor   Judgement impaired   Eye Contact at examiner   Affect blunted, flat;irritable   Mood depressed;mood is calm   Physical Appearance/Attire appears stated age   Hygiene well groomed   Suicidality other (see comments)  (Denies)   Self Injury other (see comment)  (Denies)   Activity isolative;withdrawn   Speech coherent;clear   Medication Sensitivity no stated side effects   Psychomotor / Gait balanced;steady   Psycho Education   Type of Intervention 1:1 intervention   Response participates, initiates socially appropriate   Hours 0.5   Activities of Daily Living   Hygiene/Grooming independent   Oral Hygiene independent   Dress scrubs (behavioral health)   Laundry with supervision   Room Organization independent   Activity   Activity Level of Assistance independent   Pt reports being frustrated staying here and she wants to be discharged. She spent most of her time in room sleeping or lying on bed. She appears angry and eats in her room. She was observed getting coffee and she denies suicidal ideation.   Normal

## 2018-07-25 ENCOUNTER — TRANSFERRED RECORDS (OUTPATIENT)
Dept: HEALTH INFORMATION MANAGEMENT | Facility: CLINIC | Age: 43
End: 2018-07-25

## 2018-07-30 ENCOUNTER — TRANSFERRED RECORDS (OUTPATIENT)
Dept: HEALTH INFORMATION MANAGEMENT | Facility: CLINIC | Age: 43
End: 2018-07-30

## 2018-08-28 ENCOUNTER — TRANSFERRED RECORDS (OUTPATIENT)
Dept: HEALTH INFORMATION MANAGEMENT | Facility: CLINIC | Age: 43
End: 2018-08-28

## 2019-10-08 NOTE — TELEPHONE ENCOUNTER
amand  RECORDS RECEIVED FROM: [facility] - Alcohol related cirrhosis of the liver, per patient, self refd, med recs [facility].    DATE RECEIVED: 10.25.2019   NOTES STATUS DETAILS   OFFICE NOTE from referring provider N/A    OFFICE NOTES from other specialists Care Everywhere  Received 09.25.2019 [facility]    07.25.2018 [facility]   DISCHARGE SUMMARY from hospital Care Everywhere 02.09.2018   MEDICATION LIST Internal / [facility]     LIVER BIOSPY (IF APPLICABLE)      PATHOLOGY REPORTS  N/A    IMAGING     ENDOSCOPY (IF AVAILABLE) Received 08.28.2018   COLONOSCOPY (IF AVAILABLE) N/A    ULTRASOUND LIVER Internal 09.10.2017   CT OF ABDOMEN Care Everywhere - Requested 10.04.2019 [facility]   MRI OF LIVER Internal 01.30.2018   FIBROSCAN, US ELASTOGRAPHY, FIBROSIS SCAN, MR ELASTOGRAPHY N/A    LABS     HEPATIC PANEL (LIVER PANEL) Care Everywhere 08.05.2019   BASIC METABOLIC PANEL Care Everywhere 08.05.2019   COMPLETE METABOLIC PANEL Care Everywhere 09.25.2019   COMPLETE BLOOD COUNT (CBC) Care Everywhere 09.25.2019   INTERNATIONAL NORMALIZED RATIO (INR) Internal 04.25.2017   HEPATITIS C ANTIBODY Care Everywhere 06.24.2015   HEPATITIS C VIRAL LOAD/PCR N/A    HEPATITIS C GENOTYPE N/A    HEPATITIS B SURFACE ANTIGEN N/A    HEPATITIS B SURFACE ANTIBODY N/A    HEPATITIS B DNA QUANT LEVEL N/A    HEPATITIS B CORE ANTIBODY N/A      Action    Action Taken 10.08.2019 Called [facility] to push over CT, called [phone]  was told to send over request via fax for images.    Pending image from [facility].    10.10.2019 Image received.         Medical Records Request For Appointment  URGENT!            To: [facility] Medical Records From: Brianda Joyner - Clinic   [facility]   Fax: [phone] Fax: [phone]   Date: 10/8/2019   Phone: [phone]   Pages: 1 Mailing Address: [facility]  Attn: Brianda Joyner, Clinic Support Specilaist  [address] [place] [address], mail  code [id number]  [place] [address]     PATIENT: Mildred Rascon  : 1975  REFFERRED BY: N/A      Please fax medical records to fax # [phone] for patient s upcoming appointment in the Hepatology Clinic.     Patient is being seen for: - Alcohol related cirrhosis of the liver, just need the images thank you.    PLEASE SEND:  TIME FRAME NEEDED:      Referring MD notes, office visit notes with any other related providers  3 to 4 years     Related Biopsy Reports All Dates     ED/UC/Hospital discharge notes 3 to 4 years     Medication List Current     Any Related Imaging:   MRI, CT, and US           *Reports and images*   3-4 years    Please push images to [employer] PACS or mail the imaging disc:  URGENT via [email or web address] #[id number] (cost center number to be populated in the reference field of the shipping label: [id number]) to the address above.  NON URGENT mail to the above address via [employer]       ** If no records related to Hepatology, please send most recent lab work and physical. **          Confidentiality Notice:  The document(s) accompanying this fax may contain protected health information under state and federal law and is legally privileged.  The information is only for the use of the intended recipient named above.  The recipient or person responsible for delivering this information is prohibited by law from disclosing this information without proper authorization to any other party, unless required to do so by law or regulation.  If you are not the intended recipient, you are hereby notified that any review, dissemination, distribution, or copying of this message is strictly prohibited.  If you have received this communication in error, please notify us immediately by telephone to arrange for the return or destruction of the faxed documents.  Thank you.

## 2019-10-10 DIAGNOSIS — K70.31 ALCOHOLIC CIRRHOSIS OF LIVER WITH ASCITES (H): Primary | ICD-10-CM

## 2019-10-25 ENCOUNTER — PRE VISIT (OUTPATIENT)
Dept: GASTROENTEROLOGY | Facility: CLINIC | Age: 44
End: 2019-10-25

## 2020-01-23 ENCOUNTER — CARE COORDINATION (OUTPATIENT)
Dept: ONCOLOGY | Facility: CLINIC | Age: 45
End: 2020-01-23

## 2020-01-23 DIAGNOSIS — K86.89 MASS OF PANCREAS: Primary | ICD-10-CM

## 2020-01-23 NOTE — PROGRESS NOTES
Care Coordination New Patient Referral  [facility]    NP referral date: 01/23/20  Referred to MD: Gonzalez    Referring MD: Claudia  Referring contact information see initial referral note  And Dr. Taylor has discussed with Dr. Roth directly  Referral for EUS  Per Dr. Roth:  referring for repeat EUS exam of pancreatic head mass. 17 x 16 mm lesion on EUS  1/14/2020 with non-diagnostic FNA.     Pt with compensated cirrhosis, sober, living in group home. Normal plt and INR    Diagnosis: pancreas mass    Imaging: uploaded to [employer] ([place]  CT   Location:   Date:  12/9/19' 10/20/19' 10/4/19  MRI    Location:    Date:  10/15/19    Procedures:  EUS 1/14/20 Dr. Taylor - see full report care everywhere    Referral has been reviewed by Dr. Roth with Dr. Taylor on 1/21/20  Referral received 1/23/20  Per Dr. Roth:  Upper endoscopic ultrasound with possible biopsy.   MAC   Has outside H+P from 1/7/2020.     Referral sent to endoscopy scheduling on 01/23/20 at 1:15 pm via in basket staff message and the patient will be contacted with appointment for next week.       Yolanda Mitchell  BSN, HNBC  RN Care Coordinator  [facility]  Ph: [phone]  Email: [email or web address]

## 2020-02-01 NOTE — PROGRESS NOTES
"    [facility], [place]   Psychiatric Progress Note        Interim History:     The patient's care was discussed with the treatment team during the daily team meeting and/or staff's chart notes were reviewed.  Staff report patient has spent more time inside of her room. She appeared to be in a better mood. Sounded more positive, hoped she would be accepted to one of [facility] places who interviewed her on Monday. She is compliant with most of her meds, but takes Lactulose off and on. Still appears to be responding to internal stimuli and reluctant to acknowledge that, was seen today picking something from the air. When asked what she was doing, responded: \"I was scratching my arm\".   Per CTC: MELISSA Becerril  and SEBAS Silva came to complete assessment. Yoon will have her paperwork done on Friday. Then the county will work with [facility] for admission.\"       Medications:       gabapentin  100 mg Oral TID     ferrous gluconate  324 mg Oral Daily with breakfast     lactulose  30 g Oral TID     haloperidol  2.5 mg Oral BID     risperiDONE  1 mg Oral QAM     risperiDONE  2 mg Oral At Bedtime     pantoprazole  40 mg Oral Daily     multivitamin, therapeutic with minerals  1 tablet Oral Daily     folic acid  1 mg Oral Daily     rifaximin  550 mg Oral BID     spironolactone  50 mg Oral Daily     furosemide  20 mg Oral Daily          Allergies:     Allergies   Allergen Reactions     Acetaminophen      Ambien [Zolpidem Tartrate] Other (See Comments)     Sleep walks and eats     Ciprofloxacin Other (See Comments)     Seizure.     Citalopram Nausea and Vomiting          Labs:     No results found for this or any previous visit (from the past 24 hour(s)).       Psychiatric Examination:     Vitals:    10/28/17 0827 10/29/17 0852 10/30/17 0834 10/31/17 0905   BP: 113/70      BP Location:  Right arm     Pulse: 74      Resp:    16   Temp: 98  F (36.7  C) 97.6  F (36.4  C) 97.3  F (36.3  C) 96.6  F (35.9  C) "   TempSrc:  Oral Oral Oral   SpO2:       Weight:  100 kg (220 lb 8 oz)                         Lying Orthostatic BP: 108/64         Sitting Orthostatic BP: 112/62         Standing Orthostatic BP:  (Refused)       Appearance: awake, alert, adequately groomed, appeared about stated age and no apparent distress  Attitude: more cooperative and guarded  Eye Contact: better  Mood:  sad  Affect:  intensity is blunted  Speech:  clear, coherent and slow  Psychomotor Behavior:  no evidence of tardive dyskinesia, dystonia, or tics  Throught Process:  goal oriented  Associations:  no loose associations  Thought Content:  no evidence of suicidal ideation or homicidal ideation, auditory hallucinations are present, Visual hallucinations are present off and on, delusions are present.   Insight:  limited  Judgement:  limited  Oriented to:  time, person, and place  Attention Span and Concentration:  fair  Recent and Remote Memory:  fair         Precautions:     Behavioral Orders   Procedures     Code 2     Elopement precautions     Routine Programming     As clinically indicated     Seizure precautions     Status 15     Every 15 minutes.     Withdrawal precautions          Diagnoses:     Cognitive Disorder NOS.   Encephalopathy probably multifactorial such as chronic hepatic encephalopathy, organic brain damage due to encephalopathy, alcohol drinking, possibly underlying psychotic illness; possibly head injury contributes to patient's symptoms.   Alcohol use disorder.   amphetamine dependence vs abuse.          Plan:     No medication changes today  Legal Status and Disposition:  --  Under full MICD commitment.   -- the patient appears to be at her current baseline, she is on waiting list for [facility]. Referred to several NH and memory care unit, was rejected by number of them. Per CTC, SEBAS works on [employer] and was interviewed by 2 [facility] places on Monday. See discussion above. Will continue to provide support and structure.             Implemented All Fall Risk Interventions:  [place] to call system. Call bell, personal items and telephone within reach. Instruct patient to call for assistance. Room bathroom lighting operational. Non-slip footwear when patient is off stretcher. Physically safe environment: no spills, clutter or unnecessary equipment. Stretcher in lowest position, wheels locked, appropriate side rails in place. Provide visual cue, wrist band, yellow gown, etc. Monitor gait and stability. Monitor for mental status changes and reorient to person, place, and time. Review medications for side effects contributing to fall risk. Reinforce activity limits and safety measures with patient and family.

## 2020-02-13 ENCOUNTER — TELEPHONE (OUTPATIENT)
Dept: GASTROENTEROLOGY | Facility: CLINIC | Age: 45
End: 2020-02-13

## 2020-02-13 NOTE — TELEPHONE ENCOUNTER
[facility] Call Center    Phone Message    May a detailed message be left on voicemail: yes     Reason for Call: Other: Pt care corrdinator calling, Pt needs 2nd opinion for abdominal pain, records and referral from [facility] will be faxed to the clinic [facility] provider is a GI provider. Please reach out to pt to schedule.     Action Taken: Message routed to:  [facility] ([facility]):  GI     Travel Screening: Not Applicable

## 2020-02-20 NOTE — TELEPHONE ENCOUNTER
Unable to return call to care coordinator due to no name or phone number documented in call.  Per patient's chart, attempted to contact Erendira, patient's home care provider at [facility], #[phone], left message for return call as have not received referral or records for referral from [facility], also not in CareEverywhere   Called out to patient, received message stating vmail box full and unable to leave message.

## 2020-02-21 ENCOUNTER — TELEPHONE (OUTPATIENT)
Dept: GASTROENTEROLOGY | Facility: CLINIC | Age: 45
End: 2020-02-21

## 2020-02-21 ENCOUNTER — CARE COORDINATION (OUTPATIENT)
Dept: GASTROENTEROLOGY | Facility: CLINIC | Age: 45
End: 2020-02-21

## 2020-03-02 ENCOUNTER — TELEPHONE (OUTPATIENT)
Dept: GASTROENTEROLOGY | Facility: CLINIC | Age: 45
End: 2020-03-02

## 2020-03-02 NOTE — TELEPHONE ENCOUNTER
Patient Name: Mildred Rascon   : 1975  MRN: [id number]       :  N/A    Rob Code Exp    Additional Information regarding appointment:  _____________________________________________________      Patient scheduled for:  Upper EUS    Indication for procedure. Mass of pancreas    Sedation Type: MAC    Procedure Provider:  Gonzalez      Referring Provider. (Mallery); Sherlyn Taylor; Ant Cavazos (PCP)    Arrival time verified: Tues / 3.10.2020 / 0930    Facility location verified:   [facility] - [address] [address]  1st Floor, Rm 1-999    [x] N/A for this Payor ([place])    Pt meets medical necessity for outpatient procedure in hospital Endoscopy Unit: N/A      History & Physical:   [x] No, As of 3/3/2020  -  Scheduled 3/4/2020    Contact Information: [facility]: [phone] - [facility].  __________________________________________________      Prep Type:   [x] NPO /p 0200, No solid food /p 2200 the night before      Patient taking any blood thinners ? No      Electronic implanted medical devices ? No      GI / Hepatology History: Yes: See EPIC      Heart disease ? No      Lung disease ? Yes: Asthma      Sleep apnea ? No      Diabetic ? No      Kidney disease ? Yes: Hx Renal insufficiency  Hemodialysis: No      Instructions given: [x] Rec'd & Read   [x] Reviewed         Pre procedure teaching completed: [x] Yes - Reviewed      IV Sedation: [x] No questions regarding Sedation as ordered       : Transportation from procedure & responsible adult to be with patient following procedure for a minimum of 24 hrs (MAC): [x] Shawnee Krishna - confirmed will have post-procedure companionship as required    _______________________________________________    Laura Bruner RN  [facility]

## 2020-03-10 ENCOUNTER — ANESTHESIA EVENT (OUTPATIENT)
Dept: GASTROENTEROLOGY | Facility: CLINIC | Age: 45
End: 2020-03-10
Payer: MEDICAID

## 2020-03-10 ENCOUNTER — HOSPITAL ENCOUNTER (OUTPATIENT)
Facility: CLINIC | Age: 45
Discharge: HOME | End: 2020-03-10
Attending: INTERNAL MEDICINE | Admitting: INTERNAL MEDICINE
Payer: MEDICAID

## 2020-03-10 ENCOUNTER — ANESTHESIA (OUTPATIENT)
Dept: GASTROENTEROLOGY | Facility: CLINIC | Age: 45
End: 2020-03-10
Payer: MEDICAID

## 2020-03-10 ENCOUNTER — CARE COORDINATION (OUTPATIENT)
Dept: GASTROENTEROLOGY | Facility: CLINIC | Age: 45
End: 2020-03-10

## 2020-03-10 VITALS
WEIGHT: 267.6 LBS | OXYGEN SATURATION: 98 % | SYSTOLIC BLOOD PRESSURE: 105 MMHG | HEIGHT: 66 IN | HEART RATE: 53 BPM | DIASTOLIC BLOOD PRESSURE: 63 MMHG | BODY MASS INDEX: 43.01 KG/M2 | TEMPERATURE: 98.4 F | RESPIRATION RATE: 18 BRPM

## 2020-03-10 LAB — UPPER EUS: NORMAL

## 2020-03-10 PROCEDURE — 37000009 ZZH ANESTHESIA TECHNICAL FEE, EACH ADDTL 15 MIN: Performed by: INTERNAL MEDICINE

## 2020-03-10 PROCEDURE — 25000132 ZZH RX MED GY IP 250 OP 250 PS 637: Performed by: INTERNAL MEDICINE

## 2020-03-10 PROCEDURE — 43242 EGD US FINE NEEDLE BX/ASPIR: CPT | Performed by: INTERNAL MEDICINE

## 2020-03-10 PROCEDURE — 25000125 ZZHC RX 250: Performed by: NURSE ANESTHETIST, CERTIFIED REGISTERED

## 2020-03-10 PROCEDURE — 25000128 H RX IP 250 OP 636: Performed by: ANESTHESIOLOGY

## 2020-03-10 PROCEDURE — 25000132 ZZH RX MED GY IP 250 OP 250 PS 637: Performed by: ANESTHESIOLOGY

## 2020-03-10 PROCEDURE — 25000128 H RX IP 250 OP 636: Performed by: NURSE ANESTHETIST, CERTIFIED REGISTERED

## 2020-03-10 PROCEDURE — 88305 TISSUE EXAM BY PATHOLOGIST: CPT | Performed by: INTERNAL MEDICINE

## 2020-03-10 PROCEDURE — 88173 CYTOPATH EVAL FNA REPORT: CPT | Performed by: INTERNAL MEDICINE

## 2020-03-10 PROCEDURE — 88341 IMHCHEM/IMCYTCHM EA ADD ANTB: CPT | Performed by: INTERNAL MEDICINE

## 2020-03-10 PROCEDURE — 43259 EGD US EXAM DUODENUM/JEJUNUM: CPT | Performed by: INTERNAL MEDICINE

## 2020-03-10 PROCEDURE — 88172 CYTP DX EVAL FNA 1ST EA SITE: CPT | Performed by: INTERNAL MEDICINE

## 2020-03-10 PROCEDURE — 00000155 ZZHCL STATISTIC H-CELL BLOCK W/STAIN: Performed by: INTERNAL MEDICINE

## 2020-03-10 PROCEDURE — 37000008 ZZH ANESTHESIA TECHNICAL FEE, 1ST 30 MIN: Performed by: INTERNAL MEDICINE

## 2020-03-10 PROCEDURE — 25800030 ZZH RX IP 258 OP 636: Performed by: NURSE ANESTHETIST, CERTIFIED REGISTERED

## 2020-03-10 PROCEDURE — 88342 IMHCHEM/IMCYTCHM 1ST ANTB: CPT | Performed by: INTERNAL MEDICINE

## 2020-03-10 PROCEDURE — 88307 TISSUE EXAM BY PATHOLOGIST: CPT | Performed by: INTERNAL MEDICINE

## 2020-03-10 RX ORDER — SODIUM CHLORIDE, SODIUM LACTATE, POTASSIUM CHLORIDE, CALCIUM CHLORIDE 600; 310; 30; 20 MG/100ML; MG/100ML; MG/100ML; MG/100ML
INJECTION, SOLUTION INTRAVENOUS CONTINUOUS
Status: DISCONTINUED | OUTPATIENT
Start: 2020-03-10 | End: 2020-03-10 | Stop reason: HOSPADM

## 2020-03-10 RX ORDER — OMEPRAZOLE 40 MG/1
40 CAPSULE, DELAYED RELEASE ORAL DAILY
COMMUNITY

## 2020-03-10 RX ORDER — NALOXONE HYDROCHLORIDE 0.4 MG/ML
.1-.4 INJECTION, SOLUTION INTRAMUSCULAR; INTRAVENOUS; SUBCUTANEOUS
Status: DISCONTINUED | OUTPATIENT
Start: 2020-03-10 | End: 2020-03-10 | Stop reason: HOSPADM

## 2020-03-10 RX ORDER — ACETAMINOPHEN 325 MG/1
975 TABLET ORAL EVERY 4 HOURS PRN
Status: DISCONTINUED | OUTPATIENT
Start: 2020-03-10 | End: 2020-03-10 | Stop reason: HOSPADM

## 2020-03-10 RX ORDER — ONDANSETRON 2 MG/ML
INJECTION INTRAMUSCULAR; INTRAVENOUS PRN
Status: DISCONTINUED | OUTPATIENT
Start: 2020-03-10 | End: 2020-03-10

## 2020-03-10 RX ORDER — PROPOFOL 10 MG/ML
INJECTION, EMULSION INTRAVENOUS PRN
Status: DISCONTINUED | OUTPATIENT
Start: 2020-03-10 | End: 2020-03-10

## 2020-03-10 RX ORDER — LIDOCAINE 40 MG/G
CREAM TOPICAL
Status: DISCONTINUED | OUTPATIENT
Start: 2020-03-10 | End: 2020-03-10 | Stop reason: HOSPADM

## 2020-03-10 RX ORDER — ONDANSETRON 4 MG/1
4 TABLET, ORALLY DISINTEGRATING ORAL EVERY 30 MIN PRN
Status: DISCONTINUED | OUTPATIENT
Start: 2020-03-10 | End: 2020-03-10 | Stop reason: HOSPADM

## 2020-03-10 RX ORDER — SIMETHICONE
LIQUID (ML) MISCELLANEOUS PRN
Status: DISCONTINUED | OUTPATIENT
Start: 2020-03-10 | End: 2020-03-10 | Stop reason: HOSPADM

## 2020-03-10 RX ORDER — ALBUTEROL SULFATE 0.83 MG/ML
2.5 SOLUTION RESPIRATORY (INHALATION) EVERY 4 HOURS PRN
Status: DISCONTINUED | OUTPATIENT
Start: 2020-03-10 | End: 2020-03-10 | Stop reason: HOSPADM

## 2020-03-10 RX ORDER — MEPERIDINE HYDROCHLORIDE 50 MG/ML
12.5 INJECTION INTRAMUSCULAR; INTRAVENOUS; SUBCUTANEOUS
Status: DISCONTINUED | OUTPATIENT
Start: 2020-03-10 | End: 2020-03-10 | Stop reason: HOSPADM

## 2020-03-10 RX ORDER — FLUMAZENIL 0.1 MG/ML
0.2 INJECTION, SOLUTION INTRAVENOUS
Status: DISCONTINUED | OUTPATIENT
Start: 2020-03-10 | End: 2020-03-10 | Stop reason: HOSPADM

## 2020-03-10 RX ORDER — SODIUM CHLORIDE 9 MG/ML
INJECTION, SOLUTION INTRAVENOUS CONTINUOUS PRN
Status: DISCONTINUED | OUTPATIENT
Start: 2020-03-10 | End: 2020-03-10

## 2020-03-10 RX ORDER — ONDANSETRON 2 MG/ML
4 INJECTION INTRAMUSCULAR; INTRAVENOUS EVERY 30 MIN PRN
Status: DISCONTINUED | OUTPATIENT
Start: 2020-03-10 | End: 2020-03-10 | Stop reason: HOSPADM

## 2020-03-10 RX ORDER — PROPOFOL 10 MG/ML
INJECTION, EMULSION INTRAVENOUS CONTINUOUS PRN
Status: DISCONTINUED | OUTPATIENT
Start: 2020-03-10 | End: 2020-03-10

## 2020-03-10 RX ORDER — FENTANYL CITRATE 50 UG/ML
25-50 INJECTION, SOLUTION INTRAMUSCULAR; INTRAVENOUS
Status: DISCONTINUED | OUTPATIENT
Start: 2020-03-10 | End: 2020-03-10 | Stop reason: HOSPADM

## 2020-03-10 RX ADMIN — PROPOFOL 100 MCG/KG/MIN: 10 INJECTION, EMULSION INTRAVENOUS at 11:47

## 2020-03-10 RX ADMIN — ONDANSETRON 4 MG: 2 INJECTION INTRAMUSCULAR; INTRAVENOUS at 12:19

## 2020-03-10 RX ADMIN — PROPOFOL 30 MG: 10 INJECTION, EMULSION INTRAVENOUS at 11:55

## 2020-03-10 RX ADMIN — SODIUM CHLORIDE: 9 INJECTION, SOLUTION INTRAVENOUS at 11:46

## 2020-03-10 RX ADMIN — TOPICAL ANESTHETIC 1 EACH: 200 SPRAY DENTAL; PERIODONTAL at 11:49

## 2020-03-10 RX ADMIN — PROPOFOL: 10 INJECTION, EMULSION INTRAVENOUS at 12:14

## 2020-03-10 RX ADMIN — ONDANSETRON 4 MG: 2 INJECTION INTRAMUSCULAR; INTRAVENOUS at 14:00

## 2020-03-10 RX ADMIN — ACETAMINOPHEN 650 MG: 325 TABLET, FILM COATED ORAL at 14:38

## 2020-03-10 RX ADMIN — PROPOFOL 30 MG: 10 INJECTION, EMULSION INTRAVENOUS at 12:03

## 2020-03-10 ASSESSMENT — LIFESTYLE VARIABLES: TOBACCO_USE: 1

## 2020-03-10 ASSESSMENT — MIFFLIN-ST. JEOR: SCORE: 1875.58

## 2020-03-10 NOTE — ANESTHESIA CARE TRANSFER NOTE
Patient: Mildred Rascon    Procedure(s):  ESOPHAGOGASTRODUODENOSCOPY, WITH ENDOSCOPIC US  Esophagogastroduodenoscopy, With Fine Needle Aspiration Biopsy, With Endoscopic Ultrasound Guidance    Diagnosis: Mass of pancreas [K86.89]  Diagnosis Additional Information: No value filed.    Anesthesia Type:   MAC     Note:  Airway :Nasal Cannula  Patient transferred to:Phase II  Handoff Report: Identifed the Patient, Identified the Reponsible Provider, Reviewed the pertinent medical history, Discussed the surgical course, Reviewed Intra-OP anesthesia mangement and issues during anesthesia, Set expectations for post-procedure period and Allowed opportunity for questions and acknowledgement of understanding      Vitals: (Last set prior to Anesthesia Care Transfer)    CRNA VITALS  3/10/2020 1222 - 3/10/2020 1300      3/10/2020             Resp Rate (observed):  14                Electronically Signed By: Kiarra Baer CRNA, APRN CRNA  March 10, 2020  1:00 PM

## 2020-03-10 NOTE — OR NURSING
Came back to recovery with right abdominal pain rating it 9/10/  Doctor made aware and ordered tylenol for pain.  Pt became nauseated and had a blood tinged emesis of 200ml... Was given zofran 4mg ivpush and pateint began to feel better and asked to be discharged.  Her nausea was relieved and said her pain was gone as she burped and passed gas.  Tylenol 650 mg given orally and she stated it helped the pain.  Pt's vitals stable upon discharge

## 2020-03-10 NOTE — LETTER
March 16, 2020      Rio Rascon  [address]  [facility] [address]        Dear ,    It was a pleasure to meet you at the time of your recent repeat endoscopic ultrasound examination. The biopsy of the pancreas this time was able to establish a diagnosis. This biopsy showed a low-grade type of tumor called an endocrine tumor. This is not the same thing as a pancreas cancer.    These results have been sent to your referring doctors at [facility] (Dr. Mandujano and Dr. Cavazos). Please schedule a follow-up visit with them to discuss further management if this has not already been done.    Please feel free to call if you have any questions about this letter. I can be reached at [phone].    DANILO Gauthier MD  Associate Professor of Medicine  Division of Gastroenterology, Hepatology and Nutrition  [facility]      Resulted Orders   Fine needle aspiration   Result Value Ref Range    Copath Report       Patient Name: RIO RASCON  MR#: [id number]  Specimen #: [id number]  Collected: 3/10/2020  Received: 3/10/2020  Reported: 3/16/2020 10:57  Ordering Phy(s): GAVIN GAUTHIER    For improved result formatting, select 'View Enhanced Report Format' under   Linked Documents section.    SPECIMEN/STAIN PROCESS:  Pancreas, head , endoscopic ultrasound guided fine needle aspiration       Pap-Cyto x 2, Diff Quick Stain-cyto x 4, Cell Block w/ H&E-Cyto x 1,   Save Ribbon, 1 x 1, Cell Block,  Level 2 x 1, Save Ribbon, 2 x 1, Cell Block, Level 3 x 1, Synaptophysin x   1, Chromogranin x 1, Ki-67 x 1    ----------------------------------------------------------------    CYTOLOGIC INTERPRETATION:    Pancreas, head, endoscopic ultrasound guided fine needle aspiration:  - Well differentiated neuroendocrine tumor, favor grade-1.  - See comment  Specimen Adequacy: Satisfactory for evaluation.    COMMENT:  Cellblock sections show a few minute fragments of stromal tissue   infiltrated by cells  with  nested and  trabecular arrangement. Immunostains were performed with appropriate   controls and show the infiltrative nests  of cells positive for synaptophysin and chromogranin. Ki67 proliferation   index is estimated at less than 3%.  This immunoprofile supports the above diagnosis.    Please correlate with concurrent surgical pathology specimen ([id number]).    I have personally reviewed all specimens and/or slides, including the   listed special stains, and used them  with my medical judgement to determine or confirm the final diagnosis.    Electronically signed out by:  Winnie Pozo M.D., [facility]    CLINICAL HISTORY:  Cirrhosis, discrete pancreatic mass -17mm.    ,    GROSS:  Pancreas, head , endoscopic ultrasound guided fine needle aspiration:    Received are 2 fixed slides, processed  for Pap stain, 4 air dried slides, processed for Diff Quik stain, and   material in formalin, processed for one  hematoxylin stained cell block.    INTRAOPERATIVE CONSULTATION:  FNA Performance : Fine needle aspiration was not performed by [facility],    Pathology staff.  Aspirate immediate study/adequacy:  I, Dr. SCAR Mena III, MD attest that I immediately examined smears while   the procedure was underway and  determined or confirmed the adequacy of the specimens via telepathology.  It is of note that the final assessment and report may be performed and   signed by a different pathologist.    Onsite adequacy/interpretation:  Pass A1-A4 inadequate.    MICROSCOPIC:  Microscopic examination is performed.  MD Dash Steinberg MD Cytopathology fellow           Winnie Pozo MD   Attending    CPT Codes:   72433-KUMB-ZDP, 82586-WWHJ-UHQ  A: 78115-INBA, 46674-LFA, HCB, 18855-BWT, 99688-IUN, 35499-AVU    COLLECTION SITE:  Client:  [facility]  Location:  [facility] ()    The technical component of this testing was completed at the [facility]  "[facility], with the professional component performed    at the [facility], [address] [place] [address],   [place] [address] ([phone])    Resident  [id number]     Surgical pathology exam   Result Value Ref Range    Copath Report       Patient Name: RIO SNYDER  MR#: [id number]  Specimen #: [id number]  Collected: 3/10/2020  Received: 3/10/2020  Reported: 3/13/2020 11:03  Ordering Phy(s): GAVIN GAUTHIER    For improved result formatting, select 'View Enhanced Report Format' under   Linked Documents section.    SPECIMEN(S):  A: Pancreas biopsy  B: Specimen, No Charge    FINAL DIAGNOSIS:  A. Pancreas biopsy:  - Low-grade well differentiated neuroendocrine tumor with associated   fibrosis    B. Specimen not received in surgical pathology (fluid):    I have personally reviewed all specimens and/or slides, including the   listed special stains, and used them  with my medical judgement to determine or confirm the final diagnosis.    Electronically signed out by:    Jonh Romo M.D., [facility]    CLINICAL HISTORY:  Pancreatic head mass    GROSS:  A: The specimen is received in formalin with proper patient   identification, labeled \"pancreatic head mass\".  The specimen consists of a 2.0 x 0.7 x 0.1 cm aggrega te of red-brown soft   tissue and blood clot, which are  wrapped and entirely submitted in cassettes A1A2.    B: No specimen is received. (Dictated by: Dayanna Mcintosh 3/10/2020 05:52   PM)    MICROSCOPIC:  Examination reveals multiple fragments of pancreatic tissue mainly in   block A2.  There is focal fibrosis in  the specimen associated with a proliferation of neuroendocrine appearing   cells.  These are bland with no  appreciable mitotic activity and are consistent with a low-grade   well-differentiated neuroendocrine tumor.  Chromogranin and synaptophysin stains are both strongly positive,   supporting the " diagnosis.  A Ki-67 stain was  performed and doesn't show staining within the tumor, however due to crush   artifact in the tumor, it is not  possible to distinguish tumor cells from stromal and lymphocytic cells,   therefore an accurate proliferative  index cannot be determined.  This is a well-differentiated low-grade   neuroendocrine tumor which could be grade  1 or 2.  This c ase was seen in consultation with Dr. Pozo who concurs.    The technical component of this testing was completed at the [facility], with the professional component performed   at the [facility], [address] [place] [phone] ([phone])    CPT Codes:  A: 70617-VJ8, 96513-LBC, 90794-WSI, 19414-NZC    COLLECTION SITE:  Client: [facility]  Location: KADE CARRASQUILLO)    Resident  EDOUARD

## 2020-03-10 NOTE — ANESTHESIA POSTPROCEDURE EVALUATION
Anesthesia POST Procedure Evaluation    Patient: Mildred Rascon   MRN:     [id number] Gender:   female   Age:    44 year old :      1975        Preoperative Diagnosis: Mass of pancreas [K86.89]   Procedure(s):  ESOPHAGOGASTRODUODENOSCOPY, WITH ENDOSCOPIC US  Esophagogastroduodenoscopy, With Fine Needle Aspiration Biopsy, With Endoscopic Ultrasound Guidance   Postop Comments: No value filed.     Anesthesia Type: MAC       Disposition: Outpatient   Postop Pain Control: Uneventful            Sign Out: Well controlled pain   PONV: No   Neuro/Psych: Uneventful            Sign Out: Acceptable/Baseline neuro status   Airway/Respiratory: Uneventful            Sign Out: Acceptable/Baseline resp. status   CV/Hemodynamics: Uneventful            Sign Out: Acceptable CV status   Other NRE: NONE   DID A NON-ROUTINE EVENT OCCUR? No         Last Anesthesia Record Vitals:  CRNA VITALS  3/10/2020 1222 - 3/10/2020 1310      3/10/2020             Resp Rate (observed):  14          Last PACU Vitals:  Vitals Value Taken Time   BP     Temp     Pulse     Resp     SpO2     Temp src     NIBP 111/60 3/10/2020 12:52 PM   Pulse 67 3/10/2020 12:53 PM   SpO2 98 % 3/10/2020 12:53 PM   Resp     Temp 34.2  C (93.6  F) 3/10/2020 12:50 PM   Ht Rate 66 3/10/2020 12:53 PM   Temp 2           Electronically Signed By: Raheem Chamberlain MD, March 10, 2020, 1:10 PM

## 2020-03-10 NOTE — OR NURSING
Procedure: Endoscopic Ultrasound with Fine Needle Aspirate using 22 Gauge Acquire needle, and 19 gauge for core biopsy which was sent to surgical pathology  Sedation: Monitored Anesthesia Care  Tolerated: VS stable during and post procedure. Not c/o abdominal or chest pain    Pt to recovery area in stable condition, accompanied by CRNA

## 2020-03-10 NOTE — DISCHARGE INSTRUCTIONS
[facility] Endoscopic Ultrasound with Monitored Anesthesia Care  For 24 hours after your procedure  Sedation:  1. Get plenty of rest. A responsible adult must stay with you for at least 24 hours after you leave the hospital.   2. Do not drive or use heavy equipment. If you have weakness or tingling, don't drive or use heavy equipment until this feeling goes away.  3. Do not drink alcohol.  4. Avoid strenuous or risky activities. Ask for help when climbing stairs.   5. You may feel lightheaded. IF so, sit for a few minutes before standing. Have someone help you get up.   6. If you have nausea (feel sick to your stomach): Drink only clear liquids such as apple juice, ginger ale, broth or 7-Up. Rest may also help. Be sure to drink enough fluids. Move to a regular diet as you feel able.  7. You may have a slight fever. Call the doctor if your fever is over 100 F (37.7 C) (taken under the tongue) or lasts longer than 24 hours.  8. You may have a dry mouth, a sore throat, muscle aches or trouble sleeping. These should go away after 24 hours.  9. Do not make important or legal decisions.   Procedural:  1. Wait one hour before eating or drinking. Start with sips of water. When your gag reflex has returned, you may return to your normal diet, medicines, and light exercise.  2. Some bloating is normal. You may have large burps or pass air.  3. You may have a sore throat for 2 to 3 days. If so, it may help to:    Avoid hot liquids for 24 hours.    Use sore throat lozenges.    Gargle as need with salt water up to 4 times a day. Mix 1 cup of warm water with 1 teaspoon of salt. Do not swallow.  4. You may take Tylenol (acetaminophen) for pain unless your doctor has told you not to.   Do not take aspirin or ibuprofen (Advil, Motrin, or other anti-inflammatory  drugs) for _____ days.  Call right away if you have:  1. Unusual pain in belly or chest pain not relieved with passing air.  2. Severe throat pain or trouble swallowing.  3. Black  stools (tar-like looking bowel movement).  4. Signs of infection (fever}  5. It has been over 8 to 10 hours since surgery and you are still not able to urinate (pass water).  6. Headache for over 24 hours.  Follow-up:  __x__ We took small tissue or fluid samples. Your doctor will call you with the results within two weeks.  If you have severe pain, bleeding, vomit blood, or shortness of breath, go to an emergency room.  If you have questions, call:  Endoscopy: Monday to Friday, 7 a.m. to 4:30 p.m. [phone] (We may have to call you back)  After hours: Hospital  [phone] (Ask for the GI fellow on call)

## 2020-03-10 NOTE — ANESTHESIA PREPROCEDURE EVALUATION
Anesthesia Pre-Procedure Evaluation    Patient: Mildred Rascon   MRN:     [id number] Gender:   female   Age:    44 year old :      1975        Preoperative Diagnosis: Mass of pancreas [K86.89]   Procedure(s):  ESOPHAGOGASTRODUODENOSCOPY, WITH ENDOSCOPIC US     LABS:  CBC:   Lab Results   Component Value Date    WBC 5.4 2017    WBC 5.3 2017    HGB 13.3 2017    HGB 14.1 2017    HCT 38.5 2017    HCT 40.3 2017    PLT 73 (L) 2017    PLT 81 (L) 2017     BMP:   Lab Results   Component Value Date     2017     2017    POTASSIUM 3.7 2017    POTASSIUM 3.9 2017    CHLORIDE 108 2017    CHLORIDE 110 (H) 2017    CO2 27 2017    CO2 24 2017    BUN 14 2017    BUN 14 2017    CR 0.65 2017    CR 0.67 2017     (H) 2017     (H) 2017     COAGS:   Lab Results   Component Value Date    PTT 43 (H) 2016    INR 1.42 (H) 2017    FIBR 200 10/01/2016     POC:   Lab Results   Component Value Date    BGM 99 2017    HCG Negative 2015    HCGS Negative 10/18/2016     OTHER:   Lab Results   Component Value Date    PH 7.48 (H) 2016    LACT 1.4 2017    A1C Below Assay Range 2016    FABIAN 8.3 (L) 2017    PHOS 4.3 2016    MAG 1.9 2017    ALBUMIN 2.7 (L) 2017    PROTTOTAL 6.7 (L) 2017    ALT 35 2017    AST 38 2017    ALKPHOS 118 2017    BILITOTAL 0.7 2017    LIPASE 209 2017    AMYLASE 38 2017    DL 81 (H) 2017    TSH 2.33 2017    CRP 11.0 (H) 2016    SED 57 (H) 2016        Preop Vitals    BP Readings from Last 3 Encounters:   17 109/69   17 107/58   17 113/62    Pulse Readings from Last 3 Encounters:   17 75   17 98   17 105      Resp Readings from Last 3 Encounters:   18 16   17 16   17 16    SpO2 Readings  "from Last 3 Encounters:   17 94%   17 98%   17 100%      Temp Readings from Last 1 Encounters:   18 35.6  C (96  F)    Ht Readings from Last 1 Encounters:   17 1.676 m (5' 5.98\")      Wt Readings from Last 1 Encounters:   17 102.5 kg (226 lb)    Estimated body mass index is 36.5 kg/m  as calculated from the following:    Height as of 17: 1.676 m (5' 5.98\").    Weight as of 17: 102.5 kg (226 lb).     LDA:        Past Medical History:   Diagnosis Date     Anxiety      Arthritis      Depression      Liver disease      Substance abuse       Past Surgical History:   Procedure Laterality Date     APPENDECTOMY       C  DELIVERY ONLY  ,      CHOLECYSTECTOMY       TUBAL LIGATION        Allergies   Allergen Reactions     Acetaminophen      Ambien [Zolpidem Tartrate] Other (See Comments)     Sleep walks and eats     Ciprofloxacin Other (See Comments)     Seizure.     Citalopram Nausea and Vomiting        Anesthesia Evaluation     .             ROS/MED HX    ENT/Pulmonary:     (+)tobacco use, Current use asthma , . .    Neurologic:     (+)other neuro Hepatic encephalopathy     Cardiovascular:         METS/Exercise Tolerance:     Hematologic:     (+) Anemia, Other Hematologic Disorder-Factor II deficiency       Musculoskeletal:   (+) arthritis,  -       GI/Hepatic:     (+) GERD cholecystitis/cholelithiasis, hepatitis type Alcoholic, liver disease,       Renal/Genitourinary:     (+) chronic renal disease, type: CRI,       Endo:     (+) Obesity, .      Psychiatric:     (+) psychiatric history (Substance abuse Methamphetamines, Narcotics) anxiety, depression and other (comment)      Infectious Disease:         Malignancy:         Other:                         PHYSICAL EXAM:   Mental Status/Neuro: A/A/O   Airway: Facies: Feasible  Mallampati: II  Mouth/Opening: Full  TM distance: > 6 cm  Neck ROM: Full   Respiratory: Auscultation: CTAB     Resp. Rate: Normal   "   Resp. Effort: Normal      CV: Rhythm: Regular  Rate: Age appropriate  Heart: Normal Sounds  Edema: None   Comments:      Dental: Dentures  Dentures: Upper                Assessment:   ASA SCORE: 3    H&P: History and physical reviewed and following examination; no interval change.    NPO Status: NPO Appropriate     Plan:   Anes. Type:  MAC   Pre-Medication: None   Induction:  IV (Standard)   Airway: Native Airway   Access/Monitoring: PIV   Maintenance: Propofol Sedation     Postop Plan:   Postop Pain: None  Postop Sedation/Airway: Not planned     PONV Management:  NO PONV Prophylaxis Required     CONSENT: Direct conversation   Plan and risks discussed with: Patient   Blood Products: Consent Deferred (Minimal Blood Loss)                   Raheem Chamberlain MD

## 2020-03-13 LAB — COPATH REPORT: NORMAL

## 2020-03-16 ENCOUNTER — TELEPHONE (OUTPATIENT)
Dept: GASTROENTEROLOGY | Facility: CLINIC | Age: 45
End: 2020-03-16

## 2020-03-16 LAB — COPATH REPORT: NORMAL

## 2020-03-16 NOTE — TELEPHONE ENCOUNTER
Biopsy results from recent EUS showed low grade well-differentiated neuroendocrine tumor.    Pt with significant past history including cirrhosis, reported prior coma.   Not likely a surgical candidate.    Referred from [facility] for second opinion EUS after initial EUS there with non-diagnostic biopsies.  Initial consultation by Dr. Mandujano and primary care Dr. Cavazos at [place].    Unable to reach either today.    Discussed results with home health nurse and results sent. She will discuss with pt generally and agrees pt would not be able to understand fully over the phone.    Message sent to Dr. Mandujano's office to call and discuss results. EUS and cytology results sent.  Will arrange for follow-up at [facility] to discuss surveillance.    DANILO Roth MD  Associate Professor of Medicine  Division of Gastroenterology, Hepatology and Nutrition  [facility]

## 2020-03-17 ENCOUNTER — PATIENT OUTREACH (OUTPATIENT)
Dept: GASTROENTEROLOGY | Facility: CLINIC | Age: 45
End: 2020-03-17

## 2020-03-17 ENCOUNTER — TELEPHONE (OUTPATIENT)
Dept: GASTROENTEROLOGY | Facility: CLINIC | Age: 45
End: 2020-03-17

## 2020-03-17 NOTE — TELEPHONE ENCOUNTER
Results of EUS and biopsy reviewed with referring MD, Dr. Keaton Mandujano, at [facility].    He will arrange for further evaluation. Likely oncology consultation and then surveillance.    Again, biopsy results discussed with pts home care nurse Shawnee Krishna and report sent to caregiver. Biopsy results not discussed with pt personally in light of cognitive dysfunction and history of anxiety/depression - I believe this would be better discussed in person. Dr. Mandujano aware of this.    DANILO Roth MD  Associate Professor of Medicine  Division of Gastroenterology, Hepatology and Nutrition  [facility]

## 2020-03-17 NOTE — TELEPHONE ENCOUNTER
Surgical Oncology RN Care Coordination Note:     Contacted Dr. Mandujano's office at [facility] to confirm the fax number [phone] and informed their office that Dr. Roth would like to speak with Dr. Mandujano's regarding results and further follow up.     Provided Dr. Roth's pager number on fax cover sheet and to the staff who confirmed fax and took message. Pager: [phone]    Nini Baer RN, BSN  Care Coordinator   [phone]

## 2021-06-01 VITALS — HEIGHT: 66 IN | WEIGHT: 230 LBS | BODY MASS INDEX: 36.96 KG/M2

## 2021-06-15 NOTE — PROGRESS NOTES
[facility]      Facility:    [facility] [[id number]]  Code Status: UNKNOWN      Chief Complaint/Reason for Visit:  Chief Complaint   Patient presents with     H & P     Long history of alcohol abuse, liver disease with cirrhosis, elevated ammonia levels, noncompliant with lactulose, history of other substance abuse, pancreatitis in the past, traumatic brain injury,       HPI:   Mildred is a 42 y.o. female who is a very poor historian.  She did arrive here on around January 2 and she does have alcoholic liver disease as well as alcohol abuse.  She has had elevated ammonia levels and she does not take her lactulose very often and she does have a traumatic brain injury and there is some history of polysubstance abuse.  She does not give me too much information about her previous hospitalization but she did arrive here in January 2 have been in here to look through her chart several times however I have asked for information and discharge summary has not been available.  The last information get on her is from approximately August 2017 in which she was hospitalized at [place].  She has been seen by psychiatry here at this time and she has severe liver disease and we do have her scheduled for an MRI of her abdomen and pelvis for today.  I will also get labs today on her secondary to her history of severe liver disease and chronic kidney disease.  She does have some cognitive disorders that she does not recall the details of her previous hospitalization and staff does not recall if there is a discharge summary or not.    I did discuss with patient today she is laying in bed comfortably.  She did not sleep well last night and she claims she has bilateral knee pain in which we can have her see orthopedic surgery.  She does not appear encephalopathic at this time she is doing extremely fatigue and she is noncompliant with her lactulose which is scheduled for twice daily.  I am unclear  how many bowel movements she is having per day but over 3 and 4 would be acceptable.  Her knee she denies any other pain in the knee pain has been consistent for a very long time.  She does have depression anxiety which she has been treated by psychiatry with this at this time and seems to be under adequate control.  Patient is currently being  diuresed with spironolactone as well as furosemide.  She does have some lower extremity edema at this time but she does not appear to be in any respiratory distress.  I am unclear whether or not she has had cardiology follow-up at this time however the does not seem to be any heart issues and she denies these at this time.    Past Medical History:  Past Medical History:   Diagnosis Date     Anxiety      Arthritis      Cognitive disorder      Depression      Encephalopathy      Liver disease      Substance abuse            Surgical History:  Past Surgical History:   Procedure Laterality Date     APPENDECTOMY        SECTION  ,      CHOLECYSTECTOMY       TUBAL LIGATION         Family History:   Family History   Problem Relation Age of Onset     Alcoholism Mother      Liver cancer Mother      Lung cancer Father      Heart attack Father      Heart attack Brother        Social History:    Social History     Social History     Marital status:      Spouse name: N/A     Number of children: N/A     Years of education: N/A     Social History Main Topics     Smoking status: Current Every Day Smoker     Packs/day: 1.00     Types: Cigarettes     Smokeless tobacco: Never Used     Alcohol use No      Comment: Hx of abuse     Drug use: No     Sexual activity: Not Asked     Other Topics Concern     None     Social History Narrative          Review of Systems   Constitutional:        Review of systems is positive for bilateral knee pain and she does have liver disease and some aspect of encephalopathy at this time.  She denies any fevers chills nausea vomiting  diarrhea change in vision hearing taste or smell weakness one side the other chest pain or shortness of breath.  She denies any other issues at this time however she is a poor historian so the review of systems is in question.       Vitals:    01/30/18 0743   BP: 124/77   Pulse: 82   Resp: 18   Temp: 97.7  F (36.5  C)   SpO2: 97%       Physical Exam   Constitutional: She appears well-nourished. No distress.   HENT:   Head: Normocephalic and atraumatic.   Nose: Nose normal.   Mouth/Throat: No oropharyngeal exudate.   Eyes: Right eye exhibits no discharge. Left eye exhibits no discharge.   Neck: Neck supple. No thyromegaly present.   Cardiovascular: Normal rate and regular rhythm.    Pulmonary/Chest: Effort normal and breath sounds normal. No respiratory distress. She has no wheezes. She has no rales.   Abdominal: Soft. Bowel sounds are normal. She exhibits distension. There is no tenderness. There is no rebound.   Musculoskeletal: She exhibits edema and tenderness.   Patient's tenderness is located in the knees bilateral.   Neurological: She is alert. She displays normal reflexes. No cranial nerve deficit. She exhibits normal muscle tone.   Skin: Skin is warm and dry. She is not diaphoretic.   Psychiatric:   Patient's affect was flat at this time.       Medication List:  Current Outpatient Prescriptions   Medication Sig     albuterol (PROAIR HFA;PROVENTIL HFA;VENTOLIN HFA) 90 mcg/actuation inhaler Inhale 1 puff as needed for wheezing.     ferrous gluconate 324 mg (37.5 mg iron) Tab Take 1 tablet by mouth daily.     folic acid (FOLVITE) 1 MG tablet Take 1 mg by mouth daily.     furosemide (LASIX) 20 MG tablet Take 20 mg by mouth daily.     gabapentin (NEURONTIN) 100 MG capsule Take 200 mg by mouth 3 (three) times a day.     lactulose (CEPHULAC) 10 gram packet Take 30 g by mouth 3 (three) times a day.     levOCARNitine (CARNITOR) 330 mg tablet Take 990 mg by mouth 2 (two) times a day.     MULTIVITAMIN ORAL Take 1  tablet by mouth daily.     oxyCODONE (ROXICODONE) 5 MG immediate release tablet Take 5 mg by mouth every 6 (six) hours as needed for pain.     pantoprazole (PROTONIX) 40 MG tablet Take 40 mg by mouth daily.     rifAXIMin (XIFAXAN) 550 mg Tab tablet Take 550 mg by mouth 2 (two) times a day.     risperiDONE (RISPERDAL) 1 MG tablet Take 1 mg by mouth daily before breakfast.     risperiDONE (RISPERDAL) 2 MG tablet Take 2 mg by mouth at bedtime.     spironolactone (ALDACTONE) 50 MG tablet Take 50 mg by mouth daily.       Labs: Labs are unavailable in the medical record at this time including ammonia levels.      Assessment:    ICD-10-CM    1. Alcohol abuse F10.10    2. Mental status alteration R41.82    3. Alcoholic liver disease K70.9    4. Pain management R52    5. Polysubstance abuse F19.10        Plan: Plan at this time ammonia level be done today as well as a basic metabolic profile and liver function tests.  MRI be done today of abdomen pelvis and she will get an orthotic consult for her knees.  She may be candidate for knee replacements at this time however we need Ortho to consult this.  I will refer her to liver specialist outpatient at this time and will check weights daily and notify MD if any 2 pounds weight changes.  Given her history of pancreatitis I would recommend an A1c today.  She does not have any blood sugars drawn at this time and I will check blood sugars daily at alternating times.  I will continue to monitor above medical problems and no other changes to care plan at this time.        Electronically signed by: Ant Garcia DO

## 2021-06-16 NOTE — PROGRESS NOTES
"[facility]    Name:   Mildred SNYDER  : 1975  Facility:   [facility] [[id number]]   Room: 222B  Code Status: UNKNOWN -   Fac type:   NF (Long Term Care, LTC) - Locked unit    CHIEF COMPLAINT / REASON FOR VISIT:  Chief Complaint   Patient presents with     Review Of Multiple Medical Conditions     Follow-up to new admission    Patient was last seen by Dr. Garcia for an admission visit on 18.    HPI: Mildred is a 42 y.o.  female with alcoholic liver disease and chronic kidney disease.  She has had elevated ammonia levels and is on lactulose (scheduled twice daily, but there have been reports that she is refusing).  She also has a history of polysubstance abuse (including meth) and traumatic brain injury.  Little information is available about her previous hospitalization.  She went there for bilateral knee pain.  She has mild neurocognitive disorder and psychosis and is unable to recall details of her recent hospitalization.  She has been seen by psychiatry here.  She has a history of severe impairment in psychosocial functioning and health problems related to her alcohol abuse.        CURRENT ISSUES    She tells me she has pain in her knee (arthritis) but warns me to \"do not touch them.\"  She has had this knee pain for a long time.  She looks depressed but denies any depression.  She has, however, been treated by psychiatry for depression and anxiety.  She does tell me that she would rather be home.  She was seen by Sukhi Dominguez, psychologist, on 18.    She is a diabetic, but blood glucose levels look good.  There is only one > 200 and only one < 100.      She was scheduled for an MRI of her abdomen and pelvis the end of January.  I am unable to find any of the results of that.      ROS: She communicates very little, but she does not appear to be experiencing any headaches or chest pains, coughing or congestion, nausea or vomiting, " dizziness or dyspnea, dysuria, constipation or diarrhea, difficulty chewing or swallowing, or any integumentary issues.    Past Medical History:   Diagnosis Date     Anxiety      Arthritis      Cognitive disorder      Depression      Encephalopathy      Liver disease      Substance abuse               Family History   Problem Relation Age of Onset     Alcoholism Mother      Liver cancer Mother      Lung cancer Father      Heart attack Father      Heart attack Brother      Social History     Social History     Marital status:      Spouse name: N/A     Number of children: N/A     Years of education: N/A     Social History Main Topics     Smoking status: Current Every Day Smoker     Packs/day: 1.00     Types: Cigarettes     Smokeless tobacco: Never Used     Alcohol use No      Comment: Hx of abuse     Drug use: No     Sexual activity: Not on file     Other Topics Concern     Not on file     Social History Narrative     MEDICATIONS: Reviewed from the MAR, physician orders, and earlier progress notes.  Current Outpatient Prescriptions   Medication Sig     albuterol (PROAIR HFA;PROVENTIL HFA;VENTOLIN HFA) 90 mcg/actuation inhaler Inhale 1 puff as needed for wheezing.     ferrous gluconate 324 mg (37.5 mg iron) Tab Take 1 tablet by mouth daily.     folic acid (FOLVITE) 1 MG tablet Take 1 mg by mouth daily.     furosemide (LASIX) 20 MG tablet Take 20 mg by mouth daily.     gabapentin (NEURONTIN) 100 MG capsule Take 200 mg by mouth 3 (three) times a day.     lactulose (CEPHULAC) 10 gram packet Take 30 g by mouth 3 (three) times a day.     levOCARNitine (CARNITOR) 330 mg tablet Take 990 mg by mouth 2 (two) times a day.     MULTIVITAMIN ORAL Take 1 tablet by mouth daily.     oxyCODONE (ROXICODONE) 5 MG immediate release tablet Take 1 tablet (5 mg total) by mouth every 6 (six) hours as needed for pain.     pantoprazole (PROTONIX) 40 MG tablet Take 40 mg by mouth daily.     rifAXIMin (XIFAXAN) 550 mg Tab tablet Take 550  "mg by mouth 2 (two) times a day.     risperiDONE (RISPERDAL) 1 MG tablet Take 1 mg by mouth daily before breakfast.     risperiDONE (RISPERDAL) 2 MG tablet Take 2 mg by mouth at bedtime.     spironolactone (ALDACTONE) 50 MG tablet Take 50 mg by mouth daily.     ALLERGIES:   Allergies   Allergen Reactions     Acetaminophen      Ciprofloxacin      seizure     Citalopram Nausea And Vomiting     Zolpidem      Sleep walks and eats      DIET: Unknown    Vitals:    02/11/18 1514   BP: 145/77   Pulse: 67   Resp: 17   Temp: 96.9  F (36.1  C)   Weight: (!) 230 lb (104.3 kg)   Height: 5' 6\" (1.676 m)     Body mass index is 37.12 kg/(m^2).    EXAMINATION:   General: Rather young female, looking much older than her stated age, lying in bed.  She communicates very little.  Head: Normocephalic and atraumatic.   Eyes: PERRLA, sclerae clear.   ENT: Moist oral mucosa.  Dentition is poor, and she has only a few remaining teeth.  Cardiovascular: Regular rate and rhythm.  No appreciable murmur.  Respiratory: Lungs clear to auscultation bilaterally.   Abdomen: Soft and nontender.   Musculoskeletal/Extremities: No peripheral edema.  Integument: No rashes, clinically significant lesions, or skin breakdown.   Cognitive/Psychiatric: Cognitive deficits without available cognitive testing scores.    DIAGNOSTICS:   No results found for this or any previous visit.    No results found for: WBC, HGB, HCT, MCV, PLT  CrCl cannot be calculated (No order found.).    ASSESSMENT/Plan:      ICD-10-CM    1. Liver cirrhosis, alcoholic K70.30    2. Hepatic encephalopathy K72.90    3. H/O traumatic brain injury Z87.820    4. Psychosis, unspecified psychosis type F29    5. Non compliance w medication regimen Z91.14    6. Bilateral knee pain M25.561     M25.562    7. Seizure disorder G40.909    8. GERD (gastroesophageal reflux disease) K21.9      CHANGES:    None.    CARE PLAN:    The care plan has been reviewed and all orders signed. Changes to care plan, if " any, as noted. Otherwise, continue care plan of care.    The above has been created using voice recognition software. Please be aware that this may unintentionally  produce inaccuracies and/or nonsensical sentences.      Electronically signed by: Michael Montelongo CNP

## 2022-03-16 NOTE — PLAN OF CARE
----- Message from Jenny Nath sent at 3/16/2022 10:07 AM CDT -----  Contact: DAIN HANSON [[id number]]  .Type:  Needs Medical Advice    Who Called: DAIN HANSON [[id number]]  Would the patient rather a call back or a response via [facility]? Call   Best Call Back Number: .[phone] (home)    Additional Information: Pt states that the information in regards to his upcoming procedure is not located in his my chart and that he would like for someone to call him         Problem: Psychotic Symptoms  Goal: Psychotic Symptoms  Signs and symptoms of listed problems will be absent or manageable.   Outcome: Therapy, progress toward functional goals is gradual  Pt did accept 1400 medications including lactulose and prn haldol 5 mg when approached again and offered ginger ale as enticement @ 6422.

## 2022-06-17 NOTE — DISCHARGE INSTRUCTIONS
Khalida GOMES Alexxbk presents to the clinic today for suture/staple removal.      BACKGROUND:  The patient has had the sutures/staples placed on 06/11/2022  There has been no history of infection or drainage.    ASSESSMENT:   Incision is clean without signs of infection and edges are well approximated.    4 sutures/staples are seen located on the right eyebrow.        RECOMMENDATION (ACTION):    Area is prepped with an alcohol swab and 4 sutures are removed from eyebrow without difficulty.    Steri strips placed as needed.   Routine wound care discussed.    The patient will follow up as needed.    Azam Lilly, SANJANAN, RN, PHN  [facility] ~ Registered Nurse  Clinic Triage ~ [facility]  June 17, 2022      Prescription approved per [facility] Refill Protocol.       We have scheduled an appt for you on Tuesday 5/2 at 12:00 pm with your Primary Care Provider, Dr. Ant Cavazos. Please bring your insurance card, ID, and these discharge papers with you to this appointment.  [facility] [address]  Phone #: [phone]

## 2024-10-23 NOTE — PROGRESS NOTES
12/15/17 1209   Behavioral Health   Hallucinations denies / not responding to hallucinations   Thinking other (see comment)  (concentration reported as better; no delusional statements)   Orientation person: oriented;place: oriented   Insight other (see comment)  (limited)   Judgement impaired   Eye Contact at examiner   Affect full range affect   Mood mood is calm   Physical Appearance/Attire appears stated age;attire appropriate to age and situation;neat   Hygiene other (see comment)  (adequate)   Suicidality other (see comments)  (denied SI)   Self Injury other (see comment)  (denied SIB urges)   Elopement (no indicators this shift)   Activity isolative;withdrawn;other (see comment)   Speech clear;coherent   Psychomotor / Gait slow;balanced;steady  (uses a walker for stability)   Sleep/Rest/Relaxation   Day/Evening Time Hours napping;resting in bed   Safety   Suicidality Status 15   Coping/Psychosocial   Verbalized Emotional State acceptance   Activities of Daily Living   Hygiene/Grooming independent   Oral Hygiene independent   Dress independent   Room Organization independent   Groups   Details no group involvement   Behavioral Health Interventions   Psychotic Symptoms maintain safety precautions;monitor need to revise level of observation;maintain safe secure environment;reality orientation;simple, clear language;establish therapeutic relationship;assist with developing & utilizing healthy coping strategies;build upon strengths;monitor confusion, memory loss, decision making ability and reorient / intervent as needed   Social and Therapeutic Interventions (Psychotic Symptoms) encourage socialization with peers;encourage participation in therapeutic groups and milieu activities     Patient in her room and bed for most of the shift, coming out primarily for lunch and to make requests.  No peer interaction or program involvement.  Pleasant and cooperative on approach with all unit and personal care protocols.   "Intake limited at mealtimes.  Stated that she was feeling \"much better now...\" and essentially denied all significant psychopathology - I.e. depression, anxiety, SIB urges, AH's, etc.  Stated that her concentration was better today. No overtly delusional statements or  overtly psychotic behaviors noted this shift so far.   Naveed Dyson   12/15/2017  " Attending to bill

## 2025-03-18 NOTE — PROGRESS NOTES
"    [facility], [place]   Psychiatric Progress Note        Interim History:     The patient's care was discussed with the treatment team during the daily team meeting and/or staff's chart notes were reviewed.  Staff report patient has spent more time inside of her room. She appeared to be despondent and sad. Was heard by staff talking to herself, but again denied presence of hallucinations. Oriented x 3. Ambulates with the help of walker. Was irritable, even started yelling today: \"You are keeping me here. I should be allowed to leave\". She did calm down, but refused to talk.    She didn't have any questions during today's interview. Was reported to eat well.       Medications:       gabapentin  100 mg Oral TID     ferrous gluconate  324 mg Oral Daily with breakfast     lactulose  30 g Oral TID     haloperidol  2.5 mg Oral BID     risperiDONE  1 mg Oral QAM     risperiDONE  2 mg Oral At Bedtime     pantoprazole  40 mg Oral Daily     multivitamin, therapeutic with minerals  1 tablet Oral Daily     folic acid  1 mg Oral Daily     rifaximin  550 mg Oral BID     spironolactone  50 mg Oral Daily     furosemide  20 mg Oral Daily          Allergies:     Allergies   Allergen Reactions     Acetaminophen      Ambien [Zolpidem Tartrate] Other (See Comments)     Sleep walks and eats     Ciprofloxacin Other (See Comments)     Seizure.     Citalopram Nausea and Vomiting          Labs:     No results found for this or any previous visit (from the past 24 hour(s)).       Psychiatric Examination:     Vitals:    10/22/17 0832 10/23/17 0900 10/23/17 1500 10/23/17 1644   BP: 106/73 110/69  92/55   BP Location: Right arm Left arm  Left arm   Pulse: 71 80  66   Resp: 14 16  20   Temp: 97.2  F (36.2  C) 97.6  F (36.4  C)  98.2  F (36.8  C)   TempSrc: Oral Oral  Oral   SpO2:   99%    Weight:                       Lying Orthostatic BP: 103/51         Sitting Orthostatic BP: 106/73         Standing Orthostatic " BP:  (Refused )       Appearance: awake, alert, adequately groomed, appeared older than stated age and no apparent distress  Attitude: somewhat cooperative and guarded  Eye Contact:  limited  Mood:  Sad, irritable  Affect:  intensity is blunted  Speech:  clear, coherent and slow  Psychomotor Behavior:  no evidence of tardive dyskinesia, dystonia, or tics  Throught Process:  goal oriented  Associations:  no loose associations  Thought Content:  no evidence of suicidal ideation or homicidal ideation, possibly, auditory hallucinations present, Visual hallucinations are present off and on, delusions are present.   Insight:  limited  Judgement:  limited  Oriented to:  time, person, and place  Attention Span and Concentration:  fair  Recent and Remote Memory:  fair         Precautions:     Behavioral Orders   Procedures     Code 2     Elopement precautions     Routine Programming     As clinically indicated     Seizure precautions     Status 15     Every 15 minutes.     Withdrawal precautions          Diagnoses:     Cognitive Disorder NOS.   Encephalopathy probably multifactorial such as chronic hepatic encephalopathy, organic brain damage due to encephalopathy, alcohol drinking, possibly underlying psychotic illness; possibly head injury contributes to patient's symptoms.   Alcohol use disorder.   amphetamine dependence vs abuse.          Plan:     No medication changes today.    Legal Status and Disposition:  --  Under full MICD commitment.   -- the patient appears to be at her current baseline, she is on waiting list for [facility]. Referred to several NH and memory care unit, was rejected by number of them. Per [facility], [facility] is working on CADI waiver for group home placement. See discussion above. Will continue to provide support and structure.                   [FreeTextEntry1] : GAIT: No limp. Good coordination and balance noted. GENERAL: alert, cooperative pleasant young 14 yo female in NAD SKIN: The skin is intact, warm, pink and dry over the area examined. EYES: Normal conjunctiva, normal eyelids and pupils were equal and round. ENT: normal ears, normal nose and normal lips. CARDIOVASCULAR: brisk capillary refill, but no peripheral edema. RESPIRATORY: The patient is in no apparent respiratory distress. They're taking full deep breaths without use of accessory muscles or evidence of audible wheezes or stridor without the use of a stethoscope. Normal respiratory effort. ABDOMEN: not examined   right foot: mild pre hallux valgus noted right more than left. Mild tenderness over the MTP joint noted great toe right. no tenderness over other MTP joints noted.  FUll ROM. No tenderness with great toe ROM. Mild tenderness with abduction of the great toe. No instability to stress Brisk cap refill sensation grossly intact

## (undated) RX ORDER — FENTANYL CITRATE 50 UG/ML
INJECTION, SOLUTION INTRAMUSCULAR; INTRAVENOUS
Status: DISPENSED
Start: 2020-03-10

## (undated) RX ORDER — PROPOFOL 10 MG/ML
INJECTION, EMULSION INTRAVENOUS
Status: DISPENSED
Start: 2020-03-10

## (undated) RX ORDER — LIDOCAINE HYDROCHLORIDE 20 MG/ML
SOLUTION OROPHARYNGEAL
Status: DISPENSED
Start: 2020-03-10

## (undated) RX ORDER — LIDOCAINE HYDROCHLORIDE 20 MG/ML
INJECTION, SOLUTION EPIDURAL; INFILTRATION; INTRACAUDAL; PERINEURAL
Status: DISPENSED
Start: 2020-03-10

## (undated) RX ORDER — ONDANSETRON 2 MG/ML
INJECTION INTRAMUSCULAR; INTRAVENOUS
Status: DISPENSED
Start: 2020-03-10